# Patient Record
Sex: MALE | Race: WHITE | Employment: OTHER | URBAN - METROPOLITAN AREA
[De-identification: names, ages, dates, MRNs, and addresses within clinical notes are randomized per-mention and may not be internally consistent; named-entity substitution may affect disease eponyms.]

---

## 2017-01-20 RX ORDER — LEVOCETIRIZINE DIHYDROCHLORIDE 5 MG/1
TABLET, FILM COATED ORAL
Qty: 30 TABLET | Refills: 2 | Status: SHIPPED | OUTPATIENT
Start: 2017-01-20 | End: 2017-04-17 | Stop reason: SDUPTHER

## 2017-02-08 RX ORDER — TRAMADOL HYDROCHLORIDE 50 MG/1
50-100 TABLET ORAL EVERY 6 HOURS PRN
Qty: 60 TABLET | Refills: 0 | Status: SHIPPED | OUTPATIENT
Start: 2017-02-08 | End: 2017-04-19 | Stop reason: SDUPTHER

## 2017-02-17 ENCOUNTER — OFFICE VISIT (OUTPATIENT)
Dept: FAMILY MEDICINE CLINIC | Age: 67
End: 2017-02-17

## 2017-02-17 VITALS
SYSTOLIC BLOOD PRESSURE: 118 MMHG | HEIGHT: 73 IN | DIASTOLIC BLOOD PRESSURE: 76 MMHG | WEIGHT: 170.4 LBS | OXYGEN SATURATION: 99 % | BODY MASS INDEX: 22.58 KG/M2 | HEART RATE: 98 BPM | RESPIRATION RATE: 18 BRPM

## 2017-02-17 DIAGNOSIS — J01.40 ACUTE NON-RECURRENT PANSINUSITIS: ICD-10-CM

## 2017-02-17 DIAGNOSIS — B37.0 THRUSH: Primary | ICD-10-CM

## 2017-02-17 DIAGNOSIS — J45.901 ASTHMA EXACERBATION: ICD-10-CM

## 2017-02-17 PROCEDURE — 99214 OFFICE O/P EST MOD 30 MIN: CPT | Performed by: NURSE PRACTITIONER

## 2017-02-17 RX ORDER — PREDNISONE 10 MG/1
TABLET ORAL
Qty: 36 TABLET | Refills: 0 | Status: SHIPPED | OUTPATIENT
Start: 2017-02-17 | End: 2017-04-07 | Stop reason: ALTCHOICE

## 2017-02-17 RX ORDER — TIZANIDINE 4 MG/1
TABLET ORAL
Qty: 30 TABLET | Refills: 5 | Status: SHIPPED | OUTPATIENT
Start: 2017-02-17 | End: 2017-06-13

## 2017-02-17 RX ORDER — AMOXICILLIN AND CLAVULANATE POTASSIUM 875; 125 MG/1; MG/1
1 TABLET, FILM COATED ORAL 2 TIMES DAILY
Qty: 20 TABLET | Refills: 0 | Status: SHIPPED | OUTPATIENT
Start: 2017-02-17 | End: 2017-02-27

## 2017-02-17 RX ORDER — TOPIRAMATE 25 MG/1
TABLET ORAL
Qty: 240 TABLET | Refills: 0 | Status: SHIPPED | OUTPATIENT
Start: 2017-02-17 | End: 2017-03-15 | Stop reason: SDUPTHER

## 2017-02-17 RX ORDER — CLOTRIMAZOLE 10 MG/1
10 LOZENGE ORAL; TOPICAL
Qty: 50 TABLET | Refills: 0 | Status: SHIPPED | OUTPATIENT
Start: 2017-02-17 | End: 2017-03-10 | Stop reason: SDUPTHER

## 2017-03-10 ENCOUNTER — TELEPHONE (OUTPATIENT)
Dept: FAMILY MEDICINE CLINIC | Age: 67
End: 2017-03-10

## 2017-03-10 DIAGNOSIS — B37.0 THRUSH: ICD-10-CM

## 2017-03-10 RX ORDER — CLOTRIMAZOLE 10 MG/1
10 LOZENGE ORAL; TOPICAL
Qty: 50 TABLET | Refills: 2 | Status: SHIPPED | OUTPATIENT
Start: 2017-03-10 | End: 2018-03-13 | Stop reason: SDUPTHER

## 2017-03-16 RX ORDER — MOMETASONE FUROATE AND FORMOTEROL FUMARATE DIHYDRATE 200; 5 UG/1; UG/1
AEROSOL RESPIRATORY (INHALATION)
Qty: 13 G | Refills: 5 | Status: SHIPPED | OUTPATIENT
Start: 2017-03-16 | End: 2017-08-03 | Stop reason: SDUPTHER

## 2017-03-16 RX ORDER — TOPIRAMATE 25 MG/1
TABLET ORAL
Qty: 240 TABLET | Refills: 0 | Status: SHIPPED | OUTPATIENT
Start: 2017-03-16 | End: 2017-04-19 | Stop reason: SDUPTHER

## 2017-04-07 ENCOUNTER — OFFICE VISIT (OUTPATIENT)
Dept: FAMILY MEDICINE CLINIC | Age: 67
End: 2017-04-07

## 2017-04-07 VITALS
RESPIRATION RATE: 16 BRPM | HEART RATE: 78 BPM | BODY MASS INDEX: 23.59 KG/M2 | HEIGHT: 73 IN | SYSTOLIC BLOOD PRESSURE: 124 MMHG | DIASTOLIC BLOOD PRESSURE: 78 MMHG | OXYGEN SATURATION: 98 % | WEIGHT: 178 LBS

## 2017-04-07 DIAGNOSIS — J01.41 ACUTE RECURRENT PANSINUSITIS: Primary | ICD-10-CM

## 2017-04-07 DIAGNOSIS — B37.0 ORAL THRUSH: ICD-10-CM

## 2017-04-07 PROCEDURE — 99214 OFFICE O/P EST MOD 30 MIN: CPT | Performed by: FAMILY MEDICINE

## 2017-04-07 RX ORDER — AMOXICILLIN AND CLAVULANATE POTASSIUM 875; 125 MG/1; MG/1
1 TABLET, FILM COATED ORAL 2 TIMES DAILY
Qty: 20 TABLET | Refills: 0 | Status: SHIPPED | OUTPATIENT
Start: 2017-04-07 | End: 2017-04-17

## 2017-04-07 ASSESSMENT — PATIENT HEALTH QUESTIONNAIRE - PHQ9
2. FEELING DOWN, DEPRESSED OR HOPELESS: 0
1. LITTLE INTEREST OR PLEASURE IN DOING THINGS: 0
SUM OF ALL RESPONSES TO PHQ9 QUESTIONS 1 & 2: 0
SUM OF ALL RESPONSES TO PHQ QUESTIONS 1-9: 0

## 2017-04-07 ASSESSMENT — ENCOUNTER SYMPTOMS
HOARSE VOICE: 0
SORE THROAT: 1
COUGH: 0
SINUS PRESSURE: 1
SHORTNESS OF BREATH: 1
SWOLLEN GLANDS: 0

## 2017-04-17 RX ORDER — LEVOCETIRIZINE DIHYDROCHLORIDE 5 MG/1
TABLET, FILM COATED ORAL
Qty: 30 TABLET | Refills: 2 | Status: SHIPPED | OUTPATIENT
Start: 2017-04-17 | End: 2017-08-23 | Stop reason: SDUPTHER

## 2017-04-19 ENCOUNTER — TELEPHONE (OUTPATIENT)
Dept: FAMILY MEDICINE CLINIC | Age: 67
End: 2017-04-19

## 2017-04-19 RX ORDER — TRAMADOL HYDROCHLORIDE 50 MG/1
50-100 TABLET ORAL EVERY 6 HOURS PRN
Qty: 60 TABLET | Refills: 0 | Status: SHIPPED | OUTPATIENT
Start: 2017-04-19 | End: 2017-06-09 | Stop reason: SDUPTHER

## 2017-04-19 RX ORDER — TOPIRAMATE 25 MG/1
TABLET ORAL
Qty: 240 TABLET | Refills: 0 | Status: SHIPPED | OUTPATIENT
Start: 2017-04-19 | End: 2017-05-17 | Stop reason: SDUPTHER

## 2017-05-30 ENCOUNTER — TELEPHONE (OUTPATIENT)
Dept: FAMILY MEDICINE CLINIC | Age: 67
End: 2017-05-30

## 2017-06-09 RX ORDER — TRAMADOL HYDROCHLORIDE 50 MG/1
TABLET ORAL
Qty: 60 TABLET | Refills: 0 | Status: SHIPPED | OUTPATIENT
Start: 2017-06-09 | End: 2017-06-13 | Stop reason: SDUPTHER

## 2017-06-13 ENCOUNTER — OFFICE VISIT (OUTPATIENT)
Dept: FAMILY MEDICINE CLINIC | Age: 67
End: 2017-06-13

## 2017-06-13 VITALS
WEIGHT: 175 LBS | SYSTOLIC BLOOD PRESSURE: 134 MMHG | HEART RATE: 88 BPM | DIASTOLIC BLOOD PRESSURE: 74 MMHG | HEIGHT: 73 IN | BODY MASS INDEX: 23.19 KG/M2 | RESPIRATION RATE: 15 BRPM | OXYGEN SATURATION: 98 %

## 2017-06-13 DIAGNOSIS — R07.81 PLEURITIC PAIN: ICD-10-CM

## 2017-06-13 DIAGNOSIS — G89.29 CHRONIC NONINTRACTABLE HEADACHE, UNSPECIFIED HEADACHE TYPE: ICD-10-CM

## 2017-06-13 DIAGNOSIS — M25.471 ANKLE SWELLING, RIGHT: Primary | ICD-10-CM

## 2017-06-13 DIAGNOSIS — D69.2 PURPURA (HCC): ICD-10-CM

## 2017-06-13 DIAGNOSIS — M25.571 CHRONIC PAIN OF RIGHT ANKLE: ICD-10-CM

## 2017-06-13 DIAGNOSIS — R51.9 CHRONIC NONINTRACTABLE HEADACHE, UNSPECIFIED HEADACHE TYPE: ICD-10-CM

## 2017-06-13 DIAGNOSIS — J45.40 MODERATE PERSISTENT ASTHMA WITHOUT COMPLICATION: ICD-10-CM

## 2017-06-13 DIAGNOSIS — G89.29 CHRONIC PAIN OF RIGHT ANKLE: ICD-10-CM

## 2017-06-13 LAB
A/G RATIO: 1.9 (ref 1.1–2.2)
ALBUMIN SERPL-MCNC: 4.4 G/DL (ref 3.4–5)
ALP BLD-CCNC: 87 U/L (ref 40–129)
ALT SERPL-CCNC: 21 U/L (ref 10–40)
ANION GAP SERPL CALCULATED.3IONS-SCNC: 14 MMOL/L (ref 3–16)
AST SERPL-CCNC: 20 U/L (ref 15–37)
BASOPHILS ABSOLUTE: 0 K/UL (ref 0–0.2)
BASOPHILS RELATIVE PERCENT: 0.4 %
BILIRUB SERPL-MCNC: 0.5 MG/DL (ref 0–1)
BUN BLDV-MCNC: 10 MG/DL (ref 7–20)
CALCIUM SERPL-MCNC: 9.2 MG/DL (ref 8.3–10.6)
CHLORIDE BLD-SCNC: 106 MMOL/L (ref 99–110)
CO2: 23 MMOL/L (ref 21–32)
CREAT SERPL-MCNC: 0.7 MG/DL (ref 0.8–1.3)
D DIMER: <200 NG/ML DDU (ref 0–229)
EOSINOPHILS ABSOLUTE: 0.1 K/UL (ref 0–0.6)
EOSINOPHILS RELATIVE PERCENT: 1.2 %
GFR AFRICAN AMERICAN: >60
GFR NON-AFRICAN AMERICAN: >60
GLOBULIN: 2.3 G/DL
GLUCOSE BLD-MCNC: 90 MG/DL (ref 70–99)
HCT VFR BLD CALC: 42.2 % (ref 40.5–52.5)
HEMOGLOBIN: 14.1 G/DL (ref 13.5–17.5)
LYMPHOCYTES ABSOLUTE: 1.8 K/UL (ref 1–5.1)
LYMPHOCYTES RELATIVE PERCENT: 24.4 %
MCH RBC QN AUTO: 32.7 PG (ref 26–34)
MCHC RBC AUTO-ENTMCNC: 33.3 G/DL (ref 31–36)
MCV RBC AUTO: 98.2 FL (ref 80–100)
MONOCYTES ABSOLUTE: 0.6 K/UL (ref 0–1.3)
MONOCYTES RELATIVE PERCENT: 9 %
NEUTROPHILS ABSOLUTE: 4.7 K/UL (ref 1.7–7.7)
NEUTROPHILS RELATIVE PERCENT: 65 %
PDW BLD-RTO: 13.4 % (ref 12.4–15.4)
PLATELET # BLD: 253 K/UL (ref 135–450)
PMV BLD AUTO: 7.1 FL (ref 5–10.5)
POTASSIUM SERPL-SCNC: 4.6 MMOL/L (ref 3.5–5.1)
RBC # BLD: 4.3 M/UL (ref 4.2–5.9)
SODIUM BLD-SCNC: 143 MMOL/L (ref 136–145)
TOTAL PROTEIN: 6.7 G/DL (ref 6.4–8.2)
WBC # BLD: 7.2 K/UL (ref 4–11)

## 2017-06-13 PROCEDURE — 36415 COLL VENOUS BLD VENIPUNCTURE: CPT | Performed by: FAMILY MEDICINE

## 2017-06-13 PROCEDURE — 99214 OFFICE O/P EST MOD 30 MIN: CPT | Performed by: FAMILY MEDICINE

## 2017-06-13 RX ORDER — BACLOFEN 10 MG/1
10 TABLET ORAL 3 TIMES DAILY PRN
Qty: 90 TABLET | Refills: 2 | Status: SHIPPED | OUTPATIENT
Start: 2017-06-13 | End: 2017-10-16

## 2017-06-13 RX ORDER — TRAMADOL HYDROCHLORIDE 50 MG/1
TABLET ORAL
Qty: 60 TABLET | Refills: 1 | Status: SHIPPED | OUTPATIENT
Start: 2017-06-13 | End: 2017-09-11 | Stop reason: SDUPTHER

## 2017-06-13 RX ORDER — RIZATRIPTAN BENZOATE 10 MG/1
TABLET, ORALLY DISINTEGRATING ORAL
Qty: 9 TABLET | Refills: 5 | Status: SHIPPED | OUTPATIENT
Start: 2017-06-13 | End: 2017-07-03

## 2017-06-16 ENCOUNTER — TELEPHONE (OUTPATIENT)
Dept: FAMILY MEDICINE CLINIC | Age: 67
End: 2017-06-16

## 2017-06-16 ENCOUNTER — HOSPITAL ENCOUNTER (OUTPATIENT)
Dept: OTHER | Age: 67
Discharge: HOME | End: 2017-06-16
Attending: FAMILY MEDICINE | Admitting: FAMILY MEDICINE

## 2017-06-16 DIAGNOSIS — M25.571 CHRONIC PAIN OF RIGHT ANKLE: ICD-10-CM

## 2017-06-16 DIAGNOSIS — M25.471 ANKLE SWELLING, RIGHT: ICD-10-CM

## 2017-06-16 DIAGNOSIS — G89.29 CHRONIC PAIN OF RIGHT ANKLE: ICD-10-CM

## 2017-06-16 RX ORDER — ERGOCALCIFEROL 1.25 MG/1
CAPSULE ORAL
Qty: 4 CAPSULE | Refills: 5 | Status: SHIPPED | OUTPATIENT
Start: 2017-06-16 | End: 2017-11-25 | Stop reason: SDUPTHER

## 2017-06-19 ENCOUNTER — TELEPHONE (OUTPATIENT)
Dept: FAMILY MEDICINE CLINIC | Age: 67
End: 2017-06-19

## 2017-06-19 DIAGNOSIS — M25.571 RIGHT ANKLE PAIN, UNSPECIFIED CHRONICITY: Primary | ICD-10-CM

## 2017-06-19 RX ORDER — MELOXICAM 15 MG/1
15 TABLET ORAL DAILY
Qty: 30 TABLET | Refills: 0 | Status: SHIPPED | OUTPATIENT
Start: 2017-06-19 | End: 2017-07-28 | Stop reason: SDUPTHER

## 2017-07-03 ENCOUNTER — TELEPHONE (OUTPATIENT)
Dept: FAMILY MEDICINE CLINIC | Age: 67
End: 2017-07-03

## 2017-07-03 RX ORDER — RIZATRIPTAN BENZOATE 10 MG/1
TABLET, ORALLY DISINTEGRATING ORAL
Qty: 18 TABLET | Refills: 5 | Status: SHIPPED | OUTPATIENT
Start: 2017-07-03 | End: 2018-02-12 | Stop reason: SDUPTHER

## 2017-07-03 RX ORDER — RIZATRIPTAN BENZOATE 10 MG/1
TABLET, ORALLY DISINTEGRATING ORAL
Qty: 9 TABLET | Refills: 5 | Status: SHIPPED | OUTPATIENT
Start: 2017-07-03 | End: 2017-07-03 | Stop reason: SDUPTHER

## 2017-07-28 DIAGNOSIS — M25.571 RIGHT ANKLE PAIN, UNSPECIFIED CHRONICITY: ICD-10-CM

## 2017-07-28 RX ORDER — MELOXICAM 15 MG/1
TABLET ORAL
Qty: 30 TABLET | Refills: 0 | Status: SHIPPED | OUTPATIENT
Start: 2017-07-28 | End: 2017-08-23 | Stop reason: SDUPTHER

## 2017-07-31 RX ORDER — ONDANSETRON 4 MG/1
TABLET, ORALLY DISINTEGRATING ORAL
Qty: 10 TABLET | Refills: 4 | Status: SHIPPED | OUTPATIENT
Start: 2017-07-31 | End: 2017-10-19 | Stop reason: ALTCHOICE

## 2017-08-03 ENCOUNTER — TELEPHONE (OUTPATIENT)
Dept: FAMILY MEDICINE CLINIC | Age: 67
End: 2017-08-03

## 2017-08-23 DIAGNOSIS — M25.571 RIGHT ANKLE PAIN, UNSPECIFIED CHRONICITY: ICD-10-CM

## 2017-08-23 RX ORDER — LEVOCETIRIZINE DIHYDROCHLORIDE 5 MG/1
TABLET, FILM COATED ORAL
Qty: 30 TABLET | Refills: 2 | Status: SHIPPED | OUTPATIENT
Start: 2017-08-23 | End: 2017-11-16 | Stop reason: SDUPTHER

## 2017-08-23 RX ORDER — TOPIRAMATE 25 MG/1
TABLET ORAL
Qty: 240 TABLET | Refills: 2 | Status: SHIPPED | OUTPATIENT
Start: 2017-08-23 | End: 2017-11-16 | Stop reason: SDUPTHER

## 2017-08-23 RX ORDER — MELOXICAM 15 MG/1
TABLET ORAL
Qty: 30 TABLET | Refills: 0 | Status: SHIPPED | OUTPATIENT
Start: 2017-08-23 | End: 2017-09-12 | Stop reason: SDUPTHER

## 2017-09-12 DIAGNOSIS — M25.571 RIGHT ANKLE PAIN, UNSPECIFIED CHRONICITY: ICD-10-CM

## 2017-09-12 RX ORDER — TRAMADOL HYDROCHLORIDE 50 MG/1
TABLET ORAL
Qty: 60 TABLET | Refills: 0 | Status: SHIPPED | OUTPATIENT
Start: 2017-09-12 | End: 2017-10-17 | Stop reason: SDUPTHER

## 2017-09-13 RX ORDER — MELOXICAM 15 MG/1
TABLET ORAL
Qty: 30 TABLET | Refills: 0 | Status: SHIPPED | OUTPATIENT
Start: 2017-09-13 | End: 2017-10-16 | Stop reason: SDUPTHER

## 2017-10-16 ENCOUNTER — TELEPHONE (OUTPATIENT)
Dept: FAMILY MEDICINE CLINIC | Age: 67
End: 2017-10-16

## 2017-10-16 DIAGNOSIS — M25.571 RIGHT ANKLE PAIN, UNSPECIFIED CHRONICITY: ICD-10-CM

## 2017-10-16 NOTE — TELEPHONE ENCOUNTER
PT WANTING TO GO BACK ON FLEXERIL - STOP TAKING THE BACLOFEN - IT ISN'T WORKING THAT WELL FOR HIM      [facility]  [place], [address] [facility] -  [phone] - F [phone]      PT @  [phone]

## 2017-10-17 RX ORDER — TIZANIDINE 4 MG/1
TABLET ORAL
Qty: 30 TABLET | Refills: 2 | Status: SHIPPED | OUTPATIENT
Start: 2017-10-17 | End: 2017-10-18 | Stop reason: SDUPTHER

## 2017-10-17 RX ORDER — MELOXICAM 15 MG/1
TABLET ORAL
Qty: 30 TABLET | Refills: 5 | Status: SHIPPED | OUTPATIENT
Start: 2017-10-17 | End: 2018-04-12 | Stop reason: SDUPTHER

## 2017-10-17 RX ORDER — TRAMADOL HYDROCHLORIDE 50 MG/1
TABLET ORAL
Qty: 60 TABLET | Refills: 0 | Status: SHIPPED | OUTPATIENT
Start: 2017-10-17 | End: 2017-11-16 | Stop reason: SDUPTHER

## 2017-10-17 NOTE — TELEPHONE ENCOUNTER
SPOKE  TO PT AND ADVISED. HE WOULD LIKE TO TRY THE TIZANIDINE. MED PENDED TO [facility] FOR THE PREVIOUS DOSE HE WAS ON.

## 2017-10-18 ENCOUNTER — TELEPHONE (OUTPATIENT)
Dept: FAMILY MEDICINE CLINIC | Age: 67
End: 2017-10-18

## 2017-10-18 RX ORDER — TIZANIDINE 4 MG/1
TABLET ORAL
Qty: 30 TABLET | Refills: 2 | Status: SHIPPED | OUTPATIENT
Start: 2017-10-18 | End: 2018-01-15 | Stop reason: SDUPTHER

## 2017-10-18 NOTE — TELEPHONE ENCOUNTER
[facility] [address] - [place] [phone]      tiZANidine (ZANAFLEX) 4 MG tablet         Script was faxed yesterday -- it wasn't signed.    Please sign and fax back - thank you

## 2017-10-19 ENCOUNTER — OFFICE VISIT (OUTPATIENT)
Dept: FAMILY MEDICINE CLINIC | Age: 67
End: 2017-10-19

## 2017-10-19 VITALS
HEIGHT: 73 IN | DIASTOLIC BLOOD PRESSURE: 70 MMHG | WEIGHT: 173 LBS | BODY MASS INDEX: 22.93 KG/M2 | TEMPERATURE: 98 F | SYSTOLIC BLOOD PRESSURE: 122 MMHG | RESPIRATION RATE: 14 BRPM | HEART RATE: 74 BPM

## 2017-10-19 DIAGNOSIS — H00.015 HORDEOLUM EXTERNUM OF LEFT LOWER EYELID: Primary | ICD-10-CM

## 2017-10-19 DIAGNOSIS — B37.0 ORAL PHARYNGEAL CANDIDIASIS: ICD-10-CM

## 2017-10-19 PROCEDURE — 99213 OFFICE O/P EST LOW 20 MIN: CPT | Performed by: NURSE PRACTITIONER

## 2017-10-19 RX ORDER — FLUCONAZOLE 100 MG/1
TABLET ORAL
Qty: 15 TABLET | Refills: 0 | Status: SHIPPED | OUTPATIENT
Start: 2017-10-19 | End: 2018-09-11

## 2017-10-19 RX ORDER — SULFACETAMIDE SODIUM 100 MG/ML
2 SOLUTION/ DROPS OPHTHALMIC 4 TIMES DAILY
Qty: 15 ML | Refills: 0 | Status: SHIPPED | OUTPATIENT
Start: 2017-10-19 | End: 2018-07-10 | Stop reason: SDUPTHER

## 2017-10-19 NOTE — PROGRESS NOTES
06/13/17 175 lb (79.4 kg)   04/07/17 178 lb (80.7 kg)     BP Readings from Last 3 Encounters:   10/19/17 122/70   06/13/17 134/74   04/07/17 124/78        No results found for this visit on 10/19/17. Assessment    1. Hordeolum externum of left lower eyelid  sulfacetamide (BLEPH-10) 10 % ophthalmic solution   2. Oral pharyngeal candidiasis  fluconazole (DIFLUCAN) 100 MG tablet         Plan    Continue warm compresses 15-20 minutes 3-4 times daily to stye. Started on antibiotic eye drops. Thrush:  Ok to continue nystatin oral swish. Prescribed diflucan for additional treatment. Return if symptoms worsen or fail to improve.

## 2017-10-23 RX ORDER — INHALER, ASSIST DEVICES
SPACER (EA) MISCELLANEOUS
Qty: 1 DEVICE | Refills: 0 | Status: SHIPPED | OUTPATIENT
Start: 2017-10-23 | End: 2022-04-21 | Stop reason: SDUPTHER

## 2017-11-01 ENCOUNTER — TELEPHONE (OUTPATIENT)
Dept: FAMILY MEDICINE CLINIC | Age: 67
End: 2017-11-01

## 2017-11-01 RX ORDER — NEOMYCIN/POLYMYXIN B/HYDROCORT 3.5-10K-1
2 SUSPENSION, DROPS(FINAL DOSAGE FORM)(ML) OPHTHALMIC (EYE) 4 TIMES DAILY
Qty: 1 BOTTLE | Refills: 0 | Status: SHIPPED | OUTPATIENT
Start: 2017-11-01 | End: 2018-09-11 | Stop reason: ALTCHOICE

## 2017-11-01 NOTE — TELEPHONE ENCOUNTER
CALLED NAD SPOKE TO PATIENT. HE STATES THE WHITE PART OF THE STYE IS GONE, BUT STILL OOZING, AND BURNING AND IS REDDNESS STILL. BUT HE FEELS LIKE IT IS OVER ALL GETTING BETTER. PLEASE ADVISE?

## 2017-11-01 NOTE — TELEPHONE ENCOUNTER
I sent in a new drop with a couple of antibiotics in combination with a steroid. If this is not clearing the stye, I recommend an eye specialist to look at the lesion.

## 2017-11-01 NOTE — TELEPHONE ENCOUNTER
Patient is asking if he should get the flu shot and/ or the shingles shot    Also says his eye is still a bothersome    Not sure if he is going to need to come back in or not   said he will watch if for a couple of days

## 2017-11-17 RX ORDER — LEVOCETIRIZINE DIHYDROCHLORIDE 5 MG/1
TABLET, FILM COATED ORAL
Qty: 30 TABLET | Refills: 2 | Status: SHIPPED | OUTPATIENT
Start: 2017-11-17 | End: 2018-02-12

## 2017-11-17 RX ORDER — TOPIRAMATE 25 MG/1
TABLET ORAL
Qty: 240 TABLET | Refills: 2 | Status: SHIPPED | OUTPATIENT
Start: 2017-11-17 | End: 2018-02-12 | Stop reason: SDUPTHER

## 2017-11-27 RX ORDER — ERGOCALCIFEROL 1.25 MG/1
CAPSULE ORAL
Qty: 4 CAPSULE | Refills: 5 | Status: SHIPPED | OUTPATIENT
Start: 2017-11-27 | End: 2018-06-12 | Stop reason: SDUPTHER

## 2017-12-01 ENCOUNTER — OFFICE VISIT (OUTPATIENT)
Dept: FAMILY MEDICINE CLINIC | Age: 67
End: 2017-12-01

## 2017-12-01 VITALS
HEIGHT: 73 IN | BODY MASS INDEX: 23.17 KG/M2 | WEIGHT: 174.8 LBS | TEMPERATURE: 98.6 F | DIASTOLIC BLOOD PRESSURE: 68 MMHG | RESPIRATION RATE: 16 BRPM | HEART RATE: 84 BPM | SYSTOLIC BLOOD PRESSURE: 110 MMHG | OXYGEN SATURATION: 98 %

## 2017-12-01 DIAGNOSIS — J02.9 SORE THROAT: ICD-10-CM

## 2017-12-01 DIAGNOSIS — J06.9 VIRAL URI: Primary | ICD-10-CM

## 2017-12-01 DIAGNOSIS — R07.89 CHEST TIGHTNESS: ICD-10-CM

## 2017-12-01 LAB — S PYO AG THROAT QL: NORMAL

## 2017-12-01 PROCEDURE — 87880 STREP A ASSAY W/OPTIC: CPT | Performed by: NURSE PRACTITIONER

## 2017-12-01 PROCEDURE — 99214 OFFICE O/P EST MOD 30 MIN: CPT | Performed by: NURSE PRACTITIONER

## 2017-12-01 PROCEDURE — 94640 AIRWAY INHALATION TREATMENT: CPT | Performed by: NURSE PRACTITIONER

## 2017-12-01 RX ORDER — PREDNISONE 10 MG/1
TABLET ORAL
Qty: 20 TABLET | Refills: 0 | Status: SHIPPED | OUTPATIENT
Start: 2017-12-01 | End: 2018-02-23 | Stop reason: ALTCHOICE

## 2017-12-01 RX ORDER — ALBUTEROL SULFATE 2.5 MG/3ML
2.5 SOLUTION RESPIRATORY (INHALATION) ONCE
Status: COMPLETED | OUTPATIENT
Start: 2017-12-01 | End: 2017-12-01

## 2017-12-01 RX ORDER — AZITHROMYCIN 250 MG/1
250 TABLET, FILM COATED ORAL DAILY
Qty: 6 TABLET | Refills: 0 | Status: SHIPPED | OUTPATIENT
Start: 2017-12-01 | End: 2017-12-07

## 2017-12-01 RX ADMIN — ALBUTEROL SULFATE 2.5 MG: 2.5 SOLUTION RESPIRATORY (INHALATION) at 16:25

## 2017-12-01 ASSESSMENT — ENCOUNTER SYMPTOMS
COUGH: 1
CHEST TIGHTNESS: 1

## 2017-12-01 NOTE — PROGRESS NOTES
visit:      Sore throat  -     POCT rapid strep A- Negative  Viral URI  Chest tightness  RESP handout reviewed with pt he is encouraged to continue treating the symptoms  Breathing treatment x 1- pt states his chest does not feel as tight afterwARDS  albuterol (PROVENTIL) nebulizer solution 2.5 mg; Take 3 mLs by nebulization once  CA PRESSURIZED/NONPRESSURIZED INHALATION TREATMENT  predniSONE (DELTASONE) 10 MG tablet; 4 tabs x 2 d 3 tabs x 2 d 2 tabs x 2 d 1  tab x 2 d in am with food  avoid NSAIDs while on this medication  azithromycin (ZITHROMAX) 250 MG tablet;  Take 1 tablet by mouth daily for 6 days 2 tabs day 1 then 1 tab day 2- 5

## 2017-12-01 NOTE — PATIENT INSTRUCTIONS
Instructions for Respiratory Infections (SAVE THIS SHEET)   For the first 7-14 days of symptoms follow instructions below, even before being seen in the office or even during treatment with antibiotics, until symptom free. 1. Water: Drink 1 ounce of water for every 2 pounds of body weight for adults need 64 Ounces of water per day. This will loosen mucus in the head and chest & improve the weak feeling of dehydration, allow the body to get germ fighting resources to the infection. Half can be juice or sugar free Crystal Light. Don't count drinks with caffeine or carbonation. Infants can have Pedialyte liquid or freezer pops. Avoid salt if you have high Blood Pressure, swelling in the feet or ankles or have heart problems. 2. Humidity: Humidify the air to 35-50% ( or until the windows fog over slightly). Can use a humidifier, vaporizer, boil water on the stove or put a coffee can full of water on the heater vents. This will loosen mucus from infections and allergies. 3. Sleep: Get 8-10 hours a night and rest during the evening after work or school. If you have trouble sleeping, adults can take Melatonin 3mg up to 3 tabs at bedtime ( not for children or pregnant women). If Mono is suspected then sleep during 9PM to 9AM time span (if possible.)   4. Cough: Take cough medicines with Guaifenesin ( to loosen chest or head congestion) and Dextromethorphan ( to decrease excess cough). Robitussin D.M. Syrup every 4-6 hrs or Mucinex D.M. Pills twice a day. Use the pediatric formulations for children over 6 months making sure they are alcohol & sugar free for children, pregnant women, and diabetics. 5. Pain And Fevers: Take Acetaminophen ( Tylenol) for fevers, aches, and headaches. 2-500 mg every 8 hours for adults. Appropriate doses at bedtime for children may help them sleep better. If pregnant take 1 -500 mg. (Tylenol) every 8 hours as needed.  Ibuprofen may be used if not pregnant, but should be given with food to

## 2017-12-11 ENCOUNTER — TELEPHONE (OUTPATIENT)
Dept: FAMILY MEDICINE CLINIC | Age: 67
End: 2017-12-11

## 2017-12-11 NOTE — TELEPHONE ENCOUNTER
Pt saw Tara Wallace on 12/01/17 -- she was going to see if she could get a breathing machine for him  - he had a treatment while he was here and it really helped      Please call @  [phone]

## 2017-12-14 ENCOUNTER — TELEPHONE (OUTPATIENT)
Dept: FAMILY MEDICINE CLINIC | Age: 67
End: 2017-12-14

## 2017-12-14 DIAGNOSIS — J45.909 UNCOMPLICATED ASTHMA, UNSPECIFIED ASTHMA SEVERITY, UNSPECIFIED WHETHER PERSISTENT: Primary | ICD-10-CM

## 2017-12-18 ENCOUNTER — TELEPHONE (OUTPATIENT)
Dept: FAMILY MEDICINE CLINIC | Age: 67
End: 2017-12-18

## 2017-12-18 RX ORDER — ALBUTEROL SULFATE 2.5 MG/3ML
2.5 SOLUTION RESPIRATORY (INHALATION) EVERY 4 HOURS PRN
Qty: 25 VIAL | Refills: 2 | Status: SHIPPED | OUTPATIENT
Start: 2017-12-18 | End: 2019-08-26

## 2017-12-18 NOTE — TELEPHONE ENCOUNTER
CALLED AND SPOKE TO PATIENT. HE SAID HE THOUGHT HE WAS SUPPOSED TO GET THE NEBULIZER SOLUTION THAT COMES IN LITTLE VIALS AND JUST USE THE CONTENTS OF ONE VIAL PER TREATMENT 4 TIMES DAILY. WHEN HE WENT TO THE PHARMACY THE SCRIPT THAT WAS SENT OVER WAS FOR A CONCENTRATE AND HE HAD TO MIX THIS UP AND STUFF LIKE THAT. CAN WE CHANGE THIS SO HE CAN GET THE VIALS IN STEAD.

## 2017-12-18 NOTE — TELEPHONE ENCOUNTER
Patient called and stated that he talked to Melissa Hernández on the phone about his Abuterol rx he has questions on how to use it       Please call patient back

## 2017-12-28 ENCOUNTER — OFFICE VISIT (OUTPATIENT)
Dept: FAMILY MEDICINE CLINIC | Age: 67
End: 2017-12-28

## 2017-12-28 VITALS
HEART RATE: 80 BPM | DIASTOLIC BLOOD PRESSURE: 70 MMHG | BODY MASS INDEX: 23.7 KG/M2 | TEMPERATURE: 97.8 F | WEIGHT: 178.8 LBS | SYSTOLIC BLOOD PRESSURE: 128 MMHG | HEIGHT: 73 IN | OXYGEN SATURATION: 96 % | RESPIRATION RATE: 16 BRPM

## 2017-12-28 DIAGNOSIS — Z79.899 LONG TERM USE OF DRUG: ICD-10-CM

## 2017-12-28 DIAGNOSIS — E55.9 VITAMIN D DEFICIENCY: ICD-10-CM

## 2017-12-28 DIAGNOSIS — E53.8 B12 DEFICIENCY: ICD-10-CM

## 2017-12-28 DIAGNOSIS — M19.90 ARTHRITIS: ICD-10-CM

## 2017-12-28 DIAGNOSIS — R53.83 FATIGUE, UNSPECIFIED TYPE: ICD-10-CM

## 2017-12-28 DIAGNOSIS — Z12.5 SCREENING FOR PROSTATE CANCER: ICD-10-CM

## 2017-12-28 DIAGNOSIS — G43.719 INTRACTABLE CHRONIC MIGRAINE WITHOUT AURA AND WITHOUT STATUS MIGRAINOSUS: Primary | ICD-10-CM

## 2017-12-28 LAB
A/G RATIO: 1.9 (ref 1.1–2.2)
ALBUMIN SERPL-MCNC: 4.4 G/DL (ref 3.4–5)
ALP BLD-CCNC: 91 U/L (ref 40–129)
ALT SERPL-CCNC: 23 U/L (ref 10–40)
AMPHETAMINE SCREEN, URINE: NORMAL
ANION GAP SERPL CALCULATED.3IONS-SCNC: 13 MMOL/L (ref 3–16)
AST SERPL-CCNC: 24 U/L (ref 15–37)
BARBITURATE SCREEN, URINE: NORMAL
BENZODIAZEPINE SCREEN, URINE: NORMAL
BILIRUB SERPL-MCNC: 0.6 MG/DL (ref 0–1)
BUN BLDV-MCNC: 9 MG/DL (ref 7–20)
CALCIUM SERPL-MCNC: 9.4 MG/DL (ref 8.3–10.6)
CHLORIDE BLD-SCNC: 101 MMOL/L (ref 99–110)
CO2: 26 MMOL/L (ref 21–32)
COCAINE METABOLITE SCREEN URINE: NORMAL
CREAT SERPL-MCNC: 0.6 MG/DL (ref 0.8–1.3)
GFR AFRICAN AMERICAN: >60
GFR NON-AFRICAN AMERICAN: >60
GLOBULIN: 2.3 G/DL
GLUCOSE BLD-MCNC: 89 MG/DL (ref 70–99)
MDMA URINE: NORMAL
METHADONE SCREEN, URINE: NORMAL
METHAMPHETAMINE, URINE: NORMAL
OPIATE SCREEN URINE: NORMAL
OXYCODONE SCREEN URINE: NORMAL
PHENCYCLIDINE SCREEN URINE: NORMAL
POTASSIUM SERPL-SCNC: 3.9 MMOL/L (ref 3.5–5.1)
PROPOXYPHENE SCREEN, URINE: NORMAL
PROSTATE SPECIFIC ANTIGEN: 0.73 NG/ML (ref 0–4)
SODIUM BLD-SCNC: 140 MMOL/L (ref 136–145)
THC: NORMAL
TOTAL PROTEIN: 6.7 G/DL (ref 6.4–8.2)
TRICYCLIC ANTIDEPRESSANTS, UR: NORMAL
TSH REFLEX: 2.18 UIU/ML (ref 0.27–4.2)
VITAMIN D 25-HYDROXY: 59.3 NG/ML

## 2017-12-28 PROCEDURE — 80305 DRUG TEST PRSMV DIR OPT OBS: CPT | Performed by: NURSE PRACTITIONER

## 2017-12-28 PROCEDURE — 36415 COLL VENOUS BLD VENIPUNCTURE: CPT | Performed by: NURSE PRACTITIONER

## 2017-12-28 PROCEDURE — 99214 OFFICE O/P EST MOD 30 MIN: CPT | Performed by: NURSE PRACTITIONER

## 2017-12-28 RX ORDER — TRAMADOL HYDROCHLORIDE 50 MG/1
TABLET ORAL
Qty: 60 TABLET | Refills: 2 | Status: SHIPPED | OUTPATIENT
Start: 2017-12-28 | End: 2018-05-23 | Stop reason: SDUPTHER

## 2017-12-28 NOTE — PROGRESS NOTES
Micaela Lazo  74 y.o. male    1950      CC: Refill of tramadol. Chief Complaint   Patient presents with    Other     TRAMADOL REFILL  UDS TO BE OBTAINED. WOULD LIKE LABS HAS NOT EATEN SINCE 2 AM     HPI  Was taking zyrtecl daily - switched to xyzal at night  Allegra in the am.    Using ipratroprium nasal sprays and an OTC nose sprays - steroid spray. Allergies are pretty well managed   Has migraines in am.   Takes 6 topamax at night and protonix. Has light sensitivity with them. Time stops the headache. The Triptan (maxalt) does not stop it - improves it some. May be food related. Was trying to go to pain clinic. Has been having trouble getting a hold of them. Takes the tramadol for the back pain and leg pain. Some days the pain pain is bad - other days it is ok. Most of the pain is muscle fatigue. Takes the tramadol mostly at night unless has a bad migraine. Uses for bad migraines. If really sick with migraines, does not resolve with the maxalt. Uses a nausea med with the migraines, as well (zofran)  Gets in habit of taking, so tries to get to stop taking the medication. Arthritis has been bothering him so bad - has neck pain and right elbow pain that is significant. Has ankle pain. Has arthritis that resolves with tramadol, as well. Mobic helps some. Did help with the carpal tunnel. Never uses the whole tab of tramadol. Uses a small portion of the tramadol. Sugar bothers him. Allergies   Allergen Reactions    Codeine      Upset stomach     Hydrocodone Nausea And Vomiting       Physical  Examination    Physical Exam   Constitutional: He is oriented to person, place, and time and well-developed, well-nourished, and in no distress. No distress. HENT:   Head: Normocephalic. Right Ear: External ear normal.   Left Ear: External ear normal.   Nose: Nose normal.   Mouth/Throat: Oropharynx is clear and moist.   Eyes: Right eye exhibits no discharge.  Left eye exhibits no discharge. Cardiovascular: Normal rate and regular rhythm. Exam reveals no gallop and no friction rub. No murmur heard. Pulmonary/Chest: Effort normal. No respiratory distress. He has no wheezes. He has no rales. Musculoskeletal: He exhibits tenderness. Low back pain into right hip and into bilateral legs. Right ankle pain. Lymphadenopathy:        Head (right side): No submental, no submandibular and no tonsillar adenopathy present. Head (left side): No submental, no submandibular and no tonsillar adenopathy present. He has no cervical adenopathy. Neurological: He is alert and oriented to person, place, and time. Skin: Skin is warm and dry. He is not diaphoretic. Psychiatric: Mood and affect normal.   Nursing note and vitals reviewed. Vitals:    12/28/17 1149   BP: 128/70   Site: Left Arm   Position: Sitting   Cuff Size: Medium Adult   Pulse: 80   Resp: 16   Temp: 97.8 °F (36.6 °C)   TempSrc: Oral   SpO2: 96%   Weight: 178 lb 12.8 oz (81.1 kg)   Height: 6' 1\" (1.854 m)     Body mass index is 23.59 kg/m². Wt Readings from Last 3 Encounters:   12/28/17 178 lb 12.8 oz (81.1 kg)   12/01/17 174 lb 12.8 oz (79.3 kg)   10/19/17 173 lb (78.5 kg)     BP Readings from Last 3 Encounters:   12/28/17 128/70   12/01/17 110/68   10/19/17 122/70        Results for POC orders placed in visit on 12/28/17   POCT Rapid Drug Screen   Result Value Ref Range    Amphetamine Screen, Urine NEG     Barbiturate Screen, Urine NEG     Benzodiazepine Screen, Urine NEG     COCAINE METABOLITE SCREEN URINE NEG     THC NEG     MDMA URINE NEG     Methadone Screen, Urine NEG     Opiate Scrn, Ur NEG     Oxycodone Screen, Ur NEG     PCP Scrn, Ur NEG     Propoxyphene Screen, Urine NEG     Tricyclic Antidepressants, Ur NEG     Methamphetamine, Urine NEG        Assessment    1. Intractable chronic migraine without aura and without status migrainosus     2. Arthritis     3.  Screening for prostate cancer  Psa screening    Psa screening   4. Long term use of drug  POCT Rapid Drug Screen   5. Fatigue, unspecified type  Testosterone, free, total    Comprehensive Metabolic Panel    TSH with Reflex    Testosterone, free, total    Comprehensive Metabolic Panel    TSH with Reflex   6. Vitamin D deficiency  Vitamin D 25 Hydroxy    Vitamin D 25 Hydroxy   7. B12 deficiency  Vitamin B12    Vitamin B12         Plan    Refilled tramadol - continue use as needed - only use when needed. Labs ordered as above. Return in about 3 months (around 3/28/2018) for Pain mgmt. There are no Patient Instructions on file for this visit. Controlled Substances Monitoring:     Attestation: The Prescription Monitoring Report for this patient was reviewed today. (Falguni Morgan CNP)  Documentation: Medication contract signed today. , Random urine drug screen sent today.  (Hershal Cathy, CNP)

## 2017-12-29 ENCOUNTER — TELEPHONE (OUTPATIENT)
Dept: FAMILY MEDICINE CLINIC | Age: 67
End: 2017-12-29

## 2017-12-29 LAB — VITAMIN B-12: 495 PG/ML (ref 211–911)

## 2017-12-29 NOTE — TELEPHONE ENCOUNTER
Patient is unable to get into his mychart and he wants us to disable mychart - please give him a call with his blood test results.

## 2017-12-29 NOTE — TELEPHONE ENCOUNTER
I do not know how to disable my chart. Please check with front staff  Labs are released other than testosterone which is still pending at this point.  I will recheck on Wednesday went back in the office

## 2017-12-30 LAB
SEX HORMONE BINDING GLOBULIN: 115 NMOL/L (ref 11–80)
TESTOSTERONE FREE-NONMALE: 45.3 PG/ML (ref 47–244)
TESTOSTERONE TOTAL: 556 NG/DL (ref 220–1000)

## 2018-01-15 RX ORDER — TIZANIDINE 4 MG/1
TABLET ORAL
Qty: 30 TABLET | Refills: 2 | Status: SHIPPED | OUTPATIENT
Start: 2018-01-15 | End: 2019-01-08 | Stop reason: ALTCHOICE

## 2018-01-16 ENCOUNTER — TELEPHONE (OUTPATIENT)
Dept: FAMILY MEDICINE CLINIC | Age: 68
End: 2018-01-16

## 2018-02-01 ENCOUNTER — TELEPHONE (OUTPATIENT)
Dept: FAMILY MEDICINE CLINIC | Age: 68
End: 2018-02-01

## 2018-02-01 DIAGNOSIS — J45.909 UNCOMPLICATED ASTHMA, UNSPECIFIED ASTHMA SEVERITY, UNSPECIFIED WHETHER PERSISTENT: Primary | ICD-10-CM

## 2018-02-01 RX ORDER — SODIUM CHLORIDE FOR INHALATION 0.9 %
3 VIAL, NEBULIZER (ML) INHALATION PRN
Qty: 3 ML | Refills: 4 | Status: SHIPPED | OUTPATIENT
Start: 2018-02-01 | End: 2018-03-03

## 2018-02-12 RX ORDER — LEVOCETIRIZINE DIHYDROCHLORIDE 5 MG/1
TABLET, FILM COATED ORAL
Qty: 30 TABLET | Refills: 2 | Status: SHIPPED | OUTPATIENT
Start: 2018-02-12 | End: 2018-05-12 | Stop reason: SDUPTHER

## 2018-02-12 RX ORDER — TOPIRAMATE 25 MG/1
TABLET ORAL
Qty: 240 TABLET | Refills: 2 | Status: SHIPPED | OUTPATIENT
Start: 2018-02-12 | End: 2018-05-12 | Stop reason: SDUPTHER

## 2018-02-12 RX ORDER — TIZANIDINE 4 MG/1
TABLET ORAL
Qty: 30 TABLET | Refills: 2 | Status: SHIPPED | OUTPATIENT
Start: 2018-02-12 | End: 2018-02-23 | Stop reason: ALTCHOICE

## 2018-02-12 RX ORDER — RIZATRIPTAN BENZOATE 10 MG/1
TABLET, ORALLY DISINTEGRATING ORAL
Qty: 18 TABLET | Refills: 5 | Status: SHIPPED | OUTPATIENT
Start: 2018-02-12 | End: 2018-05-14

## 2018-02-12 RX ORDER — MOMETASONE FUROATE AND FORMOTEROL FUMARATE DIHYDRATE 200; 5 UG/1; UG/1
AEROSOL RESPIRATORY (INHALATION)
Qty: 13 G | Refills: 5 | Status: SHIPPED | OUTPATIENT
Start: 2018-02-12 | End: 2018-03-01

## 2018-02-23 ENCOUNTER — OFFICE VISIT (OUTPATIENT)
Dept: FAMILY MEDICINE CLINIC | Age: 68
End: 2018-02-23

## 2018-02-23 VITALS
DIASTOLIC BLOOD PRESSURE: 76 MMHG | WEIGHT: 175 LBS | SYSTOLIC BLOOD PRESSURE: 116 MMHG | BODY MASS INDEX: 23.19 KG/M2 | HEIGHT: 73 IN | RESPIRATION RATE: 20 BRPM | HEART RATE: 72 BPM | OXYGEN SATURATION: 98 %

## 2018-02-23 DIAGNOSIS — Z23 NEED FOR PNEUMOCOCCAL VACCINATION: ICD-10-CM

## 2018-02-23 DIAGNOSIS — J45.21 MILD INTERMITTENT ASTHMA WITH ACUTE EXACERBATION: Primary | ICD-10-CM

## 2018-02-23 PROCEDURE — 90471 IMMUNIZATION ADMIN: CPT | Performed by: NURSE PRACTITIONER

## 2018-02-23 PROCEDURE — 99214 OFFICE O/P EST MOD 30 MIN: CPT | Performed by: NURSE PRACTITIONER

## 2018-02-23 PROCEDURE — 94640 AIRWAY INHALATION TREATMENT: CPT | Performed by: NURSE PRACTITIONER

## 2018-02-23 PROCEDURE — 90670 PCV13 VACCINE IM: CPT | Performed by: NURSE PRACTITIONER

## 2018-02-23 RX ORDER — IPRATROPIUM BROMIDE 42 UG/1
1-2 SPRAY, METERED NASAL 4 TIMES DAILY PRN
Qty: 1 BOTTLE | Refills: 1 | Status: CANCELLED | OUTPATIENT
Start: 2018-02-23

## 2018-02-23 RX ORDER — FLUTICASONE FUROATE AND VILANTEROL 200; 25 UG/1; UG/1
1 POWDER RESPIRATORY (INHALATION) DAILY
Qty: 1 EACH | Refills: 3 | Status: SHIPPED | OUTPATIENT
Start: 2018-02-23 | End: 2018-03-01

## 2018-02-23 RX ORDER — ALBUTEROL SULFATE 2.5 MG/3ML
2.5 SOLUTION RESPIRATORY (INHALATION) ONCE
Status: COMPLETED | OUTPATIENT
Start: 2018-02-23 | End: 2018-02-23

## 2018-02-23 RX ADMIN — ALBUTEROL SULFATE 2.5 MG: 2.5 SOLUTION RESPIRATORY (INHALATION) at 13:49

## 2018-02-23 NOTE — PROGRESS NOTES
SUBJECTIVE:    Patient ID: Homar Stewart is a 74 y.o. y.o. male. HPI   Chief Complaint   Patient presents with   Jose Stoner does not work well for him- asthma is worse at end of the month- needs pneumonia shot     Would like to change inhaler - Sachin Rodriguez works the beginning of the month but towards the end of the month it does not work so well as he has increased wheezing and has to use albuterol more- using Dulera 3 puffs then - has not had to use combivent much at end of the month at least 6-7 - - he wakes up wheezing more  symbicort did not work well for him- Advair 250 not sure how it worked as it has been years since last used    Review of Systems     OBJECTIVE:  Physical Exam   Constitutional: Vital signs are normal. He appears well-developed. He is active. Cardiovascular: Normal rate, regular rhythm, S1 normal, S2 normal and normal heart sounds. Pulmonary/Chest: Effort normal and breath sounds normal.   Lymphadenopathy:        Head (right side): No submental, no submandibular and no tonsillar adenopathy present. Head (left side): No submental, no submandibular and no tonsillar adenopathy present. Neurological: He is alert. Psychiatric: He has a normal mood and affect. His speech is normal and behavior is normal. Judgment and thought content normal. Cognition and memory are normal.       ASSESSMENT:  1. Mild intermittent asthma with acute exacerbation  Fluticasone Furoate-Vilanterol (BREO ELLIPTA) 200-25 MCG/INH AEPB    77190 - NM INHAL RX, AIRWAY OBST/DX SPUTUM INDUCT    albuterol (PROVENTIL) nebulizer solution 2.5 mg   2. Need for pneumococcal vaccination  PREVNAR 13 IM (Pneumococcal conjugate vaccine 13-valent)       PLAN:  Zan Aguillon was seen today for asthma. Diagnoses and all orders for this visit:    Mild intermittent asthma with acute exacerbation  -     Fluticasone Furoate-Vilanterol (BREO ELLIPTA) 200-25 MCG/INH AEPB;  Inhale 1 puff into the lungs daily  Breathing treatment  -

## 2018-02-23 NOTE — PATIENT INSTRUCTIONS
quick-relief medicine with you at all times. · Talk to your doctor before using other medicines. Some medicines, such as aspirin, can cause asthma attacks in some people. · If you have a peak flow meter, use it to check how well you are breathing. This can help you predict when an asthma attack is going to occur. Then you can take medicine to prevent the asthma attack or make it less severe. · Do not smoke or allow others to smoke around you. Avoid smoky places. Smoking makes asthma worse. If you need help quitting, talk to your doctor about stop-smoking programs and medicines. These can increase your chances of quitting for good. · Learn what triggers an asthma attack for you, and avoid the triggers when you can. Common triggers include colds, smoke, air pollution, dust, pollen, mold, pets, cockroaches, stress, and cold air. · Avoid colds and the flu. Get a pneumococcal vaccine shot. If you have had one before, ask your doctor if you need a second dose. Get a flu vaccine every fall. If you must be around people with colds or the flu, wash your hands often. When should you call for help? Call 911 anytime you think you may need emergency care. For example, call if:  ? · You have severe trouble breathing. ?Call your doctor now or seek immediate medical care if:  ? · Your symptoms do not get better after you have followed your asthma action plan. ? · You have new or worse trouble breathing. ? · Your coughing and wheezing get worse. ? · You cough up dark brown or bloody mucus (sputum). ? · You have a new or higher fever. ? Watch closely for changes in your health, and be sure to contact your doctor if:  ? · You need to use quick-relief medicine on more than 2 days a week (unless it is just for exercise). ? · You cough more deeply or more often, especially if you notice more mucus or a change in the color of your mucus. ? · You are not getting better as expected. Where can you learn more?   Go to

## 2018-03-01 ENCOUNTER — TELEPHONE (OUTPATIENT)
Dept: FAMILY MEDICINE CLINIC | Age: 68
End: 2018-03-01

## 2018-03-13 DIAGNOSIS — B37.0 THRUSH: ICD-10-CM

## 2018-04-12 DIAGNOSIS — M25.571 RIGHT ANKLE PAIN, UNSPECIFIED CHRONICITY: ICD-10-CM

## 2018-04-12 RX ORDER — MELOXICAM 15 MG/1
TABLET ORAL
Qty: 30 TABLET | Refills: 5 | Status: SHIPPED | OUTPATIENT
Start: 2018-04-12 | End: 2018-10-09 | Stop reason: SDUPTHER

## 2018-05-04 ENCOUNTER — OFFICE VISIT (OUTPATIENT)
Dept: FAMILY MEDICINE CLINIC | Age: 68
End: 2018-05-04

## 2018-05-04 VITALS
WEIGHT: 179.4 LBS | BODY MASS INDEX: 23.78 KG/M2 | RESPIRATION RATE: 18 BRPM | HEIGHT: 73 IN | SYSTOLIC BLOOD PRESSURE: 118 MMHG | OXYGEN SATURATION: 97 % | HEART RATE: 73 BPM | DIASTOLIC BLOOD PRESSURE: 78 MMHG

## 2018-05-04 DIAGNOSIS — M54.42 ACUTE BILATERAL LOW BACK PAIN WITH BILATERAL SCIATICA: Primary | ICD-10-CM

## 2018-05-04 DIAGNOSIS — M54.41 ACUTE BILATERAL LOW BACK PAIN WITH BILATERAL SCIATICA: Primary | ICD-10-CM

## 2018-05-04 DIAGNOSIS — T14.8XXA BRUISING: ICD-10-CM

## 2018-05-04 LAB
APTT: 30.8 SEC (ref 24.1–34.9)
BASOPHILS ABSOLUTE: 0 K/UL (ref 0–0.2)
BASOPHILS RELATIVE PERCENT: 0.2 %
EOSINOPHILS ABSOLUTE: 0.1 K/UL (ref 0–0.6)
EOSINOPHILS RELATIVE PERCENT: 0.8 %
HCT VFR BLD CALC: 39.2 % (ref 40.5–52.5)
HEMOGLOBIN: 13.8 G/DL (ref 13.5–17.5)
INR BLD: 0.96 (ref 0.85–1.15)
LYMPHOCYTES ABSOLUTE: 1.8 K/UL (ref 1–5.1)
LYMPHOCYTES RELATIVE PERCENT: 22.9 %
MCH RBC QN AUTO: 33.8 PG (ref 26–34)
MCHC RBC AUTO-ENTMCNC: 35.2 G/DL (ref 31–36)
MCV RBC AUTO: 96.2 FL (ref 80–100)
MONOCYTES ABSOLUTE: 0.7 K/UL (ref 0–1.3)
MONOCYTES RELATIVE PERCENT: 9.1 %
NEUTROPHILS ABSOLUTE: 5.2 K/UL (ref 1.7–7.7)
NEUTROPHILS RELATIVE PERCENT: 67 %
PDW BLD-RTO: 13.5 % (ref 12.4–15.4)
PLATELET # BLD: 226 K/UL (ref 135–450)
PMV BLD AUTO: 6.8 FL (ref 5–10.5)
PROTHROMBIN TIME: 10.8 SEC (ref 9.6–13)
RBC # BLD: 4.08 M/UL (ref 4.2–5.9)
WBC # BLD: 7.8 K/UL (ref 4–11)

## 2018-05-04 PROCEDURE — 99213 OFFICE O/P EST LOW 20 MIN: CPT | Performed by: NURSE PRACTITIONER

## 2018-05-04 RX ORDER — PREDNISONE 10 MG/1
TABLET ORAL
Qty: 20 TABLET | Refills: 0 | Status: SHIPPED | OUTPATIENT
Start: 2018-05-04 | End: 2018-05-23 | Stop reason: ALTCHOICE

## 2018-05-04 RX ORDER — PREDNISONE 10 MG/1
TABLET ORAL
Qty: 20 TABLET | Refills: 0 | Status: SHIPPED | OUTPATIENT
Start: 2018-05-04 | End: 2018-05-04 | Stop reason: CLARIF

## 2018-05-04 ASSESSMENT — ENCOUNTER SYMPTOMS: BACK PAIN: 1

## 2018-05-07 ENCOUNTER — TELEPHONE (OUTPATIENT)
Dept: FAMILY MEDICINE CLINIC | Age: 68
End: 2018-05-07

## 2018-05-09 DIAGNOSIS — M54.41 ACUTE BILATERAL LOW BACK PAIN WITH BILATERAL SCIATICA: Primary | ICD-10-CM

## 2018-05-09 DIAGNOSIS — M54.42 ACUTE BILATERAL LOW BACK PAIN WITH BILATERAL SCIATICA: Primary | ICD-10-CM

## 2018-05-09 RX ORDER — TRAMADOL HYDROCHLORIDE 50 MG/1
50 TABLET ORAL EVERY 6 HOURS PRN
Qty: 12 TABLET | Refills: 0 | Status: SHIPPED | OUTPATIENT
Start: 2018-05-09 | End: 2018-05-12

## 2018-05-09 RX ORDER — CYCLOBENZAPRINE HCL 10 MG
10 TABLET ORAL 3 TIMES DAILY PRN
Qty: 30 TABLET | Refills: 0 | Status: SHIPPED | OUTPATIENT
Start: 2018-05-09 | End: 2018-05-19

## 2018-05-14 RX ORDER — RIZATRIPTAN BENZOATE 10 MG/1
TABLET, ORALLY DISINTEGRATING ORAL
Qty: 18 TABLET | Refills: 5 | Status: SHIPPED | OUTPATIENT
Start: 2018-05-14 | End: 2018-11-10 | Stop reason: SDUPTHER

## 2018-05-15 RX ORDER — LEVOCETIRIZINE DIHYDROCHLORIDE 5 MG/1
TABLET, FILM COATED ORAL
Qty: 30 TABLET | Refills: 5 | Status: SHIPPED | OUTPATIENT
Start: 2018-05-15 | End: 2018-11-10 | Stop reason: SDUPTHER

## 2018-05-15 RX ORDER — TIZANIDINE 4 MG/1
TABLET ORAL
Qty: 30 TABLET | Refills: 2 | Status: SHIPPED | OUTPATIENT
Start: 2018-05-15 | End: 2018-05-23 | Stop reason: ALTCHOICE

## 2018-05-15 RX ORDER — TOPIRAMATE 25 MG/1
TABLET ORAL
Qty: 240 TABLET | Refills: 2 | Status: SHIPPED | OUTPATIENT
Start: 2018-05-15 | End: 2018-08-09 | Stop reason: SDUPTHER

## 2018-05-22 ENCOUNTER — TELEPHONE (OUTPATIENT)
Dept: FAMILY MEDICINE CLINIC | Age: 68
End: 2018-05-22

## 2018-05-23 ENCOUNTER — OFFICE VISIT (OUTPATIENT)
Dept: FAMILY MEDICINE CLINIC | Age: 68
End: 2018-05-23

## 2018-05-23 VITALS
HEIGHT: 73 IN | DIASTOLIC BLOOD PRESSURE: 72 MMHG | BODY MASS INDEX: 23.59 KG/M2 | WEIGHT: 178 LBS | RESPIRATION RATE: 14 BRPM | SYSTOLIC BLOOD PRESSURE: 134 MMHG | HEART RATE: 74 BPM

## 2018-05-23 DIAGNOSIS — T14.8XXA BRUISING: ICD-10-CM

## 2018-05-23 DIAGNOSIS — R35.0 URINE FREQUENCY: ICD-10-CM

## 2018-05-23 DIAGNOSIS — H10.13 ALLERGIC CONJUNCTIVITIS OF BOTH EYES: ICD-10-CM

## 2018-05-23 DIAGNOSIS — K21.9 GASTROESOPHAGEAL REFLUX DISEASE, ESOPHAGITIS PRESENCE NOT SPECIFIED: ICD-10-CM

## 2018-05-23 DIAGNOSIS — M79.671 RIGHT FOOT PAIN: ICD-10-CM

## 2018-05-23 DIAGNOSIS — R10.9 LEFT FLANK PAIN: Primary | ICD-10-CM

## 2018-05-23 DIAGNOSIS — G89.29 CHRONIC PAIN OF RIGHT ANKLE: ICD-10-CM

## 2018-05-23 DIAGNOSIS — M25.571 CHRONIC PAIN OF RIGHT ANKLE: ICD-10-CM

## 2018-05-23 LAB
BILIRUBIN, POC: NORMAL
BLOOD URINE, POC: NORMAL
CLARITY, POC: CLEAR
COLOR, POC: YELLOW
GLUCOSE URINE, POC: NORMAL
KETONES, POC: NORMAL
LEUKOCYTE EST, POC: NORMAL
NITRITE, POC: NORMAL
PH, POC: 7
PROTEIN, POC: NORMAL
SPECIFIC GRAVITY, POC: 1.01
UROBILINOGEN, POC: 0.2

## 2018-05-23 PROCEDURE — 81002 URINALYSIS NONAUTO W/O SCOPE: CPT | Performed by: FAMILY MEDICINE

## 2018-05-23 PROCEDURE — 99214 OFFICE O/P EST MOD 30 MIN: CPT | Performed by: FAMILY MEDICINE

## 2018-05-23 RX ORDER — TRAMADOL HYDROCHLORIDE 50 MG/1
TABLET ORAL
Qty: 60 TABLET | Refills: 0 | Status: SHIPPED | OUTPATIENT
Start: 2018-05-23 | End: 2022-08-23 | Stop reason: SDUPTHER

## 2018-05-24 ENCOUNTER — HOSPITAL ENCOUNTER (OUTPATIENT)
Dept: ULTRASOUND IMAGING | Age: 68
Discharge: HOME | End: 2018-05-24
Attending: FAMILY MEDICINE | Admitting: FAMILY MEDICINE

## 2018-05-24 DIAGNOSIS — R10.9 LEFT FLANK PAIN: ICD-10-CM

## 2018-05-24 DIAGNOSIS — R10.13 ABDOMINAL PAIN, EPIGASTRIC: ICD-10-CM

## 2018-05-24 DIAGNOSIS — R35.0 URINE FREQUENCY: ICD-10-CM

## 2018-06-12 RX ORDER — ERGOCALCIFEROL 1.25 MG/1
CAPSULE ORAL
Qty: 4 CAPSULE | Refills: 5 | Status: SHIPPED | OUTPATIENT
Start: 2018-06-12 | End: 2018-12-03 | Stop reason: SDUPTHER

## 2018-07-10 ENCOUNTER — TELEPHONE (OUTPATIENT)
Dept: FAMILY MEDICINE CLINIC | Age: 68
End: 2018-07-10

## 2018-07-10 DIAGNOSIS — H00.015 HORDEOLUM EXTERNUM OF LEFT LOWER EYELID: ICD-10-CM

## 2018-07-10 RX ORDER — SULFACETAMIDE SODIUM 100 MG/ML
2 SOLUTION/ DROPS OPHTHALMIC 4 TIMES DAILY
Qty: 5 ML | Refills: 0 | Status: SHIPPED | OUTPATIENT
Start: 2018-07-10 | End: 2019-08-26 | Stop reason: SDUPTHER

## 2018-07-10 NOTE — TELEPHONE ENCOUNTER
PT CALLING HE HAS A STYE ON HIS LEFT EYE AND WAS WONDERING IF YOU COULD CALL SOMETHING IN FOR HIM - LAST TIME KENDALL Kemp 85 HIM    SULFACETAMIDE  - [address] [facility] [address] [facility], [address] [place] [address] [phone] - F [phone]    PT @  [phone]    L/S 05/23/18

## 2018-08-07 ENCOUNTER — HOSPITAL ENCOUNTER (OUTPATIENT)
Dept: NEUROLOGY | Age: 68
Discharge: HOME | End: 2018-08-07
Attending: PODIATRIST | Admitting: PODIATRIST

## 2018-08-07 DIAGNOSIS — M79.672 PAIN OF LEFT FOOT: ICD-10-CM

## 2018-08-07 NOTE — PROCEDURES
(n>40) 42 (n>40)   Above Knee         (n>40) (n>40)   Tibial Abs     5.5 (n<6.2) 5.0(n<6.2) 4.3  5.0  41 (n>40) 43 (n>40)   H-Reflex                 Summary of EMG and Nerve Conduction Findings: The above EMG needle exam was within normal limits in anterior myotomes, posteriorly mild muscle membrane irritability in lower lumbar paraspinals. Nerve conduction studies demonstrate absent sensory evoked responses. Right peroneal motor amplitude in decreased. 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 The changes in the lumbar paraspinals suggest underlying compromise of posterior primary rami as in degenerative facet disease, spinal stenosis. Small amplitude peroneal motor evoked response can be seen with atrophy of extensor dig brevis. No evidence of an acute radiculopathy or entrapment neuropathy. Thank you. Electronically signed by:  Gallito Moore DO,8/7/2018,9:15 AM

## 2018-08-10 RX ORDER — TOPIRAMATE 25 MG/1
TABLET ORAL
Qty: 240 TABLET | Refills: 5 | Status: SHIPPED | OUTPATIENT
Start: 2018-08-10 | End: 2019-02-04 | Stop reason: SDUPTHER

## 2018-08-10 RX ORDER — TIZANIDINE 4 MG/1
TABLET ORAL
Qty: 30 TABLET | Refills: 5 | Status: SHIPPED | OUTPATIENT
Start: 2018-08-10 | End: 2018-09-11 | Stop reason: ALTCHOICE

## 2018-08-10 RX ORDER — PANTOPRAZOLE SODIUM 40 MG/1
TABLET, DELAYED RELEASE ORAL
Qty: 60 TABLET | Refills: 2 | Status: SHIPPED | OUTPATIENT
Start: 2018-08-10 | End: 2018-11-10 | Stop reason: SDUPTHER

## 2018-08-27 ENCOUNTER — TELEPHONE (OUTPATIENT)
Dept: FAMILY MEDICINE CLINIC | Age: 68
End: 2018-08-27

## 2018-08-27 DIAGNOSIS — G95.9 LUMBAR MYELOPATHY (HCC): Primary | ICD-10-CM

## 2018-08-27 DIAGNOSIS — M54.40 ACUTE BILATERAL LOW BACK PAIN WITH SCIATICA, SCIATICA LATERALITY UNSPECIFIED: ICD-10-CM

## 2018-09-05 ENCOUNTER — HOSPITAL ENCOUNTER (OUTPATIENT)
Dept: MRI IMAGING | Age: 68
Discharge: HOME | End: 2018-09-05
Attending: FAMILY MEDICINE | Admitting: FAMILY MEDICINE

## 2018-09-05 DIAGNOSIS — G95.9 DISEASE OF SPINAL CORD (HCC): ICD-10-CM

## 2018-09-05 DIAGNOSIS — M54.40 ACUTE BILATERAL LOW BACK PAIN WITH SCIATICA, SCIATICA LATERALITY UNSPECIFIED: ICD-10-CM

## 2018-09-05 DIAGNOSIS — G95.9 LUMBAR MYELOPATHY (HCC): ICD-10-CM

## 2018-09-06 ENCOUNTER — TELEPHONE (OUTPATIENT)
Dept: FAMILY MEDICINE CLINIC | Age: 68
End: 2018-09-06

## 2018-09-11 ENCOUNTER — OFFICE VISIT (OUTPATIENT)
Dept: FAMILY MEDICINE CLINIC | Age: 68
End: 2018-09-11

## 2018-09-11 VITALS
DIASTOLIC BLOOD PRESSURE: 68 MMHG | SYSTOLIC BLOOD PRESSURE: 126 MMHG | WEIGHT: 172 LBS | BODY MASS INDEX: 22.8 KG/M2 | HEIGHT: 73 IN

## 2018-09-11 DIAGNOSIS — G62.9 PERIPHERAL POLYNEUROPATHY: ICD-10-CM

## 2018-09-11 DIAGNOSIS — B35.1 ONYCHOMYCOSIS: ICD-10-CM

## 2018-09-11 DIAGNOSIS — M70.61 TROCHANTERIC BURSITIS OF RIGHT HIP: Primary | ICD-10-CM

## 2018-09-11 DIAGNOSIS — M51.36 DDD (DEGENERATIVE DISC DISEASE), LUMBAR: ICD-10-CM

## 2018-09-11 PROCEDURE — 90662 IIV NO PRSV INCREASED AG IM: CPT | Performed by: FAMILY MEDICINE

## 2018-09-11 PROCEDURE — 90471 IMMUNIZATION ADMIN: CPT | Performed by: FAMILY MEDICINE

## 2018-09-11 PROCEDURE — 99214 OFFICE O/P EST MOD 30 MIN: CPT | Performed by: FAMILY MEDICINE

## 2018-09-11 RX ORDER — GABAPENTIN 300 MG/1
CAPSULE ORAL
Qty: 120 CAPSULE | Refills: 2 | Status: SHIPPED | OUTPATIENT
Start: 2018-09-11 | End: 2019-02-04 | Stop reason: SDUPTHER

## 2018-09-11 RX ORDER — TERBINAFINE HYDROCHLORIDE 250 MG/1
250 TABLET ORAL DAILY
Qty: 30 TABLET | Refills: 1 | Status: SHIPPED | OUTPATIENT
Start: 2018-09-11 | End: 2018-11-10 | Stop reason: SDUPTHER

## 2018-09-11 ASSESSMENT — PATIENT HEALTH QUESTIONNAIRE - PHQ9
SUM OF ALL RESPONSES TO PHQ9 QUESTIONS 1 & 2: 0
SUM OF ALL RESPONSES TO PHQ QUESTIONS 1-9: 0
SUM OF ALL RESPONSES TO PHQ QUESTIONS 1-9: 0
1. LITTLE INTEREST OR PLEASURE IN DOING THINGS: 0
2. FEELING DOWN, DEPRESSED OR HOPELESS: 0

## 2018-10-03 ENCOUNTER — OFFICE VISIT (OUTPATIENT)
Dept: FAMILY MEDICINE CLINIC | Age: 68
End: 2018-10-03
Payer: COMMERCIAL

## 2018-10-03 VITALS
HEART RATE: 71 BPM | SYSTOLIC BLOOD PRESSURE: 122 MMHG | OXYGEN SATURATION: 98 % | TEMPERATURE: 98.7 F | BODY MASS INDEX: 23.27 KG/M2 | RESPIRATION RATE: 16 BRPM | HEIGHT: 73 IN | DIASTOLIC BLOOD PRESSURE: 82 MMHG | WEIGHT: 175.6 LBS

## 2018-10-03 DIAGNOSIS — J06.9 PROTRACTED URI: Primary | ICD-10-CM

## 2018-10-03 PROCEDURE — 99213 OFFICE O/P EST LOW 20 MIN: CPT | Performed by: NURSE PRACTITIONER

## 2018-10-03 RX ORDER — AZITHROMYCIN 250 MG/1
TABLET, FILM COATED ORAL
Qty: 6 TABLET | Refills: 0 | Status: SHIPPED | OUTPATIENT
Start: 2018-10-03 | End: 2019-01-08 | Stop reason: ALTCHOICE

## 2018-10-03 ASSESSMENT — ENCOUNTER SYMPTOMS
COUGH: 1
SORE THROAT: 1
SINUS PRESSURE: 1

## 2018-10-05 ENCOUNTER — HOSPITAL ENCOUNTER (OUTPATIENT)
Dept: PHYSICAL THERAPY | Age: 68
Setting detail: THERAPIES SERIES
Discharge: HOME | End: 2018-10-05
Payer: COMMERCIAL

## 2018-10-05 PROCEDURE — G8978 MOBILITY CURRENT STATUS: HCPCS

## 2018-10-05 PROCEDURE — G8979 MOBILITY GOAL STATUS: HCPCS

## 2018-10-05 PROCEDURE — 97161 PT EVAL LOW COMPLEX 20 MIN: CPT

## 2018-10-05 PROCEDURE — 97110 THERAPEUTIC EXERCISES: CPT

## 2018-10-05 PROCEDURE — 97530 THERAPEUTIC ACTIVITIES: CPT

## 2018-10-05 ASSESSMENT — PAIN SCALES - GENERAL: PAINLEVEL_OUTOF10: 9

## 2018-10-05 ASSESSMENT — PAIN DESCRIPTION - DESCRIPTORS: DESCRIPTORS: CONSTANT;SHOOTING

## 2018-10-05 ASSESSMENT — PAIN DESCRIPTION - FREQUENCY: FREQUENCY: INTERMITTENT

## 2018-10-05 ASSESSMENT — PAIN DESCRIPTION - PAIN TYPE: TYPE: CHRONIC PAIN

## 2018-10-05 ASSESSMENT — PAIN DESCRIPTION - ORIENTATION: ORIENTATION: RIGHT;LEFT

## 2018-10-05 NOTE — PLAN OF CARE
Outpatient Physical Therapy     Phone: [phone] Fax: [phone]     To: Referring Practitioner: Kath Pizarro       Patient: Aria Patel   : 1950   MRN: [id number]  Evaluation Date: 10/5/2018      Diagnosis Information:  · Diagnosis: Right Hip Bursitis, DDD, Peripheral Polyneuropathy    · Treatment Diagnosis: Decreased LE flexibility, BLE radiculopathy, extension bias, decreased LE strength      Physical Therapy Certification Form  Dear Dr. Marie Boswell,   The following patient has been evaluated for physical therapy services. Please review the attached evaluation and/or summary of the patient's plan of care, and verify that you agree therapy should continue by signing the attached document and sending it back to our office. Plan of Care/Treatment to date:  [x] Therapeutic Exercise      [x] Modalities:  [x] Therapeutic Activity        [] Ultrasound    [x] Gait Training        [] Cervical Traction   [x] Neuromuscular Re-education      [] Cold/hotpack    [x] Instruction in HEP        [] Lumbar Traction  [x] Manual Therapy        [] Electrical Stimulation            [x] Aquatic Therapy        [] Iontophoresis        ? [] Lymphedema management  [] Women's Health     Other:  [] Vestibular Rehab        []    []  Needed     Frequency/Duration:  # Days per week: [] 1 day # Weeks: [] 1 week [] 5 weeks     [x] 2 days? [] 2 weeks [x] 6 weeks     [] 3 days   [] 3 weeks [] 7 weeks     [] 4 days   [] 4 weeks [] 8 weeks    Rehab Potential: [] Excellent [x] Good [] Fair  [] Poor     Electronically signed by:  Tierra Blackwell PT    If you have any questions or concerns, please don't hesitate to call.   Thank you for your referral.      Physician Signature:________________________________Date:__________________  By signing above, therapists plan is approved by physician

## 2018-10-05 NOTE — PROGRESS NOTES
Physical Therapy  Initial Assessment  Date: 10/5/2018  Patient Name: Lai Mims  MRN: [id number]  : 1950     Treatment Diagnosis: Decreased LE flexibility, BLE radiculopathy, extension bias, decreased LE strength     Restrictions    Outpatient fall risk assessment completed asking screening question if patient has fallen in the past 30 days:  [x] Yes  [] No    Based on screen for falls, patient demonstrates fall risk:  [] Yes  [x] No    Interventions based on fall risk status:  Updated Problem List within Medical History  [] Yes   [x] N/A    Asked family to assist with increased observation of the patient  [] Yes   [x] N/A    Patient kept in visible area when not closely supervised by therapist  [] Yes   [x] N/A    Repeatedly reinforce activity limits and safety needs with patient/family  [] Yes   [x] N/A    Increase frequency of rounding/monitoring patient  [] Yes   [x] N/A          Subjective  General  Chart Reviewed: Yes  Patient assessed for rehabilitation services?: Yes  Additional Pertinent Hx:  Left flank pain, Chronic pain of right ankle, right hip trochanteric bursitis, Lx DDD, Peripheral Polyneuropathy. Allergic Rhinitis, Arthritis, B12 deficiency, Cerebral concussion, Asthma,   Family / Caregiver Present: Yes  Referring Practitioner: Aaron Solis   Referral Date : 18  Diagnosis: Right Hip Bursitis, DDD, Peripheral Polyneuropathy   PT Visit Information  PT Insurance Information: BC/BS 60 visits/year   Subjective  Subjective: CLOF:  Referred from PCP, Dr Queen Olsen for right hip trochanteric bursitis, Lx DDD, acute bilateral LE pain with sciatica. Pt states this started when his ankles starting swelling up. Pt had 6 weeks of physical therapy at [facility] for ankle. Pt has had onset of these symptoms approximately 1.5 years ago. MD states he has nerve problems going down to the ankle  in both legs.   Pt says he does an exercise bike and therabands and it goes away, because he says he's had back pain for years and years. Pt says that without Rx medication, he has trouble sitting because of the nerve problems going down his legs. Pt states once he has shooting pains, he is unable to then lie down and get comfortable. Pt states that he can stand all day without any problems, but has problems with all other activities such as sitting for a grandson's basketball game, can't sit > 30 mins. PLOF:  Pt's hobbies include wood working, carving, yard work. IND PLOF. Home: Lives with wife in a 1 story home, garage to enter house, problems with steps. Pain: 9/10 at rest, prolonged sitting, 6/10 with activity, shooting, deep, constant Goals:  relief of pain   Pain Screening  Patient Currently in Pain: Yes  Pain Assessment  Pain Assessment: 0-10  Pain Level: 9  Pain Type: Chronic pain  Pain Location: Back;Leg; Ankle; Hip  Pain Orientation: Right;Left  Pain Descriptors: Constant; Shooting  Pain Frequency: Intermittent  Pain Intervention(s): Medication (see eMar); Heat applied  Vital Signs  Patient Currently in Pain: Yes      Social/Functional History  Social/Functional History  Lives With: Spouse  Type of Home: House  Home Layout: One level  Home Access: Stairs to enter with rails  Entrance Stairs - Number of Steps: from garage, pt unable to clarify   Active : Yes  Occupation: Retired (2* chronic pain issues )  Leisure & Hobbies: woodworking, carving, yard work   Objective    Observation/Palpation  Palpation: hypomobility SI joints   Observation: Decreased swing-through and trunk rotation with gait     Spine  Lumbar: Rotation L: 25%, R: 50%, Ext: 75% with pain, Flex:  75% with pain     Strength RLE  Comment: Hip Flex: 4+/5, Hip Abd:  Hip Ext:  3+/5  Ankle DF 5/5, KNee 5/5   Strength LLE  Comment: Hip Flex: 4/5, Hip Abd:  Hip Ext: 3+/5 Ankle DF 5/5, Knee Ext: 4/5, Knee flex 5/5     Additional Measures  Flexibility: 90/90 hamstring: LLE: lacking 25*, RLE:  lacking 30*  Special Tests: RFIS (+)  MADDI (-), (-) Slump, (-) SLR  Other: Deficits in hip rotation/hip flexor flexibility. Pt with difficulty assuming position to formally test     Assessment  Conditions Requiring Skilled Therapeutic Intervention  Body structures, Functions, Activity limitations: Decreased functional mobility ; Decreased ROM; Decreased strength  Assessment: Pt presents to skilled PT with chronic history of low back pain with new more acute onset with BLE radiculopathy, difficulties with prolonged sitting and lifting. Pt would benefit from skilled PT services in order to optimize return to PLOF  Treatment Diagnosis: Decreased LE flexibility, BLE radiculopathy, extension bias, decreased LE strength   Prognosis: Good  Decision Making: Low Complexity  REQUIRES PT FOLLOW UP: Yes        Plan  Plan  Times per week: 2  Times per day: Daily  Plan weeks: 6  Current Treatment Recommendations: Strengthening, ROM, Gait Training, Manual Therapy - Soft Tissue Mobilization, Neuromuscular Re-education, Manual Therapy - Joint Manipulation, Patient/Caregiver Education & Training, Safety Education & Training, Home Exercise Program, Aquatics    G-Code  PT G-Codes  Functional Assessment Tool Used: PT Assessment   Score: 40-60  Functional Limitation: Mobility: Walking and moving around  Mobility: Walking and Moving Around Current Status (): At least 40 percent but less than 60 percent impaired, limited or restricted  Mobility: Walking and Moving Around Goal Status ():  At least 1 percent but less than 20 percent impaired, limited or restricted    OutComes Score  40-60  Goals  Long term goals  Time Frame for Long term goals : upon discharge  Long term goal 1: Pt will be IND with progressive LE/back debility HEP protocol with reference to written instructions, to achieve maximal level of functional mobility and independence  Long term goal 2: Pt will tolerate sitting for 2 hours without difficulty in order to restore PLOF and be able to tolerate sitting for grandkid's

## 2018-10-08 ENCOUNTER — HOSPITAL ENCOUNTER (OUTPATIENT)
Dept: PHYSICAL THERAPY | Age: 68
Setting detail: THERAPIES SERIES
Discharge: HOME | End: 2018-10-08
Payer: COMMERCIAL

## 2018-10-08 PROCEDURE — 97110 THERAPEUTIC EXERCISES: CPT

## 2018-10-09 DIAGNOSIS — M25.571 RIGHT ANKLE PAIN, UNSPECIFIED CHRONICITY: ICD-10-CM

## 2018-10-10 ENCOUNTER — TELEPHONE (OUTPATIENT)
Dept: FAMILY MEDICINE CLINIC | Age: 68
End: 2018-10-10

## 2018-10-10 RX ORDER — GABAPENTIN 100 MG/1
100 CAPSULE ORAL 3 TIMES DAILY
Qty: 90 CAPSULE | Refills: 3 | Status: SHIPPED | OUTPATIENT
Start: 2018-10-10 | End: 2019-02-04 | Stop reason: SDUPTHER

## 2018-10-10 RX ORDER — MELOXICAM 15 MG/1
TABLET ORAL
Qty: 30 TABLET | Refills: 5 | Status: SHIPPED | OUTPATIENT
Start: 2018-10-10 | End: 2019-05-02 | Stop reason: ALTCHOICE

## 2018-10-10 NOTE — TELEPHONE ENCOUNTER
Azithromycin 5 day course should stay in your system for 10 days. Use mucinex and antihistamine to reduce drainage and if symptoms worsen or persist, can rx another antibiotic.   Low dose gabapentin sent to day's pharmacy to use during day w/ higher dose gabapentin at night time

## 2018-10-11 ENCOUNTER — HOSPITAL ENCOUNTER (OUTPATIENT)
Dept: PHYSICAL THERAPY | Age: 68
Setting detail: THERAPIES SERIES
Discharge: HOME | End: 2018-10-11
Payer: COMMERCIAL

## 2018-10-11 PROCEDURE — 97110 THERAPEUTIC EXERCISES: CPT

## 2018-10-11 PROCEDURE — 97140 MANUAL THERAPY 1/> REGIONS: CPT

## 2018-10-16 ENCOUNTER — APPOINTMENT (OUTPATIENT)
Dept: PHYSICAL THERAPY | Age: 68
End: 2018-10-16
Payer: COMMERCIAL

## 2018-10-18 ENCOUNTER — HOSPITAL ENCOUNTER (OUTPATIENT)
Dept: PHYSICAL THERAPY | Age: 68
Setting detail: THERAPIES SERIES
Discharge: HOME | End: 2018-10-18
Payer: COMMERCIAL

## 2018-10-18 PROCEDURE — 97110 THERAPEUTIC EXERCISES: CPT

## 2018-10-23 ENCOUNTER — HOSPITAL ENCOUNTER (OUTPATIENT)
Dept: PHYSICAL THERAPY | Age: 68
Setting detail: THERAPIES SERIES
Discharge: HOME | End: 2018-10-23
Payer: COMMERCIAL

## 2018-10-23 PROCEDURE — 97110 THERAPEUTIC EXERCISES: CPT

## 2018-10-26 ENCOUNTER — APPOINTMENT (OUTPATIENT)
Dept: PHYSICAL THERAPY | Age: 68
End: 2018-10-26
Payer: COMMERCIAL

## 2018-10-30 ENCOUNTER — HOSPITAL ENCOUNTER (OUTPATIENT)
Dept: PHYSICAL THERAPY | Age: 68
Setting detail: THERAPIES SERIES
Discharge: HOME | End: 2018-10-30
Payer: COMMERCIAL

## 2018-10-30 PROCEDURE — 97110 THERAPEUTIC EXERCISES: CPT

## 2018-11-02 ENCOUNTER — APPOINTMENT (OUTPATIENT)
Dept: PHYSICAL THERAPY | Age: 68
End: 2018-11-02
Payer: COMMERCIAL

## 2018-11-06 ENCOUNTER — HOSPITAL ENCOUNTER (OUTPATIENT)
Dept: PHYSICAL THERAPY | Age: 68
Setting detail: THERAPIES SERIES
Discharge: HOME | End: 2018-11-06
Payer: COMMERCIAL

## 2018-11-06 PROCEDURE — 97110 THERAPEUTIC EXERCISES: CPT

## 2018-11-08 ENCOUNTER — HOSPITAL ENCOUNTER (OUTPATIENT)
Dept: PHYSICAL THERAPY | Age: 68
Setting detail: THERAPIES SERIES
Discharge: HOME | End: 2018-11-08
Payer: COMMERCIAL

## 2018-11-08 PROCEDURE — 97110 THERAPEUTIC EXERCISES: CPT

## 2018-11-08 NOTE — FLOWSHEET NOTE
Physical Therapy Daily Treatment Note  Date:  2018    Patient Name:  Shilpa Mcfarlane    :  1950  MRN: [id number]     Restrictions/Precautions:  Extension bias, but currently poor tolerance to prone;  MRI: L2-3, L3-4, L4-5, L5-S1 disc bulge, L1 fracture     Medical/Treatment Diagnosis Information:   · Diagnosis: Right Hip Bursitis, DDD, Peripheral Polyneuropathy   · Treatment Diagnosis: Decreased LE flexibility, BLE radiculopathy, extension bias, decreased LE strength     Tracking Information:  Physician Information Referring Practitioner: [id number] of Care Sent Date:  10/5/18  Signed Received: Y   Visit Count / Total Visits      Insurance Approved Visits  /  Approved Dates:     Insurance Information PT Insurance Information: BC/BS 60 visits/year      Progress Note/G-codes   []  Yes  [x]  No Next Due: #10     Pain level: 3-4/10 bilateral legs/hip/back       Subjective:    Pt reports states he is not feeling to bad today says he feels a little stiff today. States his pain bothers him the most at night but he has been sleeping better than before. Says he feels like he is moving better than before. Objective:   Observation:    Test measurements:         Exercises:  Exercise/Equipment Resistance/Repetitions Other comments   Bike/Nu Step #1 x 4 mins Level 3    IB/HR/TR 2 x 30sec    Stair, HS/Hip Flexor    Mat    Fire Hydrants x 10 B in quadruped  Quadruped alt leg lifts x 10B    Supine SB Crunches - 10x    SB Bridges - 10x (knees bent)        LTR w/ LE on SB x10        DKTC 1 x 10'' B  Deep Lumbar EOT flexion - 3 x 5'' : Pt reports crunches w/ SB caused neck pain and bridges w/ SB (straight legs) caused ankle pain last visit and did not want to perform these exercises at this date. Hip Hike     Cable Column Multifidi, 3-way hip            // Bars     SwissBall        10/23: Attempted alt.  arm marches but was a bit difficult  10/30: attempted lateral bend over swissball but pt had more pain   [employer]                                      Other Therapeutic Activities: 11/8. Pt continues to bring up foot issues, and wants to get a script to work on his foot as well as his back. Pt is inconsistent with his symptoms, and PT has difficulty getting patient to focus on his specific needs to help improve overall functioning. Pt states he does the ankle exercises every day at home, so isn't sure what else can be done for that anyway. Pt was educated on PT POC, Diagnosis, Prognosis, pathomechanics, as well as treatment goals and options, and frequency/duration of scheduling future physical therapy appointments. Time was also taken on this day to answer all patient questions involving their participation in PT    Home Exercise Program:    11/6 - The following exercises were performed and added to the patient's home exercise program. (EOT deep lumbar flexion). Additionally, the patient was educated on appropriate frequency, intensity and duration. Pt was educated on  HEP including distribution of handout describing exercises, sets, repetitions, frequency and intensity. Exercises/activities include: wall lean stretch, prone on elbows, piriformis/hamstring/hip flexor stretch     Manual Treatments:   10/11. Ball/belt traction x 10 mins     Modalities:     Consider mechanical traction next visit  10/30: Pt declined heat at this date  10/18: Pt declines heat and mechanical traction  10/11, MHP in reclined position x 15 mins    Consider EGS, and mechanical traction     Timed Code Treatment Minutes:    30    Total Treatment Minutes:   30    Treatment/Activity Tolerance:  [x] Patient tolerated treatment well [] Patient limited by fatigue  [] Patient limited by pain  [] Patient limited by other medical complications  [] Other: Patient was able to perform gentle core strengthening and deep EOT lumbar flexion without increased symptoms.     Prognosis: [x] Good [] Fair  [] Poor    Patient Requires

## 2018-11-12 RX ORDER — TERBINAFINE HYDROCHLORIDE 250 MG/1
250 TABLET ORAL DAILY
Qty: 30 TABLET | Refills: 1 | Status: SHIPPED | OUTPATIENT
Start: 2018-11-12 | End: 2018-12-12

## 2018-11-12 RX ORDER — RIZATRIPTAN BENZOATE 10 MG/1
TABLET, ORALLY DISINTEGRATING ORAL
Qty: 18 TABLET | Refills: 5 | Status: SHIPPED | OUTPATIENT
Start: 2018-11-12 | End: 2019-05-08 | Stop reason: SDUPTHER

## 2018-11-12 RX ORDER — MOMETASONE FUROATE AND FORMOTEROL FUMARATE DIHYDRATE 200; 5 UG/1; UG/1
2 AEROSOL RESPIRATORY (INHALATION) 2 TIMES DAILY
Qty: 13 G | Refills: 2 | Status: SHIPPED | OUTPATIENT
Start: 2018-11-12 | End: 2019-02-04 | Stop reason: SDUPTHER

## 2018-11-12 RX ORDER — PANTOPRAZOLE SODIUM 40 MG/1
TABLET, DELAYED RELEASE ORAL
Qty: 60 TABLET | Refills: 2 | Status: SHIPPED | OUTPATIENT
Start: 2018-11-12 | End: 2019-02-04 | Stop reason: SDUPTHER

## 2018-11-12 RX ORDER — LEVOCETIRIZINE DIHYDROCHLORIDE 5 MG/1
TABLET, FILM COATED ORAL
Qty: 30 TABLET | Refills: 5 | Status: SHIPPED | OUTPATIENT
Start: 2018-11-12 | End: 2019-05-04 | Stop reason: SDUPTHER

## 2018-11-13 ENCOUNTER — HOSPITAL ENCOUNTER (OUTPATIENT)
Dept: PHYSICAL THERAPY | Age: 68
Setting detail: THERAPIES SERIES
Discharge: HOME | End: 2018-11-13
Payer: COMMERCIAL

## 2018-11-13 PROCEDURE — 97012 MECHANICAL TRACTION THERAPY: CPT

## 2018-11-13 PROCEDURE — 97110 THERAPEUTIC EXERCISES: CPT

## 2018-11-13 NOTE — FLOWSHEET NOTE
// [employer]        10/23: Attempted alt. arm marches but was a bit difficult  10/30: attempted lateral bend over swissball but pt had more pain   [employer]                                      Other Therapeutic Activities: 11/8. Pt continues to bring up foot issues, and wants to get a script to work on his foot as well as his back. Pt is inconsistent with his symptoms, and PT has difficulty getting patient to focus on his specific needs to help improve overall functioning. Pt states he does the ankle exercises every day at home, so isn't sure what else can be done for that anyway. Pt was educated on PT POC, Diagnosis, Prognosis, pathomechanics, as well as treatment goals and options, and frequency/duration of scheduling future physical therapy appointments. Time was also taken on this day to answer all patient questions involving their participation in PT    Home Exercise Program:    11/13: Added quadruped fire hydrants, quadruped alt leg lifts, and SB TR  11/6 - The following exercises were performed and added to the patient's home exercise program. (EOT deep lumbar flexion). Additionally, the patient was educated on appropriate frequency, intensity and duration. Pt was educated on  HEP including distribution of handout describing exercises, sets, repetitions, frequency and intensity. Exercises/activities include: wall lean stretch, prone on elbows, piriformis/hamstring/hip flexor stretch     Manual Treatments:   10/11. Ball/belt traction x 10 mins     Modalities:     11/13: Pt was set up on lumbar traction in supine with bolsters under B knees with parameters of 65/40 lbs with on/off time of 30/10 x 10 mins. Pt was given panic button as well as instructed on how to use it if experiencing pain.   Discussed using call bell as needed for assistance     10/30: Pt declined heat at this date  10/18: Pt declines heat and mechanical traction  10/11, MHP in reclined position x 15 mins    Consider

## 2018-11-15 ENCOUNTER — APPOINTMENT (OUTPATIENT)
Dept: PHYSICAL THERAPY | Age: 68
End: 2018-11-15
Payer: COMMERCIAL

## 2018-12-05 RX ORDER — ERGOCALCIFEROL 1.25 MG/1
CAPSULE ORAL
Qty: 4 CAPSULE | Refills: 2 | Status: SHIPPED | OUTPATIENT
Start: 2018-12-05 | End: 2019-04-03 | Stop reason: ALTCHOICE

## 2019-01-08 ENCOUNTER — OFFICE VISIT (OUTPATIENT)
Dept: FAMILY MEDICINE CLINIC | Age: 69
End: 2019-01-08
Payer: COMMERCIAL

## 2019-01-08 VITALS
WEIGHT: 177 LBS | DIASTOLIC BLOOD PRESSURE: 72 MMHG | HEART RATE: 79 BPM | BODY MASS INDEX: 23.46 KG/M2 | TEMPERATURE: 97.9 F | SYSTOLIC BLOOD PRESSURE: 108 MMHG | HEIGHT: 73 IN

## 2019-01-08 DIAGNOSIS — J01.90 ACUTE SINUSITIS, RECURRENCE NOT SPECIFIED, UNSPECIFIED LOCATION: Primary | ICD-10-CM

## 2019-01-08 DIAGNOSIS — R07.9 RIGHT-SIDED CHEST PAIN: ICD-10-CM

## 2019-01-08 PROCEDURE — 99213 OFFICE O/P EST LOW 20 MIN: CPT | Performed by: FAMILY MEDICINE

## 2019-01-08 RX ORDER — AMOXICILLIN 875 MG/1
875 TABLET, COATED ORAL 2 TIMES DAILY
Qty: 14 TABLET | Refills: 0 | Status: SHIPPED | OUTPATIENT
Start: 2019-01-08 | End: 2019-04-03 | Stop reason: SDUPTHER

## 2019-01-08 RX ORDER — BENZONATATE 200 MG/1
200 CAPSULE ORAL 3 TIMES DAILY PRN
Qty: 20 CAPSULE | Refills: 0 | Status: SHIPPED | OUTPATIENT
Start: 2019-01-08 | End: 2019-01-15

## 2019-01-11 ENCOUNTER — TELEPHONE (OUTPATIENT)
Dept: FAMILY MEDICINE CLINIC | Age: 69
End: 2019-01-11

## 2019-01-11 RX ORDER — DEXTROMETHORPHAN HYDROBROMIDE AND PROMETHAZINE HYDROCHLORIDE 15; 6.25 MG/5ML; MG/5ML
5 SYRUP ORAL 4 TIMES DAILY PRN
Qty: 120 ML | Refills: 0 | Status: SHIPPED | OUTPATIENT
Start: 2019-01-11 | End: 2019-01-11 | Stop reason: SDUPTHER

## 2019-01-11 RX ORDER — DEXTROMETHORPHAN HYDROBROMIDE AND PROMETHAZINE HYDROCHLORIDE 15; 6.25 MG/5ML; MG/5ML
5 SYRUP ORAL 4 TIMES DAILY PRN
Qty: 120 ML | Refills: 0 | Status: SHIPPED | OUTPATIENT
Start: 2019-01-11 | End: 2019-01-17

## 2019-01-11 NOTE — TELEPHONE ENCOUNTER
Promethazine dm sent to [facility] [address] [facility] - can use this w/ tessalon perles and cough drops.   Could cause sedation - can also use honey -1 tsp every 1-2 hours for cough

## 2019-02-04 DIAGNOSIS — G62.9 PERIPHERAL POLYNEUROPATHY: ICD-10-CM

## 2019-02-05 RX ORDER — GABAPENTIN 100 MG/1
100 CAPSULE ORAL 3 TIMES DAILY
Qty: 90 CAPSULE | Refills: 3 | Status: SHIPPED | OUTPATIENT
Start: 2019-02-05 | End: 2019-07-08 | Stop reason: SDUPTHER

## 2019-02-05 RX ORDER — PANTOPRAZOLE SODIUM 40 MG/1
TABLET, DELAYED RELEASE ORAL
Qty: 60 TABLET | Refills: 2 | Status: SHIPPED | OUTPATIENT
Start: 2019-02-05 | End: 2019-05-04 | Stop reason: SDUPTHER

## 2019-02-05 RX ORDER — TOPIRAMATE 25 MG/1
TABLET ORAL
Qty: 240 TABLET | Refills: 5 | Status: SHIPPED | OUTPATIENT
Start: 2019-02-05 | End: 2019-07-08 | Stop reason: SDUPTHER

## 2019-02-05 RX ORDER — GABAPENTIN 300 MG/1
CAPSULE ORAL
Qty: 120 CAPSULE | Refills: 2 | Status: SHIPPED | OUTPATIENT
Start: 2019-02-05 | End: 2019-07-08 | Stop reason: SDUPTHER

## 2019-02-05 RX ORDER — MOMETASONE FUROATE AND FORMOTEROL FUMARATE DIHYDRATE 200; 5 UG/1; UG/1
2 AEROSOL RESPIRATORY (INHALATION) 2 TIMES DAILY
Qty: 13 G | Refills: 2 | Status: SHIPPED | OUTPATIENT
Start: 2019-02-05 | End: 2019-05-04 | Stop reason: SDUPTHER

## 2019-03-07 RX ORDER — ERGOCALCIFEROL 1.25 MG/1
CAPSULE ORAL
Qty: 4 CAPSULE | OUTPATIENT
Start: 2019-03-07

## 2019-03-11 ENCOUNTER — TELEPHONE (OUTPATIENT)
Dept: FAMILY MEDICINE CLINIC | Age: 69
End: 2019-03-11

## 2019-04-03 ENCOUNTER — OFFICE VISIT (OUTPATIENT)
Dept: FAMILY MEDICINE CLINIC | Age: 69
End: 2019-04-03
Payer: COMMERCIAL

## 2019-04-03 VITALS
HEART RATE: 64 BPM | RESPIRATION RATE: 18 BRPM | DIASTOLIC BLOOD PRESSURE: 78 MMHG | BODY MASS INDEX: 23.01 KG/M2 | SYSTOLIC BLOOD PRESSURE: 122 MMHG | HEIGHT: 73 IN | WEIGHT: 173.6 LBS

## 2019-04-03 DIAGNOSIS — R20.2 PARESTHESIA OF LEFT ARM: ICD-10-CM

## 2019-04-03 DIAGNOSIS — L08.9 WOUND INFECTION: Primary | ICD-10-CM

## 2019-04-03 DIAGNOSIS — T14.8XXA WOUND INFECTION: Primary | ICD-10-CM

## 2019-04-03 PROCEDURE — 99213 OFFICE O/P EST LOW 20 MIN: CPT | Performed by: FAMILY MEDICINE

## 2019-04-03 RX ORDER — AMOXICILLIN 875 MG/1
875 TABLET, COATED ORAL 2 TIMES DAILY
Qty: 20 TABLET | Refills: 0 | Status: SHIPPED | OUTPATIENT
Start: 2019-04-03 | End: 2019-04-08 | Stop reason: ALTCHOICE

## 2019-04-03 ASSESSMENT — PATIENT HEALTH QUESTIONNAIRE - PHQ9
1. LITTLE INTEREST OR PLEASURE IN DOING THINGS: 0
SUM OF ALL RESPONSES TO PHQ QUESTIONS 1-9: 0
SUM OF ALL RESPONSES TO PHQ QUESTIONS 1-9: 0
SUM OF ALL RESPONSES TO PHQ9 QUESTIONS 1 & 2: 0
2. FEELING DOWN, DEPRESSED OR HOPELESS: 0

## 2019-04-03 ASSESSMENT — ENCOUNTER SYMPTOMS: COLOR CHANGE: 1

## 2019-04-03 NOTE — PROGRESS NOTES
[facility]  Clinic Note    Date: 4/3/2019                                               Subjective:     Chief Complaint   Patient presents with    Lesion(s)     WOUND BETWEEN FINGERS ONGOING 4 WEEKS, NOT HEALING LEFT HAND IN BETWEEN RIGHT AN MIDDLE FINGER, RIGHT THUMB WAS INFECTED AND TREATED BY KIKO. PINKY FINGER ON RIGHT HAND BY WOUND IS NUMBD AS WELL, THE NUMBNESS GOES UP TO ELBOW     HPI  As above, started off as small sore on dorsum of left hand, ring finger. There has been yellow d/c but \"closed over\" recently. No fever but increasing redness of skin surrounding it, hurts to make a fist.   Now has had episodes of numbness starting at the are and going up to elbow. He says has previous hx of having MRSA but that last time he had this, Amoxicillin made all of his sores and \"blisters\" go away completely. Not diabetic, not on any blood thinners, no known trauma or injury. 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 13 g 2    pantoprazole (PROTONIX) 40 MG tablet TAKE 1 TABLET BY MOUTH 2 TIMES DAILY. 60 tablet 2    topiramate (TOPAMAX) 25 MG tablet TAKE 3 TABLETS BY MOUTH EVERY MORNING AND 5 TABLETS BY MOUTH EVERY EVENING 240 tablet 5    gabapentin (NEURONTIN) 100 MG capsule Take 1 capsule by mouth 3 times daily for 30 days. . 90 capsule 3    gabapentin (NEURONTIN) 300 MG capsule 1 CAP BY MOUTH AT BEDTIME FOR 2-3 NIGHTS THEN 2 NIGHTLY FOR 4-7 DAYS THEN 1 AM, 2 AT BEDTIME FOR 4-7 DAYS THEN 1AM, 1 PM AND 2 AT BEDTIME  capsule 2    PROAIR  (90 Base) MCG/ACT inhaler INHALE 2 PUFFS INTO THE LUNGS EVERY 4 HOURS AS NEEDED FOR WHEEZING OR SHORTNESS OF BREATH. 8.5 g 5    rizatriptan (MAXALT-MLT) 10 MG disintegrating tablet DISSOLVE 1 TABLET BY MOUTH ONCE AS NEEDED FOR MIGRAINE. MAY REPEAT IN 2 HOURS IF NEEDED. MAX 2 TABLETS PER 24 HOURS 18 tablet 5    levocetirizine (XYZAL) 5 MG tablet TAKE 1 TABLET BY MOUTH EVERY EVENING. 30 tablet 5    meloxicam (MOBIC) 15 MG tablet TAKE 1 TABLET BY MOUTH DAILY 30 tablet 5    albuterol (PROVENTIL) (2.5 MG/3ML) 0.083% nebulizer solution Take 3 mLs by nebulization every 4 hours as needed for Wheezing 25 vial 2    Spacer/Aero-Holding Chambers (COMPACT SPACE CHAMBER/SM MASK) AZAEL AS DIRECTED 1 Device 0    albuterol-ipratropium (COMBIVENT)  MCG/ACT inhaler INHALE 2 PUFFS INTO THE LUNGS EVERY 4 (FOUR) HOURS AS NEEDED. 2 Inhaler 2    ipratropium (ATROVENT) 0.06 % nasal spray 1-2 sprays by Nasal route 4 times daily as needed for Rhinitis 1 Bottle 1    ondansetron (ZOFRAN-ODT) 4 MG disintegrating tablet TAKE 1 TABLET BY MOUTH EVERY 8 HOURS AS NEEDED FOR NAUSEA OR VOMITING. 10 tablet 0     No current facility-administered medications for this visit. Allergies   Allergen Reactions    Codeine      Upset stomach     Hydrocodone Nausea And Vomiting       Review of Systems   Constitutional: Negative for activity change, chills and fever. Musculoskeletal: Positive for arthralgias. Negative for gait problem. Skin: Positive for color change, rash and wound. Neurological: Positive for tremors. Hematological: Bruises/bleeds easily.        Objective:  /78 (Site: Right Upper Arm, Position: Sitting, Cuff Size: Medium Adult)   Pulse 64   Resp 18   Ht 6' 1\" (1.854 m)   Wt 173 lb 9.6 oz (78.7 kg)   BMI 22.90 kg/m²     Physical

## 2019-04-06 LAB
GRAM STAIN RESULT: ABNORMAL
ORGANISM: ABNORMAL
WOUND/ABSCESS: ABNORMAL
WOUND/ABSCESS: ABNORMAL

## 2019-04-08 ENCOUNTER — TELEPHONE (OUTPATIENT)
Dept: FAMILY MEDICINE CLINIC | Age: 69
End: 2019-04-08

## 2019-04-08 RX ORDER — SULFAMETHOXAZOLE AND TRIMETHOPRIM 800; 160 MG/1; MG/1
1 TABLET ORAL 2 TIMES DAILY
Qty: 20 TABLET | Refills: 0 | Status: SHIPPED | OUTPATIENT
Start: 2019-04-08 | End: 2019-04-18

## 2019-04-08 NOTE — TELEPHONE ENCOUNTER
Patient is calling because he wants to know if he should be taking AMOXICILLIN AND BACTRIM OR SHOULD HE STOP THE AMOXICILLIN. Left arm is still hurting a little, and what can he put on the cut on his left hand. Please give him a call back.

## 2019-04-09 ENCOUNTER — TELEPHONE (OUTPATIENT)
Dept: FAMILY MEDICINE CLINIC | Age: 69
End: 2019-04-09

## 2019-04-09 NOTE — TELEPHONE ENCOUNTER
Patient wants to know why Dr. Samm Jacobs put him on Bactrim and does he need to stay out of the sun with this medication. Please give him a call back.

## 2019-04-09 NOTE — TELEPHONE ENCOUNTER
Changed it based on the wound culture, and yes, would be best to either avoid sun exposure or be sure to wear sunscreen to minimize the chance of sunburn

## 2019-04-23 ENCOUNTER — OFFICE VISIT (OUTPATIENT)
Dept: FAMILY MEDICINE CLINIC | Age: 69
End: 2019-04-23
Payer: COMMERCIAL

## 2019-04-23 DIAGNOSIS — D69.2 PURPURA (HCC): Primary | ICD-10-CM

## 2019-04-23 LAB
A/G RATIO: 2.1 (ref 1.1–2.2)
ALBUMIN SERPL-MCNC: 4.2 G/DL (ref 3.4–5)
ALP BLD-CCNC: 113 U/L (ref 40–129)
ALT SERPL-CCNC: 22 U/L (ref 10–40)
ANION GAP SERPL CALCULATED.3IONS-SCNC: 13 MMOL/L (ref 3–16)
APTT: 32.3 SEC (ref 26–36)
AST SERPL-CCNC: 24 U/L (ref 15–37)
BASOPHILS ABSOLUTE: 0 K/UL (ref 0–0.2)
BASOPHILS RELATIVE PERCENT: 0.2 %
BILIRUB SERPL-MCNC: 0.4 MG/DL (ref 0–1)
BUN BLDV-MCNC: 9 MG/DL (ref 7–20)
CALCIUM SERPL-MCNC: 8.9 MG/DL (ref 8.3–10.6)
CHLORIDE BLD-SCNC: 106 MMOL/L (ref 99–110)
CO2: 24 MMOL/L (ref 21–32)
CREAT SERPL-MCNC: 0.5 MG/DL (ref 0.8–1.3)
EOSINOPHILS ABSOLUTE: 0 K/UL (ref 0–0.6)
EOSINOPHILS RELATIVE PERCENT: 0.4 %
GFR AFRICAN AMERICAN: >60
GFR NON-AFRICAN AMERICAN: >60
GLOBULIN: 2 G/DL
GLUCOSE BLD-MCNC: 100 MG/DL (ref 70–99)
HCT VFR BLD CALC: 40.2 % (ref 40.5–52.5)
HEMOGLOBIN: 13.9 G/DL (ref 13.5–17.5)
LYMPHOCYTES ABSOLUTE: 1.6 K/UL (ref 1–5.1)
LYMPHOCYTES RELATIVE PERCENT: 22.6 %
MCH RBC QN AUTO: 33 PG (ref 26–34)
MCHC RBC AUTO-ENTMCNC: 34.5 G/DL (ref 31–36)
MCV RBC AUTO: 95.5 FL (ref 80–100)
MONOCYTES ABSOLUTE: 0.7 K/UL (ref 0–1.3)
MONOCYTES RELATIVE PERCENT: 10.2 %
NEUTROPHILS ABSOLUTE: 4.9 K/UL (ref 1.7–7.7)
NEUTROPHILS RELATIVE PERCENT: 66.6 %
PDW BLD-RTO: 13.4 % (ref 12.4–15.4)
PLATELET # BLD: 239 K/UL (ref 135–450)
PMV BLD AUTO: 6.8 FL (ref 5–10.5)
POTASSIUM SERPL-SCNC: 3.9 MMOL/L (ref 3.5–5.1)
RBC # BLD: 4.21 M/UL (ref 4.2–5.9)
SEDIMENTATION RATE, ERYTHROCYTE: 8 MM/HR (ref 0–20)
SODIUM BLD-SCNC: 143 MMOL/L (ref 136–145)
TOTAL PROTEIN: 6.2 G/DL (ref 6.4–8.2)
WBC # BLD: 7.3 K/UL (ref 4–11)

## 2019-04-23 PROCEDURE — 11104 PUNCH BX SKIN SINGLE LESION: CPT | Performed by: FAMILY MEDICINE

## 2019-04-23 PROCEDURE — 36415 COLL VENOUS BLD VENIPUNCTURE: CPT | Performed by: FAMILY MEDICINE

## 2019-04-23 NOTE — PROGRESS NOTES
Subjective:      Patient ID: Mike Luna is a 79 y.o. male. HPI  No chief complaint on file. BP Readings from Last 3 Encounters:   04/03/19 122/78   01/08/19 108/72   10/03/18 122/82     Pulse Readings from Last 3 Encounters:   04/03/19 64   01/08/19 79   10/03/18 71     Wt Readings from Last 3 Encounters:   04/03/19 173 lb 9.6 oz (78.7 kg)   01/08/19 177 lb (80.3 kg)   10/03/18 175 lb 9.6 oz (79.7 kg)     Current Outpatient Medications   Medication Sig Dispense Refill    DULERA 200-5 MCG/ACT inhaler INHALE 2 PUFFS INTO THE LUNGS 2 TIMES DAILY RINSE MOUTH AFTER USING. 13 g 2    pantoprazole (PROTONIX) 40 MG tablet TAKE 1 TABLET BY MOUTH 2 TIMES DAILY. 60 tablet 2    topiramate (TOPAMAX) 25 MG tablet TAKE 3 TABLETS BY MOUTH EVERY MORNING AND 5 TABLETS BY MOUTH EVERY EVENING 240 tablet 5    gabapentin (NEURONTIN) 100 MG capsule Take 1 capsule by mouth 3 times daily for 30 days. . 90 capsule 3    gabapentin (NEURONTIN) 300 MG capsule 1 CAP BY MOUTH AT BEDTIME FOR 2-3 NIGHTS THEN 2 NIGHTLY FOR 4-7 DAYS THEN 1 AM, 2 AT BEDTIME FOR 4-7 DAYS THEN 1AM, 1 PM AND 2 AT BEDTIME  capsule 2    PROAIR  (90 Base) MCG/ACT inhaler INHALE 2 PUFFS INTO THE LUNGS EVERY 4 HOURS AS NEEDED FOR WHEEZING OR SHORTNESS OF BREATH. 8.5 g 5    rizatriptan (MAXALT-MLT) 10 MG disintegrating tablet DISSOLVE 1 TABLET BY MOUTH ONCE AS NEEDED FOR MIGRAINE. MAY REPEAT IN 2 HOURS IF NEEDED. MAX 2 TABLETS PER 24 HOURS 18 tablet 5    levocetirizine (XYZAL) 5 MG tablet TAKE 1 TABLET BY MOUTH EVERY EVENING.  30 tablet 5    meloxicam (MOBIC) 15 MG tablet TAKE 1 TABLET BY MOUTH DAILY 30 tablet 5    albuterol (PROVENTIL) (2.5 MG/3ML) 0.083% nebulizer solution Take 3 mLs by nebulization every 4 hours as needed for Wheezing 25 vial 2    Spacer/Aero-Holding Chambers (COMPACT SPACE CHAMBER/SM MASK) AZAEL AS DIRECTED 1 Device 0    albuterol-ipratropium (COMBIVENT)  MCG/ACT inhaler INHALE 2 PUFFS INTO THE LUNGS EVERY 4 (FOUR) HOURS AS NEEDED. 2 Inhaler 2    ipratropium (ATROVENT) 0.06 % nasal spray 1-2 sprays by Nasal route 4 times daily as needed for Rhinitis 1 Bottle 1    ondansetron (ZOFRAN-ODT) 4 MG disintegrating tablet TAKE 1 TABLET BY MOUTH EVERY 8 HOURS AS NEEDED FOR NAUSEA OR VOMITING. 10 tablet 0     No current facility-administered medications for this visit.    seen by dr. Joey Giordano 4/3 with wound between fingers for 1 month  Bruised spots -raised - skin peels and gets infected - happening on arms. Getting bad headaches. Dx w/ mrsa in past - given amoxil on 4/3  No additional asa/ ibuprofen  Hurts a lot when first gets lesions but don't stay that way. Don't usually go proximal to elbows. Review of Systems    Objective:   Physical Exam   Constitutional: He is oriented to person, place, and time. He appears well-developed and well-nourished. No distress. Eyes: No scleral icterus. Pulmonary/Chest: Effort normal.   Musculoskeletal: He exhibits no edema. Neurological: He is alert and oriented to person, place, and time. Skin: Skin is warm and dry. Scattered purpura lesions on forearms/ hands bilat  One w/ superficial abrasion right hand   Psychiatric: He has a normal mood and affect. Assessment:       Diagnosis Orders   1.  Purpura (HCC)  CBC Auto Differential    Comprehensive Metabolic Panel    Sedimentation Rate    ANTI-NEUTROPHILIC CYTOPLASMIC ANTIBODY    APTT    78367 - WI PUNCH BIOPSY SKIN SINGLE LESION           Plan:      Stop meloxicam    Check labs above  Punch bx 4mm done after 1 cc 2% lido w/ epi local/ betadine  Pso/ bandaid - wound care d/w pt  F/u pending results  Hold on abx for now    Shaunna Skiff, MD

## 2019-04-25 LAB — ANCA IFA: NORMAL

## 2019-04-26 ENCOUNTER — TELEPHONE (OUTPATIENT)
Dept: FAMILY MEDICINE CLINIC | Age: 69
End: 2019-04-26

## 2019-04-26 DIAGNOSIS — R51.9 WORSENING HEADACHES: Primary | ICD-10-CM

## 2019-04-26 RX ORDER — TIZANIDINE 4 MG/1
4 TABLET ORAL NIGHTLY
Qty: 30 TABLET | Refills: 0 | Status: SHIPPED | OUTPATIENT
Start: 2019-04-26 | End: 2021-01-07 | Stop reason: ALTCHOICE

## 2019-04-26 NOTE — TELEPHONE ENCOUNTER
Biopsy shows hemanth's purpura - lab work essentially unremarkable. Hemanth's purpura is not inflammatory but represents breakdown in skin/ subcutaneous tissue that makes blood vessels more susceptible to injury. Need to eat good balanced diet,take a good multivitamin daily and protect skin on hands/ arms from trauma/ injury. Need to try to get ct of head, noncontrast asap if headaches are worsening - please schedule  Sent in zanaflex 4mg to take before bed [facility] to see if this helps w/ headache in addition to what he's taking now.

## 2019-05-02 ENCOUNTER — OFFICE VISIT (OUTPATIENT)
Dept: FAMILY MEDICINE CLINIC | Age: 69
End: 2019-05-02
Payer: COMMERCIAL

## 2019-05-02 VITALS
DIASTOLIC BLOOD PRESSURE: 70 MMHG | HEART RATE: 76 BPM | BODY MASS INDEX: 23.19 KG/M2 | HEIGHT: 73 IN | SYSTOLIC BLOOD PRESSURE: 128 MMHG | WEIGHT: 175 LBS | RESPIRATION RATE: 14 BRPM

## 2019-05-02 DIAGNOSIS — D69.2 SENILE PURPURA (HCC): ICD-10-CM

## 2019-05-02 DIAGNOSIS — G89.29 CHRONIC NONINTRACTABLE HEADACHE, UNSPECIFIED HEADACHE TYPE: ICD-10-CM

## 2019-05-02 DIAGNOSIS — R51.9 CHRONIC NONINTRACTABLE HEADACHE, UNSPECIFIED HEADACHE TYPE: ICD-10-CM

## 2019-05-02 DIAGNOSIS — L08.9 SKIN INFECTION: ICD-10-CM

## 2019-05-02 DIAGNOSIS — J31.0 CHRONIC RHINITIS: Primary | ICD-10-CM

## 2019-05-02 PROCEDURE — 99214 OFFICE O/P EST MOD 30 MIN: CPT | Performed by: FAMILY MEDICINE

## 2019-05-02 RX ORDER — CLINDAMYCIN HYDROCHLORIDE 300 MG/1
300 CAPSULE ORAL 3 TIMES DAILY
Qty: 30 CAPSULE | Refills: 0 | Status: SHIPPED | OUTPATIENT
Start: 2019-05-02 | End: 2019-05-12

## 2019-05-02 RX ORDER — IPRATROPIUM BROMIDE 42 UG/1
1-2 SPRAY, METERED NASAL 4 TIMES DAILY PRN
Qty: 1 BOTTLE | Refills: 5 | Status: SHIPPED | OUTPATIENT
Start: 2019-05-02 | End: 2020-06-01

## 2019-05-02 NOTE — PROGRESS NOTES
[facility]  Clinic Note    Date: 5/2/2019                                               Subjective:     Chief Complaint   Patient presents with    Migraine     MIGRAINE ROUTINE FOLLOW UP DISCUSS IF HE REALLY NEEDS TO GET CT SCAN HE HAS HAD SO MANY CT SCANS NOT REALLY WANTING TO REPEAT     Results     REVIEW BLOOD WORK AND DISCUSS RED SPOTS ON HIS ARMS HE HAS MORE NOW      HPI  Stays active - always doing things w/ arms/ hands  Doesn't get anything but a lot of new ones since yesterday  Skin breaks open but don't heal well - feels off - doesn't feel healthy - ? Chronic skin infection  Still nail issues -no response to lamisil for 3 month - had pus under nail at one point. 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 Recommended that he continue current therapy, as will likely drop with oral supplement.      Diverticulitis     Gallbladder problem     GERD (gastroesophageal reflux disease)     Hiatal hernia     Migraine     Nausea & vomiting         Past Surgical History:   Procedure Laterality Date    CHOLECYSTECTOMY      COLONOSCOPY  9/11    FOOT SURGERY      nerve removed from foot    SHOULDER SURGERY Right 2005    rotator cuff repair    UPPER GASTROINTESTINAL ENDOSCOPY  9/11     Office Visit on 04/23/2019   Component Date Value Ref Range Status    aPTT 04/23/2019 32.3  26.0 - 36.0 sec Final    Comment: Therapeutic range: 54.0 - 90.0 sec    Effective 5-31-18 9:00am EST  Please note reference ranges have  changed for PTT Testing.  Sed Rate 04/23/2019 8  0 - 20 mm/Hr Final    Sodium 04/23/2019 143  136 - 145 mmol/L Final    Potassium 04/23/2019 3.9  3.5 - 5.1 mmol/L Final    Chloride 04/23/2019 106  99 - 110 mmol/L Final    CO2 04/23/2019 24  21 - 32 mmol/L Final    Anion Gap 04/23/2019 13  3 - 16 Final    Glucose 04/23/2019 100* 70 - 99 mg/dL Final    BUN 04/23/2019 9  7 - 20 mg/dL Final    CREATININE 04/23/2019 0.5* 0.8 - 1.3 mg/dL Final    GFR Non- 04/23/2019 >60  >60 Final    Comment: >60 mL/min/1.73m2 EGFR, calc. for ages 25 and older using the  MDRD formula (not corrected for weight), is valid for stable  renal function.  GFR  04/23/2019 >60  >60 Final    Comment: Chronic Kidney Disease: less than 60 ml/min/1.73 sq.m. Kidney Failure: less than 15 ml/min/1.73 sq.m. Results valid for patients 18 years and older.       Calcium 04/23/2019 8.9  8.3 - 10.6 mg/dL Final    Total Protein 04/23/2019 6.2* 6.4 - 8.2 g/dL Final    Alb 04/23/2019 4.2  3.4 - 5.0 g/dL Final    Albumin/Globulin Ratio 04/23/2019 2.1  1.1 - 2.2 Final    Total Bilirubin 04/23/2019 0.4  0.0 - 1.0 mg/dL Final    Alkaline Phosphatase 04/23/2019 113  40 - 129 U/L Final    ALT 04/23/2019 22  10 - 40 U/L Final    AST 04/23/2019 24  15 - 37 U/L Final    Globulin 04/23/2019 2.0  g/dL Final    WBC 04/23/2019 7.3  4.0 - 11.0 K/uL Final    RBC 04/23/2019 4.21  4.20 - 5.90 M/uL Final    Hemoglobin 04/23/2019 13.9  13.5 - 17.5 g/dL Final    Hematocrit 04/23/2019 40.2* 40.5 - 52.5 % Final    MCV 04/23/2019 95.5  80.0 - 100.0 fL Final    MCH 04/23/2019 33.0  26.0 - 34.0 pg Final    MCHC 04/23/2019 34.5  31.0 - 36.0 g/dL Final    RDW 04/23/2019 13.4  12.4 - 15.4 % Final    Platelets 80/93/4543 239  135 - 450 K/uL Final    MPV 04/23/2019 6.8  5.0 - 10.5 fL Final    Neutrophils % 04/23/2019 66.6  % Final    Lymphocytes % 04/23/2019 22.6  % Final    Monocytes % 04/23/2019 10.2  % Final    Eosinophils % 04/23/2019 0.4  % Final    Basophils % 04/23/2019 0.2  % Final    Neutrophils # 04/23/2019 4.9  1.7 - 7.7 K/uL Final    Lymphocytes # 04/23/2019 1.6  1.0 - 5.1 K/uL Final    Monocytes # 04/23/2019 0.7  0.0 - 1.3 K/uL Final    Eosinophils # 04/23/2019 0.0  0.0 - 0.6 K/uL Final    Basophils # 04/23/2019 0.0  0.0 - 0.2 K/uL Final    ANCA IFA 04/23/2019 <1:20  <1:20 Final    Comment: The ANCA IFA is <1:20; therefore, no further testing will be performed. INTERPRETIVE INFORMATION: Anti-Neutrophil Cyto Ab, IgG  Neutrophil Cytoplasmic Antibodies (C-ANCA = granular cytoplasmic  staining,  P-ANCA = perinuclear staining) are found in the serum of over 90  percent of  patients with certain necrotizing systemic vasculitides, and usually in  less  than 5 percent of patients with collagen vascular disease or arthritis. Performed by [facility] , [id number] [facility] [phone]  www. Gauri Powell MD - Lab. Director       Family History   Problem Relation Age of Onset    Hypertension Mother     High Cholesterol Mother     Hypertension Father     High Cholesterol Father     Diabetes Sister         B 17 ? METFORMIN? Current Outpatient Medications   Medication Sig Dispense Refill    tiZANidine (ZANAFLEX) 4 MG tablet Take 1 tablet by mouth nightly 30 tablet 0    DULERA 200-5 MCG/ACT inhaler INHALE 2 PUFFS INTO THE LUNGS 2 TIMES DAILY RINSE MOUTH AFTER USING. 13 g 2    pantoprazole (PROTONIX) 40 MG tablet TAKE 1 TABLET BY MOUTH 2 TIMES DAILY. 60 tablet 2    topiramate (TOPAMAX) 25 MG tablet TAKE 3 TABLETS BY MOUTH EVERY MORNING AND 5 TABLETS BY MOUTH EVERY EVENING 240 tablet 5    gabapentin (NEURONTIN) 100 MG capsule Take 1 capsule by mouth 3 times daily for 30 days. . 90 capsule 3    gabapentin (NEURONTIN) 300 MG capsule 1 CAP BY MOUTH AT BEDTIME FOR 2-3 NIGHTS THEN 2 NIGHTLY FOR 4-7 DAYS THEN 1 AM, 2 AT BEDTIME FOR 4-7 DAYS THEN 1AM, 1 PM AND 2 AT BEDTIME  capsule 2    PROAIR  (90 Base) MCG/ACT inhaler INHALE 2 PUFFS INTO THE LUNGS EVERY 4 HOURS AS NEEDED FOR WHEEZING OR SHORTNESS OF BREATH. 8.5 g 5    rizatriptan (MAXALT-MLT) 10 MG disintegrating tablet DISSOLVE 1 TABLET BY MOUTH ONCE AS NEEDED FOR MIGRAINE. MAY REPEAT IN 2 HOURS IF NEEDED. MAX 2 TABLETS PER 24 HOURS 18 tablet 5    levocetirizine (XYZAL) 5 MG tablet TAKE 1 TABLET BY MOUTH EVERY EVENING. 30 tablet 5    albuterol (PROVENTIL) (2.5 MG/3ML) 0.083% nebulizer solution Take 3 mLs by nebulization every 4 hours as needed for Wheezing 25 vial 2    Spacer/Aero-Holding Chambers (COMPACT SPACE CHAMBER/SM MASK) AZAEL AS DIRECTED 1 Device 0    albuterol-ipratropium (COMBIVENT)  MCG/ACT inhaler INHALE 2 PUFFS INTO THE LUNGS EVERY 4 (FOUR) HOURS AS NEEDED. 2 Inhaler 2    ipratropium (ATROVENT) 0.06 % nasal spray 1-2 sprays by Nasal route 4 times daily as needed for Rhinitis 1 Bottle 1    ondansetron (ZOFRAN-ODT) 4 MG disintegrating tablet TAKE 1 TABLET BY MOUTH EVERY 8 HOURS AS NEEDED FOR NAUSEA OR VOMITING. 10 tablet 0     No current facility-administered medications for this visit. Allergies   Allergen Reactions    Codeine      Upset stomach     Hydrocodone Nausea And Vomiting       Review of Systems    Objective:  /70 (Site: Left Upper Arm, Position: Sitting, Cuff Size: Medium Adult)   Pulse 76   Resp 14   Ht 6' 1\" (1.854 m)   Wt 175 lb (79.4 kg)   BMI 23.09 kg/m²     BP Readings from Last 3 Encounters:   05/02/19 128/70   04/03/19 122/78   01/08/19 108/72       Pulse Readings from Last 3 Encounters:   05/02/19 76   04/03/19 64   01/08/19 79       Wt Readings from Last 3 Encounters:   05/02/19 175 lb (79.4 kg)   04/03/19 173 lb 9.6 oz (78.7 kg)   01/08/19 177 lb (80.3 kg)   still getting headaches/ ? Migraines - maxalt helps  On mvi/ probiotics daily.   Light is big trigger for migraines at times -bright lights  Allergies have been problematic   Allegra doesn't work in summer  Zyrtec 10 bid worked well in past.  Needs atrovent ns refill for drainage  Can't tolerate smelly lotions - trigger migraine  Sees foot doctor for pain - off mobic now  Stomach okay on ppi  Physical Exam   Constitutional: He is oriented to person, place, and time. He appears well-developed and well-nourished. No distress. Eyes: No scleral icterus. Pulmonary/Chest: Effort normal.   Musculoskeletal: He exhibits no edema. Neurological: He is alert and oriented to person, place, and time. No focal lesions   Skin: Skin is warm and dry. Scattered bruising dorsal hands/ forearms   Psychiatric: He has a normal mood and affect. Assessment/Plan:   There are no diagnoses linked to this encounter. Diagnosis Orders   1. Chronic rhinitis     2. Senile purpura ([facility] [address]     3. Skin infection     4. Chronic nonintractable headache, unspecified headache type       Consider emgality as topamax not working - maxalt prn  Hold on ct scan for now  Clindamycin 300 tid for 10 days w/ probiotics  Senile purpura d/w pt - consider derm referral but o/w mvi and protect arms  Refill atrovent ns prn - can use zyrtec 10 bid through summer prn  ? Role of allergies in headache  Soap/ water - consider hibiclens wash 2x/ week w/ hx of mrsa  Using topical compounded diclofenac/ gabapentin/ lidocaine to foot per podiatry  Off oral nsaids  Topical creams d/w pt  No follow-ups on file.     Pauline Dodge DO  5/2/2019  11:25 AM

## 2019-05-06 RX ORDER — LEVOCETIRIZINE DIHYDROCHLORIDE 5 MG/1
TABLET, FILM COATED ORAL
Qty: 30 TABLET | Refills: 5 | Status: SHIPPED | OUTPATIENT
Start: 2019-05-06 | End: 2019-11-07 | Stop reason: SDUPTHER

## 2019-05-06 RX ORDER — MOMETASONE FUROATE AND FORMOTEROL FUMARATE DIHYDRATE 200; 5 UG/1; UG/1
2 AEROSOL RESPIRATORY (INHALATION) 2 TIMES DAILY
Qty: 13 G | Refills: 5 | Status: SHIPPED | OUTPATIENT
Start: 2019-05-06 | End: 2019-11-07 | Stop reason: SDUPTHER

## 2019-05-06 RX ORDER — PANTOPRAZOLE SODIUM 40 MG/1
TABLET, DELAYED RELEASE ORAL
Qty: 60 TABLET | Refills: 5 | Status: SHIPPED | OUTPATIENT
Start: 2019-05-06 | End: 2019-11-07 | Stop reason: SDUPTHER

## 2019-05-09 RX ORDER — RIZATRIPTAN BENZOATE 10 MG/1
TABLET, ORALLY DISINTEGRATING ORAL
Qty: 18 TABLET | Refills: 5 | Status: SHIPPED | OUTPATIENT
Start: 2019-05-09 | End: 2019-12-05 | Stop reason: SDUPTHER

## 2019-05-22 ENCOUNTER — TELEPHONE (OUTPATIENT)
Dept: FAMILY MEDICINE CLINIC | Age: 69
End: 2019-05-22

## 2019-05-22 NOTE — TELEPHONE ENCOUNTER
Pt had MRSA on his hand. His finger is bothering him--nothing to see. Do you want to see him? It is his finger  The inside. It is swollen. Is this why you are sending him to Dermatologist?    We did a punch biopsy on pt --there is still a hole in it that is white. Left arm. The redness away.   Does this just take a while to heal.  Please call

## 2019-05-22 NOTE — TELEPHONE ENCOUNTER
It does take awhile for the punch ulcer to heal from inside out - usually 1-2 months. I suggested seeing a dermatologist due to the extent of his bruising to make sure there is nothing else going and for general skin check.   can use ice/ anti-inflammatory topical preparations to finger and if continues to persist/ worsen, can be seen for acute appointment

## 2019-06-03 ENCOUNTER — TELEPHONE (OUTPATIENT)
Dept: FAMILY MEDICINE CLINIC | Age: 69
End: 2019-06-03

## 2019-06-03 NOTE — TELEPHONE ENCOUNTER
Patient is calling because he only wants to see Dr. Shharzad Vargas tomorrow for diarrhea now for 1 week. Please give him a call back, Emma Grewal told him she could get him in.

## 2019-06-05 ENCOUNTER — OFFICE VISIT (OUTPATIENT)
Dept: FAMILY MEDICINE CLINIC | Age: 69
End: 2019-06-05
Payer: COMMERCIAL

## 2019-06-05 VITALS
RESPIRATION RATE: 14 BRPM | WEIGHT: 174 LBS | HEIGHT: 73 IN | HEART RATE: 74 BPM | SYSTOLIC BLOOD PRESSURE: 118 MMHG | DIASTOLIC BLOOD PRESSURE: 70 MMHG | BODY MASS INDEX: 23.06 KG/M2

## 2019-06-05 DIAGNOSIS — K20.90 ESOPHAGITIS DETERMINED BY ENDOSCOPY: Primary | ICD-10-CM

## 2019-06-05 DIAGNOSIS — R19.7 DIARRHEA, UNSPECIFIED TYPE: ICD-10-CM

## 2019-06-05 PROCEDURE — 99213 OFFICE O/P EST LOW 20 MIN: CPT | Performed by: FAMILY MEDICINE

## 2019-06-05 RX ORDER — DEXLANSOPRAZOLE 60 MG/1
60 CAPSULE, DELAYED RELEASE ORAL DAILY
Qty: 30 CAPSULE | Refills: 2 | Status: SHIPPED | OUTPATIENT
Start: 2019-06-05 | End: 2019-08-26

## 2019-06-05 RX ORDER — DICYCLOMINE HCL 20 MG
20 TABLET ORAL 3 TIMES DAILY PRN
Qty: 30 TABLET | Refills: 0 | Status: SHIPPED | OUTPATIENT
Start: 2019-06-05 | End: 2019-08-26

## 2019-06-05 NOTE — PROGRESS NOTES
Subjective:      Patient ID: Cammy Robert is a 79 y.o. male. HPI  Chief Complaint   Patient presents with    Diarrhea     DIARRHEA FOR OVER A WEEK NOT SURE WHAT CAUSED IT WORSE IN THE MORNING WAS ON ANTIBIOTICS FOR MRSA NOT SURE IF ITS RELATED OR NOT HE HAS NEVER HAD THIS ISSUE BEFORE HE IS BLEEDING NOW VERY BAD CRAMPING      Having firm stool initially waking him up then turns to thick liquid consistency  A lot of abd pain/ cramping  BP Readings from Last 3 Encounters:   06/05/19 118/70   05/02/19 128/70   04/03/19 122/78     Pulse Readings from Last 3 Encounters:   06/05/19 74   05/02/19 76   04/03/19 64     Wt Readings from Last 3 Encounters:   06/05/19 174 lb (78.9 kg)   05/02/19 175 lb (79.4 kg)   04/03/19 173 lb 9.6 oz (78.7 kg)     Taking imodium/ pepto bismol - pepto bismol made feel dizzy  Imodium tolerated and seems to slow it down  Wife usually cooks meals - generally fairly healthy  Colonoscopy 7/15 and egd done - mild esophagitis - occ gets things stuck in throat  Coughs a lot to get foot up -on right side  Usually slows down by noon - but getting intermittent episodes  Noted blood yesterday - sleeping a lot in last week. Has divertic/ hiatal hernia - pain more on left side - frequent bathroom trips  Some hoarseness off and on over last week. No heartburn but indigestion a lot - limits spicy food  Takes protonix twice daily. Current Outpatient Medications   Medication Sig Dispense Refill    rizatriptan (MAXALT-MLT) 10 MG disintegrating tablet DISSOLVE 1 TABLET BY MOUTH ONCE AS NEEDED FOR MIGRAINE. MAY REPEAT IN 2 HOURS IF NEEDED. MAX 2 TABLETS PER 24 HOURS 18 tablet 5    levocetirizine (XYZAL) 5 MG tablet TAKE 1 TABLET BY MOUTH EVERY EVENING. 30 tablet 5    DULERA 200-5 MCG/ACT inhaler INHALE 2 PUFFS INTO THE LUNGS 2 TIMES DAILY RINSE MOUTH AFTER USING. 13 g 5    pantoprazole (PROTONIX) 40 MG tablet TAKE 1 TABLET BY MOUTH 2 TIMES DAILY.  60 tablet 5    ipratropium (ATROVENT) 0.06 % nasal spray 1-2 sprays by Nasal route 4 times daily as needed for Rhinitis 1 Bottle 5    tiZANidine (ZANAFLEX) 4 MG tablet Take 1 tablet by mouth nightly 30 tablet 0    topiramate (TOPAMAX) 25 MG tablet TAKE 3 TABLETS BY MOUTH EVERY MORNING AND 5 TABLETS BY MOUTH EVERY EVENING 240 tablet 5    PROAIR  (90 Base) MCG/ACT inhaler INHALE 2 PUFFS INTO THE LUNGS EVERY 4 HOURS AS NEEDED FOR WHEEZING OR SHORTNESS OF BREATH. 8.5 g 5    albuterol (PROVENTIL) (2.5 MG/3ML) 0.083% nebulizer solution Take 3 mLs by nebulization every 4 hours as needed for Wheezing 25 vial 2    Spacer/Aero-Holding Chambers (COMPACT SPACE CHAMBER/SM MASK) AZAEL AS DIRECTED 1 Device 0    albuterol-ipratropium (COMBIVENT)  MCG/ACT inhaler INHALE 2 PUFFS INTO THE LUNGS EVERY 4 (FOUR) HOURS AS NEEDED. 2 Inhaler 2    ondansetron (ZOFRAN-ODT) 4 MG disintegrating tablet TAKE 1 TABLET BY MOUTH EVERY 8 HOURS AS NEEDED FOR NAUSEA OR VOMITING. 10 tablet 0    gabapentin (NEURONTIN) 100 MG capsule Take 1 capsule by mouth 3 times daily for 30 days. . 90 capsule 3    gabapentin (NEURONTIN) 300 MG capsule 1 CAP BY MOUTH AT BEDTIME FOR 2-3 NIGHTS THEN 2 NIGHTLY FOR 4-7 DAYS THEN 1 AM, 2 AT BEDTIME FOR 4-7 DAYS THEN 1AM, 1 PM AND 2 AT BEDTIME  capsule 2     No current facility-administered medications for this visit. Review of Systems    Objective:   Physical Exam   Constitutional: He appears well-developed. No distress. HENT:   Mouth/Throat: Oropharynx is clear and moist.   Eyes: Conjunctivae are normal. No scleral icterus. Cardiovascular: Normal rate, regular rhythm and normal heart sounds. Exam reveals no gallop. No murmur heard. Pulmonary/Chest: Effort normal and breath sounds normal. No respiratory distress. He has no wheezes. He has no rhonchi. He has no rales. Abdominal: Soft. Bowel sounds are normal. He exhibits no distension. There is no tenderness. Musculoskeletal: He exhibits no edema. Neurological: He is alert. Skin: Skin is intact. No rash noted. No erythema. Psychiatric: He has a normal mood and affect. Assessment:       Diagnosis Orders   1. Esophagitis determined by endoscopy  dexlansoprazole (DEXILANT) 60 MG CPDR delayed release capsule   2.  Diarrhea, unspecified type  Clostridium Difficile Toxin/Antigen    GI Bacterial Pathogens By PCR           Plan:      Probiotic/ fiber encouraged  Check stool pathogen/ c.diff  Bentyl prn pain  Imodium prn frequent stool  dexilant in lieu of protonix 40 bid to see if helps ongoing indigestion  Reviewed colonoscopy/ egd  Consider gi if continued sx        Kenji Boucher MD

## 2019-06-07 ENCOUNTER — TELEPHONE (OUTPATIENT)
Dept: FAMILY MEDICINE CLINIC | Age: 69
End: 2019-06-07

## 2019-06-07 RX ORDER — VANCOMYCIN HYDROCHLORIDE 125 MG/1
125 CAPSULE ORAL 4 TIMES DAILY
Qty: 40 CAPSULE | Refills: 0 | Status: SHIPPED | OUTPATIENT
Start: 2019-06-07 | End: 2019-06-17

## 2019-06-25 ENCOUNTER — TELEPHONE (OUTPATIENT)
Dept: FAMILY MEDICINE CLINIC | Age: 69
End: 2019-06-25

## 2019-06-25 NOTE — TELEPHONE ENCOUNTER
CAN HE DO ANOTHER STOOL CX? I DO NOT HAVE ANY WHERE I CAN WORK HIM IN TODAY WITH [facility] OR [facility] [address] IS COMPLETELY BOOKED. Corinne Arena

## 2019-06-25 NOTE — TELEPHONE ENCOUNTER
Pt calling he was seen 6.5 for diarrhea. Pt said this has not stopped and wants to see Dr. Gabriela Jade. Dr. Gabriela Jade is booked has no openings pt did not want to see a NP. Pt wants to know what we can do.        Please call pt back

## 2019-06-25 NOTE — TELEPHONE ENCOUNTER
CALLED PT PER [facility] DOES NOT HAVE TO BE SEEN, POSS DO ANOTHER STOOL CX. AND HE CAN SEND OVER ANOTHER ANTIBIOTIC. I CALLED PT TO ADVISE HE THEN TELLS ME HE IS CONSTIPATED SOMETIMES AND HAS DIARRHEA AT TIMES.  HE IS GOING TO TRY TO DO STOOL CX AND  KIT TOMORROW.KW

## 2019-07-08 DIAGNOSIS — G62.9 PERIPHERAL POLYNEUROPATHY: ICD-10-CM

## 2019-07-08 RX ORDER — GABAPENTIN 100 MG/1
100 CAPSULE ORAL 3 TIMES DAILY
Qty: 90 CAPSULE | Refills: 3 | Status: SHIPPED | OUTPATIENT
Start: 2019-07-08 | End: 2019-10-25 | Stop reason: ALTCHOICE

## 2019-07-08 RX ORDER — GABAPENTIN 300 MG/1
CAPSULE ORAL
Qty: 120 CAPSULE | Refills: 2 | Status: SHIPPED | OUTPATIENT
Start: 2019-07-08 | End: 2019-10-25 | Stop reason: ALTCHOICE

## 2019-07-08 RX ORDER — TOPIRAMATE 25 MG/1
TABLET ORAL
Qty: 240 TABLET | Refills: 5 | Status: SHIPPED | OUTPATIENT
Start: 2019-07-08 | End: 2020-01-09

## 2019-07-19 ENCOUNTER — TELEPHONE (OUTPATIENT)
Dept: FAMILY MEDICINE CLINIC | Age: 69
End: 2019-07-19

## 2019-07-19 RX ORDER — DOXYCYCLINE HYCLATE 100 MG
100 TABLET ORAL 2 TIMES DAILY
Qty: 14 TABLET | Refills: 0 | Status: SHIPPED | OUTPATIENT
Start: 2019-07-19 | End: 2019-08-26 | Stop reason: SDUPTHER

## 2019-07-19 NOTE — TELEPHONE ENCOUNTER
PT HAS A CUT ON HIS  LEFT HAND THAT IS NOT HEALING. PT hurt himself 2 weeks ago and the spot is not real big but just not healing. His finger is swollen,  Pt does not want to get MRSA again. Do you need to see pt again? Could you sent in an antibiotic? Same hand that he had MRSA on.

## 2019-08-15 ENCOUNTER — HOSPITAL ENCOUNTER (OUTPATIENT)
Dept: CT IMAGING | Age: 69
Discharge: HOME | End: 2019-08-15
Payer: COMMERCIAL

## 2019-08-15 PROCEDURE — 70450 CT HEAD/BRAIN W/O DYE: CPT

## 2019-08-26 ENCOUNTER — OFFICE VISIT (OUTPATIENT)
Dept: FAMILY MEDICINE CLINIC | Age: 69
End: 2019-08-26
Payer: COMMERCIAL

## 2019-08-26 VITALS
TEMPERATURE: 98.6 F | HEART RATE: 82 BPM | RESPIRATION RATE: 12 BRPM | DIASTOLIC BLOOD PRESSURE: 82 MMHG | HEIGHT: 73 IN | SYSTOLIC BLOOD PRESSURE: 128 MMHG | BODY MASS INDEX: 22.82 KG/M2 | WEIGHT: 172.2 LBS

## 2019-08-26 DIAGNOSIS — B96.89 ACUTE BACTERIAL SINUSITIS: Primary | ICD-10-CM

## 2019-08-26 DIAGNOSIS — H00.014 HORDEOLUM EXTERNUM LEFT UPPER EYELID: ICD-10-CM

## 2019-08-26 DIAGNOSIS — J01.90 ACUTE BACTERIAL SINUSITIS: Primary | ICD-10-CM

## 2019-08-26 PROCEDURE — 99213 OFFICE O/P EST LOW 20 MIN: CPT | Performed by: FAMILY MEDICINE

## 2019-08-26 RX ORDER — DOXYCYCLINE HYCLATE 100 MG
100 TABLET ORAL 2 TIMES DAILY
Qty: 20 TABLET | Refills: 0 | Status: SHIPPED | OUTPATIENT
Start: 2019-08-26 | End: 2020-02-12 | Stop reason: ALTCHOICE

## 2019-08-26 RX ORDER — ONDANSETRON 4 MG/1
TABLET, ORALLY DISINTEGRATING ORAL
Qty: 20 TABLET | Refills: 0 | Status: SHIPPED | OUTPATIENT
Start: 2019-08-26 | End: 2021-08-24

## 2019-08-26 RX ORDER — SULFACETAMIDE SODIUM 100 MG/ML
2 SOLUTION/ DROPS OPHTHALMIC 4 TIMES DAILY
Qty: 1 BOTTLE | Refills: 0 | Status: SHIPPED | OUTPATIENT
Start: 2019-08-26 | End: 2019-09-02

## 2019-08-26 ASSESSMENT — ENCOUNTER SYMPTOMS
EYE DISCHARGE: 1
EYE REDNESS: 1
WHEEZING: 0
SINUS PRESSURE: 1
SHORTNESS OF BREATH: 0
ABDOMINAL PAIN: 0
RHINORRHEA: 1
SINUS PAIN: 1
COUGH: 1
FACIAL SWELLING: 1
PHOTOPHOBIA: 0

## 2019-09-13 ENCOUNTER — TELEPHONE (OUTPATIENT)
Dept: FAMILY MEDICINE CLINIC | Age: 69
End: 2019-09-13

## 2019-09-13 RX ORDER — CLINDAMYCIN HYDROCHLORIDE 300 MG/1
300 CAPSULE ORAL 3 TIMES DAILY
Qty: 30 CAPSULE | Refills: 0 | Status: SHIPPED | OUTPATIENT
Start: 2019-09-13 | End: 2019-09-23

## 2019-09-13 RX ORDER — NEOMYCIN/POLYMYXIN B/HYDROCORT 3.5-10K-1
1 SUSPENSION, DROPS(FINAL DOSAGE FORM)(ML) OPHTHALMIC (EYE) 4 TIMES DAILY
Qty: 1 BOTTLE | Refills: 0 | Status: SHIPPED | OUTPATIENT
Start: 2019-09-13 | End: 2021-01-07 | Stop reason: ALTCHOICE

## 2019-09-13 NOTE — TELEPHONE ENCOUNTER
Sent in scripts for Cortisporin and clindamycin  Not improving he needs to see an eye specialist as may be a chalazion that needs to be cut out

## 2019-10-25 ENCOUNTER — OFFICE VISIT (OUTPATIENT)
Dept: FAMILY MEDICINE CLINIC | Age: 69
End: 2019-10-25
Payer: COMMERCIAL

## 2019-10-25 VITALS
HEART RATE: 78 BPM | DIASTOLIC BLOOD PRESSURE: 70 MMHG | SYSTOLIC BLOOD PRESSURE: 120 MMHG | WEIGHT: 170 LBS | BODY MASS INDEX: 22.53 KG/M2 | HEIGHT: 73 IN | RESPIRATION RATE: 14 BRPM

## 2019-10-25 DIAGNOSIS — S50.812A INFECTED ABRASION OF LEFT FOREARM, INITIAL ENCOUNTER: Primary | ICD-10-CM

## 2019-10-25 DIAGNOSIS — B37.81 ESOPHAGEAL CANDIDIASIS (HCC): ICD-10-CM

## 2019-10-25 DIAGNOSIS — L08.9 INFECTED ABRASION OF LEFT FOREARM, INITIAL ENCOUNTER: Primary | ICD-10-CM

## 2019-10-25 PROCEDURE — 99213 OFFICE O/P EST LOW 20 MIN: CPT | Performed by: FAMILY MEDICINE

## 2019-10-25 RX ORDER — FLUCONAZOLE 200 MG/1
TABLET ORAL
Refills: 0 | COMMUNITY
Start: 2019-10-18 | End: 2019-11-06 | Stop reason: SDUPTHER

## 2019-10-25 RX ORDER — DOXYCYCLINE HYCLATE 100 MG
100 TABLET ORAL 2 TIMES DAILY
Qty: 14 TABLET | Refills: 0 | Status: SHIPPED | OUTPATIENT
Start: 2019-10-25 | End: 2019-11-01

## 2019-10-28 ENCOUNTER — TELEPHONE (OUTPATIENT)
Dept: FAMILY MEDICINE CLINIC | Age: 69
End: 2019-10-28

## 2019-10-29 ENCOUNTER — TELEPHONE (OUTPATIENT)
Dept: FAMILY MEDICINE CLINIC | Age: 69
End: 2019-10-29

## 2019-10-29 ENCOUNTER — OFFICE VISIT (OUTPATIENT)
Dept: FAMILY MEDICINE CLINIC | Age: 69
End: 2019-10-29
Payer: COMMERCIAL

## 2019-10-29 VITALS — BODY MASS INDEX: 22.43 KG/M2 | HEIGHT: 73 IN

## 2019-10-29 DIAGNOSIS — T14.8XXA SKIN ABRASION: ICD-10-CM

## 2019-10-29 DIAGNOSIS — J45.40 MODERATE PERSISTENT ASTHMA WITHOUT COMPLICATION: ICD-10-CM

## 2019-10-29 DIAGNOSIS — L03.114 CELLULITIS OF LEFT ARM: Primary | ICD-10-CM

## 2019-10-29 PROCEDURE — S8100 SPACER WITHOUT MASK: HCPCS | Performed by: FAMILY MEDICINE

## 2019-10-29 PROCEDURE — 99213 OFFICE O/P EST LOW 20 MIN: CPT | Performed by: FAMILY MEDICINE

## 2019-10-29 RX ORDER — CLINDAMYCIN HYDROCHLORIDE 300 MG/1
300 CAPSULE ORAL 3 TIMES DAILY
Qty: 21 CAPSULE | Refills: 0 | Status: SHIPPED | OUTPATIENT
Start: 2019-10-29 | End: 2019-11-05

## 2019-10-31 ENCOUNTER — TELEPHONE (OUTPATIENT)
Dept: FAMILY MEDICINE CLINIC | Age: 69
End: 2019-10-31

## 2019-11-06 ENCOUNTER — TELEPHONE (OUTPATIENT)
Dept: FAMILY MEDICINE CLINIC | Age: 69
End: 2019-11-06

## 2019-11-06 RX ORDER — FLUCONAZOLE 100 MG/1
TABLET ORAL
Qty: 15 TABLET | Refills: 0 | Status: SHIPPED | OUTPATIENT
Start: 2019-11-20 | End: 2019-12-19 | Stop reason: SDUPTHER

## 2019-11-07 RX ORDER — MOMETASONE FUROATE AND FORMOTEROL FUMARATE DIHYDRATE 200; 5 UG/1; UG/1
2 AEROSOL RESPIRATORY (INHALATION) 2 TIMES DAILY
Qty: 13 G | Refills: 5 | Status: SHIPPED | OUTPATIENT
Start: 2019-11-07 | End: 2020-05-01

## 2019-11-07 RX ORDER — PANTOPRAZOLE SODIUM 40 MG/1
TABLET, DELAYED RELEASE ORAL
Qty: 60 TABLET | Refills: 5 | Status: SHIPPED | OUTPATIENT
Start: 2019-11-07 | End: 2020-05-01

## 2019-11-07 RX ORDER — LEVOCETIRIZINE DIHYDROCHLORIDE 5 MG/1
TABLET, FILM COATED ORAL
Qty: 30 TABLET | Refills: 5 | Status: SHIPPED | OUTPATIENT
Start: 2019-11-07 | End: 2020-06-01

## 2019-11-11 RX ORDER — GABAPENTIN 100 MG/1
100 CAPSULE ORAL 3 TIMES DAILY
Qty: 90 CAPSULE | Refills: 2 | Status: SHIPPED | OUTPATIENT
Start: 2019-11-11 | End: 2020-02-06 | Stop reason: SDUPTHER

## 2019-12-18 ENCOUNTER — TELEPHONE (OUTPATIENT)
Dept: FAMILY MEDICINE CLINIC | Age: 69
End: 2019-12-18

## 2019-12-19 ENCOUNTER — NURSE ONLY (OUTPATIENT)
Dept: FAMILY MEDICINE CLINIC | Age: 69
End: 2019-12-19
Payer: COMMERCIAL

## 2019-12-19 ENCOUNTER — TELEPHONE (OUTPATIENT)
Dept: FAMILY MEDICINE CLINIC | Age: 69
End: 2019-12-19

## 2019-12-19 DIAGNOSIS — Z23 NEED FOR 23-POLYVALENT PNEUMOCOCCAL POLYSACCHARIDE VACCINE: Primary | ICD-10-CM

## 2019-12-19 DIAGNOSIS — B37.81 ESOPHAGEAL CANDIDIASIS (HCC): ICD-10-CM

## 2019-12-19 PROCEDURE — 36415 COLL VENOUS BLD VENIPUNCTURE: CPT | Performed by: FAMILY MEDICINE

## 2019-12-19 PROCEDURE — 90471 IMMUNIZATION ADMIN: CPT | Performed by: FAMILY MEDICINE

## 2019-12-19 PROCEDURE — 90732 PPSV23 VACC 2 YRS+ SUBQ/IM: CPT | Performed by: FAMILY MEDICINE

## 2019-12-19 RX ORDER — FLUCONAZOLE 100 MG/1
TABLET ORAL
Qty: 7 TABLET | Refills: 0 | Status: SHIPPED | OUTPATIENT
Start: 2019-12-19 | End: 2020-02-12 | Stop reason: ALTCHOICE

## 2019-12-20 LAB
ALBUMIN SERPL-MCNC: 3.4 G/DL (ref 3.1–4.9)
ALPHA-1-GLOBULIN: 0.2 G/DL (ref 0.2–0.4)
ALPHA-2-GLOBULIN: 0.7 G/DL (ref 0.4–1.1)
BETA GLOBULIN: 1.1 G/DL (ref 0.9–1.6)
GAMMA GLOBULIN: 1 G/DL (ref 0.6–1.8)
HIV AG/AB: NORMAL
HIV ANTIGEN: NORMAL
HIV-1 ANTIBODY: NORMAL
HIV-2 AB: NORMAL
SPE/IFE INTERPRETATION: NORMAL
TOTAL PROTEIN: 6.3 G/DL (ref 6.4–8.2)

## 2020-01-09 RX ORDER — TOPIRAMATE 25 MG/1
TABLET ORAL
Qty: 240 TABLET | Refills: 5 | Status: SHIPPED | OUTPATIENT
Start: 2020-01-09 | End: 2020-07-31

## 2020-01-10 ENCOUNTER — TELEPHONE (OUTPATIENT)
Dept: INTERNAL MEDICINE CLINIC | Age: 70
End: 2020-01-10

## 2020-01-10 NOTE — TELEPHONE ENCOUNTER
Any inhaler that has steroid in it can cause thrush. Can use combivent which can be used as needed and doesn't have a steroid -up to every 4 hours, if would like to try this. Usually if rinsing mouth w/ mouthwash after each steroid inhalation, risk of thrush is low.

## 2020-01-10 NOTE — TELEPHONE ENCOUNTER
SPOKE TO PT AND ADVISED- HE WOULD LIKE TO KNOW IF THERE IS ANOTHER INHALER WE CAN SEND OTHER THAN HIS Dellis Everett. HE DOES NOT NEED IT A LOT THIS TIME HAYLEY YEAR BUT IT DOES CAUSE THRUSH SO HE WOULD LIKE TO KNOW IF THERE IS ANOTHER OPTION FOR HIM.

## 2020-01-10 NOTE — TELEPHONE ENCOUNTER
Submitted PA for Rizatriptan Benzoate 10MG dispersible tablets    Via [facility]  Key: [id number]   STATUS:APPROVED, will scan once received.

## 2020-02-05 RX ORDER — GABAPENTIN 300 MG/1
CAPSULE ORAL
Qty: 120 CAPSULE | Refills: 2 | Status: SHIPPED | OUTPATIENT
Start: 2020-02-05 | End: 2020-06-02

## 2020-02-05 RX ORDER — RIZATRIPTAN BENZOATE 10 MG/1
TABLET, ORALLY DISINTEGRATING ORAL
Qty: 18 TABLET | Refills: 1 | Status: SHIPPED | OUTPATIENT
Start: 2020-02-05 | End: 2020-04-02

## 2020-02-06 RX ORDER — GABAPENTIN 100 MG/1
100 CAPSULE ORAL 3 TIMES DAILY
Qty: 90 CAPSULE | Refills: 2 | Status: SHIPPED | OUTPATIENT
Start: 2020-02-06 | End: 2020-03-06 | Stop reason: SDUPTHER

## 2020-02-12 ENCOUNTER — OFFICE VISIT (OUTPATIENT)
Dept: FAMILY MEDICINE CLINIC | Age: 70
End: 2020-02-12
Payer: COMMERCIAL

## 2020-02-12 VITALS
RESPIRATION RATE: 14 BRPM | DIASTOLIC BLOOD PRESSURE: 78 MMHG | WEIGHT: 178 LBS | BODY MASS INDEX: 23.59 KG/M2 | SYSTOLIC BLOOD PRESSURE: 128 MMHG | HEIGHT: 73 IN | OXYGEN SATURATION: 99 % | HEART RATE: 74 BPM

## 2020-02-12 LAB
A/G RATIO: 2 (ref 1.1–2.2)
ALBUMIN SERPL-MCNC: 4.4 G/DL (ref 3.4–5)
ALP BLD-CCNC: 138 U/L (ref 40–129)
ALT SERPL-CCNC: 20 U/L (ref 10–40)
ANION GAP SERPL CALCULATED.3IONS-SCNC: 10 MMOL/L (ref 3–16)
AST SERPL-CCNC: 23 U/L (ref 15–37)
BILIRUB SERPL-MCNC: 0.3 MG/DL (ref 0–1)
BILIRUBIN, POC: 1.02
BLOOD URINE, POC: NORMAL
BUN BLDV-MCNC: 9 MG/DL (ref 7–20)
CALCIUM SERPL-MCNC: 9.1 MG/DL (ref 8.3–10.6)
CHLORIDE BLD-SCNC: 101 MMOL/L (ref 99–110)
CLARITY, POC: NORMAL
CO2: 25 MMOL/L (ref 21–32)
COLOR, POC: NORMAL
CREAT SERPL-MCNC: 0.6 MG/DL (ref 0.8–1.3)
D DIMER: <200 NG/ML DDU (ref 0–229)
GFR AFRICAN AMERICAN: >60
GFR NON-AFRICAN AMERICAN: >60
GLOBULIN: 2.2 G/DL
GLUCOSE BLD-MCNC: 102 MG/DL (ref 70–99)
GLUCOSE URINE, POC: NORMAL
KETONES, POC: NORMAL
LEUKOCYTE EST, POC: NORMAL
NITRITE, POC: NORMAL
PH, POC: 7
POTASSIUM SERPL-SCNC: 4 MMOL/L (ref 3.5–5.1)
PRO-BNP: 49 PG/ML (ref 0–124)
PROTEIN, POC: NORMAL
SEDIMENTATION RATE, ERYTHROCYTE: 8 MM/HR (ref 0–20)
SODIUM BLD-SCNC: 136 MMOL/L (ref 136–145)
SPECIFIC GRAVITY, POC: 1.02
TOTAL PROTEIN: 6.6 G/DL (ref 6.4–8.2)
UROBILINOGEN, POC: 0.2

## 2020-02-12 PROCEDURE — 99213 OFFICE O/P EST LOW 20 MIN: CPT | Performed by: FAMILY MEDICINE

## 2020-02-12 PROCEDURE — 94010 BREATHING CAPACITY TEST: CPT | Performed by: FAMILY MEDICINE

## 2020-02-12 PROCEDURE — 81002 URINALYSIS NONAUTO W/O SCOPE: CPT | Performed by: FAMILY MEDICINE

## 2020-02-12 ASSESSMENT — PATIENT HEALTH QUESTIONNAIRE - PHQ9
2. FEELING DOWN, DEPRESSED OR HOPELESS: 0
1. LITTLE INTEREST OR PLEASURE IN DOING THINGS: 0
SUM OF ALL RESPONSES TO PHQ QUESTIONS 1-9: 0
SUM OF ALL RESPONSES TO PHQ QUESTIONS 1-9: 0
SUM OF ALL RESPONSES TO PHQ9 QUESTIONS 1 & 2: 0

## 2020-02-12 NOTE — PROGRESS NOTES
Refill    gabapentin (NEURONTIN) 100 MG capsule Take 1 capsule by mouth 3 times daily for 90 days. 90 capsule 2    rizatriptan (MAXALT-MLT) 10 MG disintegrating tablet DISSOLVE 1 TABLET BY MOUTH ONCE AS NEEDED FOR MIGRAINE. MAY REPEAT IN 2 HOURS IF NEEDED. MAX 2 TABLETS PER 24 HOURS 18 tablet 1    gabapentin (NEURONTIN) 300 MG capsule TAKE ONE CAPSULE IN THE MORNING, TAKE ONE CAPSULE IN THE AFTERNOON, TAKE TWO CAPSULES BEFORE BED, AS TOLERATED 120 capsule 2    albuterol-ipratropium (COMBIVENT RESPIMAT)  MCG/ACT AERS inhaler Inhale 2 puffs into the lungs daily 1 Inhaler 2    topiramate (TOPAMAX) 25 MG tablet TAKE 3 TABLETS BY MOUTH EVERY MORNING AND 5 TABLETS BY MOUTH EVERY EVENING 240 tablet 5    levocetirizine (XYZAL) 5 MG tablet TAKE 1 TABLET BY MOUTH EVERY EVENING. 30 tablet 5    DULERA 200-5 MCG/ACT inhaler INHALE 2 PUFFS INTO THE LUNGS 2 TIMES DAILY RINSE MOUTH AFTER USING. 13 g 5    pantoprazole (PROTONIX) 40 MG tablet TAKE 1 TABLET BY MOUTH 2 TIMES DAILY. 60 tablet 5    neomycin-polymyxin-hydrocortisone (CORTISPORIN) 3.5-15884-6 ophthalmic suspension Place 1 drop into the left eye 4 times daily 1 Bottle 0    ondansetron (ZOFRAN-ODT) 4 MG disintegrating tablet TAKE 1 TABLET BY MOUTH EVERY 8 HOURS AS NEEDED FOR NAUSEA OR VOMITING. 20 tablet 0    ipratropium (ATROVENT) 0.06 % nasal spray 1-2 sprays by Nasal route 4 times daily as needed for Rhinitis 1 Bottle 5    tiZANidine (ZANAFLEX) 4 MG tablet Take 1 tablet by mouth nightly 30 tablet 0    PROAIR  (90 Base) MCG/ACT inhaler INHALE 2 PUFFS INTO THE LUNGS EVERY 4 HOURS AS NEEDED FOR WHEEZING OR SHORTNESS OF BREATH. 8.5 g 5    Spacer/Aero-Holding Chambers (COMPACT SPACE CHAMBER/SM MASK) AZAEL AS DIRECTED 1 Device 0     No current facility-administered medications for this visit. Review of Systems:   All others are negative, except as noted in the HPI.     Patient History:  Past Medical History:   Diagnosis Date    Arthritis    

## 2020-02-13 ENCOUNTER — TELEPHONE (OUTPATIENT)
Dept: FAMILY MEDICINE CLINIC | Age: 70
End: 2020-02-13

## 2020-02-13 NOTE — TELEPHONE ENCOUNTER
Urinalysis is normal other than being a little concentrated. It is okay to increase water consumption as long as salt /sodium intake remains low. No evidence of blood clot, heart failure or kidney/ liver problem on lab test  This appears to be venous insufficiency - try the custom fit stockings and see how this works.   If symptoms worsen in meantime, let me know

## 2020-02-13 NOTE — TELEPHONE ENCOUNTER
Dose listed now for gabapentin is 100mg 3x/day. Dose max on this medicine is 3600mg/day or over 10x what he's taking now, so plenty of room to increase dose. To determine if there is neuropathy or radiculopathy from low back, emg is diagnostic test for this. Would have to call [address] [place] [address] to schedule - ordered.

## 2020-02-13 NOTE — TELEPHONE ENCOUNTER
SPOKE TO PT HE STATED HE IS WEARING THE STOCKINGS NOW BUT HIS FEET ARE BURNING, HE IS WONDERING ABOUT BACK INJURY HE HAD IN THE PAST IF THIS COULD BE CAUSING ANY ISSUES. HE STATED THE GABAPENTIN HELPS BUT WANTS TO KNOW MAX DOSE OF THIS?  HE IS ALSO NOT SURE THAT HE WANTS TO CONTINUE THIS MED? Joe Flores

## 2020-03-03 ENCOUNTER — TELEPHONE (OUTPATIENT)
Dept: FAMILY MEDICINE CLINIC | Age: 70
End: 2020-03-03

## 2020-03-03 ENCOUNTER — HOSPITAL ENCOUNTER (OUTPATIENT)
Dept: NEUROLOGY | Age: 70
Discharge: HOME | End: 2020-03-03
Payer: COMMERCIAL

## 2020-03-03 PROCEDURE — 95886 MUSC TEST DONE W/N TEST COMP: CPT

## 2020-03-03 PROCEDURE — 95909 NRV CNDJ TST 5-6 STUDIES: CPT

## 2020-03-03 RX ORDER — ALBUTEROL SULFATE 90 UG/1
2 AEROSOL, METERED RESPIRATORY (INHALATION) 4 TIMES DAILY PRN
Qty: 1 INHALER | Refills: 2 | Status: SHIPPED | OUTPATIENT
Start: 2020-03-03 | End: 2021-01-07 | Stop reason: SDUPTHER

## 2020-03-03 NOTE — PROCEDURES
Left Sural Sensory   Calf-Lat Mall NR  < 4.0 NR  > 5 Calf-Lat Mall 14 NR  > 35    Right Sural Sensory   Calf-Lat Mall NR  < 4.0 NR  > 5 Calf-Lat Mall 14 NR  > 35        Electromyography     Side Muscle Nerve Root Ins Act Fibs Psw Amp Dur Poly Recrt Int Noa Cancholaery Comment   Right Gluteus Med Sup Gluteal L5-S1 Nml Nml Nml Nml Nml 0 Nml Nml    Right Vastus Med Femoral L2-L4 Nml Nml Nml Nml Nml 0 Nml Nml    Right Add Longus Obturator L2-L4 Nml Nml Nml Nml Nml 0 Nml Nml    Right Tib Anterior Deep Fibular,  Fibula. .. L4-L5 Nml Nml Nml Nml Nml 0 Nml Nml    Right Fib longus  L5-S1 Nml Nml Nml Nml Nml 0 Nml Nml    Right Gastroc MH Tibial S1-S2 Nml Nml Nml Nml Nml 0 Nml Nml    Right Ext [place]ura Long Deep Fibular,  Fibula. .. L5-S1 Nml Nml Nml Nml Nml 0 Nml Nml    Right EDB Deep Fibular,  Fibula. .. L5-S1 Nml Nml Nml Nml Nml 0 Nml Nml    Right AHB Medial Plantar,  Tibi. .. S1-S2 Nml Nml Nml Nml Nml 0 Nml Nml    Right Lumbo Paraspinal (Upper) Rami L1-L2 Nml Nml Nml         Right Lumbo Paraspinal (Mid) Rami L3-L4 Nml Nml Nml         Right Lumbo Paraspinal (Lower) Rami L5-S1 Nml Nml Nml         Left Gluteus Med Sup Gluteal L5-S1 Nml Nml Nml Nml Nml 0 Nml Nml    Left Vastus Med Femoral L2-L4 Nml Nml Nml Nml Nml 0 Nml Nml    Left Add Longus Obturator L2-L4 Nml Nml Nml Nml Nml 0 Nml Nml    Left Tib Anterior Deep Fibular,  Fibula. .. L4-L5 Nml Nml Nml Nml Nml 0 Nml Nml    Left Fib longus  L5-S1 Nml Nml Nml Nml Nml 0 Nml Nml    Left Gastroc MH Tibial S1-S2 Nml Nml Nml Nml Nml 0 Nml Nml    Left Ext Ventura Long Deep Fibular,  Fibula. .. L5-S1 Nml Nml Nml Nml Nml 0 Nml Nml    Left EDB Deep Fibular,  Fibula. .. L5-S1 Nml Nml Nml Nml Nml 0 Nml Nml    Left AHB Medial Plantar,  Tibi. ..  S1-S2 Nml Nml Nml Nml Nml 0 Nml Nml    Left Lumbo Paraspinal (Upper) Rami L1-L2 Nml Nml Nml         Left Lumbo Paraspinal (Mid) Rami L3-L4 Nml Nml Nml         Left Lumbo Paraspinal (Lower) Rami L5-S1 Nml Nml Nml               Electronically signed by Dmitry Yeung DO on

## 2020-03-03 NOTE — TELEPHONE ENCOUNTER
Pharmacy is calling because the patient has tried the inhaler Combivent and he does not want this inhaler he wants the Proair. Please give them a call back.        [phone] Kaitlin Christy

## 2020-03-05 ENCOUNTER — TELEPHONE (OUTPATIENT)
Dept: FAMILY MEDICINE CLINIC | Age: 70
End: 2020-03-05

## 2020-03-05 NOTE — TELEPHONE ENCOUNTER
Swelling is separate from neuropathy. Did he get new custom fit compression stockings? Is he restricting sodium in diet significantly? No evidence of dvt based on labs done in last month.   If compression stockings are not helping, will consider referral to vascular specialist

## 2020-03-05 NOTE — TELEPHONE ENCOUNTER
CALLED AND SPOKE WITH PT AND ADVISED. HE IS GOING TO TALK WITH HIS SISTER AND SEE WHAT THEY THINK HE SHOULD DO. AND HE WILL GIVE US A CALL BACK.  HS

## 2020-03-06 ENCOUNTER — TELEPHONE (OUTPATIENT)
Dept: FAMILY MEDICINE CLINIC | Age: 70
End: 2020-03-06

## 2020-03-06 RX ORDER — GABAPENTIN 100 MG/1
100-200 CAPSULE ORAL 3 TIMES DAILY
Qty: 180 CAPSULE | Refills: 5 | Status: SHIPPED | OUTPATIENT
Start: 2020-03-06 | End: 2020-12-03 | Stop reason: SDUPTHER

## 2020-03-16 NOTE — TELEPHONE ENCOUNTER
Patients current nebulizer solution is . Needs to have a new prescription. Currently has the solution that he has to mix with saline. He would prefer to have the ready to use. Does not need to be a large quantity he doesn't use this all of the time.

## 2020-03-19 ENCOUNTER — TELEPHONE (OUTPATIENT)
Dept: FAMILY MEDICINE CLINIC | Age: 70
End: 2020-03-19

## 2020-03-19 RX ORDER — ALBUTEROL SULFATE 2.5 MG/3ML
2.5 SOLUTION RESPIRATORY (INHALATION) EVERY 6 HOURS PRN
Qty: 120 EACH | Refills: 3 | Status: SHIPPED | OUTPATIENT
Start: 2020-03-19 | End: 2021-05-05

## 2020-03-19 NOTE — TELEPHONE ENCOUNTER
[facility] [place] [place] - [address] - P [phone] - F [phone]      HIS SCRIPT FOR ALBUTEROL IS THE ONE YOU HAVE TO MIX - HE PREFERS THE PRE-MIX -  PLEASE CALL THIS ONE IN - THANK YOU

## 2020-04-02 RX ORDER — RIZATRIPTAN BENZOATE 10 MG/1
TABLET, ORALLY DISINTEGRATING ORAL
Qty: 18 TABLET | Refills: 1 | OUTPATIENT
Start: 2020-04-02

## 2020-04-02 RX ORDER — RIZATRIPTAN BENZOATE 10 MG/1
TABLET, ORALLY DISINTEGRATING ORAL
Qty: 18 TABLET | Refills: 5 | Status: SHIPPED | OUTPATIENT
Start: 2020-04-02 | End: 2020-04-06

## 2020-04-03 ENCOUNTER — TELEPHONE (OUTPATIENT)
Dept: ADMINISTRATIVE | Age: 70
End: 2020-04-03

## 2020-04-06 RX ORDER — RIZATRIPTAN BENZOATE 10 MG/1
TABLET, ORALLY DISINTEGRATING ORAL
Qty: 18 TABLET | Refills: 1 | Status: SHIPPED | OUTPATIENT
Start: 2020-04-06 | End: 2020-04-30 | Stop reason: SDUPTHER

## 2020-04-30 ENCOUNTER — TELEPHONE (OUTPATIENT)
Dept: FAMILY MEDICINE CLINIC | Age: 70
End: 2020-04-30

## 2020-04-30 RX ORDER — RIZATRIPTAN BENZOATE 10 MG/1
TABLET, ORALLY DISINTEGRATING ORAL
Qty: 18 TABLET | Refills: 1 | Status: SHIPPED | OUTPATIENT
Start: 2020-04-30 | End: 2020-06-26 | Stop reason: SDUPTHER

## 2020-04-30 NOTE — TELEPHONE ENCOUNTER
Refill sent though to [facility] on 4/6 for 3 month supply so should have unless didn't get filled then on maxalt.   Resent rx today

## 2020-04-30 NOTE — TELEPHONE ENCOUNTER
Patient needs a refill on his medication RIZATRIPTAN 10 MG TABLET - PATIENT TAKES AS NEEDED. Please give him a call back.      [phone] Kaitlin Christy

## 2020-05-01 ENCOUNTER — TELEPHONE (OUTPATIENT)
Dept: FAMILY MEDICINE CLINIC | Age: 70
End: 2020-05-01

## 2020-05-01 RX ORDER — MOMETASONE FUROATE AND FORMOTEROL FUMARATE DIHYDRATE 200; 5 UG/1; UG/1
2 AEROSOL RESPIRATORY (INHALATION) 2 TIMES DAILY
Qty: 13 G | Refills: 5 | Status: SHIPPED | OUTPATIENT
Start: 2020-05-01 | End: 2020-10-27

## 2020-05-01 RX ORDER — PANTOPRAZOLE SODIUM 40 MG/1
TABLET, DELAYED RELEASE ORAL
Qty: 60 TABLET | Refills: 5 | Status: SHIPPED | OUTPATIENT
Start: 2020-05-01 | End: 2020-10-27

## 2020-05-01 RX ORDER — GABAPENTIN 300 MG/1
600 CAPSULE ORAL NIGHTLY
Qty: 60 CAPSULE | Refills: 0
Start: 2020-05-01 | End: 2020-05-31

## 2020-06-26 ENCOUNTER — VIRTUAL VISIT (OUTPATIENT)
Dept: FAMILY MEDICINE CLINIC | Age: 70
End: 2020-06-26
Payer: COMMERCIAL

## 2020-06-26 PROCEDURE — 99214 OFFICE O/P EST MOD 30 MIN: CPT | Performed by: FAMILY MEDICINE

## 2020-06-26 RX ORDER — RIZATRIPTAN BENZOATE 10 MG/1
TABLET, ORALLY DISINTEGRATING ORAL
Qty: 18 TABLET | Refills: 5 | Status: SHIPPED | OUTPATIENT
Start: 2020-06-26 | End: 2021-01-07 | Stop reason: SDUPTHER

## 2020-06-26 RX ORDER — AMITRIPTYLINE HYDROCHLORIDE 25 MG/1
25 TABLET, FILM COATED ORAL NIGHTLY
Qty: 30 TABLET | Refills: 5 | Status: SHIPPED | OUTPATIENT
Start: 2020-06-26 | End: 2021-08-24

## 2020-06-26 RX ORDER — GABAPENTIN 300 MG/1
CAPSULE ORAL
Qty: 120 CAPSULE | Refills: 5 | Status: SHIPPED | OUTPATIENT
Start: 2020-06-26 | End: 2021-01-07 | Stop reason: SDUPTHER

## 2020-06-26 NOTE — PROGRESS NOTES
NASAL ROUTE 4 TIMES DAILY AS NEEDED FOR RHINITIS Yes Valentina Schlatter, MD   pantoprazole (PROTONIX) 40 MG tablet TAKE 1 TABLET BY MOUTH 2 TIMES DAILY. Yes Valentina Schlatter, MD   DULERA 200-5 MCG/ACT inhaler INHALE 2 PUFFS INTO THE LUNGS 2 TIMES DAILY RINSE MOUTH AFTER USING. Yes Valentina Schlatter, MD   rizatriptan (MAXALT-MLT) 10 MG disintegrating tablet DISSOLVE 1 TABLET BY MOUTH ONCE AS NEEDED FOR MIGRAINE. MAY REPEAT IN 2 HOURS IF NEEDED. MAX 2 TABLETS PER 24 HOURS Yes Valentina Schlatter, MD   albuterol (PROVENTIL) (2.5 MG/3ML) 0.083% nebulizer solution Take 3 mLs by nebulization every 6 hours as needed for Wheezing Yes Valentina Schlatter, MD   albuterol (PROVENTIL) (5 MG/ML) 0.5% nebulizer solution Take 1 mL by nebulization 4 times daily as needed for Wheezing Yes Valentina Schlatter, MD   gabapentin (NEURONTIN) 100 MG capsule Take 1-2 capsules by mouth 3 times daily for 90 days. Yes Valentina Schlatter, MD   albuterol sulfate  (90 Base) MCG/ACT inhaler Inhale 2 puffs into the lungs 4 times daily as needed for Wheezing Yes Valentina Schlatter, MD   topiramate (TOPAMAX) 25 MG tablet TAKE 3 TABLETS BY MOUTH EVERY MORNING AND 5 TABLETS BY MOUTH EVERY EVENING Yes Valentina Schlatter, MD   neomycin-polymyxin-hydrocortisone (CORTISPORIN) 3.5-79472-0 ophthalmic suspension Place 1 drop into the left eye 4 times daily Yes CELESTINO Brandt CNP   ondansetron (ZOFRAN-ODT) 4 MG disintegrating tablet TAKE 1 TABLET BY MOUTH EVERY 8 HOURS AS NEEDED FOR NAUSEA OR VOMITING. Yes Isabelle Castillo DO   tiZANidine (ZANAFLEX) 4 MG tablet Take 1 tablet by mouth nightly Yes Valentina Schlatter, MD   PROAIR  (17 Base) MCG/ACT inhaler INHALE 2 PUFFS INTO THE LUNGS EVERY 4 HOURS AS NEEDED FOR WHEEZING OR SHORTNESS OF BREATH.  Yes CELESTINO Lazaro CNP   Spacer/Aero-Holding Liana Kevin (COMPACT SPACE CHAMBER/SM MASK) AZAEL AS DIRECTED Yes CELESTINO Lazaro - CNP       Social History     Tobacco Use    Smoking status: Never Smoker    Smokeless tobacco: Never Used

## 2020-06-29 RX ORDER — IPRATROPIUM BROMIDE 42 UG/1
SPRAY, METERED NASAL
Qty: 15 ML | Refills: 2 | Status: SHIPPED | OUTPATIENT
Start: 2020-06-29 | End: 2021-01-07 | Stop reason: SDUPTHER

## 2020-06-29 RX ORDER — LEVOCETIRIZINE DIHYDROCHLORIDE 5 MG/1
TABLET, FILM COATED ORAL
Qty: 30 TABLET | Refills: 2 | Status: SHIPPED | OUTPATIENT
Start: 2020-06-29 | End: 2020-09-30

## 2020-07-31 RX ORDER — TOPIRAMATE 25 MG/1
TABLET ORAL
Qty: 240 TABLET | Refills: 5 | Status: SHIPPED | OUTPATIENT
Start: 2020-07-31 | End: 2021-01-07 | Stop reason: SDUPTHER

## 2020-09-30 RX ORDER — LEVOCETIRIZINE DIHYDROCHLORIDE 5 MG/1
TABLET, FILM COATED ORAL
Qty: 30 TABLET | Refills: 2 | Status: SHIPPED | OUTPATIENT
Start: 2020-09-30 | End: 2021-01-07 | Stop reason: SDUPTHER

## 2020-11-20 ENCOUNTER — NURSE ONLY (OUTPATIENT)
Dept: FAMILY MEDICINE CLINIC | Age: 70
End: 2020-11-20
Payer: COMMERCIAL

## 2020-11-20 PROCEDURE — 90472 IMMUNIZATION ADMIN EACH ADD: CPT | Performed by: FAMILY MEDICINE

## 2020-11-20 PROCEDURE — 90715 TDAP VACCINE 7 YRS/> IM: CPT | Performed by: FAMILY MEDICINE

## 2020-11-20 PROCEDURE — 90471 IMMUNIZATION ADMIN: CPT | Performed by: FAMILY MEDICINE

## 2020-11-20 PROCEDURE — 90750 HZV VACC RECOMBINANT IM: CPT | Performed by: FAMILY MEDICINE

## 2020-11-20 NOTE — PROGRESS NOTES
Immunizations Administered     Name Date Dose Route    Tdap (Boostrix, Adacel) 11/20/2020 0.5 mL Intramuscular    Site: Deltoid- Left    Lot: [id number]    ND: 90303-616-15    Zoster Recombinant (Shingrix) 11/20/2020 0.5 mL Intramuscular    Site: Deltoid- Right    Lot: [id number]    NDC: 80094-911-47

## 2020-12-03 RX ORDER — GABAPENTIN 100 MG/1
100-200 CAPSULE ORAL 3 TIMES DAILY
Qty: 180 CAPSULE | Refills: 0 | Status: SHIPPED | OUTPATIENT
Start: 2020-12-03 | End: 2020-12-23

## 2020-12-21 ENCOUNTER — TELEPHONE (OUTPATIENT)
Dept: FAMILY MEDICINE CLINIC | Age: 70
End: 2020-12-21

## 2020-12-23 RX ORDER — GABAPENTIN 100 MG/1
CAPSULE ORAL
Qty: 60 CAPSULE | Refills: 0 | Status: SHIPPED | OUTPATIENT
Start: 2020-12-23 | End: 2021-01-07 | Stop reason: SDUPTHER

## 2020-12-23 RX ORDER — PANTOPRAZOLE SODIUM 40 MG/1
TABLET, DELAYED RELEASE ORAL
Qty: 60 TABLET | Refills: 0 | Status: SHIPPED | OUTPATIENT
Start: 2020-12-23 | End: 2021-01-07 | Stop reason: SDUPTHER

## 2021-01-07 ENCOUNTER — TELEPHONE (OUTPATIENT)
Dept: FAMILY MEDICINE CLINIC | Age: 71
End: 2021-01-07

## 2021-01-07 ENCOUNTER — VIRTUAL VISIT (OUTPATIENT)
Dept: FAMILY MEDICINE CLINIC | Age: 71
End: 2021-01-07
Payer: COMMERCIAL

## 2021-01-07 DIAGNOSIS — J31.0 CHRONIC RHINITIS: ICD-10-CM

## 2021-01-07 DIAGNOSIS — J45.40 MODERATE PERSISTENT ASTHMA WITHOUT COMPLICATION: ICD-10-CM

## 2021-01-07 DIAGNOSIS — K21.9 GASTROESOPHAGEAL REFLUX DISEASE, UNSPECIFIED WHETHER ESOPHAGITIS PRESENT: ICD-10-CM

## 2021-01-07 DIAGNOSIS — H00.025 HORDEOLUM INTERNUM OF LEFT LOWER EYELID: Primary | ICD-10-CM

## 2021-01-07 DIAGNOSIS — G43.909 MIGRAINE WITHOUT STATUS MIGRAINOSUS, NOT INTRACTABLE, UNSPECIFIED MIGRAINE TYPE: ICD-10-CM

## 2021-01-07 DIAGNOSIS — G62.9 PERIPHERAL POLYNEUROPATHY: ICD-10-CM

## 2021-01-07 DIAGNOSIS — E55.9 VITAMIN D DEFICIENCY: ICD-10-CM

## 2021-01-07 PROCEDURE — 99214 OFFICE O/P EST MOD 30 MIN: CPT | Performed by: FAMILY MEDICINE

## 2021-01-07 RX ORDER — TOPIRAMATE 25 MG/1
TABLET ORAL
Qty: 240 TABLET | Refills: 5 | Status: SHIPPED | OUTPATIENT
Start: 2021-01-07 | End: 2021-01-07 | Stop reason: SDUPTHER

## 2021-01-07 RX ORDER — POLYMYXIN B SULFATE AND TRIMETHOPRIM 1; 10000 MG/ML; [USP'U]/ML
1 SOLUTION OPHTHALMIC EVERY 4 HOURS
Qty: 5 ML | Refills: 0 | Status: SHIPPED | OUTPATIENT
Start: 2021-01-07 | End: 2021-01-17

## 2021-01-07 RX ORDER — RIZATRIPTAN BENZOATE 10 MG/1
TABLET, ORALLY DISINTEGRATING ORAL
Qty: 18 TABLET | Refills: 5 | Status: SHIPPED | OUTPATIENT
Start: 2021-01-07 | End: 2021-10-04

## 2021-01-07 RX ORDER — ERGOCALCIFEROL (VITAMIN D2) 1250 MCG
1 CAPSULE ORAL WEEKLY
COMMUNITY
End: 2021-01-07 | Stop reason: SDUPTHER

## 2021-01-07 RX ORDER — GABAPENTIN 300 MG/1
CAPSULE ORAL
Qty: 90 CAPSULE | Refills: 5 | Status: SHIPPED | OUTPATIENT
Start: 2021-01-07 | End: 2021-01-07 | Stop reason: SDUPTHER

## 2021-01-07 RX ORDER — GABAPENTIN 100 MG/1
CAPSULE ORAL
Qty: 180 CAPSULE | Refills: 5 | Status: SHIPPED | OUTPATIENT
Start: 2021-01-07 | End: 2021-07-26

## 2021-01-07 RX ORDER — ERGOCALCIFEROL (VITAMIN D2) 1250 MCG
1 CAPSULE ORAL WEEKLY
Qty: 4 CAPSULE | Refills: 5 | Status: SHIPPED | OUTPATIENT
Start: 2021-01-07 | End: 2021-01-11

## 2021-01-07 RX ORDER — PANTOPRAZOLE SODIUM 40 MG/1
TABLET, DELAYED RELEASE ORAL
Qty: 60 TABLET | Refills: 5 | Status: SHIPPED | OUTPATIENT
Start: 2021-01-07 | End: 2021-01-07 | Stop reason: SDUPTHER

## 2021-01-07 RX ORDER — LEVOCETIRIZINE DIHYDROCHLORIDE 5 MG/1
TABLET, FILM COATED ORAL
Qty: 30 TABLET | Refills: 5 | Status: SHIPPED | OUTPATIENT
Start: 2021-01-07 | End: 2021-07-26

## 2021-01-07 RX ORDER — IPRATROPIUM BROMIDE 42 UG/1
SPRAY, METERED NASAL
Qty: 15 ML | Refills: 5 | Status: SHIPPED | OUTPATIENT
Start: 2021-01-07 | End: 2021-01-07 | Stop reason: SDUPTHER

## 2021-01-07 RX ORDER — LEVOCETIRIZINE DIHYDROCHLORIDE 5 MG/1
TABLET, FILM COATED ORAL
Qty: 30 TABLET | Refills: 5 | Status: SHIPPED | OUTPATIENT
Start: 2021-01-07 | End: 2021-01-07 | Stop reason: SDUPTHER

## 2021-01-07 RX ORDER — IPRATROPIUM BROMIDE 42 UG/1
SPRAY, METERED NASAL
Qty: 15 ML | Refills: 5 | Status: SHIPPED | OUTPATIENT
Start: 2021-01-07 | End: 2022-01-25

## 2021-01-07 RX ORDER — GABAPENTIN 100 MG/1
CAPSULE ORAL
Qty: 180 CAPSULE | Refills: 5 | Status: SHIPPED | OUTPATIENT
Start: 2021-01-07 | End: 2021-01-07 | Stop reason: SDUPTHER

## 2021-01-07 RX ORDER — TOPIRAMATE 25 MG/1
TABLET ORAL
Qty: 240 TABLET | Refills: 5 | Status: SHIPPED | OUTPATIENT
Start: 2021-01-07 | End: 2021-08-30

## 2021-01-07 RX ORDER — GABAPENTIN 300 MG/1
CAPSULE ORAL
Qty: 90 CAPSULE | Refills: 5 | Status: SHIPPED | OUTPATIENT
Start: 2021-01-07 | End: 2021-09-09 | Stop reason: SDUPTHER

## 2021-01-07 RX ORDER — RIZATRIPTAN BENZOATE 10 MG/1
TABLET, ORALLY DISINTEGRATING ORAL
Qty: 18 TABLET | Refills: 5 | Status: SHIPPED | OUTPATIENT
Start: 2021-01-07 | End: 2021-01-07 | Stop reason: SDUPTHER

## 2021-01-07 RX ORDER — ALBUTEROL SULFATE 90 UG/1
2 AEROSOL, METERED RESPIRATORY (INHALATION) 4 TIMES DAILY PRN
Qty: 1 INHALER | Refills: 2 | Status: SHIPPED | OUTPATIENT
Start: 2021-01-07 | End: 2021-01-07 | Stop reason: SDUPTHER

## 2021-01-07 RX ORDER — ALBUTEROL SULFATE 90 UG/1
2 AEROSOL, METERED RESPIRATORY (INHALATION) 4 TIMES DAILY PRN
Qty: 1 INHALER | Refills: 2 | Status: SHIPPED | OUTPATIENT
Start: 2021-01-07 | End: 2021-12-21 | Stop reason: SDUPTHER

## 2021-01-07 RX ORDER — PANTOPRAZOLE SODIUM 40 MG/1
TABLET, DELAYED RELEASE ORAL
Qty: 60 TABLET | Refills: 5 | Status: SHIPPED | OUTPATIENT
Start: 2021-01-07 | End: 2021-07-26

## 2021-01-07 SDOH — HEALTH STABILITY: MENTAL HEALTH: HOW OFTEN DO YOU HAVE A DRINK CONTAINING ALCOHOL?: NEVER

## 2021-01-07 SDOH — ECONOMIC STABILITY: FOOD INSECURITY: WITHIN THE PAST 12 MONTHS, THE FOOD YOU BOUGHT JUST DIDN'T LAST AND YOU DIDN'T HAVE MONEY TO GET MORE.: NEVER TRUE

## 2021-01-07 SDOH — ECONOMIC STABILITY: FOOD INSECURITY: WITHIN THE PAST 12 MONTHS, YOU WORRIED THAT YOUR FOOD WOULD RUN OUT BEFORE YOU GOT MONEY TO BUY MORE.: NEVER TRUE

## 2021-01-07 SDOH — ECONOMIC STABILITY: INCOME INSECURITY: HOW HARD IS IT FOR YOU TO PAY FOR THE VERY BASICS LIKE FOOD, HOUSING, MEDICAL CARE, AND HEATING?: NOT HARD AT ALL

## 2021-01-07 SDOH — ECONOMIC STABILITY: TRANSPORTATION INSECURITY
IN THE PAST 12 MONTHS, HAS THE LACK OF TRANSPORTATION KEPT YOU FROM MEDICAL APPOINTMENTS OR FROM GETTING MEDICATIONS?: NO

## 2021-01-07 ASSESSMENT — PATIENT HEALTH QUESTIONNAIRE - PHQ9
SUM OF ALL RESPONSES TO PHQ QUESTIONS 1-9: 0
1. LITTLE INTEREST OR PLEASURE IN DOING THINGS: 0

## 2021-01-07 NOTE — PROGRESS NOTES
2021    TELEHEALTH EVALUATION -- Audio/Visual (During MTTZO-42 public health emergency)    HPI:    Lucía Saavedra (:  1950) has requested an audio/video evaluation for the following concern(s):    Chief Complaint   Patient presents with    Pain     NEUROPATHY ROUTINE FOLLOW UP    Eye Problem     PT C/O LEFT EYE REDNESS, OOZING, PAINFUL X1 WEEK. Going on for 10 days - wakes up w/ drainage in eye and late in evening - has spot under lid - may be getting a little better - on left side - using lid scrubs which help - vision effected when drainage increases - o/w okay  Not clearing  Working on different diet - still using protonix bid  Can't tolerate milk or almond milk  ? Tried dexilant in past - protonix works fairly well  Gabapentin working well and tolerates well - careful w/ driving w/ it - can't tolerate higher dose. Can tolerate gabapentin 200 tid w/ 900 hs  topamax helps cut down on migraines - 75 am, 125 pm  A little dizzy at times w/ gabapentin  Uses maxalt - prn - frequency varies but reduced and not near as intense as in past  More head congestion - breathing through nose okay. Ran down on dulera - some more wheezing - as long as stays on Branda Qualia does fairly well  Review of Systems    Prior to Visit Medications    Medication Sig Taking? Authorizing Provider   pantoprazole (PROTONIX) 40 MG tablet TAKE 1 TABLET BY MOUTH 2 TIMES DAILY. Arielle Nguyen MD   gabapentin (NEURONTIN) 100 MG capsule TAKE 1-2 CAPSULES BY MOUTH 3 TIMES DAILY. Arielle Nguyen MD   DULERA 200-5 MCG/ACT inhaler INHALE 2 PUFFS INTO THE LUNGS 2 TIMES DAILY RINSE MOUTH AFTER USING. Arielle Nguyen MD   levocetirizine (XYZAL) 5 MG tablet TAKE 1 TABLET BY MOUTH EVERY EVENING.   CELESTINO Handy - CNP   topiramate (TOPAMAX) 25 MG tablet TAKE 3 TABLETS BY MOUTH EVERY MORNING AND 5 TABLETS BY MOUTH EVERY EVENING  CELESTINO Gutierrez - CNP ipratropium (ATROVENT) 0.06 % nasal spray TAKE 1-2 SPRAYS BY NASAL ROUTE 4 TIMES DAILY AS NEEDED FOR RHINITIS  Shayan De Jesus MD   gabapentin (NEURONTIN) 300 MG capsule 1 cap am, 1 cap pm and 2 po hs  Shayan De Jesus MD   amitriptyline (ELAVIL) 25 MG tablet Take 1 tablet by mouth nightly  Shayan De Jesus MD   rizatriptan (MAXALT-MLT) 10 MG disintegrating tablet DISSOLVE 1 TABLET BY MOUTH ONCE AS NEEDED FOR MIGRAINE. MAY REPEAT IN 2 HOURS IF NEEDED. MAX 2 TABLETS PER 24 HOURS  Shayan De Jesus MD   albuterol (PROVENTIL) (2.5 MG/3ML) 0.083% nebulizer solution Take 3 mLs by nebulization every 6 hours as needed for Wheezing  Shayan De Jesus MD   albuterol (PROVENTIL) (5 MG/ML) 0.5% nebulizer solution Take 1 mL by nebulization 4 times daily as needed for Wheezing  Shayan De Jesus MD   albuterol sulfate  (90 Base) MCG/ACT inhaler Inhale 2 puffs into the lungs 4 times daily as needed for Wheezing  Shayan De Jesus MD   neomycin-polymyxin-hydrocortisone (CORTISPORIN) 3.5-96520-9 ophthalmic suspension Place 1 drop into the left eye 4 times daily  CELESTINO Arora CNP   ondansetron (ZOFRAN-ODT) 4 MG disintegrating tablet TAKE 1 TABLET BY MOUTH EVERY 8 HOURS AS NEEDED FOR NAUSEA OR VOMITING. Adri Husbands DO Jonathan   tiZANidine (ZANAFLEX) 4 MG tablet Take 1 tablet by mouth nightly  Shayan De Jesus MD   PROAIR  (50 Base) MCG/ACT inhaler INHALE 2 PUFFS INTO THE LUNGS EVERY 4 HOURS AS NEEDED FOR WHEEZING OR SHORTNESS OF BREATH. CELESTINO Hernandez CNP   Spacer/Aero-Holding Cheli Contreras (COMPACT SPACE CHAMBER/SM MASK) AZAEL  [facility], APRN - CNP       Social History     Tobacco Use    Smoking status: Never Smoker    Smokeless tobacco: Never Used   Substance Use Topics    Alcohol use:  Yes     Alcohol/week: 1.0 - 2.0 standard drinks     Types: 1 - 2 Standard drinks or equivalent per week     Comment: glass of wine at night    Drug use: No        PHYSICAL EXAMINATION: [ INSTRUCTIONS:  \"[x]\" Indicates a positive item  \"[]\" Indicates a negative item  -- DELETE ALL ITEMS NOT EXAMINED]  Vital Signs: (As obtained by patient/caregiver or practitioner observation)    Blood pressure-  Heart rate-    Respiratory rate-    Temperature-  Pulse oximetry-     Constitutional: [x] Appears well-developed and well-nourished [] No apparent distress      [] Abnormal-   Mental status  [x] Alert and awake  [] Oriented to person/place/time []Able to follow commands      Eyes:  EOM    []  Normal  [] Abnormal-  Sclera  []  Normal  [] Abnormal -         Discharge []  None visible  [] Abnormal -    HENT:   [x] Normocephalic, atraumatic. [] Abnormal   [] Mouth/Throat: Mucous membranes are moist.     External Ears [] Normal  [] Abnormal-     Neck: [] No visualized mass     Pulmonary/Chest: [x] Respiratory effort normal.  [] No visualized signs of difficulty breathing or respiratory distress        [] Abnormal-      Musculoskeletal:   [] Normal gait with no signs of ataxia         [] Normal range of motion of neck        [] Abnormal-       Neurological:        [x] No Facial Asymmetry (Cranial nerve 7 motor function) (limited exam to video visit)          [] No gaze palsy        [] Abnormal-         Skin:        [x] No significant exanthematous lesions or discoloration noted on facial skin         [] Abnormal-            Psychiatric:       [x] Normal Affect [] No Hallucinations        [] Abnormal-     Other pertinent observable physical exam findings-     ASSESSMENT/PLAN:  There are no diagnoses linked to this encounter. Diagnosis Orders   1. Hordeolum internum of left lower eyelid     2. Peripheral polyneuropathy  gabapentin (NEURONTIN) 300 MG capsule   3. Gastroesophageal reflux disease, unspecified whether esophagitis present     4. Migraine without status migrainosus, not intractable, unspecified migraine type     5. Chronic rhinitis     6.  Vitamin D deficiency 7. Moderate persistent asthma without complication       Refills done  Cont dulera w/ albuterol prn  Cont topamax w/ maxalt - working well  Gabapentin stable for neuropathy  polytrim gtts w/ warm compresses for eye  tx options for congestion dw pt at length - consider allergist eval if worsening sxs  Needs shingrix #2 - consider covid vaccine  Due for colonoscopy 2020 2/2 fam hx colon ca - last scope 2015 no polyps - just divertic  Consider lipid,cmp, psa testing on f/u visit 6 months/ prn  No follow-ups on file. Anna Potts is a 78 y.o. male being evaluated by a Virtual Visit (video visit) encounter to address concerns as mentioned above. A caregiver was present when appropriate. Due to this being a TeleHealth encounter (During [facility]- public health emergency), evaluation of the following organ systems was limited: Vitals/Constitutional/EENT/Resp/CV/GI//MS/Neuro/Skin/Heme-Lymph-Imm. Pursuant to the emergency declaration under the [address] [place] [address] authority and the [employer] and [employer] Act, this Virtual Visit was conducted with patient's (and/or legal guardian's) consent, to reduce the patient's risk of exposure to COVID-19 and provide necessary medical care. The patient (and/or legal guardian) has also been advised to contact this office for worsening conditions or problems, and seek emergency medical treatment and/or call 911 if deemed necessary. Patient identification was verified at the start of the visit: Yes    Total time spent on this encounter: 21-30 minutes    Services were provided through a video synchronous discussion virtually to substitute for in-person clinic visit. Patient and provider were located at their individual homes. --Luciana Batista MD on 1/7/2021 at 2:14 PM    An electronic signature was used to authenticate this note.

## 2021-01-07 NOTE — TELEPHONE ENCOUNTER
All the scripts Dr. Marvel Reardon sent today - [facility] go to [facility]    [facility], [address] [facility] -  [phone]    Except the eye drops and Vitamin D

## 2021-01-11 RX ORDER — ERGOCALCIFEROL 1.25 MG/1
CAPSULE ORAL
Qty: 4 CAPSULE | Refills: 10 | Status: SHIPPED | OUTPATIENT
Start: 2021-01-11 | End: 2021-12-14

## 2021-03-04 ENCOUNTER — TELEPHONE (OUTPATIENT)
Dept: ADMINISTRATIVE | Age: 71
End: 2021-03-04

## 2021-03-04 NOTE — TELEPHONE ENCOUNTER
Submitted PA for Rizatriptan Benzoate 10MG OR TABS Key: [id number]    Via CMM STATUS:  Available without authorization. .Please notify patient, thank you.

## 2021-05-03 ENCOUNTER — TELEPHONE (OUTPATIENT)
Dept: ADMINISTRATIVE | Age: 71
End: 2021-05-03

## 2021-05-03 NOTE — TELEPHONE ENCOUNTER
PA COVER MY MEDS  Medication:Levocetirizine Dihydrochloride 5MG tablets  Key:[id number]  Status:PENDING

## 2021-05-05 RX ORDER — ALBUTEROL SULFATE 2.5 MG/3ML
2.5 SOLUTION RESPIRATORY (INHALATION) EVERY 6 HOURS PRN
Qty: 75 ML | Refills: 2 | Status: SHIPPED | OUTPATIENT
Start: 2021-05-05 | End: 2021-12-06

## 2021-05-06 NOTE — TELEPHONE ENCOUNTER
Denial for Levocetirizine Dihydrochloride 5MG tablets    Letter attached    . Please notify patient, thank you.

## 2021-05-06 NOTE — TELEPHONE ENCOUNTER
Inform xyzal is not covered by insurance [facility]

## 2021-07-26 RX ORDER — GABAPENTIN 100 MG/1
CAPSULE ORAL
Qty: 180 CAPSULE | Refills: 1 | Status: SHIPPED | OUTPATIENT
Start: 2021-07-26 | End: 2021-10-26

## 2021-07-26 RX ORDER — LEVOCETIRIZINE DIHYDROCHLORIDE 5 MG/1
TABLET, FILM COATED ORAL
Qty: 30 TABLET | Refills: 5 | Status: SHIPPED | OUTPATIENT
Start: 2021-07-26 | End: 2022-01-28 | Stop reason: SDUPTHER

## 2021-07-26 RX ORDER — PANTOPRAZOLE SODIUM 40 MG/1
TABLET, DELAYED RELEASE ORAL
Qty: 60 TABLET | Refills: 5 | Status: SHIPPED | OUTPATIENT
Start: 2021-07-26 | End: 2022-01-25

## 2021-08-23 ENCOUNTER — NURSE TRIAGE (OUTPATIENT)
Dept: OTHER | Facility: CLINIC | Age: 71
End: 2021-08-23

## 2021-08-23 ENCOUNTER — TELEPHONE (OUTPATIENT)
Dept: FAMILY MEDICINE CLINIC | Age: 71
End: 2021-08-23

## 2021-08-23 NOTE — TELEPHONE ENCOUNTER
Received call from [address] [place] [address] at [facility] with Red Flag Complaint. Brief description of triage: Pt states he has had sinus pressure and nasal congestion/drainage for the last 2-3 weeks with no relief. See assessment below. Triage indicates for patient to be seen today or tomorrow. Care advice provided, patient verbalizes understanding; denies any other questions or concerns; instructed to call back for any new or worsening symptoms. Writer provided warm transfer to Adarsh Machado at [facility] for appointment scheduling. Attention Provider: Thank you for allowing me to participate in the care of your patient. The patient was connected to triage in response to information provided to the [facility]. Please do not respond through this encounter as the response is not directed to a shared pool. Reason for Disposition   Sinus congestion (pressure, fullness) present > 10 days    Answer Assessment - Initial Assessment Questions  1. LOCATION: \"Where does it hurt? \"      Nasal discomfort, eye pressure and ears    2. ONSET: \"When did the sinus pain start? \"  (e.g., hours, days)       2 -3 weeks    3. SEVERITY: \"How bad is the pain? \"   (Scale 1-10; mild, moderate or severe)    - MILD (1-3): doesn't interfere with normal activities     - MODERATE (4-7): interferes with normal activities (e.g., work or school) or awakens from sleep    - SEVERE (8-10): excruciating pain and patient unable to do any normal activities         6/10    4. RECURRENT SYMPTOM: \"Have you ever had sinus problems before? \" If so, ask: \"When was the last time? \" and \"What happened that time? \"       Pt states he has in the past about once a year. Given ATB    5. NASAL CONGESTION: \"Is the nose blocked? \" If so, ask, \"Can you open it or must you breathe through the mouth? \"      Pt states nasal congestion has to mouth breathe    6. NASAL DISCHARGE: \"Do you have discharge from your nose? \" If so ask, \"What color? \"      Yellow color    7. FEVER: \"Do you have a fever? \" If so, ask: \"What is it, how was it measured, and when did it start? \"       Denies    8. OTHER SYMPTOMS: \"Do you have any other symptoms? \" (e.g., sore throat, cough, earache, difficulty breathing)      Ear pressure and slight drainage, cough and SOB at times    9. PREGNANCY: \"Is there any chance you are pregnant? \" \"When was your last menstrual period? \"      na    Protocols used: SINUS PAIN OR CONGESTION-ADULT-OH

## 2021-08-23 NOTE — TELEPHONE ENCOUNTER
----- Message from Mehrdadon Roberto sent at 8/23/2021 11:23 AM EDT -----  Subject: Appointment Request    Reason for Call: Urgent Return from RN Triage    QUESTIONS  Type of Appointment? Established Patient  Reason for appointment request? No appointments available during search  Additional Information for Provider? Pt has been having sinus pain for the   last several day's. Would like to know if he be seen within the next two   day's. Pt also mentioned that if a Z-pack can be called to his pharmacy   that would be helpful also  ---------------------------------------------------------------------------  --------------  CALL BACK INFO  What is the best way for the office to contact you? OK to leave message on   voicemail  Preferred Call Back Phone Number? [phone]  ---------------------------------------------------------------------------  --------------  SCRIPT ANSWERS  Patient needs to be seen today? No  Patient needs to be seen today or tomorrow? Yes   Patient needs to be seen within 5 days? No  Patient can be seen for a routine visit? No  Nurse Name? Jl Maier  Have you been diagnosed with, awaiting test results for, or told that you   are suspected of having COVID-19 (Coronavirus)? (If patient has tested   negative or was tested as a requirement for work, school, or travel and   not based on symptoms, answer no)? No  Do you currently have flu-like symptoms including fever or chills, cough,   shortness of breath, difficulty breathing, or new loss of taste or smell? No  Have you had close contact with someone with COVID-19 in the last 14 days? No  (Service Expert  click yes below to proceed with [employer] As Usual   Scheduling)?  Yes

## 2021-08-24 ENCOUNTER — VIRTUAL VISIT (OUTPATIENT)
Dept: FAMILY MEDICINE CLINIC | Age: 71
End: 2021-08-24
Payer: COMMERCIAL

## 2021-08-24 DIAGNOSIS — R09.89 CHEST CONGESTION: ICD-10-CM

## 2021-08-24 DIAGNOSIS — J01.40 ACUTE NON-RECURRENT PANSINUSITIS: Primary | ICD-10-CM

## 2021-08-24 PROCEDURE — 99213 OFFICE O/P EST LOW 20 MIN: CPT | Performed by: NURSE PRACTITIONER

## 2021-08-24 RX ORDER — AZITHROMYCIN 250 MG/1
TABLET, FILM COATED ORAL
Qty: 6 TABLET | Refills: 0 | Status: SHIPPED | OUTPATIENT
Start: 2021-08-24 | End: 2021-09-09 | Stop reason: ALTCHOICE

## 2021-08-24 ASSESSMENT — ENCOUNTER SYMPTOMS
BACK PAIN: 0
SINUS PAIN: 1
SINUS PRESSURE: 1
COUGH: 1
EYE DISCHARGE: 0
DIARRHEA: 0
COLOR CHANGE: 0
FACIAL SWELLING: 0
CHEST TIGHTNESS: 1
SORE THROAT: 1
RHINORRHEA: 1
CONSTIPATION: 0
TROUBLE SWALLOWING: 0
NAUSEA: 0
VOICE CHANGE: 1
SHORTNESS OF BREATH: 0
ABDOMINAL DISTENTION: 0
ABDOMINAL PAIN: 0

## 2021-08-24 NOTE — PROGRESS NOTES
Nanette Goldberg (:  1950) is a 78 y.o. male,Established patient, here for evaluation of the following chief complaint(s): Sinusitis (pt is having chest congestion and issues with breathing nasal congestion, pt took mucinex adn xyza,, ha and sinus pressure, cough, yellow mucus , x 2- 2 1/2 weeks )      Patient identification was verified at the start of the visit: Yes- phone call    Patient located for today's visit in [place]: Yes    ASSESSMENT/PLAN:  1. Acute non-recurrent pansinusitis  -     azithromycin (ZITHROMAX) 250 MG tablet; 2 tabs day 1 then 1 tab day 2- 5, Disp-6 tablet, R-0Normal  2. Chest congestion  -     azithromycin (ZITHROMAX) 250 MG tablet; 2 tabs day 1 then 1 tab day 2- 5, Disp-6 tablet, R-0Normal   Push fluids and rest   Continue mucinex BID   Try switching xyzal to claritin or allegra   Work on coughing and deep breathing   Continue PRN albuterol nebulizers up to 4 times daily, already has refills available     Pt reports his eye is red/purple upper eyelid and swollen, requests allergy eye drops. Need a picture to treat, pt requesting allergy eye drops. States polytrim from Dr General Weller in 2021 did not help and pt had to seek eye treatment from eye doctor. Cannot treat eye without image, pt understanding, will send [employer] message or call his eye doctor. Return in about 3 weeks (around 2021) for Gabapentin Check Up. SUBJECTIVE/OBJECTIVE:  HPI    Sinus Pain  Patient complains of congestion, facial pain, frequent clearing of the throat, headaches, nasal congestion, post nasal drip and sinus pressure. Onset of symptoms was 2 weeks ago. Symptoms have been gradually worsening since that time. He is drinking plenty of fluids. Past history is significant for asthma and allergic rhinitis. Patient is non-smoker. No sick contacts. Wife was sick a few weeks ago. Pt has been trying nebulizer at home as well. COVID vaccinated Feb and Mar 2021.      Review of Systems   Constitutional: Negative for activity change, appetite change, fatigue, fever and unexpected weight change. HENT: Positive for congestion, postnasal drip, rhinorrhea, sinus pressure, sinus pain, sore throat and voice change. Negative for dental problem, drooling, ear discharge, ear pain, facial swelling, hearing loss, mouth sores, nosebleeds, sneezing, tinnitus and trouble swallowing. Eyes: Negative for discharge and visual disturbance. Respiratory: Positive for cough and chest tightness. Negative for shortness of breath. Cardiovascular: Negative for chest pain, palpitations and leg swelling. Gastrointestinal: Negative for abdominal distention, abdominal pain, constipation, diarrhea and nausea. Endocrine: Negative for cold intolerance, heat intolerance, polydipsia, polyphagia and polyuria. Genitourinary: Negative for decreased urine volume, difficulty urinating, dysuria, flank pain, frequency and urgency. Musculoskeletal: Negative for arthralgias, back pain, gait problem, joint swelling, myalgias and neck pain. Skin: Negative for color change, rash and wound. Allergic/Immunologic: Negative for food allergies and immunocompromised state. Neurological: Positive for headaches. Negative for dizziness, tremors, speech difficulty, weakness, light-headedness and numbness. Hematological: Negative for adenopathy. Does not bruise/bleed easily. Psychiatric/Behavioral: Negative for confusion, decreased concentration, self-injury, sleep disturbance and suicidal ideas. The patient is not nervous/anxious.         Patient-Reported Vitals 8/24/2021   Patient-Reported Weight 165 lbs   Patient-Reported Height 6 1        Physical Exam    [INSTRUCTIONS:  \"[x]\" Indicates a positive item  \"[]\" Indicates a negative item  -- DELETE ALL ITEMS NOT EXAMINED]    Constitutional: [x] Appears well-developed and well-nourished [x] No apparent distress      [] Abnormal -     Mental status: [x] Alert and awake  [x] Oriented to person/place/time [x] Able to follow commands    [] Abnormal -     Eyes:   EOM    [x]  Normal    [] Abnormal -   Sclera  [x]  Normal    [] Abnormal -          Discharge [x]  None visible   [] Abnormal -     HENT: [x] Normocephalic, atraumatic  [x] Abnormal -  Nasal voice  [x] Mouth/Throat: Mucous membranes are moist    External Ears [x] Normal  [] Abnormal -    Neck: [x] No visualized mass [x] Abnormal -  Jaw glands feel swollen and tender per pt    Pulmonary/Chest: [x] Respiratory effort normal   [x] No visualized signs of difficulty breathing or respiratory distress        [x] Abnormal -  Congested cough observed during visit     Musculoskeletal:   [x] Normal gait with no signs of ataxia         [x] Normal range of motion of neck        [] Abnormal -     Neurological:        [x] No Facial Asymmetry (Cranial nerve 7 motor function) (limited exam due to video visit)          [x] No gaze palsy        [] Abnormal -          Skin:        [x] No significant exanthematous lesions or discoloration noted on facial skin         [] Abnormal -            Psychiatric:       [x] Normal Affect [] Abnormal -        [x] No Hallucinations    Other pertinent observable physical exam findings:-          On this date 8/24/2021 I have spent 20 minutes reviewing previous notes, test results and face to face (virtual) with the patient discussing the diagnosis and importance of compliance with the treatment plan as well as documenting on the day of the visit. Care Gaps Addressed  Hep C screen recommended with next blood draw   Call insurance company to discuss coverage for shingles vaccine (Shingrix) 2 dose series   Due for lipids    I have reviewed patient's pertinent medical history, relevant laboratory and imaging studies, and past/future health maintenance. Discussed with the patient the importance of adhering to their current medication regimen as directed.  Advised the patient that they should continue to work on eating a healthy balanced diet and staying active by exercising within their personal limits. Orders as listed above. Patient was advised to keep future appointments with their respective specialty care team(s). Patient had the opportunity to ask questions, all of which were answered to the best of my ability and with patient satisfaction. Patient understands and is agreeable with the care plan following today's visit. Patient is to schedule an appointment for any new or worsening symptoms. Go to ER for significant shortness of breath, chest pain, or uncontrolled pain or fever. I discussed with patient the risk and benefits of any medications that were prescribed today. I verified that the patient understands their medications, labs, and/or procedures. The patient is doing well with current medication regimen and does not have any barriers to adherence. The patient's self-management abilities are good. Follow Up in 1 Months for Gabapentin/needs fasting labs but that is afternoon appt    Esteban Rojo is a 78 y.o. male being evaluated by a Virtual Visit (video visit) encounter to address concerns as mentioned above. A caregiver was present when appropriate. Due to this being a TeleHealth encounter (During [facility] public [facility] emergency), evaluation of the following organ systems was limited: Vitals/Constitutional/EENT/Resp/CV/GI//MS/Neuro/Skin/Heme-Lymph-Imm. Pursuant to the emergency declaration under the [facility], [address] [place] [address] authority and the [employer] and [employer] Act, this Virtual Visit was conducted with patient's (and/or legal guardian's) consent, to reduce the patient's risk of exposure to COVID-19 and provide necessary medical care.   The patient (and/or legal guardian) has also been advised to contact this office for worsening conditions or problems, and seek emergency medical treatment and/or call 911 if deemed necessary. Services were provided through a video synchronous discussion virtually to substitute for in-person clinic visit. Patient was located at home and provider was located in office or at home. An electronic signature was used to authenticate this note.     --CELESTINO Gaspar - CNP

## 2021-08-24 NOTE — PATIENT INSTRUCTIONS
Patient Education        Saline Nasal Washes: Care Instructions  Your Care Instructions     Saline nasal washes help keep the nasal passages open by washing out thick or dried mucus. This simple remedy can help relieve symptoms of allergies, sinusitis, and colds. It also can make the nose feel more comfortable by keeping the mucous membranes moist. You may notice a little burning sensation in your nose the first few times you use the solution, but this usually gets better in a few days. Follow-up care is a key part of your treatment and safety. Be sure to make and go to all appointments, and call your doctor if you are having problems. It's also a good idea to know your test results and keep a list of the medicines you take. How can you care for yourself at home? · You can buy premixed saline solution in a squeeze bottle or other sinus rinse products at a drugstore. Read and follow the instructions on the label. · You also can make your own saline solution by adding 1 teaspoon of salt and 1 teaspoon of baking soda to 2 cups of distilled water. · If you use a homemade solution, pour a small amount into a clean bowl. Using a rubber bulb syringe, squeeze the syringe and place the tip in the salt water. Pull a small amount of the salt water into the syringe by relaxing your hand. · Sit down with your head tilted slightly back. Do not lie down. Put the tip of the bulb syringe or the squeeze bottle a little way into one of your nostrils. Gently drip or squirt a few drops into the nostril. Repeat with the other nostril. Some sneezing and gagging are normal at first.  · Gently blow your nose. · Wipe the syringe or bottle tip clean after each use. · Repeat this 2 or 3 times a day. · Use nasal washes gently if you have nosebleeds often. When should you call for help?   Watch closely for changes in your health, and be sure to contact your doctor if:    · You often get nosebleeds.     · You have problems doing the nasal washes. Where can you learn more? Go to [email or web address] and sign in to your viaForensicst account. Enter [id number] [id number] [phone] in the [facility] box to learn more about \"Saline Nasal Washes: Care Instructions. \"     If you do not have an account, please click on the \"Sign Up Now\" link. Current as of: December 2, 2020               Content Version: 12.9  © 2006-2021 [employer]. Care instructions adapted under license by [facility] ([facility]). If you have questions about a medical condition or this instruction, always ask your healthcare professional. [place] [address] any warranty or liability for your use of this information. Patient Education        Sinusitis: Care Instructions  Your Care Instructions     Sinusitis is an infection of the lining of the sinus cavities in your head. Sinusitis often follows a cold. It causes pain and pressure in your head and face. In most cases, sinusitis gets better on its own in 1 to 2 weeks. But some mild symptoms may last for several weeks. Sometimes antibiotics are needed. Follow-up care is a key part of your treatment and safety. Be sure to make and go to all appointments, and call your doctor if you are having problems. It's also a good idea to know your test results and keep a list of the medicines you take. How can you care for yourself at home? · Take an over-the-counter pain medicine, such as acetaminophen (Tylenol), ibuprofen (Advil, Motrin), or naproxen (Aleve). Read and follow all instructions on the label. · If the doctor prescribed antibiotics, take them as directed. Do not stop taking them just because you feel better. You need to take the full course of antibiotics. · Be careful when taking over-the-counter cold or flu medicines and Tylenol at the same time. Many of these medicines have acetaminophen, which is Tylenol. Read the labels to make sure that you are not taking more than the recommended dose. Too much acetaminophen (Tylenol) can be harmful. · Breathe warm, moist air from a steamy shower, a hot bath, or a sink filled with hot water. Avoid cold, dry air. Using a humidifier in your home may help. Follow the directions for cleaning the machine. · Use saline (saltwater) nasal washes. This can help keep your nasal passages open and wash out mucus and bacteria. You can buy saline nose drops at a grocery store or drugstore. Or you can make your own at home by adding 1 teaspoon of salt and 1 teaspoon of baking soda to 2 cups of distilled water. If you make your own, fill a bulb syringe with the solution, insert the tip into your nostril, and squeeze gently. Rozann Lolling your nose. · Put a hot, wet towel or a warm gel pack on your face 3 or 4 times a day for 5 to 10 minutes each time. · Try a decongestant nasal spray like oxymetazoline (Afrin). Do not use it for more than 3 days in a row. Using it for more than 3 days can make your congestion worse. When should you call for help? Call your doctor now or seek immediate medical care if:    · You have new or worse swelling or redness in your face or around your eyes.     · You have a new or higher fever. Watch closely for changes in your health, and be sure to contact your doctor if:    · You have new or worse facial pain.     · The mucus from your nose becomes thicker (like pus) or has new blood in it.     · You are not getting better as expected. Where can you learn more? Go to https://Pro [email or web address].[email or web address] and sign in to your [facility] account. Enter S445 in the [facility] box to learn more about \"Sinusitis: Care Instructions. \"     If you do not have an account, please click on the \"Sign Up Now\" link. Current as of: December 2, 2020               Content Version: 12.9  © 6293-9675 Healthwise, Incorporated. Care instructions adapted under license by [facility] ([facility]).  If you have questions about a medical condition or this instruction, always ask your healthcare professional. [place] [address] any warranty or liability for your use of this information. Patient Education        Bronchitis: Care Instructions  Your Care Instructions     Bronchitis is inflammation of the bronchial tubes, which carry air to the lungs. The tubes swell and produce mucus, or phlegm. The mucus and inflamed bronchial tubes make you cough. You may have trouble breathing. Most cases of bronchitis are caused by viruses like those that cause colds. Antibiotics usually do not help and they may be harmful. Bronchitis usually develops rapidly and lasts about 2 to 3 weeks in otherwise healthy people. Follow-up care is a key part of your treatment and safety. Be sure to make and go to all appointments, and call your doctor if you are having problems. It's also a good idea to know your test results and keep a list of the medicines you take. How can you care for yourself at home? · Take all medicines exactly as prescribed. Call your doctor if you think you are having a problem with your medicine. · Get some extra rest.  · Take an over-the-counter pain medicine, such as acetaminophen (Tylenol), ibuprofen (Advil, Motrin), or naproxen (Aleve) to reduce fever and relieve body aches. Read and follow all instructions on the label. · Do not take two or more pain medicines at the same time unless the doctor told you to. Many pain medicines have acetaminophen, which is Tylenol. Too much acetaminophen (Tylenol) can be harmful. · Take an over-the-counter cough medicine that contains dextromethorphan to help quiet a dry, hacking cough so that you can sleep. Avoid cough medicines that have more than one active ingredient. Read and follow all instructions on the label. · Breathe moist air from a humidifier, hot shower, or sink filled with hot water. The heat and moisture will thin mucus so you can cough it out. · Do not smoke. Smoking can make bronchitis worse.  If you need help quitting, talk to your doctor about stop-smoking programs and medicines. These can increase your chances of quitting for good. When should you call for help? Call 911 anytime you think you may need emergency care. For example, call if:    · You have severe trouble breathing. Call your doctor now or seek immediate medical care if:    · You have new or worse trouble breathing.     · You cough up dark brown or bloody mucus (sputum).     · You have a new or higher fever.     · You have a new rash. Watch closely for changes in your health, and be sure to contact your doctor if:    · You cough more deeply or more often, especially if you notice more mucus or a change in the color of your mucus.     · You are not getting better as expected. Where can you learn more? Go to [email or web address]://Wordeo.Yuppics. org and sign in to your [employer] account. Enter H333 in the [facility] box to learn more about \"Bronchitis: Care Instructions. \"     If you do not have an account, please click on the \"Sign Up Now\" link. Current as of: October 26, 2020               Content Version: 12.9  © 1960-1822 Healthwise, Incorporated. Care instructions adapted under license by [facility] ([facility]). If you have questions about a medical condition or this instruction, always ask your healthcare professional. [address] any warranty or liability for your use of this information.

## 2021-09-09 ENCOUNTER — VIRTUAL VISIT (OUTPATIENT)
Dept: FAMILY MEDICINE CLINIC | Age: 71
End: 2021-09-09
Payer: COMMERCIAL

## 2021-09-09 DIAGNOSIS — E78.00 HYPERCHOLESTEREMIA: ICD-10-CM

## 2021-09-09 DIAGNOSIS — M19.90 ARTHRITIS: ICD-10-CM

## 2021-09-09 DIAGNOSIS — E55.9 VITAMIN D DEFICIENCY: ICD-10-CM

## 2021-09-09 DIAGNOSIS — G43.719 INTRACTABLE CHRONIC MIGRAINE WITHOUT AURA AND WITHOUT STATUS MIGRAINOSUS: Primary | ICD-10-CM

## 2021-09-09 DIAGNOSIS — J30.9 ALLERGIC RHINITIS, UNSPECIFIED SEASONALITY, UNSPECIFIED TRIGGER: ICD-10-CM

## 2021-09-09 DIAGNOSIS — K58.9 IRRITABLE BOWEL SYNDROME, UNSPECIFIED TYPE: ICD-10-CM

## 2021-09-09 DIAGNOSIS — J45.40 MODERATE PERSISTENT ASTHMA WITHOUT COMPLICATION: ICD-10-CM

## 2021-09-09 DIAGNOSIS — T78.40XD MULTIPLE CHEMICAL SENSITIVITY SYNDROME, SUBSEQUENT ENCOUNTER: ICD-10-CM

## 2021-09-09 DIAGNOSIS — K21.9 GASTROESOPHAGEAL REFLUX DISEASE, UNSPECIFIED WHETHER ESOPHAGITIS PRESENT: ICD-10-CM

## 2021-09-09 PROCEDURE — 99214 OFFICE O/P EST MOD 30 MIN: CPT | Performed by: FAMILY MEDICINE

## 2021-09-09 RX ORDER — GABAPENTIN 300 MG/1
600 CAPSULE ORAL NIGHTLY
COMMUNITY
End: 2021-11-08 | Stop reason: SDUPTHER

## 2021-09-09 NOTE — PROGRESS NOTES
2021    TELEHEALTH EVALUATION -- Audio/Visual (During [id number] public health emergency)    HPI:    Vikas Romero (:  1950) has requested an audio/video evaluation for the following concern(s):    Chief Complaint   Patient presents with    Pain     CHRONIC PAIN ROUTINE FOLLOW UP   seen for sinusitis recently  Got zpak - doing better   Still dealing w/ allergies  Using xyzal and mucinex regularly 1/2-1 bid of each. Allegra not helpful  atrovent helps fairly well. Headaches not too bad - about the same  Uses rizatriptan handful/ month typically. Mornings usually the roughest  Breathing not real good lately. Uses dulera daily but w/ albuterol prn - some increased use of albuterol overall   Still able to function and do things he needs to do  Using albuterol tid - Nanci Cinnamon doesn't seem to help a lot. Doesn't always remember dulera dose  Gabapentin seems to be working well - stretching out through day - gets tired if pushes dose up through day. Back and feet are most irritated by neuropathy  Some swelling but related to salt content  ppi daily still needed  Some diarrhea - aggravated by abx  Better now - on probiotic daily  BP Readings from Last 3 Encounters:   20 128/78   10/25/19 120/70   19 128/82       Review of Systems    Prior to Visit Medications    Medication Sig Taking? Authorizing Provider   gabapentin (NEURONTIN) 300 MG capsule Take 600 mg by mouth nightly. Yes Historical Provider, MD   topiramate (TOPAMAX) 25 MG tablet TAKE 3 TABLETS BY MOUTH EVERY MORNING AND 5 TABLETS BY MOUTH EVERY EVENING Yes Pravin Cisneros MD   gabapentin (NEURONTIN) 100 MG capsule TAKE 1-2 CAPSULES BY MOUTH 3 TIMES DAILY. Yes CELESTINO Perez CNP   pantoprazole (PROTONIX) 40 MG tablet TAKE 1 TABLET BY MOUTH 2 TIMES DAILY. Yes CELESTINO Perez CNP   levocetirizine (XYZAL) 5 MG tablet TAKE 1 TABLET BY MOUTH EVERY EVENING.  Yes CELESTINO Perez - CNP   albuterol (PROVENTIL) (2.5 MG/3ML) 0.083% nebulizer solution TAKE 3 MLS BY NEBULIZATION EVERY 6 HOURS AS NEEDED FOR WHEEZING Yes Farhana Lugo MD   vitamin D (ERGOCALCIFEROL) 1.25 MG (05819 UT) CAPS capsule TAKE 1 CAPSULE BY MOUTH ONCE A WEEK Yes Farhana Lugo MD   ipratropium (ATROVENT) 0.06 % nasal spray TAKE 1-2 SPRAYS BY NASAL ROUTE 4 TIMES DAILY AS NEEDED FOR RHINITIS Yes Farhana Lugo MD   rizatriptan (MAXALT-MLT) 10 MG disintegrating tablet DISSOLVE 1 TABLET BY MOUTH ONCE AS NEEDED FOR MIGRAINE. MAY REPEAT IN 2 HOURS IF NEEDED. MAX 2 TABLETS PER 24 HOURS Yes Farhana Lugo MD   albuterol sulfate  (90 Base) MCG/ACT inhaler Inhale 2 puffs into the lungs 4 times daily as needed for Wheezing PROAIR BRAND!! Yes Farhana Lugo MD   mometasone-formoterol [facility]) 200-5 MCG/ACT inhaler Inhale 2 puffs into the lungs 2 times daily Rinse mouth after using. Yes Farhana Lugo MD   Spacer/Aero-Holding Chambers (COMPACT SPACE CHAMBER/SM MASK) AZAEL AS DIRECTED Yes CELESTINO Cr - CNP       Social History     Tobacco Use    Smoking status: Never Smoker    Smokeless tobacco: Never Used   Vaping Use    Vaping Use: Never used   Substance Use Topics    Alcohol use:  Yes     Alcohol/week: 1.0 - 2.0 standard drinks     Types: 1 - 2 Standard drinks or equivalent per week     Comment: glass of wine at night    Drug use: No        PHYSICAL EXAMINATION:  [ INSTRUCTIONS:  \"[x]\" Indicates a positive item  \"[]\" Indicates a negative item  -- DELETE ALL ITEMS NOT EXAMINED]  Vital Signs: (As obtained by patient/caregiver or practitioner observation)    Blood pressure-  Heart rate-    Respiratory rate-    Temperature-  Pulse oximetry-     Constitutional: [x] Appears well-developed and well-nourished [x] No apparent distress      [] Abnormal-   Mental status  [x] Alert and awake  [] Oriented to person/place/time []Able to follow commands      Eyes:  EOM    []  Normal  [] Abnormal-  Sclera  []  Normal  [] Abnormal -         Discharge []  None visible  [] Abnormal -    HENT:   [x] Normocephalic, atraumatic. [] Abnormal   [] Mouth/Throat: Mucous membranes are moist.     External Ears [] Normal  [] Abnormal-     Neck: [] No visualized mass     Pulmonary/Chest: [x] Respiratory effort normal.  [] No visualized signs of difficulty breathing or respiratory distress        [] Abnormal-      Musculoskeletal:   [] Normal gait with no signs of ataxia         [] Normal range of motion of neck        [] Abnormal-       Neurological:        [x] No Facial Asymmetry (Cranial nerve 7 motor function) (limited exam to video visit)          [] No gaze palsy        [] Abnormal-         Skin:        [x] No significant exanthematous lesions or discoloration noted on facial skin         [] Abnormal-            Psychiatric:       [x] Normal Affect [] No Hallucinations        [] Abnormal-     Other pertinent observable physical exam findings-     ASSESSMENT/PLAN:   Diagnosis Orders   1. Intractable chronic migraine without aura and without status migrainosus     2. Irritable bowel syndrome, unspecified type     3. Multiple chemical sensitivity syndrome, subsequent encounter     4. Vitamin D deficiency     5. Arthritis     6. Moderate persistent asthma without complication     7. Allergic rhinitis, unspecified seasonality, unspecified trigger     8. Gastroesophageal reflux disease, unspecified whether esophagitis present       Encourage dulera routinely bid w/ albuterol prn  Consider pulm / pft when able w/ covid  Xyzal/ mucinex/ atrovent ns for allergies for now  Cont topamax w/ maxalt for migraines  Gabapentin for neuropathy pain working well at present dose  Reviewed coronary calcium score - 55% risk for similar individuals - lifestyle focus on reducing risk dw/ pt including bp/ lipid control, healthy diet, regular activity and weight mgmt. Labs in near future o/w  6 months  No follow-ups on file. Caitie Dillon, was evaluated through a synchronous (real-time) audio-video encounter.  The patient (or guardian if applicable) is aware that this is a billable service. Verbal consent to proceed has been obtained within the past 12 months. The visit was conducted pursuant to the emergency declaration under the [address] [place] [address] and the [employer] and [employer] General Act. Patient identification was verified, and a caregiver was present when appropriate. The patient was located in a state where the provider was credentialed to provide care. Total time spent on this encounter: 21 or more minutes were spent on the digital evaluation and management of this patient. --Marybel Cox MD on 9/9/2021 at 2:12 PM    An electronic signature was used to authenticate this note.

## 2021-09-14 ENCOUNTER — NURSE ONLY (OUTPATIENT)
Dept: FAMILY MEDICINE CLINIC | Age: 71
End: 2021-09-14
Payer: COMMERCIAL

## 2021-09-14 DIAGNOSIS — E78.00 HYPERCHOLESTEREMIA: ICD-10-CM

## 2021-09-14 DIAGNOSIS — E55.9 VITAMIN D DEFICIENCY: ICD-10-CM

## 2021-09-14 DIAGNOSIS — K58.9 IRRITABLE BOWEL SYNDROME, UNSPECIFIED TYPE: ICD-10-CM

## 2021-09-14 LAB
A/G RATIO: 2.1 (ref 1.1–2.2)
ALBUMIN SERPL-MCNC: 4.1 G/DL (ref 3.4–5)
ALP BLD-CCNC: 102 U/L (ref 40–129)
ALT SERPL-CCNC: 19 U/L (ref 10–40)
ANION GAP SERPL CALCULATED.3IONS-SCNC: 11 MMOL/L (ref 3–16)
AST SERPL-CCNC: 21 U/L (ref 15–37)
BILIRUB SERPL-MCNC: 0.5 MG/DL (ref 0–1)
BUN BLDV-MCNC: 7 MG/DL (ref 7–20)
CALCIUM SERPL-MCNC: 9.2 MG/DL (ref 8.3–10.6)
CHLORIDE BLD-SCNC: 104 MMOL/L (ref 99–110)
CHOLESTEROL, TOTAL: 137 MG/DL (ref 0–199)
CO2: 25 MMOL/L (ref 21–32)
CREAT SERPL-MCNC: 0.7 MG/DL (ref 0.8–1.3)
GFR AFRICAN AMERICAN: >60
GFR NON-AFRICAN AMERICAN: >60
GLOBULIN: 2 G/DL
GLUCOSE BLD-MCNC: 93 MG/DL (ref 70–99)
HDLC SERPL-MCNC: 56 MG/DL (ref 40–60)
LDL CHOLESTEROL CALCULATED: NORMAL MG/DL
LDL CHOLESTEROL DIRECT: 82 MG/DL
POTASSIUM SERPL-SCNC: 3.7 MMOL/L (ref 3.5–5.1)
SODIUM BLD-SCNC: 140 MMOL/L (ref 136–145)
TOTAL PROTEIN: 6.1 G/DL (ref 6.4–8.2)
TRIGL SERPL-MCNC: 29 MG/DL (ref 0–150)
VITAMIN D 25-HYDROXY: 59.5 NG/ML
VLDLC SERPL CALC-MCNC: NORMAL MG/DL

## 2021-09-14 PROCEDURE — 36415 COLL VENOUS BLD VENIPUNCTURE: CPT | Performed by: FAMILY MEDICINE

## 2021-10-19 ENCOUNTER — IMMUNIZATION (OUTPATIENT)
Dept: FAMILY MEDICINE CLINIC | Age: 71
End: 2021-10-19
Payer: COMMERCIAL

## 2021-10-19 DIAGNOSIS — Z23 NEED FOR IMMUNIZATION AGAINST INFLUENZA: Primary | ICD-10-CM

## 2021-10-19 PROCEDURE — 90694 VACC AIIV4 NO PRSRV 0.5ML IM: CPT | Performed by: FAMILY MEDICINE

## 2021-10-19 PROCEDURE — 90471 IMMUNIZATION ADMIN: CPT | Performed by: FAMILY MEDICINE

## 2021-10-19 NOTE — PROGRESS NOTES
Vaccine Information Sheet, \"Influenza - Inactivated\"  given to Agapito Gunderson, or parent/legal guardian of  Agapito Gunderson and verbalized understanding. Patient responses:    Have you ever had a reaction to a flu vaccine? No  Do you have any current illness? No  Have you ever had Guillian Hampton Syndrome? No  Do you have a serious allergy to any of the follow: Neomycin, Polymyxin, Thimerosal, eggs or egg products? No    Flu vaccine given per order. Please see immunization tab. Risks and benefits explained. Current VIS given.       Immunizations Administered     Name Date Dose Route    Influenza, Quadv, adjuvanted, 65 yrs +, IM, PF (Fluad) 10/19/2021 0.5 mL Intramuscular    Site: Deltoid- Left    Lot: [id number]    [address]: 82517-631-49

## 2021-10-22 RX ORDER — MOMETASONE FUROATE AND FORMOTEROL FUMARATE DIHYDRATE 200; 5 UG/1; UG/1
AEROSOL RESPIRATORY (INHALATION)
Qty: 13 G | Refills: 2 | Status: SHIPPED | OUTPATIENT
Start: 2021-10-22 | End: 2022-01-13

## 2021-10-27 RX ORDER — GABAPENTIN 300 MG/1
CAPSULE ORAL
Qty: 90 CAPSULE | Refills: 5 | OUTPATIENT
Start: 2021-10-27

## 2021-11-01 ENCOUNTER — TELEPHONE (OUTPATIENT)
Dept: FAMILY MEDICINE CLINIC | Age: 71
End: 2021-11-01

## 2021-11-01 NOTE — TELEPHONE ENCOUNTER
PATIENT AWARE DRANAYA NOT IN OFFICE. CALLED PATIENT HE STATED THE PHARMACY WAS JUST NOW ABLE TO GET INHALER IN. SO HE IS OKAY FOR THIS MONTH BUT WILL NEED SOMETHING NEXT MONTH. INSURANCE SAYS THEY WILL COVER  [address].  PATIENT STATES HE ALSO HASN'T HAD THRUSH IN A VERY LONG TIME WHILE USING THE Khai Marinas SO SLIGHTLY CONCERNED ABOUT STARTING A NEW INHALER. GUZMANW

## 2021-11-02 NOTE — TELEPHONE ENCOUNTER
Trelegy is not the same medicine as dulera but still has steroid in it. Risks of thrush are generally kept low by rinsing mouth and gargling with mouthwash after use.

## 2021-11-08 ENCOUNTER — TELEPHONE (OUTPATIENT)
Dept: FAMILY MEDICINE CLINIC | Age: 71
End: 2021-11-08

## 2021-11-08 DIAGNOSIS — G62.9 PERIPHERAL POLYNEUROPATHY: ICD-10-CM

## 2021-11-08 RX ORDER — GABAPENTIN 100 MG/1
100 CAPSULE ORAL 4 TIMES DAILY
Qty: 120 CAPSULE | Refills: 2 | Status: SHIPPED | OUTPATIENT
Start: 2021-11-08 | End: 2022-01-10

## 2021-11-08 RX ORDER — GABAPENTIN 300 MG/1
CAPSULE ORAL
Qty: 90 CAPSULE | Refills: 2 | Status: SHIPPED | OUTPATIENT
Start: 2021-11-08 | End: 2022-01-28 | Stop reason: SDUPTHER

## 2021-11-08 NOTE — TELEPHONE ENCOUNTER
He appears to be taking 2-3 300mg gabapentin capsules at night and 100mg up to 4x/day through day.  Refill sent

## 2021-11-08 NOTE — TELEPHONE ENCOUNTER
She's wrong. Last refills were for 900 mg nightly on 1/7 along with the 100 mg TID rx. Please fill what I sent.

## 2021-11-08 NOTE — TELEPHONE ENCOUNTER
CALLED AND SPOKE TO KIM THE PHARMACIST AT [address] KIM MENTIONED TO ME THAT THE PATIENT HAPPENS TO BE IS UNCLE AND HAS EXPRESSED TO HIM THAT HE CAN ONLY TOLERATE 700MG OF GABAPENTIN AT NIGHT. WHEN I ASKED KIM IF THE PATIENT HAS MENTIONED TO DR. QUINONEZ ABOUT THIS, HE STATES THAT THE PATIENT TOLD HIM THAT HE HAD SPOKEN TO DR. QUINONEZ AND THERE WAS AN UNDERSTANDING THAT THIS WAS THE MAX DOSE. PER HALEY HE LOOKED AT THE NOTE FROM DR. QUINONEZ IN January AND IT STATES THAT PATIENT COULD NOT TOLERATE MORE THAN 00MG AT NIGHT. I PLACED PHARMACY ON HOLD AND SPOKE TO HALEY. HALEY STATES HE IS UNCOMFORTABLE PRESCRIBING THIS MEDICATION TODAY AS THERE IS TOO MUCH CONFUSION WITH THE SCRIPT AND THIS WILL NEED TO BE TAKEN CARE OF BY DR. Taniya Torres. KIM UNDERSTOOD AND AGREED FOR THIS TO WAIT.  SC

## 2021-11-08 NOTE — TELEPHONE ENCOUNTER
ERMA FROM [facility] CALLED AND STATED THE SCRIPT FOR THE GABAPENTIN 300MG WAS WRITTEN INCORRECTLY. SHE IS ASKING US TO JUST WRITE GABAPENTIN 600MG NIGHTY. THIS WILL BE MUCH EASIER FOR THE PATIENT AND THEM TO FILL. SC    PHARMACIST CALLED BACK AND SAID THE WAY THE 100MG SCRIPT IS WRITTEN IS ONLY FOR A 12 DAY SUPPLY. SCRIPT NEEDS TO SAY QUANTITY 180 CAPSULES TO GIVE HIM 30 DAY SUPPLY.  SC

## 2021-11-08 NOTE — TELEPHONE ENCOUNTER
Looks like it accidentally got cancelled due to duplicate but it is not. Sent the refill for the 300 mg.

## 2021-11-10 ENCOUNTER — NURSE ONLY (OUTPATIENT)
Dept: FAMILY MEDICINE CLINIC | Age: 71
End: 2021-11-10
Payer: COMMERCIAL

## 2021-11-10 DIAGNOSIS — Z23 NEED FOR SHINGLES VACCINE: Primary | ICD-10-CM

## 2021-11-10 PROCEDURE — 90750 HZV VACC RECOMBINANT IM: CPT | Performed by: FAMILY MEDICINE

## 2021-11-10 PROCEDURE — 90471 IMMUNIZATION ADMIN: CPT | Performed by: FAMILY MEDICINE

## 2021-11-10 NOTE — PROGRESS NOTES
Immunizations Administered     Name Date Dose Route    Zoster Recombinant (Shingrix) 11/10/2021 0.5 mL Intramuscular    Site: Deltoid- Right    Lot: [id number]    NDC: 40398-087-73

## 2021-11-16 RX ORDER — TOPIRAMATE 25 MG/1
TABLET ORAL
Qty: 150 TABLET | OUTPATIENT
Start: 2021-11-16

## 2021-12-06 RX ORDER — ALBUTEROL SULFATE 2.5 MG/3ML
2.5 SOLUTION RESPIRATORY (INHALATION) EVERY 6 HOURS PRN
Qty: 75 ML | Refills: 2 | Status: SHIPPED | OUTPATIENT
Start: 2021-12-06 | End: 2022-01-18 | Stop reason: SDUPTHER

## 2021-12-14 RX ORDER — ERGOCALCIFEROL 1.25 MG/1
CAPSULE ORAL
Qty: 4 CAPSULE | Refills: 2 | Status: SHIPPED | OUTPATIENT
Start: 2021-12-14 | End: 2022-03-11

## 2021-12-14 NOTE — TELEPHONE ENCOUNTER
Ref Range & Units 9/14/21 0930    Vit D, 25-Hydroxy >=30 ng/mL 59.5    Comment: <=20 ng/mL. ........... Lawernce Roughen Deficient   21-29 ng/mL. ......... Lawernce Roughen Insufficient   >=30 ng/mL. ........ Lawernce Roughen Sufficient    Resulting Agency  [facility]

## 2021-12-21 RX ORDER — ALBUTEROL SULFATE 90 UG/1
2 AEROSOL, METERED RESPIRATORY (INHALATION) 4 TIMES DAILY PRN
Qty: 18 G | Refills: 2 | Status: SHIPPED | OUTPATIENT
Start: 2021-12-21 | End: 2022-06-20

## 2021-12-21 RX ORDER — ALBUTEROL SULFATE 90 UG/1
2 AEROSOL, METERED RESPIRATORY (INHALATION) 4 TIMES DAILY PRN
Qty: 18 G | Refills: 2 | OUTPATIENT
Start: 2021-12-21

## 2022-01-13 RX ORDER — MOMETASONE FUROATE AND FORMOTEROL FUMARATE DIHYDRATE 200; 5 UG/1; UG/1
AEROSOL RESPIRATORY (INHALATION)
Qty: 13 G | Refills: 2 | Status: SHIPPED | OUTPATIENT
Start: 2022-01-13 | End: 2022-01-14 | Stop reason: SDUPTHER

## 2022-01-14 ENCOUNTER — VIRTUAL VISIT (OUTPATIENT)
Dept: FAMILY MEDICINE CLINIC | Age: 72
End: 2022-01-14
Payer: COMMERCIAL

## 2022-01-14 DIAGNOSIS — J45.40 MODERATE PERSISTENT ASTHMA WITHOUT COMPLICATION: ICD-10-CM

## 2022-01-14 DIAGNOSIS — J01.10 ACUTE FRONTAL SINUSITIS, RECURRENCE NOT SPECIFIED: ICD-10-CM

## 2022-01-14 DIAGNOSIS — U07.1 COVID-19 VIRUS INFECTION: Primary | ICD-10-CM

## 2022-01-14 PROCEDURE — 99442 PR PHYS/QHP TELEPHONE EVALUATION 11-20 MIN: CPT | Performed by: FAMILY MEDICINE

## 2022-01-14 RX ORDER — AMOXICILLIN AND CLAVULANATE POTASSIUM 875; 125 MG/1; MG/1
1 TABLET, FILM COATED ORAL 2 TIMES DAILY
Qty: 14 TABLET | Refills: 0 | Status: SHIPPED | OUTPATIENT
Start: 2022-01-14 | End: 2022-01-21

## 2022-01-14 SDOH — ECONOMIC STABILITY: FOOD INSECURITY: WITHIN THE PAST 12 MONTHS, YOU WORRIED THAT YOUR FOOD WOULD RUN OUT BEFORE YOU GOT MONEY TO BUY MORE.: NEVER TRUE

## 2022-01-14 SDOH — ECONOMIC STABILITY: FOOD INSECURITY: WITHIN THE PAST 12 MONTHS, THE FOOD YOU BOUGHT JUST DIDN'T LAST AND YOU DIDN'T HAVE MONEY TO GET MORE.: NEVER TRUE

## 2022-01-14 SDOH — ECONOMIC STABILITY: TRANSPORTATION INSECURITY
IN THE PAST 12 MONTHS, HAS LACK OF TRANSPORTATION KEPT YOU FROM MEETINGS, WORK, OR FROM GETTING THINGS NEEDED FOR DAILY LIVING?: NO

## 2022-01-14 ASSESSMENT — PATIENT HEALTH QUESTIONNAIRE - PHQ9
SUM OF ALL RESPONSES TO PHQ9 QUESTIONS 1 & 2: 0
SUM OF ALL RESPONSES TO PHQ QUESTIONS 1-9: 0
2. FEELING DOWN, DEPRESSED OR HOPELESS: 0
SUM OF ALL RESPONSES TO PHQ QUESTIONS 1-9: 0
1. LITTLE INTEREST OR PLEASURE IN DOING THINGS: 0

## 2022-01-14 ASSESSMENT — SOCIAL DETERMINANTS OF HEALTH (SDOH): HOW HARD IS IT FOR YOU TO PAY FOR THE VERY BASICS LIKE FOOD, HOUSING, MEDICAL CARE, AND HEATING?: NOT HARD AT ALL

## 2022-01-14 NOTE — PROGRESS NOTES
Symone Fernando is a 73 y.o. male evaluated via telephone on 1/14/2022. Consent:  He and/or health care decision maker is aware that that he may receive a bill for this telephone service, depending on his insurance coverage, and has provided verbal consent to proceed: Yes      Documentation:  I communicated with the patient and/or health care decision maker about see note. Details of this discussion including any medical advice provided: see note  Chief Complaint   Patient presents with    Congestion     COUGH CONGESTION YELLOW MUCUS, LOW GRADE FEVER, SX STARTED 2 WEEKS AGO WIFE HAD SAME SX  2 WEEKS AGO. HE FAINTED 2 NIGHTS AGO IN BATHROOM. TESTED POS FOR COVID    2 different things - sinusitis for 2 weeks   Had positive covid test - home test this am  Started w/ runny nose/ cold sxs - now coughing up yellow phlegm -no fever - doesn't get normally  Started w/ bad headaches - which is unusual  Not really sob/ tight in chest.  Using mucinex part of a pill - using zyrtec. On dulera - may be interested in changing at some point       Diagnosis Orders   1. COVID-19 virus infection     2. Moderate persistent asthma without complication     3. Acute frontal sinusitis, recurrence not specified       augmentin 875 bid for 7 days. Please eat yogurt daily or take probiotic supplement while on this antibiotic and for additional week after finishing the antibiotic to protect your GI tract. covid tx dw pt - not likely to benefit from oral or monoclonal ab tx based on sxs, though felt a lot worse in last 2 days  Sx'atic tx d/w pt - hydrate well  Pulse oximetery to check and ER if stays below 90% at rest.  Cdc.gov recommended isolation  pft in near future  Cont dulera bid w/ albuterol prn for asthma        I affirm this is a Patient Initiated Episode with a Patient who has not had a related appointment within my department in the past 7 days or scheduled within the next 24 hours.     Patient identification was verified at the start of the visit: Yes    Total Time: minutes: 11-20 minutes    The visit was conducted pursuant to the emergency declaration under the [address] [place] [address] and the [employer] and [employer] General Act. Patient identification was verified, and a caregiver was present when appropriate. The patient was located in a state where the provider was credentialed to provide care.     Note: not billable if this call serves to triage the patient into an appointment for the relevant concern      Lachelle Barkley MD

## 2022-01-18 ENCOUNTER — TELEPHONE (OUTPATIENT)
Dept: FAMILY MEDICINE CLINIC | Age: 72
End: 2022-01-18

## 2022-01-18 RX ORDER — METHYLPREDNISOLONE 4 MG/1
TABLET ORAL
Qty: 1 KIT | Refills: 0 | Status: SHIPPED | OUTPATIENT
Start: 2022-01-18 | End: 2022-01-18 | Stop reason: SDUPTHER

## 2022-01-18 RX ORDER — GABAPENTIN 100 MG/1
CAPSULE ORAL
Qty: 130 CAPSULE | Refills: 2 | Status: SHIPPED | OUTPATIENT
Start: 2022-01-18 | End: 2022-04-21 | Stop reason: SDUPTHER

## 2022-01-18 RX ORDER — METHYLPREDNISOLONE 4 MG/1
TABLET ORAL
Qty: 1 KIT | Refills: 0 | Status: SHIPPED | OUTPATIENT
Start: 2022-01-18 | End: 2022-01-24

## 2022-01-18 RX ORDER — ALBUTEROL SULFATE 2.5 MG/3ML
2.5 SOLUTION RESPIRATORY (INHALATION) EVERY 6 HOURS PRN
Qty: 75 ML | Refills: 2 | Status: SHIPPED | OUTPATIENT
Start: 2022-01-18

## 2022-01-18 NOTE — TELEPHONE ENCOUNTER
Sent medrol dose pack to days pharmacy -can start in am w/ food as directed. This may help a little, but it is not unusual w/ covid infection for symptoms such as fatigue and getting winded more quickly to persist for days to weeks or even longer.   Activity as tolerated  Get pulse oximeter and if oxygen staying below 90% at rest -needs to go to ER

## 2022-01-18 NOTE — TELEPHONE ENCOUNTER
CALLED AND SPOKE TO PATIENT AND ADVISED ON MEDROL DOSE PACK BEING SENT IN. PATIENT IS ASKING FOR NEBULIZER SOLUTION TO BE SENT TO THE PHARMACY. HE NEEDS THE PRE MIXED PACKED SOLUTION. HE NEEDS BOTH THE MEDROL DOSE PACK AND SOLUTION TO GO TO [facility]. HE ALSO TOLD ME HIS GABAPENTIN 100MG SCRIPT AT [facility] KEEPS GETTING MESSED UP. HE IS ASKING FOR A SCRIPT SO THAT HE CAN GET #130 CAPSULES A MONTH FOR THIS. HE TOLD ME HE DOES NOT NEED THIS NOW BUT WANTED DR. QUINONEZ TO BE AWARE. SC    SCRIPTS PENDED TO THE CORRECT PHARMACY.  SC

## 2022-01-18 NOTE — TELEPHONE ENCOUNTER
He has covid  (since the 14th) and still has the SOB and wanted to know what he could do?       Pt @  [phone]

## 2022-01-24 RX ORDER — TOPIRAMATE 25 MG/1
TABLET ORAL
Qty: 240 TABLET | Refills: 0 | Status: SHIPPED | OUTPATIENT
Start: 2022-01-24 | End: 2022-02-21

## 2022-01-25 RX ORDER — PANTOPRAZOLE SODIUM 40 MG/1
TABLET, DELAYED RELEASE ORAL
Qty: 60 TABLET | Refills: 5 | Status: SHIPPED | OUTPATIENT
Start: 2022-01-25 | End: 2022-07-18

## 2022-01-25 RX ORDER — IPRATROPIUM BROMIDE 42 UG/1
SPRAY, METERED NASAL
Qty: 15 ML | Refills: 5 | Status: SHIPPED | OUTPATIENT
Start: 2022-01-25

## 2022-01-28 DIAGNOSIS — G62.9 PERIPHERAL POLYNEUROPATHY: ICD-10-CM

## 2022-01-28 RX ORDER — GABAPENTIN 300 MG/1
CAPSULE ORAL
Qty: 90 CAPSULE | Refills: 5 | Status: SHIPPED | OUTPATIENT
Start: 2022-01-28 | End: 2022-09-23

## 2022-01-28 RX ORDER — LEVOCETIRIZINE DIHYDROCHLORIDE 5 MG/1
TABLET, FILM COATED ORAL
Qty: 30 TABLET | Refills: 5 | Status: SHIPPED | OUTPATIENT
Start: 2022-01-28 | End: 2022-07-18

## 2022-01-28 NOTE — TELEPHONE ENCOUNTER
[facility] PHARMACY REQUESTING REFILL FOR GABAPENTIN 300 MG  AND  LEVOCETIRIZINE

## 2022-02-02 ENCOUNTER — TELEPHONE (OUTPATIENT)
Dept: FAMILY MEDICINE CLINIC | Age: 72
End: 2022-02-02

## 2022-02-02 DIAGNOSIS — R19.7 DIARRHEA, UNSPECIFIED TYPE: Primary | ICD-10-CM

## 2022-02-02 NOTE — TELEPHONE ENCOUNTER
CALLED PT HE FINISHED ANTIBIOTICS END OF December, STOMACH STARTED FEELING BLOATED 4 OR 5 DAYS AGO. NO DIARRHEA AND WHEN HE HAS SOFT STOOL ITS NOT EVERYDAY HAS BEEN THIS WAY SINCE HE FINISHED ANTIBIOTICS, HE WAS ON AMOXICILLIN 2 TIMES IN December ONCE FROM QUINONEZ ONCE FROM THE DENTISTS ONE FOR 10 DAYS AND ONE FOR 7 DAYS. NO NAUSEA NOT THROWING UP. HE HAS ABDOMINAL PAIN ON THE LEFT SIDE.  Jose Godwin

## 2022-02-02 NOTE — TELEPHONE ENCOUNTER
Needs c.diff toxin assay asap but has to be loose stool - can't test formed stool  In meantime - get on a good quality probiotic - florastor otc is a good one on regular basis.

## 2022-02-02 NOTE — TELEPHONE ENCOUNTER
----- Message from Luis Kennethks sent at 2/2/2022  4:30 PM EST -----  Subject: Message to Provider    QUESTIONS  Information for Provider? PT has C diff and is finishing up antibiotics. He is experiencing bloating and loose stools but no fever or diarrhea. Please call PT.  ---------------------------------------------------------------------------  --------------  CALL BACK INFO  What is the best way for the office to contact you? OK to leave message on   voicemail  Preferred Call Back Phone Number? [phone]  ---------------------------------------------------------------------------  --------------  SCRIPT ANSWERS  Relationship to Patient?  Self

## 2022-02-11 ENCOUNTER — APPOINTMENT (OUTPATIENT)
Dept: GENERAL RADIOLOGY | Age: 72
End: 2022-02-11
Payer: COMMERCIAL

## 2022-02-11 ENCOUNTER — HOSPITAL ENCOUNTER (EMERGENCY)
Age: 72
Discharge: HOME | End: 2022-02-11
Payer: COMMERCIAL

## 2022-02-11 ENCOUNTER — APPOINTMENT (OUTPATIENT)
Dept: CT IMAGING | Age: 72
End: 2022-02-11
Payer: COMMERCIAL

## 2022-02-11 VITALS
TEMPERATURE: 98.7 F | BODY MASS INDEX: 21.97 KG/M2 | WEIGHT: 165.79 LBS | OXYGEN SATURATION: 100 % | DIASTOLIC BLOOD PRESSURE: 74 MMHG | HEIGHT: 73 IN | HEART RATE: 76 BPM | RESPIRATION RATE: 16 BRPM | SYSTOLIC BLOOD PRESSURE: 124 MMHG

## 2022-02-11 DIAGNOSIS — S52.501A CLOSED FRACTURE OF DISTAL END OF RIGHT RADIUS, UNSPECIFIED FRACTURE MORPHOLOGY, INITIAL ENCOUNTER: Primary | ICD-10-CM

## 2022-02-11 DIAGNOSIS — S62.324A CLOSED DISPLACED FRACTURE OF SHAFT OF FOURTH METACARPAL BONE OF RIGHT HAND, INITIAL ENCOUNTER: ICD-10-CM

## 2022-02-11 DIAGNOSIS — S62.326A CLOSED DISPLACED FRACTURE OF SHAFT OF FIFTH METACARPAL BONE OF RIGHT HAND, INITIAL ENCOUNTER: ICD-10-CM

## 2022-02-11 PROCEDURE — 72125 CT NECK SPINE W/O DYE: CPT

## 2022-02-11 PROCEDURE — 73110 X-RAY EXAM OF WRIST: CPT

## 2022-02-11 PROCEDURE — 71101 X-RAY EXAM UNILAT RIBS/CHEST: CPT

## 2022-02-11 PROCEDURE — 70450 CT HEAD/BRAIN W/O DYE: CPT

## 2022-02-11 PROCEDURE — 6370000000 HC RX 637 (ALT 250 FOR IP): Performed by: PHYSICIAN ASSISTANT

## 2022-02-11 PROCEDURE — 99283 EMERGENCY DEPT VISIT LOW MDM: CPT

## 2022-02-11 PROCEDURE — 29125 APPL SHORT ARM SPLINT STATIC: CPT

## 2022-02-11 PROCEDURE — 73030 X-RAY EXAM OF SHOULDER: CPT

## 2022-02-11 PROCEDURE — 73130 X-RAY EXAM OF HAND: CPT

## 2022-02-11 RX ORDER — OXYCODONE HYDROCHLORIDE AND ACETAMINOPHEN 5; 325 MG/1; MG/1
1 TABLET ORAL ONCE
Status: COMPLETED | OUTPATIENT
Start: 2022-02-11 | End: 2022-02-11

## 2022-02-11 RX ORDER — HYDROCODONE BITARTRATE AND ACETAMINOPHEN 5; 325 MG/1; MG/1
1 TABLET ORAL EVERY 6 HOURS PRN
Qty: 10 TABLET | Refills: 0 | Status: SHIPPED | OUTPATIENT
Start: 2022-02-11 | End: 2022-02-14

## 2022-02-11 RX ORDER — IBUPROFEN 600 MG/1
600 TABLET ORAL EVERY 8 HOURS PRN
Qty: 15 TABLET | Refills: 0 | Status: SHIPPED | OUTPATIENT
Start: 2022-02-11 | End: 2022-08-08 | Stop reason: ALTCHOICE

## 2022-02-11 RX ORDER — ONDANSETRON 4 MG/1
4 TABLET, ORALLY DISINTEGRATING ORAL ONCE
Status: COMPLETED | OUTPATIENT
Start: 2022-02-11 | End: 2022-02-11

## 2022-02-11 RX ADMIN — ONDANSETRON 4 MG: 4 TABLET, ORALLY DISINTEGRATING ORAL at 14:09

## 2022-02-11 RX ADMIN — OXYCODONE HYDROCHLORIDE AND ACETAMINOPHEN 1 TABLET: 5; 325 TABLET ORAL at 14:09

## 2022-02-11 ASSESSMENT — PAIN SCALES - GENERAL
PAINLEVEL_OUTOF10: 8
PAINLEVEL_OUTOF10: 8

## 2022-02-11 ASSESSMENT — PAIN DESCRIPTION - LOCATION: LOCATION: HEAD

## 2022-02-11 ASSESSMENT — PAIN DESCRIPTION - ORIENTATION: ORIENTATION: RIGHT

## 2022-02-11 ASSESSMENT — PAIN DESCRIPTION - PAIN TYPE: TYPE: ACUTE PAIN

## 2022-02-11 NOTE — ED NOTES
Ulnar gutter and posterior splint applied cap refill checked after and pulses present patient education given on splint care     Gurvinder Acosta RN  02/11/22 3018

## 2022-02-11 NOTE — ED PROVIDER NOTES
[facility]        Pt Name: Benito Pizarro  MRN: [id number]  [place] 1950  Date of evaluation: 2/11/2022  Provider: STACI Catalan      ADVANCED PRACTICE PROVIDER, I HAVE EVALUATED THIS PATIENT    CHIEF COMPLAINT     fall      HISTORY OF PRESENT ILLNESS  (Location/Symptom, Timing/Onset, Context/Setting, Quality, Duration,Modifying Factors, Severity.)   Benito Pizarro is a 73 y.o. male who presents to the emergencydepartment after fall. Patient slipped and fell landing on his right side. He injured his right ribs, right hand and wrist, right shoulder. He did hit the right side of his head but the fall. He denies loss of consciousness. Denies aspirin or anticoagulant use. Denies lightheadedness, dizziness, syncope prior to the fall. He has shortness of breath due to the rib pain. Denies chest pain, nausea, vomiting, abdominal pain       Nursing Notes were reviewed and I agree. REVIEW OF SYSTEMS    (2-9 systems for level 4, 10 or more for level 5)   Review of Systems     Pertinent positives and negatives as per HPI. PAST MEDICAL HISTORY         Diagnosis Date    Arthritis     Asthma     B12 deficiency 7/10/2013    Takes injection every other week and B12 level 500. Recommended that he continue current therapy, as will likely drop with oral supplement.      Diverticulitis     Gallbladder problem     GERD (gastroesophageal reflux disease)     Hiatal hernia     Migraine     Nausea & vomiting        SURGICAL HISTORY         Procedure Laterality Date    CHOLECYSTECTOMY      COLONOSCOPY  9/11    FOOT SURGERY      nerve removed from foot    SHOULDER SURGERY Right 2005    rotator cuff repair    UPPER GASTROINTESTINAL ENDOSCOPY  9/11       CURRENT MEDICATIONS       Discharge Medication List as of 2/11/2022  5:48 PM      CONTINUE these medications which have NOT CHANGED    Details   levocetirizine (XYZAL) 5 MG tablet TAKE 1 TABLET BY MOUTH EVERY EVENING., Disp-30 tablet, R-5Normal      !! gabapentin (NEURONTIN) 300 MG capsule 3 po nightly, Disp-90 capsule, R-5Normal      pantoprazole (PROTONIX) 40 MG tablet TAKE 1 TABLET BY MOUTH 2 TIMES DAILY. , Disp-60 tablet, R-5Normal      ipratropium (ATROVENT) 0.06 % nasal spray TAKE 1-2 SPRAYS BY NASAL ROUTE 4 TIMES DAILY AS NEEDED FOR RHINITIS, Disp-15 mL, R-5Normal      topiramate (TOPAMAX) 25 MG tablet TAKE 3 TABLETS BY MOUTH EVERY MORNING AND 5 TABLETS BY MOUTH EVERY EVENING, Disp-240 tablet, R-0Normal      albuterol (PROVENTIL) (2.5 MG/3ML) 0.083% nebulizer solution Take 3 mLs by nebulization every 6 hours as needed for Wheezing, Disp-75 mL, R-2This prescription was filled on 12/6/2021. Any refills authorized will be placed on file. Normal      !! gabapentin (NEURONTIN) 100 MG capsule Take 1 cap 4-5x/ day as directed -Fill initial prescription 1/26/22 or later, Disp-130 capsule, R-2Normal      mometasone-formoterol (DULERA) 200-5 MCG/ACT inhaler INHALE 2 PUFFS INTO THE LUNGS 2 TIMES DAILY RINSE MOUTH AFTER USING., Disp-13 g, R-5This prescription was filled on 12/24/2021. Any refills authorized will be placed on file. Normal      albuterol sulfate  (90 Base) MCG/ACT inhaler Inhale 2 puffs into the lungs 4 times daily as needed for Wheezing PROAIR BRAND!!, Disp-18 g, R-2Normal      vitamin D (ERGOCALCIFEROL) 1.25 MG (56959 UT) CAPS capsule TAKE 1 CAPSULE BY MOUTH ONCE A WEEK, Disp-4 capsule, R-2This prescription was filled on 11/26/2021. Any refills authorized will be placed on file. Normal      rizatriptan (MAXALT-MLT) 10 MG disintegrating tablet DISSOLVE 1 TABLET BY MOUTH ONCE AS NEEDED FOR MIGRAINE. MAY REPEAT IN 2 HOURS IF NEEDED. MAX 2 TABLETS PER 24 HOURS, Disp-18 tablet, R-5Normal      Spacer/Aero-Holding Chambers (COMPACT SPACE CHAMBER/SM MASK) AZALE AS DIRECTED, Disp-1 Device, R-0Normal       !! - Potential duplicate medications found. Please discuss with provider. ALLERGIES     Codeine and Hydrocodone    FAMILY HISTORY           Problem Relation Age of Onset    Hypertension Mother     High Cholesterol Mother     Hypertension Father     High Cholesterol Father     Diabetes Sister         B 17 ? METFORMIN? Family Status   Relation Name Status    Mother  Alive    Father  Alive    Sister 2 Alive        SOCIAL HISTORY      reports that he has never smoked. He has never used smokeless tobacco. He reports current alcohol use of about 1.0 - 2.0 standard drink of alcohol per week. He reports that he does not use drugs. PHYSICAL EXAM    (up to 7 for level 4, 8 or more for level 5)     ED Triage Vitals [02/11/22 1253]   BP Temp Temp Source Pulse Resp SpO2 Height Weight   123/79 98.7 °F (37.1 °C) Oral 78 16 100 % 6' 1\" (1.854 m) 165 lb 12.6 oz (75.2 kg)       Physical Exam  Constitutional:       General: He is not in acute distress. Appearance: Normal appearance. He is well-developed. He is not ill-appearing, toxic-appearing or diaphoretic. HENT:      Head: Normocephalic and atraumatic. Ears:      Comments: No racoon eyes or hernandez signs bilaterally  No hematoma on the right side of the scalp at site of impact  Eyes:      Conjunctiva/sclera: Conjunctivae normal.      Pupils: Pupils are equal, round, and reactive to light. Cardiovascular:      Rate and Rhythm: Normal rate and regular rhythm. Heart sounds: Normal heart sounds. Pulmonary:      Effort: Pulmonary effort is normal. No respiratory distress. Breath sounds: Normal breath sounds. Musculoskeletal:      Cervical back: Normal range of motion and neck supple. Comments: No TTP entire midline spine    Mild diffuse TTP of the right shoulder with no erythema edema ecchymosis and decreased range of motion due to pain. The upper arm and elbow are nontender. The forearm is nontender. The wrist and dorsal hand have significant amount of ecchymosis and edema.   Mild tenderness to palpation diffusely in the wrist and hand. Significant tenderness palpation over the ulnar aspect of the hand over the fourth and fifth metacarpal.  Does have decreased range of motion of the wrist and fingers due to pain. Does have some rotatory malalignment of the pinky finger and ring finger. Radial pulse 2+. Compartments of the arm are soft. Skin:     General: Skin is warm. Neurological:      Mental Status: He is alert. Psychiatric:         Mood and Affect: Mood normal.         Behavior: Behavior normal.         Thought Content: Thought content normal.         Judgment: Judgment normal.         DIFFERENTIAL DIAGNOSIS   Fracture, dislocation, soft tissue injury  Subdural hematoma, Epidural Hematoma, Subarachnoid Hemorrhage, Traumatic Brain Injury, Cervical Spine fracture      DIAGNOSTICRESULTS         RADIOLOGY:   Non-plain film images such as CT, Ultrasound and MRI are read by the radiologist. Plain radiographic images are visualized and preliminarily interpreted by STACI Zabala with the below findings:      Interpretation per the Radiologist below, if available at the time of this note:    XR RIBS RIGHT INCLUDE CHEST (MIN 3 VIEWS)   Final Result   CHEST/RIBS:      No acute radiographic abnormality of the right ribs. No acute   cardiopulmonary disease. RIGHT SHOULDER:      No acute abnormality of the right shoulder. RIGHT WRIST:      Suspected acute nondisplaced fracture of the distal right radius. RIGHT HAND:      Acute fractures of the 4th and 5th metacarpals. Nonspecific   age-indeterminate 3 mm metallic foreign body within the soft tissues of the   right thumb, adjacent to the 1st proximal phalanx, consistent with an   age-indeterminate retained foreign body. XR SHOULDER RIGHT (MIN 2 VIEWS)   Final Result   CHEST/RIBS:      No acute radiographic abnormality of the right ribs. No acute   cardiopulmonary disease.       RIGHT SHOULDER:      No acute abnormality of the right shoulder. RIGHT WRIST:      Suspected acute nondisplaced fracture of the distal right radius. RIGHT HAND:      Acute fractures of the 4th and 5th metacarpals. Nonspecific   age-indeterminate 3 mm metallic foreign body within the soft tissues of the   right thumb, adjacent to the 1st proximal phalanx, consistent with an   age-indeterminate retained foreign body. XR WRIST RIGHT (MIN 3 VIEWS)   Final Result   CHEST/RIBS:      No acute radiographic abnormality of the right ribs. No acute   cardiopulmonary disease. RIGHT SHOULDER:      No acute abnormality of the right shoulder. RIGHT WRIST:      Suspected acute nondisplaced fracture of the distal right radius. RIGHT HAND:      Acute fractures of the 4th and 5th metacarpals. Nonspecific   age-indeterminate 3 mm metallic foreign body within the soft tissues of the   right thumb, adjacent to the 1st proximal phalanx, consistent with an   age-indeterminate retained foreign body. XR HAND RIGHT (MIN 3 VIEWS)   Final Result   CHEST/RIBS:      No acute radiographic abnormality of the right ribs. No acute   cardiopulmonary disease. RIGHT SHOULDER:      No acute abnormality of the right shoulder. RIGHT WRIST:      Suspected acute nondisplaced fracture of the distal right radius. RIGHT HAND:      Acute fractures of the 4th and 5th metacarpals. Nonspecific   age-indeterminate 3 mm metallic foreign body within the soft tissues of the   right thumb, adjacent to the 1st proximal phalanx, consistent with an   age-indeterminate retained foreign body. CT HEAD WO CONTRAST   Final Result   No acute intracranial abnormality. RECOMMENDATIONS:   Unavailable         CT CERVICAL SPINE WO CONTRAST   Final Result   No acute abnormality of the cervical spine. LABS:  Labs Reviewed - No data to display    All other labs were within normal range or not returned as of this dictation.     EMERGENCY DEPARTMENT COURSE and DIFFERENTIALDIAGNOSIS/MDM:   Vitals:    Vitals:    02/11/22 1253 02/11/22 1745   BP: 123/79 124/74   Pulse: 78 76   Resp: 16 16   Temp: 98.7 °F (37.1 °C)    TempSrc: Oral    SpO2: 100% 100%   Weight: 165 lb 12.6 oz (75.2 kg)    Height: 6' 1\" (1.854 m)        Patient wasnontoxic, well appearing, afebrile with normal vital signs. Saturating well on room air. Fall was mechanical in nature. No prodromal symptoms. Had head injury. CT head and cervical spine are negative. xrays show distal radius fracture along with 4th and 5th metacarpal fracture. Was placed in splint. Instructed to FU With ortho in next few days for reeval and return for worsening. He agreed and understood. Xray did show age-indeterminate 3 mm metallic foreign body within the soft tissues of the right thumb. There are no wounds in the thumb. Suspect this is old. PROCEDURES:  None    FINAL IMPRESSION      1. Closed fracture of distal end of right radius, unspecified fracture morphology, initial encounter    2. Closed displaced fracture of shaft of fourth metacarpal bone of right hand, initial encounter    3. Closed displaced fracture of shaft of fifth metacarpal bone of right hand, initial encounter        DISPOSITION/PLAN   DISPOSITION Decision To Discharge 02/11/2022 05:48:05 PM      PATIENT REFERRED TO:  Kimberley Montgomery MD  [address] [place] [address]  [phone]    Schedule an appointment as soon as possible for a visit in 3 days  for reevaluation    [facility] Emergency Department  [address] [facility]  [phone]    As needed, If symptoms worsen      DISCHARGE MEDICATIONS:  Discharge Medication List as of 2/11/2022  5:48 PM      START taking these medications    Details   HYDROcodone-acetaminophen (NORCO) 5-325 MG per tablet Take 1 tablet by mouth every 6 hours as needed for Pain for up to 3 days. , Disp-10 tablet, R-0Normal      ibuprofen (ADVIL;MOTRIN) 600 MG tablet Take 1 tablet by mouth every 8 hours as needed for Pain, Disp-15 tablet, R-0Normal             (Please note that portions ofthis note were completed with a voice recognition program.  Efforts were made to edit the dictations but occasionally words are mis-transcribed.)    Esau Ivory, [address] [place] [address]  02/13/22 5946

## 2022-02-11 NOTE — ED PROVIDER NOTES
views of the right shoulder were performed. There is no acute fracture or dislocation. There is mild osteoarthritis at the right [facility], operated by [facility] and glenohumeral joints. No destructive osseous lesion is seen. No soft tissue abnormality is evident. RIGHT WRIST: Three views of the right wrist were performed. There is subtle lucency through the distal right radius with a subtle cortical step-off along the dorsal aspect of the distal right radius on the lateral view. These findings are suggestive of acute nondisplaced fracture. No additional fracture is identified. The joint spaces are preserved. There is mild diffuse soft tissue swelling RIGHT HAND: Three views of the right hand were performed. There is an acute mildly displaced spiral type fracture of the shaft of the 4th metacarpal.  There is a mildly displaced comminuted fracture through the shaft of the 5th metacarpal.  No additional acute fracture is identified. The remaining osseous structures are preserved. Joint spaces are preserved. There is an age-indeterminate and nonspecific thin 3 mm metallic density overlying the soft tissues of the thumb, adjacent to the 1st proximal phalanx, consistent with an age-indeterminate retained foreign body. No additional soft tissue abnormality is evident. CHEST/RIBS: No acute radiographic abnormality of the right ribs. No acute cardiopulmonary disease. RIGHT SHOULDER: No acute abnormality of the right shoulder. RIGHT WRIST: Suspected acute nondisplaced fracture of the distal right radius. RIGHT HAND: Acute fractures of the 4th and 5th metacarpals. Nonspecific age-indeterminate 3 mm metallic foreign body within the soft tissues of the right thumb, adjacent to the 1st proximal phalanx, consistent with an age-indeterminate retained foreign body.      XR SHOULDER RIGHT (MIN 2 VIEWS)    Result Date: 2/11/2022  EXAMINATION: 3 X-RAY VIEWS OF THE RIGHT RIBS WITH FRONTAL X-RAY VIEW OF THE CHEST; 3 X-RAY VIEWS OF THE RIGHT WRIST; THREE X-RAY VIEWS OF THE RIGHT SHOULDER; THREE X-RAY VIEWS OF THE RIGHT HAND 2/11/2022 2:40 pm; 2/11/2022 2:39 pm COMPARISON: 08/23/2016, 11/02/2015, 10/17/2008 HISTORY: ORDERING SYSTEM PROVIDED HISTORY: Injury TECHNOLOGIST PROVIDED HISTORY: Reason for Exam:  Injury Reason for Exam:  Fall ORDERING SYSTEM PROVIDED HISTORY: Trauma, R/O fx TECHNOLOGIST PROVIDED HISTORY: Reason for Exam:  Trauma, R/O fx Reason for Exam:  Fall FINDINGS: CHEST/RIGHT RIBS: Multiple views of the right-sided ribs demonstrate no radiographic evidence of a rib fracture or destructive lesion. There is no acute skeletal abnormality. There is mild degenerative change throughout the thoracic spine. The heart size and mediastinal contours are within normal limits. Chronic right apical pleural-parenchymal scarring. The lungs are otherwise clear. There is no evidence of a pneumothorax. RIGHT SHOULDER: Three views of the right shoulder were performed. There is no acute fracture or dislocation. There is mild osteoarthritis at the right [facility] and glenohumeral joints. No destructive osseous lesion is seen. No soft tissue abnormality is evident. RIGHT WRIST: Three views of the right wrist were performed. There is subtle lucency through the distal right radius with a subtle cortical step-off along the dorsal aspect of the distal right radius on the lateral view. These findings are suggestive of acute nondisplaced fracture. No additional fracture is identified. The joint spaces are preserved. There is mild diffuse soft tissue swelling RIGHT HAND: Three views of the right hand were performed. There is an acute mildly displaced spiral type fracture of the shaft of the 4th metacarpal.  There is a mildly displaced comminuted fracture through the shaft of the 5th metacarpal.  No additional acute fracture is identified. The remaining osseous structures are preserved. Joint spaces are preserved.   There is an age-indeterminate and nonspecific thin 3 destructive osseous lesion is seen. No soft tissue abnormality is evident. RIGHT WRIST: Three views of the right wrist were performed. There is subtle lucency through the distal right radius with a subtle cortical step-off along the dorsal aspect of the distal right radius on the lateral view. These findings are suggestive of acute nondisplaced fracture. No additional fracture is identified. The joint spaces are preserved. There is mild diffuse soft tissue swelling RIGHT HAND: Three views of the right hand were performed. There is an acute mildly displaced spiral type fracture of the shaft of the 4th metacarpal.  There is a mildly displaced comminuted fracture through the shaft of the 5th metacarpal.  No additional acute fracture is identified. The remaining osseous structures are preserved. Joint spaces are preserved. There is an age-indeterminate and nonspecific thin 3 mm metallic density overlying the soft tissues of the thumb, adjacent to the 1st proximal phalanx, consistent with an age-indeterminate retained foreign body. No additional soft tissue abnormality is evident. CHEST/RIBS: No acute radiographic abnormality of the right ribs. No acute cardiopulmonary disease. RIGHT SHOULDER: No acute abnormality of the right shoulder. RIGHT WRIST: Suspected acute nondisplaced fracture of the distal right radius. RIGHT HAND: Acute fractures of the 4th and 5th metacarpals. Nonspecific age-indeterminate 3 mm metallic foreign body within the soft tissues of the right thumb, adjacent to the 1st proximal phalanx, consistent with an age-indeterminate retained foreign body.      XR HAND RIGHT (MIN 3 VIEWS)    Result Date: 2/11/2022  EXAMINATION: 3 X-RAY VIEWS OF THE RIGHT RIBS WITH FRONTAL X-RAY VIEW OF THE CHEST; 3 X-RAY VIEWS OF THE RIGHT WRIST; THREE X-RAY VIEWS OF THE RIGHT SHOULDER; THREE X-RAY VIEWS OF THE RIGHT HAND 2/11/2022 2:40 pm; 2/11/2022 2:39 pm COMPARISON: 08/23/2016, 11/02/2015, 10/17/2008 HISTORY: ORDERING SYSTEM PROVIDED HISTORY: Injury TECHNOLOGIST PROVIDED HISTORY: Reason for Exam:  Injury Reason for Exam:  Fall ORDERING SYSTEM PROVIDED HISTORY: Trauma, R/O fx TECHNOLOGIST PROVIDED HISTORY: Reason for Exam:  Trauma, R/O fx Reason for Exam:  Fall FINDINGS: CHEST/RIGHT RIBS: Multiple views of the right-sided ribs demonstrate no radiographic evidence of a rib fracture or destructive lesion. There is no acute skeletal abnormality. There is mild degenerative change throughout the thoracic spine. The heart size and mediastinal contours are within normal limits. Chronic right apical pleural-parenchymal scarring. The lungs are otherwise clear. There is no evidence of a pneumothorax. RIGHT SHOULDER: Three views of the right shoulder were performed. There is no acute fracture or dislocation. There is mild osteoarthritis at the right [facility] and glenohumeral joints. No destructive osseous lesion is seen. No soft tissue abnormality is evident. RIGHT WRIST: Three views of the right wrist were performed. There is subtle lucency through the distal right radius with a subtle cortical step-off along the dorsal aspect of the distal right radius on the lateral view. These findings are suggestive of acute nondisplaced fracture. No additional fracture is identified. The joint spaces are preserved. There is mild diffuse soft tissue swelling RIGHT HAND: Three views of the right hand were performed. There is an acute mildly displaced spiral type fracture of the shaft of the 4th metacarpal.  There is a mildly displaced comminuted fracture through the shaft of the 5th metacarpal.  No additional acute fracture is identified. The remaining osseous structures are preserved. Joint spaces are preserved. There is an age-indeterminate and nonspecific thin 3 mm metallic density overlying the soft tissues of the thumb, adjacent to the 1st proximal phalanx, consistent with an age-indeterminate retained foreign body.   No additional soft tissue abnormality is evident. CHEST/RIBS: No acute radiographic abnormality of the right ribs. No acute cardiopulmonary disease. RIGHT SHOULDER: No acute abnormality of the right shoulder. RIGHT WRIST: Suspected acute nondisplaced fracture of the distal right radius. RIGHT HAND: Acute fractures of the 4th and 5th metacarpals. Nonspecific age-indeterminate 3 mm metallic foreign body within the soft tissues of the right thumb, adjacent to the 1st proximal phalanx, consistent with an age-indeterminate retained foreign body. CT HEAD WO CONTRAST    Result Date: 2/11/2022  EXAMINATION: CT OF THE HEAD WITHOUT CONTRAST  2/11/2022 1:53 pm TECHNIQUE: CT of the head was performed without the administration of intravenous contrast. Dose modulation, iterative reconstruction, and/or weight based adjustment of the mA/kV was utilized to reduce the radiation dose to as low as reasonably achievable. COMPARISON: 08/15/2019 HISTORY: ORDERING SYSTEM PROVIDED HISTORY: fall with head inj TECHNOLOGIST PROVIDED HISTORY: Has a \"code stroke\" or \"stroke alert\" been called? ->No Reason for exam:->fall with head inj Decision Support Exception - unselect if not a suspected or confirmed emergency medical condition->Emergency Medical Condition (MA) Reason for Exam: fall with head inj FINDINGS: BRAIN/VENTRICLES: There is no acute intracranial hemorrhage, mass effect or midline shift. No abnormal extra-axial fluid collection. The gray-white differentiation is maintained without evidence of an acute infarct. There is no evidence of hydrocephalus. There are mild atrophic changes similar to the prior exam ORBITS: The visualized portion of the orbits demonstrate no acute abnormality. SINUSES: The visualized paranasal sinuses and mastoid air cells demonstrate no acute abnormality. SOFT TISSUES/SKULL:  No acute abnormality of the visualized skull or soft tissues. No acute intracranial abnormality.  RECOMMENDATIONS: Unavailable     CT CERVICAL SPINE WO CONTRAST    Result Date: 2/11/2022  EXAMINATION: CT OF THE CERVICAL SPINE WITHOUT CONTRAST 2/11/2022 1:53 pm TECHNIQUE: CT of the cervical spine was performed without the administration of intravenous contrast. Multiplanar reformatted images are provided for review. Dose modulation, iterative reconstruction, and/or weight based adjustment of the mA/kV was utilized to reduce the radiation dose to as low as reasonably achievable. COMPARISON: None. HISTORY: ORDERING SYSTEM PROVIDED HISTORY: fall with head inj TECHNOLOGIST PROVIDED HISTORY: Reason for exam:->fall with head inj Decision Support Exception - unselect if not a suspected or confirmed emergency medical condition->Emergency Medical Condition (MA) Reason for Exam: fall with head inj FINDINGS: BONES/ALIGNMENT: There is no acute fracture or traumatic malalignment. DEGENERATIVE CHANGES: No significant degenerative changes. SOFT TISSUES: There is no prevertebral soft tissue swelling. No acute abnormality of the cervical spine. Final ED Course and MDM: In brief, Jeovanny Rodas is a 73 y.o. male whose care was signed out to me by the outgoing provider. In brief, splint, ulnar gutter with a volar was checked. Patient is neurologically neurovascularly intact. Capillary refill less than 3 seconds. See below splint placement note. SPLINT PLACEMENT  A right ulnar gutter with a volar OCL splint was applied to the affected limb by the emergency department technician. I evaluated the splint after it was applied. The splint was in good position. The patient's extremity was neurovascularly intact after the splint placement.       ED Medication Orders (From admission, onward)    Start Ordered     Status Ordering Provider    02/11/22 1400 02/11/22 1345  oxyCODONE-acetaminophen (PERCOCET) 5-325 MG per tablet 1 tablet  ONCE         Last MAR action: Given - by Rebeca Verdin on 02/11/22 at [address] [facility], [address] [facility]

## 2022-02-14 ENCOUNTER — TELEPHONE (OUTPATIENT)
Dept: FAMILY MEDICINE CLINIC | Age: 72
End: 2022-02-14

## 2022-02-14 NOTE — TELEPHONE ENCOUNTER
----- Message from Endy Roman sent at 2/14/2022  1:47 PM EST -----  Subject: Message to Provider    QUESTIONS  Information for Provider? Pt fell and hurt his wrist on friday. He seen a   pcp and was sent to the e.r. He broke his wrist and hand. Pt was given a   referral [facility] with [facility], but that person was not a hand specialist.   Pt found another specialist that is a member of [facility] and pt would   like to make his pcp aware of this  ---------------------------------------------------------------------------  --------------  [address]  What is the best way for the office to contact you? OK to leave message on   voicemail  Preferred Call Back Phone Number? [phone]  ---------------------------------------------------------------------------  --------------  SCRIPT ANSWERS  Relationship to Patient? Other  Representative Name? Lory weaver   Is the Representative on the appropriate HIPAA document in Epic?  Yes

## 2022-02-15 ENCOUNTER — CARE COORDINATION (OUTPATIENT)
Dept: CARE COORDINATION | Age: 72
End: 2022-02-15

## 2022-02-15 NOTE — CARE COORDINATION
Educated patient about risk for severe COVID-19 due to risk factors according to CDC guidelines. ACM reviewed discharge instructions, medical action plan and red flag symptoms with the patient's wife. who verbalized understanding. Discussed COVID vaccination status: Yes. Education provided on COVID-19 vaccination as appropriate. Discussed exposure protocols and quarantine with CDC Guidelines. patient's wife. was given an opportunity to verbalize any questions and concerns and agrees to contact ACM or health care provider for questions related to their healthcare. The patient was positive for COVID 19 on 1/14/2022. COVID related symptoms have resolved. Patient slipped on the ice and fell, injuring his right hand and wrist. The patient's wife, Leonid Quiñones, who is on the patient's current HIPAA form to discuss the patient's PHI, reported that the patient has an appointment for today to see an orthopedic hand specialist. This ACM will follow up to 2-3 days. Cayden Gutierrez

## 2022-02-21 RX ORDER — TOPIRAMATE 25 MG/1
TABLET ORAL
Qty: 240 TABLET | Refills: 0 | Status: SHIPPED | OUTPATIENT
Start: 2022-02-21 | End: 2022-03-16

## 2022-03-11 RX ORDER — ERGOCALCIFEROL 1.25 MG/1
CAPSULE ORAL
Qty: 4 CAPSULE | Refills: 2 | Status: SHIPPED | OUTPATIENT
Start: 2022-03-11 | End: 2022-06-17

## 2022-03-11 NOTE — TELEPHONE ENCOUNTER
LV 1/14/22 WITH DR QUINONEZ NV NONE    Component Ref Range & Units 9/14/21 0930 12/28/17 1309 1/29/16 0900 6/2/15 1408 10/24/14 1529 5/23/13 1420   Vit D, 25-Hydroxy >=30 ng/mL 59.5  59.3 CM  51.5 CM  45.3 CM  44 R, CM  34 R    Comment: <=20 ng/mL. ........... [place] Pyo Deficient   21-29 ng/mL. ......... [place] Pyo Insufficient   >=30 ng/mL. ........ [place] Pyo Sufficient

## 2022-03-16 RX ORDER — TOPIRAMATE 25 MG/1
TABLET ORAL
Qty: 240 TABLET | Refills: 0 | Status: SHIPPED | OUTPATIENT
Start: 2022-03-16 | End: 2022-04-21

## 2022-04-18 RX ORDER — GABAPENTIN 100 MG/1
CAPSULE ORAL
Qty: 130 CAPSULE | Refills: 2 | OUTPATIENT
Start: 2022-04-18

## 2022-04-21 ENCOUNTER — OFFICE VISIT (OUTPATIENT)
Dept: FAMILY MEDICINE CLINIC | Age: 72
End: 2022-04-21
Payer: COMMERCIAL

## 2022-04-21 VITALS
SYSTOLIC BLOOD PRESSURE: 120 MMHG | BODY MASS INDEX: 22.66 KG/M2 | WEIGHT: 171 LBS | HEART RATE: 81 BPM | OXYGEN SATURATION: 97 % | HEIGHT: 73 IN | DIASTOLIC BLOOD PRESSURE: 80 MMHG | RESPIRATION RATE: 20 BRPM

## 2022-04-21 DIAGNOSIS — J45.40 MODERATE PERSISTENT ASTHMA WITHOUT COMPLICATION: Primary | ICD-10-CM

## 2022-04-21 DIAGNOSIS — J30.9 ALLERGIC RHINITIS, UNSPECIFIED SEASONALITY, UNSPECIFIED TRIGGER: ICD-10-CM

## 2022-04-21 DIAGNOSIS — G62.9 PERIPHERAL POLYNEUROPATHY: ICD-10-CM

## 2022-04-21 DIAGNOSIS — S62.91XD CLOSED FRACTURE OF RIGHT HAND WITH ROUTINE HEALING, SUBSEQUENT ENCOUNTER: ICD-10-CM

## 2022-04-21 DIAGNOSIS — G89.29 CHRONIC PAIN OF RIGHT ANKLE: ICD-10-CM

## 2022-04-21 DIAGNOSIS — G43.719 INTRACTABLE CHRONIC MIGRAINE WITHOUT AURA AND WITHOUT STATUS MIGRAINOSUS: ICD-10-CM

## 2022-04-21 DIAGNOSIS — M25.571 CHRONIC PAIN OF RIGHT ANKLE: ICD-10-CM

## 2022-04-21 PROCEDURE — 99214 OFFICE O/P EST MOD 30 MIN: CPT | Performed by: FAMILY MEDICINE

## 2022-04-21 RX ORDER — INHALER, ASSIST DEVICES
SPACER (EA) MISCELLANEOUS
Qty: 1 EACH | Refills: 0 | Status: SHIPPED | OUTPATIENT
Start: 2022-04-21

## 2022-04-21 RX ORDER — GABAPENTIN 100 MG/1
CAPSULE ORAL
Qty: 130 CAPSULE | Refills: 5 | Status: SHIPPED | OUTPATIENT
Start: 2022-04-21 | End: 2022-09-23

## 2022-04-21 RX ORDER — TOPIRAMATE 25 MG/1
TABLET ORAL
Qty: 240 TABLET | Refills: 2 | Status: SHIPPED | OUTPATIENT
Start: 2022-04-21 | End: 2022-10-13

## 2022-04-21 NOTE — PROGRESS NOTES
[facility]  Clinic Note    Date: 4/21/2022                                               Subjective:     Chief Complaint   Patient presents with    Medication Refill     PT HERE FOR MEDICATION REFILLS FOR GABAPENTIN AND SPACER FOR INHALER      HPI    Labs reviewed and good from 9/14/21  ALLERGIES WORSE THIS TIME OF YEAR  GENERALLY PAR w/ seasonal   Needs refills on inhalers  Had fracture right 4th mc bone - had removable cast 4 weeks then doing PT to help w/ cramping. [place] on ice 2/10  Still some pain right hand  Using nebulizer more   Needs spacer  Takes gabapentin when gets up in am  Gets crampy pain in feet/ low legs - has periph neuropathy  Some tremor at times. 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 Moods seem to swing a lot per wife - doesn't eat regularly - not sure if bs all over the place  Nearly daily doesn't feel good - some of this is migraines  Forgets to eat. Sister w/ dm1  Right ankle pain ? Since fall - pain though for awhile - wears compression stocking - worse in last couple months - not seen by podiatry.   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 Recommended that he continue current therapy, as will likely drop with oral supplement.  Diverticulitis     Gallbladder problem     GERD (gastroesophageal reflux disease)     Hiatal hernia     Migraine     Nausea & vomiting      Past Surgical History:   Procedure Laterality Date    CHOLECYSTECTOMY      COLONOSCOPY  9/11    FOOT SURGERY      nerve removed from foot    SHOULDER SURGERY Right 2005    rotator cuff repair    UPPER GASTROINTESTINAL ENDOSCOPY  9/11     Nurse Only on 09/14/2021   Component Date Value Ref Range Status    Sodium 09/14/2021 140  136 - 145 mmol/L Final    Potassium 09/14/2021 3.7  3.5 - 5.1 mmol/L Final    Chloride 09/14/2021 104  99 - 110 mmol/L Final    CO2 09/14/2021 25  21 - 32 mmol/L Final    Anion Gap 09/14/2021 11  3 - 16 Final    Glucose 09/14/2021 93  70 - 99 mg/dL Final    BUN 09/14/2021 7  7 - 20 mg/dL Final    CREATININE 09/14/2021 0.7* 0.8 - 1.3 mg/dL Final    GFR Non- 09/14/2021 >60  >60 Final    Comment: >60 mL/min/1.73m2 EGFR, calc. for ages 25 and older using the  MDRD formula (not corrected for weight), is valid for stable  renal function.  GFR  09/14/2021 >60  >60 Final    Comment: Chronic Kidney Disease: less than 60 ml/min/1.73 sq.m. Kidney Failure: less than 15 ml/min/1.73 sq.m. Results valid for patients 18 years and older.       Calcium 09/14/2021 9.2  8.3 - 10.6 mg/dL Final    Total Protein 09/14/2021 6.1* 6.4 - 8.2 g/dL Final    Albumin 09/14/2021 4.1  3.4 - 5.0 g/dL Final    Albumin/Globulin Ratio 09/14/2021 2.1  1.1 - 2.2 Final    Total Bilirubin 09/14/2021 0.5  0.0 - 1.0 mg/dL Final    Alkaline Phosphatase 09/14/2021 102  40 - 129 U/L Final    ALT 09/14/2021 19  10 - 40 U/L Final    AST 09/14/2021 21  15 - 37 U/L Final    Globulin 09/14/2021 2.0  g/dL Final    Cholesterol, Total 09/14/2021 137  0 - 199 mg/dL Final    Triglycerides 09/14/2021 29  0 - 150 mg/dL Final    HDL 09/14/2021 56  40 - 60 mg/dL Final    LDL Calculated 09/14/2021 see below  <100 mg/dL Final    Comment: When the triglyceride is <30 mg/dL or >300 mg/dL the  calculated LDL and  VLDL are not valid and a measured  LDL is performed.  VLDL Cholesterol Calculated 09/14/2021 see below  Not Established mg/dL Final    Comment: When the triglyceride is <30 mg/dL or >300 mg/dL the  calculated LDL and  VLDL are not valid and a measured  LDL is performed.  Vit D, 25-Hydroxy 09/14/2021 59.5  >=30 ng/mL Final    Comment: <=20 ng/mL. ........... [place] Stands Deficient  21-29 ng/mL. ......... [place] Stands Insufficient  >=30 ng/mL. ........ [place] Stands Sufficient      LDL Direct 09/14/2021 82  <100 mg/dL Final     Family History   Problem Relation Age of Onset    Hypertension Mother     High Cholesterol Mother     Hypertension Father     High Cholesterol Father     Diabetes Sister         B 15 ? METFORMIN? Current Outpatient Medications   Medication Sig Dispense Refill    topiramate (TOPAMAX) 25 MG tablet TAKE 3 TABLETS BY MOUTH EVERY MORNING AND 5 TABLETS BY MOUTH EVERY EVENING 240 tablet 0    vitamin D (ERGOCALCIFEROL) 1.25 MG (93007 UT) CAPS capsule TAKE 1 CAPSULE BY MOUTH ONCE A WEEK 4 capsule 2    ibuprofen (ADVIL;MOTRIN) 600 MG tablet Take 1 tablet by mouth every 8 hours as needed for Pain 15 tablet 0    levocetirizine (XYZAL) 5 MG tablet TAKE 1 TABLET BY MOUTH EVERY EVENING. 30 tablet 5    gabapentin (NEURONTIN) 300 MG capsule 3 po nightly 90 capsule 5    pantoprazole (PROTONIX) 40 MG tablet TAKE 1 TABLET BY MOUTH 2 TIMES DAILY.  60 tablet 5    ipratropium (ATROVENT) 0.06 % nasal spray TAKE 1-2 SPRAYS BY NASAL ROUTE 4 TIMES DAILY AS NEEDED FOR RHINITIS 15 mL 5    albuterol (PROVENTIL) (2.5 MG/3ML) 0.083% nebulizer solution Take 3 mLs by nebulization every 6 hours as needed for Wheezing 75 mL 2    gabapentin (NEURONTIN) 100 MG capsule Take 1 cap 4-5x/ day as directed -Fill initial prescription 1/26/22 or later 130 capsule 2    mometasone-formoterol (DULERA) 200-5 MCG/ACT inhaler INHALE 2 PUFFS INTO THE LUNGS 2 TIMES DAILY RINSE MOUTH AFTER USING. 13 g 5    albuterol sulfate  (90 Base) MCG/ACT inhaler Inhale 2 puffs into the lungs 4 times daily as needed for Wheezing PROAIR BRAND!! 18 g 2    rizatriptan (MAXALT-MLT) 10 MG disintegrating tablet DISSOLVE 1 TABLET BY MOUTH ONCE AS NEEDED FOR MIGRAINE. MAY REPEAT IN 2 HOURS IF NEEDED. MAX 2 TABLETS PER 24 HOURS 18 tablet 5    Spacer/Aero-Holding Chambers (COMPACT SPACE CHAMBER/SM MASK) AZAEL AS DIRECTED 1 Device 0     No current facility-administered medications for this visit. Allergies   Allergen Reactions    Codeine      Upset stomach     Hydrocodone Nausea And Vomiting       Review of Systems    Objective:  /80 (Site: Left Upper Arm, Position: Sitting, Cuff Size: Medium Adult)   Pulse 81   Resp 20   Ht 6' 1\" (1.854 m)   Wt 171 lb (77.6 kg) Comment: SHOES ON  SpO2 97%   BMI 22.56 kg/m²     BP Readings from Last 3 Encounters:   04/21/22 120/80   02/11/22 124/74   02/12/20 128/78       Pulse Readings from Last 3 Encounters:   04/21/22 81   02/11/22 76   02/12/20 74       Wt Readings from Last 3 Encounters:   04/21/22 171 lb (77.6 kg)   02/11/22 165 lb 12.6 oz (75.2 kg)   02/12/20 178 lb (80.7 kg)       Physical Exam  Vitals and nursing note reviewed. Constitutional:       Appearance: He is well-developed. HENT:      Head: Normocephalic and atraumatic. Eyes:      General: No scleral icterus. Conjunctiva/sclera: Conjunctivae normal.   Pulmonary:      Effort: Pulmonary effort is normal.   Skin:     General: Skin is warm and dry. Neurological:      Mental Status: He is alert and oriented to person, place, and time. Assessment/Plan:      Diagnosis Orders   1. Moderate persistent asthma without complication     2.  Allergic rhinitis, unspecified seasonality, unspecified trigger     3. Intractable chronic migraine without aura and without status migrainosus     4. Closed fracture of right hand with routine healing, subsequent encounter     5. Peripheral polyneuropathy     6.  Chronic pain of right ankle       Compression stockings/   F/u ortho and podiatry  topamax for migraines  Cont nasal steroid/ xyzal/ atrovent for allergies  RICE encouraged  F/u PT   Asthma stable on albuterol/ dulera w/ nebs prn  Gabapentin for periph neuropathy working well at present dose - refill done  Diet precautions dw/ pt    Viky Tillman MD, MD  4/21/2022  2:41 PM

## 2022-06-01 ENCOUNTER — TELEPHONE (OUTPATIENT)
Dept: ADMINISTRATIVE | Age: 72
End: 2022-06-01

## 2022-06-01 NOTE — TELEPHONE ENCOUNTER
Submitted PA for Rizatriptan Benzoate 10MG dispersible tablets  Via CMM KEY: [id number] STATUS: APPROVED. If this requires a response please respond to the pool. [address]). Please advise patient thank you.

## 2022-06-13 ENCOUNTER — TELEPHONE (OUTPATIENT)
Dept: FAMILY MEDICINE CLINIC | Age: 72
End: 2022-06-13

## 2022-06-13 NOTE — TELEPHONE ENCOUNTER
----- Message from Nikunj Alfred sent at 6/13/2022 12:56 PM EDT -----  Subject: Message to Provider    QUESTIONS  Information for Provider? Patient states auth sent to [employer] for 18 tabs of rizatriptan 10 mg. needs auth resent to   Brianne Conklin for 18 tabs  ---------------------------------------------------------------------------  --------------  CALL BACK INFO  What is the best way for the office to contact you? OK to leave message on   voicemail  Preferred Call Back Phone Number? [phone]  ---------------------------------------------------------------------------  --------------  SCRIPT ANSWERS  Relationship to Patient?  Self

## 2022-06-13 NOTE — TELEPHONE ENCOUNTER
----- Message from Jose Palacios sent at 11/1/2021  4:03 PM EDT -----  Subject: Message to Provider    QUESTIONS  Information for Provider? Pt wants to speak to Dr. Jasmeet Hughes about his refill   of Pebbles Reyes. Pt is running low and was told by his pharmacy that they are   having a hard time filling it because the company is being bought. He   wants to see about getting a new inhaler before he runs out.   ---------------------------------------------------------------------------  --------------  CALL BACK INFO  What is the best way for the office to contact you? OK to leave message on   voicemail  Preferred Call Back Phone Number? [phone]  ---------------------------------------------------------------------------  --------------  SCRIPT ANSWERS  Relationship to Patient?  Self Type 2 diabetes mellitus Hypothyroidism

## 2022-06-13 NOTE — TELEPHONE ENCOUNTER
CALLED PATIENT AND THE PHARM [address] [facility] [address] FILL SCRIPT. CALLED ADAN. PA WAS SENT THRU [facility] AND IS APPROVED, ITS JUST TOO EARLY TO FILL. CANNOT FILL TIL 06/18/2022. CALLED PATIENT AND ADVISED.  MICHELL

## 2022-06-17 RX ORDER — ERGOCALCIFEROL 1.25 MG/1
CAPSULE ORAL
Qty: 4 CAPSULE | Refills: 2 | Status: SHIPPED | OUTPATIENT
Start: 2022-06-17 | End: 2022-11-03

## 2022-06-17 NOTE — TELEPHONE ENCOUNTER
Last OV 4/21/2022 [address]  Next OV   7/7/2022 Jeremy Nazario, [address] [facility] [facility] Appt Reason: Routine (Patient Request) No Script   Fingernail issue/ fungus - green   Booking Code: [id number]   10/21/2022 Jeremy Nazario, [id number] [place] [address] Drive UP ASTHMA AND neuropathy PLUS BLOOD WORK

## 2022-06-20 RX ORDER — ALBUTEROL SULFATE 90 UG/1
AEROSOL, METERED RESPIRATORY (INHALATION)
Qty: 8.5 G | Refills: 2 | Status: SHIPPED | OUTPATIENT
Start: 2022-06-20

## 2022-07-07 ENCOUNTER — OFFICE VISIT (OUTPATIENT)
Dept: FAMILY MEDICINE CLINIC | Age: 72
End: 2022-07-07
Payer: COMMERCIAL

## 2022-07-07 VITALS
SYSTOLIC BLOOD PRESSURE: 120 MMHG | OXYGEN SATURATION: 96 % | BODY MASS INDEX: 22.93 KG/M2 | DIASTOLIC BLOOD PRESSURE: 72 MMHG | HEART RATE: 84 BPM | WEIGHT: 173 LBS | HEIGHT: 73 IN

## 2022-07-07 DIAGNOSIS — G62.9 PERIPHERAL POLYNEUROPATHY: Primary | ICD-10-CM

## 2022-07-07 DIAGNOSIS — D69.2 SENILE PURPURA (HCC): ICD-10-CM

## 2022-07-07 DIAGNOSIS — G43.719 INTRACTABLE CHRONIC MIGRAINE WITHOUT AURA AND WITHOUT STATUS MIGRAINOSUS: ICD-10-CM

## 2022-07-07 DIAGNOSIS — J45.40 MODERATE PERSISTENT ASTHMA WITHOUT COMPLICATION: ICD-10-CM

## 2022-07-07 DIAGNOSIS — L60.3 DYSTROPHIC NAIL: ICD-10-CM

## 2022-07-07 DIAGNOSIS — M19.90 ARTHRITIS: ICD-10-CM

## 2022-07-07 DIAGNOSIS — K58.9 IRRITABLE BOWEL SYNDROME, UNSPECIFIED TYPE: ICD-10-CM

## 2022-07-07 DIAGNOSIS — J30.9 ALLERGIC RHINITIS, UNSPECIFIED SEASONALITY, UNSPECIFIED TRIGGER: ICD-10-CM

## 2022-07-07 DIAGNOSIS — T78.40XD MULTIPLE CHEMICAL SENSITIVITY SYNDROME, SUBSEQUENT ENCOUNTER: ICD-10-CM

## 2022-07-07 DIAGNOSIS — E55.9 VITAMIN D DEFICIENCY: ICD-10-CM

## 2022-07-07 PROCEDURE — 1123F ACP DISCUSS/DSCN MKR DOCD: CPT | Performed by: FAMILY MEDICINE

## 2022-07-07 PROCEDURE — 99214 OFFICE O/P EST MOD 30 MIN: CPT | Performed by: FAMILY MEDICINE

## 2022-07-07 RX ORDER — TERBINAFINE HYDROCHLORIDE 250 MG/1
250 TABLET ORAL DAILY
Qty: 42 TABLET | Refills: 0 | Status: SHIPPED | OUTPATIENT
Start: 2022-07-07 | End: 2022-08-18

## 2022-07-07 NOTE — PROGRESS NOTES
[facility] Medicine  Clinic Note    Date: 7/7/2022                                               Subjective:     Chief Complaint   Patient presents with    Other     FUNGUS ON FINGERNAIL     Asthma     WANTS TO TALK ABOUT POSS CHANGING INHALER      HPI  Using albuterol via hfa/ neb prn  W/ dulera 200 2 puffs bid  HAS TROUBLE W/ MASK  Not sure if dulera as bothersome as it was in past - has some problems w/ effectiveness when gets to end of canister - some problems w/ pharmacy filling on regular basis  Weather/ pollen worse 1-2 months ago and some more problems - using machine more often but reduced recently  Using dulera 2 puffs bid - rinses mouth after - no problems w/ voice/ yeast/ throat irritation  Glands sore right now - some right ear problems  Nose not running/ no coughing up mucus. Off and on fullness right ear - ? 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 Very sensitive to different things on skin -can't tolerate sunscreens  Wears protective clothing -seen by derm in past  Hard to breathe at [employer] at times  Getting migraine every morning for most part -uses rizaptriptan prn - not regularly - only for worse migraine  More bruising on arms noted  Using hand strengthener but right hand lenora remains weaker - right handed -frustrating. Had fx of hand - was in therapy for this.   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 Recommended that he continue current therapy, as will likely drop with oral supplement.  Diverticulitis     Gallbladder problem     GERD (gastroesophageal reflux disease)     Hiatal hernia     Migraine     Nausea & vomiting      Past Surgical History:   Procedure Laterality Date    CHOLECYSTECTOMY      COLONOSCOPY  9/11    FOOT SURGERY      nerve removed from foot    SHOULDER SURGERY Right 2005    rotator cuff repair    UPPER GASTROINTESTINAL ENDOSCOPY  9/11     Nurse Only on 09/14/2021   Component Date Value Ref Range Status    Sodium 09/14/2021 140  136 - 145 mmol/L Final    Potassium 09/14/2021 3.7  3.5 - 5.1 mmol/L Final    Chloride 09/14/2021 104  99 - 110 mmol/L Final    CO2 09/14/2021 25  21 - 32 mmol/L Final    Anion Gap 09/14/2021 11  3 - 16 Final    Glucose 09/14/2021 93  70 - 99 mg/dL Final    BUN 09/14/2021 7  7 - 20 mg/dL Final    CREATININE 09/14/2021 0.7* 0.8 - 1.3 mg/dL Final    GFR Non- 09/14/2021 >60  >60 Final    Comment: >60 mL/min/1.73m2 EGFR, calc. for ages 25 and older using the  MDRD formula (not corrected for weight), is valid for stable  renal function.       GFR  09/14/2021 >60  >60 Final    Comment: Chronic Kidney Disease: less than 60 ml/min/1.73 sq.m. Kidney Failure: less than 15 ml/min/1.73 sq.m. Results valid for patients 18 years and older.  Calcium 09/14/2021 9.2  8.3 - 10.6 mg/dL Final    Total Protein 09/14/2021 6.1* 6.4 - 8.2 g/dL Final    Albumin 09/14/2021 4.1  3.4 - 5.0 g/dL Final    Albumin/Globulin Ratio 09/14/2021 2.1  1.1 - 2.2 Final    Total Bilirubin 09/14/2021 0.5  0.0 - 1.0 mg/dL Final    Alkaline Phosphatase 09/14/2021 102  40 - 129 U/L Final    ALT 09/14/2021 19  10 - 40 U/L Final    AST 09/14/2021 21  15 - 37 U/L Final    Globulin 09/14/2021 2.0  g/dL Final    Cholesterol, Total 09/14/2021 137  0 - 199 mg/dL Final    Triglycerides 09/14/2021 29  0 - 150 mg/dL Final    HDL 09/14/2021 56  40 - 60 mg/dL Final    LDL Calculated 09/14/2021 see below  <100 mg/dL Final    Comment: When the triglyceride is <30 mg/dL or >300 mg/dL the  calculated LDL and  VLDL are not valid and a measured  LDL is performed.  VLDL Cholesterol Calculated 09/14/2021 see below  Not Established mg/dL Final    Comment: When the triglyceride is <30 mg/dL or >300 mg/dL the  calculated LDL and  VLDL are not valid and a measured  LDL is performed.  Vit D, 25-Hydroxy 09/14/2021 59.5  >=30 ng/mL Final    Comment: <=20 ng/mL. ........... Cloteal Capps Deficient  21-29 ng/mL. ......... Cloteal Capps Insufficient  >=30 ng/mL. ........ Cloteal Capps Sufficient      LDL Direct 09/14/2021 82  <100 mg/dL Final     Family History   Problem Relation Age of Onset    Hypertension Mother     High Cholesterol Mother     Hypertension Father     High Cholesterol Father     Diabetes Sister         B 15 ? METFORMIN?      Current Outpatient Medications   Medication Sig Dispense Refill    albuterol sulfate HFA (PROVENTIL;VENTOLIN;PROAIR) 108 (90 Base) MCG/ACT inhaler INHALE 2 PUFFS INTO THE LUNGS FOUR TIMES DAILY AS NEEDED FOR WHEEZING 8.5 g 2    vitamin D (ERGOCALCIFEROL) 1.25 MG (83699 UT) CAPS capsule TAKE 1 CAPSULE BY MOUTH WEEKLY 4 capsule 2    gabapentin (NEURONTIN) 100 MG capsule Take 1 cap 4-5x/ day as directed 130 capsule 5    Spacer/Aero-Holding Chambers (COMPACT SPACE CHAMBER/SM MASK) AZAEL AS DIRECTED w/ inhalers 1 each 0    topiramate (TOPAMAX) 25 MG tablet TAKE 3 TABLETS BY MOUTH EVERY MORNING AND 5 TABLETS BY MOUTH EVERY EVENING 240 tablet 2    ibuprofen (ADVIL;MOTRIN) 600 MG tablet Take 1 tablet by mouth every 8 hours as needed for Pain 15 tablet 0    levocetirizine (XYZAL) 5 MG tablet TAKE 1 TABLET BY MOUTH EVERY EVENING. 30 tablet 5    gabapentin (NEURONTIN) 300 MG capsule 3 po nightly 90 capsule 5    pantoprazole (PROTONIX) 40 MG tablet TAKE 1 TABLET BY MOUTH 2 TIMES DAILY. 60 tablet 5    ipratropium (ATROVENT) 0.06 % nasal spray TAKE 1-2 SPRAYS BY NASAL ROUTE 4 TIMES DAILY AS NEEDED FOR RHINITIS 15 mL 5    mometasone-formoterol (DULERA) 200-5 MCG/ACT inhaler INHALE 2 PUFFS INTO THE LUNGS 2 TIMES DAILY RINSE MOUTH AFTER USING. 13 g 5    rizatriptan (MAXALT-MLT) 10 MG disintegrating tablet DISSOLVE 1 TABLET BY MOUTH ONCE AS NEEDED FOR MIGRAINE. MAY REPEAT IN 2 HOURS IF NEEDED. MAX 2 TABLETS PER 24 HOURS 18 tablet 5    albuterol (PROVENTIL) (2.5 MG/3ML) 0.083% nebulizer solution Take 3 mLs by nebulization every 6 hours as needed for Wheezing (Patient not taking: Reported on 7/7/2022) 75 mL 2     No current facility-administered medications for this visit.      Allergies   Allergen Reactions    Codeine      Upset stomach     Hydrocodone Nausea And Vomiting       Review of Systems    Objective:  /72 (Site: Left Upper Arm, Position: Sitting, Cuff Size: Medium Adult)   Pulse 84   Ht 6' 1\" (1.854 m)   Wt 173 lb (78.5 kg)   SpO2 96%   BMI 22.82 kg/m²     BP Readings from Last 3 Encounters:   07/07/22 120/72   04/21/22 120/80   02/11/22 124/74       Pulse Readings from Last 3 Encounters:   07/07/22 84   04/21/22 81   02/11/22 76       Wt Readings from Last 3 Encounters:   07/07/22 173 lb (78.5 kg)   04/21/22 171 lb (77.6 kg) 02/11/22 165 lb 12.6 oz (75.2 kg)       Physical Exam  Constitutional:       General: He is not in acute distress. Appearance: He is well-developed. HENT:      Right Ear: Tympanic membrane and ear canal normal.      Left Ear: Tympanic membrane and ear canal normal.   Eyes:      General: No scleral icterus. Conjunctiva/sclera: Conjunctivae normal.   Cardiovascular:      Rate and Rhythm: Normal rate and regular rhythm. Heart sounds: Normal heart sounds. No murmur heard. No gallop. Pulmonary:      Effort: Pulmonary effort is normal. No respiratory distress. Breath sounds: Normal breath sounds. No wheezing, rhonchi or rales. Abdominal:      General: Bowel sounds are normal. There is no distension. Palpations: Abdomen is soft. Tenderness: There is no abdominal tenderness. Musculoskeletal:         General: No swelling. Skin:     Findings: No erythema or rash. Comments: Scattered bruises forearms  Firm red nodule left buttocks   Neurological:      Mental Status: He is alert. Assessment/Plan:      Diagnosis Orders   1. Peripheral polyneuropathy     2. Allergic rhinitis, unspecified seasonality, unspecified trigger     3. Multiple chemical sensitivity syndrome, subsequent encounter     4. Arthritis     5. Vitamin D deficiency     6. Intractable chronic migraine without aura and without status migrainosus     7. Irritable bowel syndrome, unspecified type     8. Moderate persistent asthma without complication     9. Dystrophic nail     10. Senile purpura (HCC)     likely dystrophic nail from trauma but may have secondary fungal infection - tx w/ lamisil 250/d for 6 weeks - se d/w pt  Liver okay in last year  D/w pt option of adding LAMA to ics/ laba inhaler as overall need for proair seems to be increasing even w/o allergy season worsening.  trelegy appears to be covered  Will try using dulera 1 puff am, 1 puff midday and 2 puffs at night  For now first as some benefit spreading out inhaler  No asa/ limited ibuprofen - for bruising.   Cbc okay 3 years ago -   F/u 3 months w/ general exam w/ lipid,cmp, cbc  Had covid infection 1/22 - encourage covid booster in near future  Monitor and reduce pressure on cyst/ improved abscess left buttocks  Chidi Frazier MD, MD  7/7/2022  4:11 PM

## 2022-07-18 ENCOUNTER — TELEPHONE (OUTPATIENT)
Dept: FAMILY MEDICINE CLINIC | Age: 72
End: 2022-07-18

## 2022-07-18 RX ORDER — LEVOCETIRIZINE DIHYDROCHLORIDE 5 MG/1
TABLET, FILM COATED ORAL
Qty: 30 TABLET | Refills: 5 | Status: SHIPPED | OUTPATIENT
Start: 2022-07-18 | End: 2022-08-08 | Stop reason: SDUPTHER

## 2022-07-18 RX ORDER — PANTOPRAZOLE SODIUM 40 MG/1
TABLET, DELAYED RELEASE ORAL
Qty: 60 TABLET | Refills: 5 | Status: SHIPPED | OUTPATIENT
Start: 2022-07-18

## 2022-07-19 RX ORDER — LEVOCETIRIZINE DIHYDROCHLORIDE 5 MG/1
TABLET, FILM COATED ORAL
Qty: 30 TABLET | Refills: 5 | Status: SHIPPED | OUTPATIENT
Start: 2022-07-19

## 2022-08-05 ENCOUNTER — TELEPHONE (OUTPATIENT)
Dept: FAMILY MEDICINE CLINIC | Age: 72
End: 2022-08-05

## 2022-08-05 NOTE — TELEPHONE ENCOUNTER
CALLED PATIENT. HE ISN'T ON A MUSCLE RELAXER. PATIENT IS HAVING SUCH WEAKNESS HE FELL AND WAS ON FLOOR AND COULDN'T GET UP OFF FLOOR. ADVISED PATIENT THAT HE NEEDED TO GO TO [facility], HE REFUSED. PATIENT WAS REALLY OUT OF IT AND WANTED TO  [facility] ON Monday. KIKO NOT IN OFFICE. USED A SAME DAY SLOT WITH  ON Monday FOR URGENT APPT.  MICHELL

## 2022-08-05 NOTE — TELEPHONE ENCOUNTER
Not sure about bandaid - is this for finger nail - okay to use for short periods of time. Can reduce muscle relaxant dose -to 1/2 tab if believes this is effecting his weakness  Will send in inhaler to add to dulera.

## 2022-08-05 NOTE — TELEPHONE ENCOUNTER
THE MEDICATION - TERBINAFINE FOR HIS FUNGUS ON HIS FINGERNAIL - SEEMS TO BE GETTING BETTER     HE IS HAVING SOME MUSCLE WEAKNESS - HARD TO GET OUT OF BED AND UP OUT OF A CHAIR - WALKING SOMEWHAT OF A PROBLEM     HE CAN HE START TAKING 1/2 OF A PILL?  CAN HE PUT A BAND AID ON IT?    PT @  [phone]    HE ALSO WANTS ANOTHER INHALER - YOU TOLD HIM IT WOULD BE ADDED ON TO THE [facility] [address], [address] [address] - F [phone]

## 2022-08-08 ENCOUNTER — OFFICE VISIT (OUTPATIENT)
Dept: FAMILY MEDICINE CLINIC | Age: 72
End: 2022-08-08
Payer: COMMERCIAL

## 2022-08-08 VITALS
SYSTOLIC BLOOD PRESSURE: 130 MMHG | DIASTOLIC BLOOD PRESSURE: 80 MMHG | WEIGHT: 170 LBS | HEART RATE: 86 BPM | HEIGHT: 73 IN | BODY MASS INDEX: 22.53 KG/M2 | RESPIRATION RATE: 12 BRPM | OXYGEN SATURATION: 98 %

## 2022-08-08 DIAGNOSIS — M47.816 SPONDYLOSIS OF LUMBAR SPINE: ICD-10-CM

## 2022-08-08 DIAGNOSIS — S32.010S CLOSED WEDGE COMPRESSION FRACTURE OF L1 VERTEBRA, SEQUELA: ICD-10-CM

## 2022-08-08 DIAGNOSIS — G62.9 PERIPHERAL POLYNEUROPATHY: ICD-10-CM

## 2022-08-08 DIAGNOSIS — M51.36 DDD (DEGENERATIVE DISC DISEASE), LUMBAR: ICD-10-CM

## 2022-08-08 DIAGNOSIS — M54.31 RIGHT SIDED SCIATICA: Primary | ICD-10-CM

## 2022-08-08 DIAGNOSIS — E53.8 B12 DEFICIENCY: ICD-10-CM

## 2022-08-08 LAB — HOMOCYSTEINE: 8 UMOL/L (ref 0–10)

## 2022-08-08 PROCEDURE — 1123F ACP DISCUSS/DSCN MKR DOCD: CPT | Performed by: FAMILY MEDICINE

## 2022-08-08 PROCEDURE — 99215 OFFICE O/P EST HI 40 MIN: CPT | Performed by: FAMILY MEDICINE

## 2022-08-08 RX ORDER — PREDNISONE 20 MG/1
60 TABLET ORAL DAILY
Qty: 15 TABLET | Refills: 0 | Status: SHIPPED | OUTPATIENT
Start: 2022-08-08 | End: 2022-08-13

## 2022-08-08 NOTE — PROGRESS NOTES
Wei Lazo (:  1950) is a 73 y.o. male,Established patient, here for evaluation of the following chief complaint(s):  Back Pain (Pt c/o follow up on back pain ) and Other (Follow up on fungal issue )       ASSESSMENT/PLAN:  1254    Patient Instructions   Aubree Gould was seen today for back pain and other. Diagnoses and all orders for this visit:    Right sided sciatica  -     predniSONE (DELTASONE) 20 MG tablet; Take 3 tablets by mouth in the morning for 5 days. Range of motion and knee to chest and wall sit do every 1-2 hours. Moist heat  Sports cream (Aspercreme doesn't smell). Diclofenac (brand name Voltaren) gel 1% is available over-the-counter. You can place 4 g on the knee NEXT TO the kneecap but NOT ON TOP OF the kneecap. If placed on top of the kneecap it will not penetrate into the joint. You can also put some of the 4 g dose on the joint line on each side of the knee going back towards behind the knee. You can put this on up to 4 times a day. Even if you do both joints 4 times a day there is not enough absorbed into the bloodstream to upset the stomach or significantly affect the kidneys. Tylenol 500 mg up to 2 tabs 3x/day as needed. Take 1-2 Aleve twice daily with a full meal for 1-2 weeks (assuming no blood thinners besides a baby aspirin or recent treatment for ulcers). If heartburn develops take omeprazole 20 mg 30 minutes before breakfast routinely every day. This may work better than the Voltaren gel but can cause stomach problems as well as affect the kidneys. It can also cause swelling in the ankles in some people because of its effects on the kidneys. If diagnosed with arthritis in the past:  Glucosamine 1500 mg/ chondroitin 1200 mg once daily or any combination equaling that dose i.e. 750/600 mg twice daily or 500/400 mg three time daily. This is a daily joint/arthritis supplement without significant side. B12 deficiency  -     Methylmalonic Acid Urine;  Future  Start Super B complex once daily. Peripheral neuropathy  Cerefolin NAC (mild cognitive impairment), Metanx, NIVA-FOL 2.5-25-2 MG TABS (peripheral neuropathy), Folbic, Folbee, FABB (homocysteinemia), VIRT-MARIO 2.2, 25, 1 mg, Folinic Acid-Vit B6-Vit B12 (FOLINIC-PLUS) 4-50-2 MG, Methyl- Pro 15 mg,  OR [place] 15 mg, Homocystine Supreme (on line availability) -  these are all combinations of folic acid (or L-methyl-Folate), B12 and other B vitamins. Cerefolin NAC (once daily) and Metanx can be purchased for $60 a month from [employer] ([phone] OR www.[employer]). Trimethyl glycine (TMG) and Betaine are helpful also per an article in the [facility] website. MTHFR mutation testing may be indicated if you need these medications. 2100 mcg 5-MTHFolate, B2 20 mg, B6 10 mg, B12 1000 mcg, Trimethylglycine 1000 mg is a newer available medicine. Confirm diagnosis on imaging with chart review  DDD (degenerative disc disease), lumbar    Spondylosis of lumbar spine    Closed wedge compression fracture of L1 vertebra, sequela    Return if symptoms worsen or fail to improve AND, for scheduled/requested routine visit with PCP. Subjective   SUBJECTIVE/OBJECTIVE:  Chief Complaint   Patient presents with    Back Pain     Pt c/o follow up on back pain     Other     Follow up on fungal issue    1147    Back pain going into his leg  Had EMG in 2018 suggesting spinal Rami affected suggesting a spinal problem. EMG in 2020 suggested peripheral neuropathy. He is having pain in his back right now. He has pain getting up out of a chair. Has to roll out of bed. Broke his hand in Feb but slowed down. Was kneeling down cleaning out a septic tank and got much worse after that. Had recommended hip replacement but not at this time. Pain starts in center of back and goes down both legs. Right leg has pain going all around and goes into foot but stops before the toes. Left leg ~ but more in back of calf.  Gets ant thigh pain on exercise bike and stair master. Review of Systems  Past Medical History:   Diagnosis Date    Arthritis     Asthma     B12 deficiency 07/10/2013    Tooks injection every other week and B12 level 500. Recommended that he continue current therapy, as will likely drop with oral supplement. Diverticulitis     Gallbladder problem     S/P lap martha    GERD (gastroesophageal reflux disease)     Hiatal hernia     Migraine     Nausea & vomiting      Past Surgical History:   Procedure Laterality Date    CHOLECYSTECTOMY      COLONOSCOPY  9/11    FOOT SURGERY      nerve removed from foot    SHOULDER SURGERY Right 2005    rotator cuff repair    UPPER GASTROINTESTINAL ENDOSCOPY  9/11     Social History     Socioeconomic History    Marital status:      Spouse name: Paulette Gonzalez    Number of children: 5    Years of education: 12    Highest education level: Not on file   Occupational History    Occupation: CONSTRUCTION - retired 2017   Tobacco Use    Smoking status: Never    Smokeless tobacco: Never   Vaping Use    Vaping Use: Never used   Substance and Sexual Activity    Alcohol use: Not Currently     Comment: glass of wine at night    Drug use: No    Sexual activity: Not on file   Other Topics Concern    Not on file   Social History Narrative    2 hours 1x/wk exercise. Exercise 25 min 5x/wk. 8/8/22     Social Determinants of Health     Financial Resource Strain: Low Risk     Difficulty of Paying Living Expenses: Not hard at all   Food Insecurity: No Food Insecurity    Worried About Running Out of Food in the Last Year: Never true    Ran Out of Food in the Last Year: Never true   Transportation Needs: No Transportation Needs    Lack of Transportation (Medical): No    Lack of Transportation (Non-Medical):  No   Physical Activity: Not on file   Stress: Not on file   Social Connections: Not on file   Intimate Partner Violence: Not on file   Housing Stability: Not on file       Family History   Problem Relation Age of AS NEEDED FOR MIGRAINE. MAY REPEAT IN 2 HOURS IF NEEDED. MAX 2 TABLETS PER 24 HOURS 18 tablet 5    tiotropium (SPIRIVA RESPIMAT) 2.5 MCG/ACT AERS inhaler Inhale 2 puffs into the lungs in the morning. (Patient not taking: Reported on 8/8/2022) 1 each 5     No current facility-administered medications for this visit. Objective   Vitals:    08/08/22 1133   BP: 130/80   Site: Left Upper Arm   Position: Sitting   Cuff Size: Small Adult   Pulse: 86   Resp: 12   SpO2: 98%   Weight: 170 lb (77.1 kg)   Height: 6' 1\" (1.854 m)     BP Readings from Last 3 Encounters:   08/08/22 130/80   07/07/22 120/72   04/21/22 120/80     Pulse Readings from Last 3 Encounters:   08/08/22 86   07/07/22 84   04/21/22 81     Wt Readings from Last 3 Encounters:   08/08/22 170 lb (77.1 kg)   07/07/22 173 lb (78.5 kg)   04/21/22 171 lb (77.6 kg)     Body mass index is 22.43 kg/m². Physical Exam  Vitals and nursing note reviewed. Constitutional:       General: He is in acute distress (Moderate pain just transferring from exam room chair to exam room table). Appearance: He is well-developed, well-groomed and normal weight. Eyes:      General: No scleral icterus. Right eye: No discharge. Left eye: No discharge. Neck:      Trachea: Phonation normal.   Musculoskeletal:      Cervical back: Neck supple. Lumbar back: Spasms present. No swelling, deformity or signs of trauma. Decreased range of motion. Positive right straight leg raise test. Negative left straight leg raise test.      Comments: L spine pain with EXT/flexion same. Can fully flex while holding onto chair for support. Left SB causes right lateral muscle pain. Left rotation causes mid spine pain in lumbar region. Neurological:      Mental Status: He is alert. Psychiatric:         Behavior: Behavior is cooperative. 3 VIEWS OF THE LUMBAR SPINE  11/2/2015 1:59 pm  COMPARISON:  None.   HISTORY:  Midline low back pain without sciatica  Patrica Speak on Friday. Trauma. Initial study. Acute. FINDINGS:  The alignment of the lumbar spine is normal.  Anterolateral spurring is noted  at multiple levels. There is no fracture subluxation. Cholecystectomy clips  are noted. IMPRESSION:  No acute osseous injury. Mild to moderate multilevel spondylosis. MRI OF THE LUMBAR SPINE WITHOUT CONTRAST, 9/5/2018 2:07 pm  COMPARISON:  None. HISTORY:  ORDERING PHYSICIAN PROVIDED HISTORY: Lumbar myelopathy ([facility] [address]  TECHNOLOGIST PROVIDED HISTORY:  Technologist Provided Reason for Exam:  Lumbar myelopathy ([address] G95.9  (ICD-10-CM); Acute bilateral low back pain with sciatica, sciatica laterality  unspecified  Acuity: Chronic  Type of Encounter: Subsequent/Follow-up  Additional signs and symptoms: Study is consistent with a predominantly  sensory peripheral neuropathy. The changes in the lumbar paraspinals suggest  underlying compromise of posterior primary rami as in degenerative facet  disease, spinal stenosis. Small amplitude peroneal motor evoked response can  be seen with atrophy of extensor dig brevis. No evidence of an acute  radiculopathy or entrapment neuropathy. Thank you. FINDINGS:  BONES/ALIGNMENT: There is a normal lumbar lordosis. Assuming that the last  well-formed disc represents L5-S1, the conus medullaris ends at T12 and is  grossly within normal limits. There is a chronic appearing superior endplate  fracture of L1 resulting in 50% loss of vertebral body height. No acute  fracture or traumatic subluxation is identified. There is mild intramuscular edema within the posterior paraspinal space at  L4-5 bilaterally. SPINAL CORD: The conus terminates normally. SOFT TISSUES: See above. L1-L2: There is no significant disc herniation, spinal canal stenosis or  neural foraminal narrowing. L2-L3: Disc bulge resulting in mild narrowing of the right neural foramen and  the thecal sac.      L3-L4: Disc bulge resulting in mild narrowing of thecal sac and both neural  foramen. L4-L5: Disc bulge resulting in mild narrowing of the right neural foramen. L5-S1: There is no significant disc herniation, spinal canal stenosis or  neural foraminal narrowing. IMPRESSION:  Mild degenerative changes as detailed above. Chronic superior endplate fracture of L1. Mild intramuscular edema within the posterior paraspinal space of the lower  back, specifically at L4-5, possibly reflecting myositis or denervation edema. On this date 8/8/2022 I have spent 67 minutes reviewing previous notes, test results and face to face with the patient discussing the diagnosis and importance of compliance with the treatment plan as well as documenting on the day of the visit. An electronic signature was used to authenticate this note.     --Cris Flanagan, DO

## 2022-08-08 NOTE — PATIENT INSTRUCTIONS
Domenic Barnes was seen today for back pain and other. Diagnoses and all orders for this visit:    Right sided sciatica  -     predniSONE (DELTASONE) 20 MG tablet; Take 3 tablets by mouth in the morning for 5 days. Range of motion and knee to chest and wall sit do every 1-2 hours. Moist heat  Sports cream (Aspercreme doesn't smell). Diclofenac (brand name Voltaren) gel 1% is available over-the-counter. You can place 4 g on the knee NEXT TO the kneecap but NOT ON TOP OF the kneecap. If placed on top of the kneecap it will not penetrate into the joint. You can also put some of the 4 g dose on the joint line on each side of the knee going back towards behind the knee. You can put this on up to 4 times a day. Even if you do both joints 4 times a day there is not enough absorbed into the bloodstream to upset the stomach or significantly affect the kidneys. Tylenol 500 mg up to 2 tabs 3x/day as needed. Take 1-2 Aleve twice daily with a full meal for 1-2 weeks (assuming no blood thinners besides a baby aspirin or recent treatment for ulcers). If heartburn develops take omeprazole 20 mg 30 minutes before breakfast routinely every day. This may work better than the Voltaren gel but can cause stomach problems as well as affect the kidneys. It can also cause swelling in the ankles in some people because of its effects on the kidneys. If diagnosed with arthritis in the past:  Glucosamine 1500 mg/ chondroitin 1200 mg once daily or any combination equaling that dose i.e. 750/600 mg twice daily or 500/400 mg three time daily. This is a daily joint/arthritis supplement without significant side. B12 deficiency  -     Methylmalonic Acid Urine; Future  Start Super B complex once daily.      Peripheral neuropathy  Cerefolin NAC (mild cognitive impairment), Metanx, NIVA-FOL 2.5-25-2 MG TABS (peripheral neuropathy), Folbic, Folbee, FABB (homocysteinemia), VIRT-MARIO 2.2, 25, 1 mg, Folinic Acid-Vit B6-Vit B12 (FOLINIC-PLUS) 4-50-2 MG, Methyl- Pro 15 mg,  OR [place] 15 mg, Homocystine Supreme (on line availability) -  these are all combinations of folic acid (or L-methyl-Folate), B12 and other B vitamins. Cerefolin NAC (once daily) and Metanx can be purchased for $60 a month from [employer] ([phone] OR www.[employer] ([employer])). Trimethyl glycine (TMG) and Betaine are helpful also per an article in the [facility] website. MTHFR mutation testing may be indicated if you need these medications. 2100 mcg 5-MTHFolate, B2 20 mg, B6 10 mg, B12 1000 mcg, Trimethylglycine 1000 mg is a newer available medicine.

## 2022-08-12 ENCOUNTER — TELEPHONE (OUTPATIENT)
Dept: FAMILY MEDICINE CLINIC | Age: 72
End: 2022-08-12

## 2022-08-12 LAB
CREATININE 24 HOUR URINE: NORMAL MG/D (ref 800–2100)
CREATININE URINE: 82 MG/DL
HOURS COLLECTED: NORMAL
METHYLMALONIC ACID /MOL OF CREAT: 0.93 MMOL/MOL CRT (ref 0–3.6)
METHYLMALONIC ACID URINE INTERPRETATION: NORMAL
METHYLMALONIC ACID URINE: 6.73 UMOL/L
URINE TOTAL VOLUME: NORMAL

## 2022-08-12 NOTE — TELEPHONE ENCOUNTER
Homocystine is normal.  No need for additional folic acid supplementation besides what is in the current diet and supplements. Urine test for B12 deficiency is still pending. Continue B complex vitamin.

## 2022-08-12 NOTE — TELEPHONE ENCOUNTER
Patient would like the results from his blood work done on 8/8/22. Please give him a call back. Dr. Sharon Jaquez told him to take Metanx - they looked this up on the Internet and it said they need a Script. [address] [facility].  - phone no. [phone]

## 2022-08-15 ENCOUNTER — TELEPHONE (OUTPATIENT)
Dept: FAMILY MEDICINE CLINIC | Age: 72
End: 2022-08-15

## 2022-08-15 DIAGNOSIS — M54.31 RIGHT SIDED SCIATICA: Primary | ICD-10-CM

## 2022-08-15 DIAGNOSIS — M47.816 SPONDYLOSIS OF LUMBAR SPINE: ICD-10-CM

## 2022-08-15 DIAGNOSIS — S32.010S CLOSED WEDGE COMPRESSION FRACTURE OF L1 VERTEBRA, SEQUELA: ICD-10-CM

## 2022-08-15 DIAGNOSIS — M51.36 DDD (DEGENERATIVE DISC DISEASE), LUMBAR: ICD-10-CM

## 2022-08-15 PROBLEM — M51.369 DDD (DEGENERATIVE DISC DISEASE), LUMBAR: Status: ACTIVE | Noted: 2018-09-05

## 2022-08-15 PROBLEM — S32.010A CLOSED WEDGE COMPRESSION FRACTURE OF L1 VERTEBRA (HCC): Status: ACTIVE | Noted: 2018-09-05

## 2022-08-15 NOTE — TELEPHONE ENCOUNTER
Peripheral neuropathy  Cerefolin NAC (mild cognitive impairment), Metanx, NIVA-FOL 2.5-25-2 MG TABS (peripheral neuropathy), Folbic, Folbee, FABB (homocysteinemia), VIRT-MARIO 2.2, 25, 1 mg, Folinic Acid-Vit B6-Vit B12 (FOLINIC-PLUS) 4-50-2 MG, Methyl- Pro 15 mg,  OR [place] 15 mg, Homocystine Supreme (on line availability) -  these are all combinations of folic acid (or L-methyl-Folate), B12 and other B vitamins. Cerefolin NAC (once daily) and Metanx can be purchased for $60 a month from [employer] ([phone] OR www.[employer]). Trimethyl glycine (TMG) and Betaine are helpful also per an article in the [facility] website. MTHFR mutation testing may be indicated if you need these medications. 2100 mcg 5-MTHFolate, B2 20 mg, B6 10 mg, B12 1000 mcg, Trimethylglycine 1000 mg is a newer available medicine.        Patient called and stated he was supposed to get some supplements called into the pharmacy from dr. Wilian Isaacs but he said nothing is there and he is confused on what it is supposed to be         He also said he never got  the test results from the blood work

## 2022-08-16 NOTE — TELEPHONE ENCOUNTER
Spoke with patient. Explained that the medication Metanx because roughly $180 every 3 months through [facility]. It can be as much $350 a month through local pharmacies. Patient reported he will look into the other agents that we recommended to him. Patient reports after his oral steroids he is not much improved. We will refer him to a back doctor. Right sided sciatica  -     CHRISTINA King MD, Pain Management, [place] [address] Florentino Liz MD   [address] [place] [address]   ADALBERTO Flannery [address] [phone]    Fax referral with note from 8/20/22 as well as patient summary.

## 2022-08-17 ENCOUNTER — TELEPHONE (OUTPATIENT)
Dept: FAMILY MEDICINE CLINIC | Age: 72
End: 2022-08-17

## 2022-08-22 ENCOUNTER — TELEPHONE (OUTPATIENT)
Dept: FAMILY MEDICINE CLINIC | Age: 72
End: 2022-08-22

## 2022-08-22 NOTE — TELEPHONE ENCOUNTER
PER KIKO STERN TO [facility] TOMORROW SAME DAY AT END OF DAY. CALLED PATIENT AND SCHEDULED.  MICHELL

## 2022-08-22 NOTE — TELEPHONE ENCOUNTER
----- Message from Michel Arcos sent at 8/22/2022 11:31 AM EDT -----  Subject: Message to Provider    QUESTIONS  Information for Provider? Patient is still having back pain and is having   trouble sleeping. He saw Dr. Smooth Gonzales 2 weeks ago for these symptoms but   would like to get in to see Dr. Marge Guzman as soon as possible. Please advise   patient. Thank you.  ---------------------------------------------------------------------------  --------------  Laura HAWTHORNE  [phone]; OK to leave message on voicemail  ---------------------------------------------------------------------------  --------------  SCRIPT ANSWERS  Relationship to Patient? Self  Did you have an injury or trauma within the past 3 days? No  Are you having new problems with your bowel or bladder control? No  Are you having new onset numbness, tingling, or weakness in your arms   and/or legs with this pain? No  Did your pain begin within the past 14 days? No  Are you having severe pain? No  Are you having fevers (100.4), chills, or sweats? No  (Is the patient requesting to be seen urgently for their symptoms?)? No  Have you recently (14 days) seen a provider for this issue?  Yes

## 2022-08-23 ENCOUNTER — OFFICE VISIT (OUTPATIENT)
Dept: FAMILY MEDICINE CLINIC | Age: 72
End: 2022-08-23
Payer: COMMERCIAL

## 2022-08-23 VITALS
WEIGHT: 166 LBS | DIASTOLIC BLOOD PRESSURE: 86 MMHG | BODY MASS INDEX: 22 KG/M2 | HEART RATE: 83 BPM | HEIGHT: 73 IN | OXYGEN SATURATION: 99 % | SYSTOLIC BLOOD PRESSURE: 128 MMHG

## 2022-08-23 DIAGNOSIS — M54.42 ACUTE LEFT-SIDED LOW BACK PAIN WITH LEFT-SIDED SCIATICA: ICD-10-CM

## 2022-08-23 DIAGNOSIS — L60.3 DYSTROPHIC NAIL: ICD-10-CM

## 2022-08-23 DIAGNOSIS — R20.3 HYPERESTHESIA: Primary | ICD-10-CM

## 2022-08-23 DIAGNOSIS — R10.9 LEFT FLANK PAIN: ICD-10-CM

## 2022-08-23 DIAGNOSIS — R29.898 LEFT LEG WEAKNESS: ICD-10-CM

## 2022-08-23 PROCEDURE — 1123F ACP DISCUSS/DSCN MKR DOCD: CPT | Performed by: FAMILY MEDICINE

## 2022-08-23 PROCEDURE — 99214 OFFICE O/P EST MOD 30 MIN: CPT | Performed by: FAMILY MEDICINE

## 2022-08-23 RX ORDER — TRAMADOL HYDROCHLORIDE 50 MG/1
50 TABLET ORAL EVERY 6 HOURS PRN
Qty: 28 TABLET | Refills: 0 | Status: SHIPPED | OUTPATIENT
Start: 2022-08-23 | End: 2022-09-15 | Stop reason: SDUPTHER

## 2022-08-23 RX ORDER — RIZATRIPTAN BENZOATE 10 MG/1
TABLET, ORALLY DISINTEGRATING ORAL
Qty: 18 TABLET | Refills: 5 | Status: ON HOLD | OUTPATIENT
Start: 2022-08-23 | End: 2022-09-29 | Stop reason: SDUPTHER

## 2022-08-23 RX ORDER — TIZANIDINE 4 MG/1
4 TABLET ORAL NIGHTLY
Qty: 30 TABLET | Refills: 0 | Status: ON HOLD | OUTPATIENT
Start: 2022-08-23 | End: 2022-09-29 | Stop reason: HOSPADM

## 2022-08-23 NOTE — PROGRESS NOTES
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 Recommended that he continue current therapy, as will likely drop with oral supplement. Closed wedge compression fracture of L1 vertebra ([address] 09/05/2018    Superior endplate fracture of L1 resulting 80% loss of vertebral body height    DDD (degenerative disc disease), lumbar 09/05/2018    L2-L3, L3-L4, L4-L5.   Disc bulge with narrowing of the neural foramen at these levels    Diverticulitis     Gallbladder problem     S/P lap martha    GERD (gastroesophageal reflux disease)     Hiatal hernia     Migraine     MRSA infection 04/03/2019    Left hand    Nausea & vomiting     Spondylosis of lumbar spine 11/02/2015    On x-ray multilevel     Past Surgical History:   Procedure Laterality Date    CHOLECYSTECTOMY      COLONOSCOPY  9/11    FOOT SURGERY      nerve removed from foot    SHOULDER SURGERY Right 2005    rotator cuff repair    UPPER GASTROINTESTINAL ENDOSCOPY  9/11     Office Visit on 08/08/2022   Component Date Value Ref Range Status    Homocysteine 08/08/2022 8  0 - 10 umol/L Final    Comment: <11 = Desirable  11-14 = Intermediate  15-29 = High  >29 = Very High      MMA, Urine 08/08/2022 6.73  umol/L Final    Methylmalon Acid/Mol Creat 08/08/2022 0.93  0.00 - 3.60 mmol/mol CRT Final    Methymalonic Acid Urine Interpreta* 08/08/2022 See Note   Final    Comment: INTERPRETIVE INFORMATION: Methylmalonic Acid, Urine  Urinary methylmalonic acid, when increased, is an early and sensitive  indicator of vitamin B12 (cobalamin) deficiency. This test can also be  used  to monitor patients with methylmalonic aciduria. Diagnosis of  methylmalonic  aciduria requires an organic acid panel and appropriate clinical  history. This test was developed and its performance characteristics determined  by  [facility]. It has not been cleared or approved by the [employer]  and  Drug Administration. This test was performed in a CLIA certified  laboratory  and is intended for clinical purposes. Creatinine, Ur 08/08/2022 82  mg/dL Final    Creatinine, 24H Ur 19/39/0616 Not Applicable  209 - 3680 mg/d Final    Comment: Performed by [facility] , [address] [place] [phone]  www. Jennifer Gonsalez MD, PHD - Lab. Director      Urine Total Volume 08/08/2022 RANDOM   Final    Hours Collected 08/08/2022 RANDOM   Final     Family History   Problem Relation Age of Onset    Hypertension Mother     High Cholesterol Mother     Hypertension Father     High Cholesterol Father     Diabetes Type 1  Sister         B 12    Diabetes Type 1  Sister         early peripheral neuropathy    Stroke Brother 79    Hypertension Brother     High Cholesterol Brother     Hypertension Brother     High Cholesterol Brother     Hypertension Brother     High Cholesterol Brother      Current Outpatient Medications   Medication Sig Dispense Refill    tiotropium (SPIRIVA RESPIMAT) 2.5 MCG/ACT AERS inhaler Inhale 2 puffs into the lungs in the morning. 1 each 5    levocetirizine (XYZAL) 5 MG tablet TAKE 1 TABLET BY MOUTH EVERY EVENING.  30 tablet 5    pantoprazole (PROTONIX) 40 MG tablet TAKE 1 TABLET BY MOUTH TWICE DAILY 60 tablet 5    mometasone-formoterol (DULERA) 200-5 MCG/ACT inhaler INHALE 2 PUFFS INTO THE LUNGS 2 TIMES DAILY RINSE MOUTH AFTER USING. 13 g 5    albuterol sulfate HFA (PROVENTIL;VENTOLIN;PROAIR) 108 (90 Base) MCG/ACT inhaler INHALE 2 PUFFS INTO THE LUNGS FOUR TIMES DAILY AS NEEDED FOR WHEEZING 8.5 g 2    vitamin D (ERGOCALCIFEROL) 1.25 MG (61873 UT) CAPS capsule TAKE 1 CAPSULE BY MOUTH WEEKLY 4 capsule 2    Spacer/Aero-Holding Chambers (COMPACT SPACE CHAMBER/SM MASK) AZAEL AS DIRECTED w/ inhalers 1 each 0    topiramate (TOPAMAX) 25 MG tablet TAKE 3 TABLETS BY MOUTH EVERY MORNING AND 5 TABLETS BY MOUTH EVERY EVENING 240 tablet 2    gabapentin (NEURONTIN) 300 MG capsule 3 po nightly 90 capsule 5    ipratropium (ATROVENT) 0.06 % nasal spray TAKE 1-2 SPRAYS BY NASAL ROUTE 4 TIMES DAILY AS NEEDED FOR RHINITIS 15 mL 5    albuterol (PROVENTIL) (2.5 MG/3ML) 0.083% nebulizer solution Take 3 mLs by nebulization every 6 hours as needed for Wheezing 75 mL 2    rizatriptan (MAXALT-MLT) 10 MG disintegrating tablet DISSOLVE 1 TABLET BY MOUTH ONCE AS NEEDED FOR MIGRAINE. MAY REPEAT IN 2 HOURS IF NEEDED. MAX 2 TABLETS PER 24 HOURS 18 tablet 5    gabapentin (NEURONTIN) 100 MG capsule Take 1 cap 4-5x/ day as directed 130 capsule 5     No current facility-administered medications for this visit.      Allergies   Allergen Reactions    Codeine      Upset stomach     Hydrocodone Nausea And Vomiting       Review of Systems    Objective:  /86 (Site: Left Upper Arm, Position: Sitting, Cuff Size: Medium Adult)   Pulse 83   Ht 6' 1\" (1.854 m)   Wt 166 lb (75.3 kg)   SpO2 99%   BMI 21.90 kg/m²     BP Readings from Last 3 Encounters:   08/23/22 128/86   08/08/22 130/80   07/07/22 120/72       Pulse Readings from Last 3 Encounters:   08/23/22 83   08/08/22 86   07/07/22 84       Wt Readings from Last 3 Encounters:   08/23/22 166 lb (75.3 kg)   08/08/22 170 lb (77.1 kg)   07/07/22 173 lb (78.5 kg)       Physical Exam  Vitals and nursing note reviewed. Constitutional:       Appearance: He is well-developed. HENT:      Head: Normocephalic and atraumatic. Eyes:      General: No scleral icterus. Conjunctiva/sclera: Conjunctivae normal.   Pulmonary:      Effort: Pulmonary effort is normal.   Musculoskeletal:      Comments: Limited flexion/ ext and rotation lumbar  Ttp left si joint/ left para lumbar w/o palp spasm  Slow gait  Slow standing from chair     Skin:     General: Skin is warm and dry. Comments: Ess clear presently on arms   Neurological:      Mental Status: He is alert and oriented to person, place, and time. Comments: Dtr's reduced overall but may be more reduced on right than left knee and ankle  Sensation grossly intact bilat legs - weakness prox and distal left leg       Assessment/Plan:      Diagnosis Orders   1. Hyperesthesia        2. Acute left-sided low back pain with left-sided sciatica  MRI LUMBAR SPINE WO CONTRAST      3. Left leg weakness  MRI LUMBAR SPINE WO CONTRAST      4. Left flank pain  traMADol (ULTRAM) 50 MG tablet      5.  Dystrophic nail            Reviewed recent 8/8 note - not much response to prednisone  Appt w/ pain clinic end of month  Tramadol w/ zanaflex / gabapentin for now  On methyl tetrafolate  Ultram prn pain - #28 - se d/w pt  Zanaflex at night prn spasm/ tightness  F/u pending MRI lumbar  Consider spine specialist vs. Neuro pending results  May need to see derm w/ hyperesthesia/ allodynia issues and nail issues  Finish lamisil and nail care d/ wpt - hold on further tx presently  Shady Gold MD, MD  8/23/2022  5:01 PM

## 2022-08-24 NOTE — TELEPHONE ENCOUNTER
Gave printed note back to [place] to call, pt ended up calling back later in the day and was given results by JOSEPH./mv

## 2022-08-31 ENCOUNTER — TELEPHONE (OUTPATIENT)
Dept: FAMILY MEDICINE CLINIC | Age: 72
End: 2022-08-31

## 2022-08-31 NOTE — TELEPHONE ENCOUNTER
Pt calling he has an MRI tomorrow and is concerned about his medications     How should he take these and should he take this?  gabapentin, tramadol, tizanidine? Or should he take more of the tramadol?   He is worried about laying on his back, for the MRI     Pt @  [phone]

## 2022-08-31 NOTE — TELEPHONE ENCOUNTER
You can take 1-2 tramadol tabs w/ gabapentin and tizanidine as long as you have someone to drive you to assist getting through MRI

## 2022-09-01 ENCOUNTER — HOSPITAL ENCOUNTER (OUTPATIENT)
Dept: MRI IMAGING | Age: 72
Discharge: HOME | End: 2022-09-01
Payer: COMMERCIAL

## 2022-09-01 DIAGNOSIS — M54.42 ACUTE LEFT-SIDED LOW BACK PAIN WITH LEFT-SIDED SCIATICA: ICD-10-CM

## 2022-09-01 DIAGNOSIS — R29.898 LEFT LEG WEAKNESS: ICD-10-CM

## 2022-09-01 PROCEDURE — 72148 MRI LUMBAR SPINE W/O DYE: CPT

## 2022-09-05 DIAGNOSIS — S32.000A COMPRESSION FRACTURE OF LUMBAR VERTEBRA, UNSPECIFIED LUMBAR VERTEBRAL LEVEL, INITIAL ENCOUNTER (HCC): Primary | ICD-10-CM

## 2022-09-05 RX ORDER — CALCITONIN SALMON 200 [IU]/.09ML
SPRAY, METERED NASAL
Qty: 1 EACH | Refills: 0 | Status: SHIPPED | OUTPATIENT
Start: 2022-09-05

## 2022-09-06 ENCOUNTER — TELEPHONE (OUTPATIENT)
Dept: FAMILY MEDICINE CLINIC | Age: 72
End: 2022-09-06

## 2022-09-06 DIAGNOSIS — M79.673 PAIN OF FOOT, UNSPECIFIED LATERALITY: Primary | ICD-10-CM

## 2022-09-06 RX ORDER — TOLTERODINE 4 MG/1
4 CAPSULE, EXTENDED RELEASE ORAL DAILY
Qty: 30 CAPSULE | Refills: 2 | Status: SHIPPED | OUTPATIENT
Start: 2022-09-06 | End: 2022-11-01 | Stop reason: ALTCHOICE

## 2022-09-06 NOTE — TELEPHONE ENCOUNTER
CALLED AND ADVISED OF MRI RESULTS. PATIENT [facility] AND ADVISED TO CHECK WITH THEM FOR GABAPENTIN DOSAGE. AND DISCUSS NERVE PAIN.      PATIENT WOULD LIKE [facility] TO CALL IN MEDICATION FOR OVERACTIVE BLADDER, GETTING UP CLOSE TO 5 TIMES NIGHTLY TO VOID. Jacqueline Huston

## 2022-09-06 NOTE — TELEPHONE ENCOUNTER
Patient is returning [facility] phone call, concerning his MRI Lumbar. Please give him a call back.

## 2022-09-07 NOTE — TELEPHONE ENCOUNTER
PATIENT CALLED BACK AND ADVISED THE MEDICATION SEEMED TO HELP. BUT NOW THE PATIENT WOULD LIKE A FOOT DR RECOMMENDATION OR REFERRAL. RIGHT FOOT STILL BOTHERING HIM, NOW SWOLLEN MORE, TOES SWOLLEN AS WELL, WAKING HIM UP AT NIGHT. SAID KIKO LOOKED AT THIS FOOT AT LAST VISIT AND DIDN'T SEEM CONCERNED AT THAT POINT.  BUT NOW REALLY BOTHERING HIM. MICHELL

## 2022-09-08 ENCOUNTER — TELEPHONE (OUTPATIENT)
Dept: FAMILY MEDICINE CLINIC | Age: 72
End: 2022-09-08

## 2022-09-08 NOTE — TELEPHONE ENCOUNTER
Patient is calling because the medication TOLTERODINE 4 MG - HE HAS TAKEN 3 DOSAGE AND IT IS NOT WORKING. WHAT CAN WE DO FOR HIM? PLEASE GIVE HIM A CALL BACK.      [address] [employer] [place] - PHONE NO. [phone]

## 2022-09-15 ENCOUNTER — OFFICE VISIT (OUTPATIENT)
Dept: FAMILY MEDICINE CLINIC | Age: 72
End: 2022-09-15
Payer: COMMERCIAL

## 2022-09-15 VITALS
OXYGEN SATURATION: 96 % | HEIGHT: 73 IN | BODY MASS INDEX: 21.9 KG/M2 | DIASTOLIC BLOOD PRESSURE: 98 MMHG | SYSTOLIC BLOOD PRESSURE: 166 MMHG | HEART RATE: 94 BPM

## 2022-09-15 DIAGNOSIS — R20.8 ALLODYNIA: Primary | ICD-10-CM

## 2022-09-15 DIAGNOSIS — S32.000D COMPRESSION FRACTURE OF LUMBAR VERTEBRA WITH ROUTINE HEALING, UNSPECIFIED LUMBAR VERTEBRAL LEVEL, SUBSEQUENT ENCOUNTER: ICD-10-CM

## 2022-09-15 DIAGNOSIS — G57.93 NEUROPATHY OF BOTH FEET: ICD-10-CM

## 2022-09-15 DIAGNOSIS — R68.2 DRY MOUTH: ICD-10-CM

## 2022-09-15 DIAGNOSIS — R03.0 ELEVATED BLOOD PRESSURE READING: ICD-10-CM

## 2022-09-15 DIAGNOSIS — R60.0 PEDAL EDEMA: ICD-10-CM

## 2022-09-15 DIAGNOSIS — M79.604 RIGHT LEG PAIN: ICD-10-CM

## 2022-09-15 DIAGNOSIS — R53.83 FATIGUE, UNSPECIFIED TYPE: ICD-10-CM

## 2022-09-15 DIAGNOSIS — M51.36 DDD (DEGENERATIVE DISC DISEASE), LUMBAR: ICD-10-CM

## 2022-09-15 DIAGNOSIS — R39.15 URINARY URGENCY: ICD-10-CM

## 2022-09-15 DIAGNOSIS — R10.9 LEFT FLANK PAIN: ICD-10-CM

## 2022-09-15 LAB
A/G RATIO: 2.2 (ref 1.1–2.2)
ALBUMIN SERPL-MCNC: 4.1 G/DL (ref 3.4–5)
ALP BLD-CCNC: 171 U/L (ref 40–129)
ALT SERPL-CCNC: 26 U/L (ref 10–40)
ANION GAP SERPL CALCULATED.3IONS-SCNC: 13 MMOL/L (ref 3–16)
AST SERPL-CCNC: 34 U/L (ref 15–37)
BASOPHILS ABSOLUTE: 0 K/UL (ref 0–0.2)
BASOPHILS RELATIVE PERCENT: 0.2 %
BILIRUB SERPL-MCNC: 0.3 MG/DL (ref 0–1)
BILIRUBIN, POC: NORMAL
BLOOD URINE, POC: NORMAL
BUN BLDV-MCNC: 7 MG/DL (ref 7–20)
C-REACTIVE PROTEIN: 7.5 MG/L (ref 0–5.1)
CALCIUM SERPL-MCNC: 8.7 MG/DL (ref 8.3–10.6)
CHLORIDE BLD-SCNC: 94 MMOL/L (ref 99–110)
CLARITY, POC: CLEAR
CO2: 21 MMOL/L (ref 21–32)
COLOR, POC: YELLOW
CREAT SERPL-MCNC: <0.5 MG/DL (ref 0.8–1.3)
D DIMER: 1.13 UG/ML FEU (ref 0–0.6)
EOSINOPHILS ABSOLUTE: 0.1 K/UL (ref 0–0.6)
EOSINOPHILS RELATIVE PERCENT: 0.7 %
GFR AFRICAN AMERICAN: >60
GFR NON-AFRICAN AMERICAN: >60
GLUCOSE BLD-MCNC: 110 MG/DL (ref 70–99)
GLUCOSE URINE, POC: NORMAL
HCT VFR BLD CALC: 35.7 % (ref 40.5–52.5)
HEMOGLOBIN: 12.5 G/DL (ref 13.5–17.5)
KETONES, POC: NORMAL
LEUKOCYTE EST, POC: NORMAL
LYMPHOCYTES ABSOLUTE: 1.2 K/UL (ref 1–5.1)
LYMPHOCYTES RELATIVE PERCENT: 15.6 %
MCH RBC QN AUTO: 32.7 PG (ref 26–34)
MCHC RBC AUTO-ENTMCNC: 35 G/DL (ref 31–36)
MCV RBC AUTO: 93.5 FL (ref 80–100)
MONOCYTES ABSOLUTE: 0.6 K/UL (ref 0–1.3)
MONOCYTES RELATIVE PERCENT: 8.4 %
NEUTROPHILS ABSOLUTE: 5.6 K/UL (ref 1.7–7.7)
NEUTROPHILS RELATIVE PERCENT: 75.1 %
NITRITE, POC: NORMAL
PDW BLD-RTO: 12.9 % (ref 12.4–15.4)
PH, POC: 6.5
PLATELET # BLD: 314 K/UL (ref 135–450)
PMV BLD AUTO: 6.3 FL (ref 5–10.5)
POTASSIUM SERPL-SCNC: 3.2 MMOL/L (ref 3.5–5.1)
PROTEIN, POC: 30
RBC # BLD: 3.81 M/UL (ref 4.2–5.9)
SEDIMENTATION RATE, ERYTHROCYTE: 25 MM/HR (ref 0–20)
SODIUM BLD-SCNC: 128 MMOL/L (ref 136–145)
SPECIFIC GRAVITY, POC: 1.02
TOTAL CK: 209 U/L (ref 39–308)
TOTAL PROTEIN: 6 G/DL (ref 6.4–8.2)
UROBILINOGEN, POC: 1
WBC # BLD: 7.4 K/UL (ref 4–11)

## 2022-09-15 PROCEDURE — 99214 OFFICE O/P EST MOD 30 MIN: CPT | Performed by: FAMILY MEDICINE

## 2022-09-15 PROCEDURE — 81002 URINALYSIS NONAUTO W/O SCOPE: CPT | Performed by: FAMILY MEDICINE

## 2022-09-15 PROCEDURE — 1123F ACP DISCUSS/DSCN MKR DOCD: CPT | Performed by: FAMILY MEDICINE

## 2022-09-15 RX ORDER — GABAPENTIN 300 MG/1
CAPSULE ORAL
Qty: 180 CAPSULE | Refills: 0 | Status: SHIPPED | OUTPATIENT
Start: 2022-09-15 | End: 2022-09-20

## 2022-09-15 RX ORDER — ONDANSETRON 4 MG/1
4 TABLET, ORALLY DISINTEGRATING ORAL EVERY 8 HOURS PRN
Qty: 30 TABLET | Refills: 1 | Status: SHIPPED | OUTPATIENT
Start: 2022-09-15

## 2022-09-15 RX ORDER — TRAMADOL HYDROCHLORIDE 50 MG/1
50-100 TABLET ORAL EVERY 6 HOURS PRN
Qty: 28 TABLET | Refills: 1 | Status: SHIPPED | OUTPATIENT
Start: 2022-09-15 | End: 2022-09-21 | Stop reason: SDUPTHER

## 2022-09-15 NOTE — PROGRESS NOTES
[facility]  Clinic Note    Date: 9/15/2022                                               Subjective:     Chief Complaint   Patient presents with    Pain     PAIN IS OUT OF CONTROL, ELEVATED ANXIETY, PAIN DOCTOR CANNOT GET HIM IN UNTIL 2 WEEKS, CANNOT LAY ON BED SHIRT HE HAS ON IS THE ONLY SHIRT HE CAN WEAR DRYING OFF WITH PAPER TOWELS MOST OF HIS PAIN IS IN BACK BUT A NERVE BLOCK IS SCHED FOR 9/27 NOT SLEEPING CANNOT TAKE IN A DEEP BREATH DUE TO PAIN NEEDS HELP PT STATES HE CANNOT TAKE THE PAIN ANY MORE     Edema     BILATERAL EDEMA IN BOTH FEET. HPI    Ddd w/ L4-5 nerve impingement and acute/ subacute comp fx L4 and L5 on mri  Kyphoplasty planned but can't get in to surgeon for awhile  Planned nerve block  Pain to take deep breaths - using dulera  Feet pain is severe  hard to walk - same bilaterally - right always a little worse - right> left leg more swollen  Note from dr. Jeremy Tejeda reviewed  oarrs reviewed and results c/w rx'd meds  Tramadol #28 on 8/23 w/ gabapentin 130 on 9/4  Started on detrol for urgency/ frequency - no change yet  FELL OUT OF BED 3-4 weeks  - pain localized to low lumbar spine and sciatica pain down right side - allodynia over body - can't tolerate clothing on skin  Wife concerned about mental health - still fixated on something in laundry detergent related to pain over body  Wearing adult disposable  In pressure over spine is very painful - strange sensations  Mouth dry - on detrol  Still urgency  Tramadol/ zanaflex not helpful   Getting confused at times  Norco from dental work caused n/v  Taking 1600mg/ day - takes low dose frequently - may give some short term relief.     BP Readings from Last 3 Encounters:   09/15/22 (!) 166/98   08/23/22 128/86   08/08/22 130/80     Pulse Readings from Last 3 Encounters:   09/15/22 94   08/23/22 83   08/08/22 86     Wt Readings from Last 3 Encounters:   08/23/22 166 lb (75.3 kg)   08/08/22 170 lb (77.1 kg)   07/07/22 173 lb (78.5 kg) Patient Active Problem List    Diagnosis Date Noted    Right sided sciatica 08/08/2022    Peripheral polyneuropathy 04/21/2022    DDD (degenerative disc disease), lumbar 09/05/2018    Closed wedge compression fracture of L1 vertebra (HCC) 09/05/2018    Spondylosis of lumbar spine 11/02/2015    Cerebral concussion 01/19/2016    Gall bladder stones 07/10/2013    B12 deficiency 07/10/2013    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 Recommended that he continue current therapy, as will likely drop with oral supplement. Closed wedge compression fracture of L1 vertebra ([address] 09/05/2018    Superior endplate fracture of L1 resulting 80% loss of vertebral body height    DDD (degenerative disc disease), lumbar 09/05/2018    L2-L3, L3-L4, L4-L5.   Disc bulge with narrowing of the neural foramen at these levels    Diverticulitis     Gallbladder problem     S/P lap martha    GERD (gastroesophageal reflux disease)     Hiatal hernia     Migraine     MRSA infection 04/03/2019    Left hand    Nausea & vomiting     Spondylosis of lumbar spine 11/02/2015    On x-ray multilevel     Past Surgical History:   Procedure Laterality Date    CHOLECYSTECTOMY      COLONOSCOPY  9/11    FOOT SURGERY      nerve removed from foot    SHOULDER SURGERY Right 2005    rotator cuff repair    UPPER GASTROINTESTINAL ENDOSCOPY  9/11     Office Visit on 08/08/2022   Component Date Value Ref Range Status    Homocysteine 08/08/2022 8  0 - 10 umol/L Final    Comment: <11 = Desirable  11-14 = Intermediate  15-29 = High  >29 = Very High      MMA, Urine 08/08/2022 6.73  umol/L Final    Methylmalon Acid/Mol Creat 08/08/2022 0.93  0.00 - 3.60 mmol/mol CRT Final    Methymalonic Acid Urine Interpreta* 08/08/2022 See Note   Final    Comment: INTERPRETIVE INFORMATION: Methylmalonic Acid, Urine  Urinary methylmalonic acid, when increased, is an early and sensitive  indicator of vitamin B12 (cobalamin) deficiency. This test can also be  used  to monitor patients with methylmalonic aciduria. Diagnosis of  methylmalonic  aciduria requires an organic acid panel and appropriate clinical  history. This test was developed and its performance characteristics determined  by  [facility]. It has not been cleared or approved by the [employer]  and  Drug Administration. This test was performed in a CLIA certified  laboratory  and is intended for clinical purposes. Creatinine, Ur 08/08/2022 82  mg/dL Final    Creatinine, 24H Ur 81/69/2266 Not Applicable  213 - 1794 mg/d Final    Comment: Performed by [facility],  [address], [address] [facility] [phone]  www. Tico Waddell MD, PHD - Lab.  Director      Urine Total Volume 08/08/2022 RANDOM   Final    Hours Collected 08/08/2022 RANDOM   Final     Family History   Problem Relation Age of Onset    Hypertension Mother     High Cholesterol Mother     Hypertension Father     High Cholesterol Father     Diabetes Type 1  Sister         B 12    Diabetes Type 1  Sister         early peripheral neuropathy    Stroke Brother 79    Hypertension Brother     High Cholesterol Brother     Hypertension Brother     High Cholesterol Brother     Hypertension Brother     High Cholesterol Brother      Current Outpatient Medications   Medication Sig Dispense Refill    tolterodine (DETROL LA) 4 MG extended release capsule Take 1 capsule by mouth daily 30 capsule 2    calcitonin (MIACALCIN) 200 UNIT/ACT nasal spray 1 spray in one nostril daily, alternating nostril side daily 1 each 0    tiZANidine (ZANAFLEX) 4 MG tablet Take 1 tablet by mouth nightly 30 tablet 0    rizatriptan (MAXALT-MLT) 10 MG disintegrating tablet May repeat in 2 hours if needed 18 tablet 5    levocetirizine (XYZAL) 5 MG tablet TAKE 1 TABLET BY MOUTH EVERY EVENING. 30 tablet 5    pantoprazole (PROTONIX) 40 MG tablet TAKE 1 TABLET BY MOUTH TWICE DAILY 60 tablet 5    mometasone-formoterol (DULERA) 200-5 MCG/ACT inhaler INHALE 2 PUFFS INTO THE LUNGS 2 TIMES DAILY RINSE MOUTH AFTER USING. 13 g 5    albuterol sulfate HFA (PROVENTIL;VENTOLIN;PROAIR) 108 (90 Base) MCG/ACT inhaler INHALE 2 PUFFS INTO THE LUNGS FOUR TIMES DAILY AS NEEDED FOR WHEEZING 8.5 g 2    vitamin D (ERGOCALCIFEROL) 1.25 MG (09972 UT) CAPS capsule TAKE 1 CAPSULE BY MOUTH WEEKLY 4 capsule 2    Spacer/Aero-Holding Chambers (COMPACT SPACE CHAMBER/SM MASK) AZAEL AS DIRECTED w/ inhalers 1 each 0    topiramate (TOPAMAX) 25 MG tablet TAKE 3 TABLETS BY MOUTH EVERY MORNING AND 5 TABLETS BY MOUTH EVERY EVENING 240 tablet 2    ipratropium (ATROVENT) 0.06 % nasal spray TAKE 1-2 SPRAYS BY NASAL ROUTE 4 TIMES DAILY AS NEEDED FOR RHINITIS 15 mL 5    albuterol (PROVENTIL) (2.5 MG/3ML) 0.083% nebulizer solution Take 3 mLs by nebulization every 6 hours as needed for Wheezing 75 mL 2    tiotropium (SPIRIVA RESPIMAT) 2.5 MCG/ACT AERS inhaler Inhale 2 puffs into the lungs in the morning. 1 each 5    gabapentin (NEURONTIN) 100 MG capsule Take 1 cap 4-5x/ day as directed 130 capsule 5    gabapentin (NEURONTIN) 300 MG capsule 3 po nightly 90 capsule 5     No current facility-administered medications for this visit.      Allergies   Allergen Reactions    Codeine      Upset stomach     Hydrocodone Nausea And Vomiting       Review of Systems    Objective:  BP (!) 166/98 (Site: Right Upper Arm, Position: Sitting, Cuff Size: Medium Adult)   Pulse 94   Ht 6' 1\" (1.854 m)   SpO2 96%   BMI 21.90 kg/m²     BP Readings from Last 3 Encounters:   09/15/22 (!) 166/98   08/23/22 128/86   08/08/22 130/80       Pulse Readings from Last 3 Encounters:   09/15/22 94   08/23/22 83   08/08/22 86 Wt Readings from Last 3 Encounters:   08/23/22 166 lb (75.3 kg)   08/08/22 170 lb (77.1 kg)   07/07/22 173 lb (78.5 kg)       Physical Exam  Constitutional:       General: He is not in acute distress. Appearance: He is well-developed. Eyes:      General: No scleral icterus. Conjunctiva/sclera: Conjunctivae normal.   Cardiovascular:      Rate and Rhythm: Normal rate and regular rhythm. Heart sounds: Normal heart sounds. No murmur heard. No gallop. Comments: Repeat bp 130/58 right arm  Pulmonary:      Effort: Pulmonary effort is normal. No respiratory distress. Breath sounds: Normal breath sounds. No wheezing, rhonchi or rales. Comments: Rhonchi w/o wheeze right upper lobe    Abdominal:      General: Bowel sounds are normal. There is no distension. Palpations: Abdomen is soft. Tenderness: There is no abdominal tenderness. Skin:     Findings: No erythema or rash. Neurological:      Mental Status: He is alert. Assessment/Plan:      Diagnosis Orders   1. Allodynia        2. DDD (degenerative disc disease), lumbar        3. Compression fracture of lumbar vertebra with routine healing, unspecified lumbar vertebral level, subsequent encounter        4. Pedal edema        5. Urinary urgency  POCT Urinalysis no Micro      6. Elevated blood pressure reading        7.  Left flank pain  traMADol (ULTRAM) 50 MG tablet        Bp high 2/2 pain as repeat sitting at rest okay  Monitor bp - precautions d/ w pt including low salt/ caffeine moderation/ diet/ exercise w/ low fat / increased fruit / veggies d/w pt  Cont dulera for breathing w/ albuterol prn - hold on steroids presently as lungs don't sound tight and recent prednisone  Increase gabapentin up to 1800mg daily and continue to increase by 300mg/d every 3-4 days as tolerated  Refill tramadol and encourage taking full pill qid w/ additional pill if severe pain  Cont miacalcin  Plan for nerve block in next 2 weeks  F/u neurosurgery regarding kyphoplasty but may be too far out for procedure generally  Tx options for pain d/w pt  Mental health/ anxiety related to pain d/w pt and wife  Consider psychology at some point if fails to improve  Check ua - on detrol w/ continued sxs  Consider myrbetriq - keep hydrated but check ua first  oarrs reviewed and results c/w rx'd meds  Refill zofran - on ppi bid    Jay Bean MD, MD  9/15/2022  5:08 PM

## 2022-09-15 NOTE — ADDENDUM NOTE
Addended byRolandorgann [facility] on: 9/15/2022 06:04 PM     Modules accepted: Orders

## 2022-09-16 LAB
ANTI-SS-A IGG: <0.2 AI (ref 0–0.9)
ANTI-SS-B IGG: <0.2 AI (ref 0–0.9)
TSH REFLEX: 1.85 UIU/ML (ref 0.27–4.2)

## 2022-09-18 DIAGNOSIS — M79.89 LEG SWELLING: ICD-10-CM

## 2022-09-18 DIAGNOSIS — R79.89 ELEVATED D-DIMER: Primary | ICD-10-CM

## 2022-09-19 ENCOUNTER — TELEPHONE (OUTPATIENT)
Dept: ADMINISTRATIVE | Age: 72
End: 2022-09-19

## 2022-09-19 NOTE — TELEPHONE ENCOUNTER
PA submitted VIA [facility] for  Gabapentin 300MG capsules   Key: [id number]  Available without authorization.

## 2022-09-20 ENCOUNTER — HOSPITAL ENCOUNTER (OUTPATIENT)
Dept: VASCULAR LAB | Age: 72
Discharge: HOME | End: 2022-09-20
Payer: COMMERCIAL

## 2022-09-20 ENCOUNTER — TELEPHONE (OUTPATIENT)
Dept: FAMILY MEDICINE CLINIC | Age: 72
End: 2022-09-20

## 2022-09-20 DIAGNOSIS — M79.89 LEG SWELLING: ICD-10-CM

## 2022-09-20 DIAGNOSIS — R79.89 ELEVATED D-DIMER: ICD-10-CM

## 2022-09-20 PROCEDURE — 93970 EXTREMITY STUDY: CPT

## 2022-09-20 RX ORDER — GABAPENTIN 600 MG/1
TABLET ORAL
Qty: 120 TABLET | Refills: 2 | Status: ON HOLD | OUTPATIENT
Start: 2022-09-20 | End: 2022-09-29 | Stop reason: HOSPADM

## 2022-09-20 NOTE — TELEPHONE ENCOUNTER
Pt had his doppler done today at 11. Did we get the resutls? Does he need to do anything else? Cathi Bloom

## 2022-09-20 NOTE — TELEPHONE ENCOUNTER
Pt wife is calling about pt medication the Gabapentin 300 mg. The note we have from out PA dept says it is not needed. But pt wife said the Insurance co told her the 800 mg for the day is not covered. Do we need to do a PA for the MG's not the medication. ??

## 2022-09-20 NOTE — TELEPHONE ENCOUNTER
Changed gabapentin from 300 to 600 to take 3/day and can increase to an extra 600mg at nightime on top of this if tolerating the 3/day. Can also use 100mg to ramp up gradually on dose as tolerated. Venous doppler did not show any blood clot. The sensitivity of D-dimer test is good but specificity is not.  In other words, high d-dimer can be caused by other things besides clot

## 2022-09-21 ENCOUNTER — TELEPHONE (OUTPATIENT)
Dept: FAMILY MEDICINE CLINIC | Age: 72
End: 2022-09-21

## 2022-09-21 DIAGNOSIS — R10.9 LEFT FLANK PAIN: ICD-10-CM

## 2022-09-21 RX ORDER — TRAMADOL HYDROCHLORIDE 50 MG/1
50-100 TABLET ORAL EVERY 6 HOURS PRN
Qty: 120 TABLET | Refills: 1 | Status: SHIPPED | OUTPATIENT
Start: 2022-09-21 | End: 2022-10-06

## 2022-09-21 NOTE — TELEPHONE ENCOUNTER
----- Message from Noman Salguero sent at 9/21/2022  3:37 PM EDT -----  Subject: Appointment Request    Reason for Call: Established Patient Appointment needed: Routine Existing   Condition Follow Up    QUESTIONS    Reason for appointment request? Available appointments did not meet   patient need     Additional Information for Provider?  Patient wife requesting a call back   regarding filling out FMLA paperwork and needing the paperwork signed,   please advise  ---------------------------------------------------------------------------  --------------  [address] [employer]  [phone]; OK to leave message on voicemail  ---------------------------------------------------------------------------  --------------  SCRIPT ANSWERS  COVID Screen: Nelda Vogt

## 2022-09-22 ENCOUNTER — TELEPHONE (OUTPATIENT)
Dept: ADMINISTRATIVE | Age: 72
End: 2022-09-22

## 2022-09-22 NOTE — TELEPHONE ENCOUNTER
SPOKE TO PT WIFE ABOUT FMLA, SHE HAS BEEN OFF WORK SINCE 9/19 CURRENTLY KASSY CANNOT CARE FOR HIMSELF. HE IS SCHED FOR NERVE BLOCK ON 9/27 WITH CIANCIOLOL AND THEN WILL SEE DR. Jasmin Mcclure NEUROLOGY ON 9/23 TO DISCUSS KYPHOPLASTY. SHE IS NOT ABLE TO LEAVE HIM HOME ALONE RIGHT NOW HE CANNOT CARE FOR HIMSELF OR DRIVE.  SHE IS NOT SURE WHEN SHE IS GOING TO RETURN TO WORK IT WILL ALL DEPEND ON RESPONSE TO TREATMENT AND WHEN HE CAN GET IN FOR KYPHOPLASTY

## 2022-09-22 NOTE — TELEPHONE ENCOUNTER
Can do fmla for continuous leave for wife through 10/2 for right now, until we see where things stand w/  needing continuous care.

## 2022-09-22 NOTE — TELEPHONE ENCOUNTER
Submitted PA for traMADol HCl 50MG tablets  Via CMM Key: [id number] STATUS: DENIED: FOR QUANTITY. LETTER ATTACHED. If this requires a response please respond to the pool. [address]). Please advise patient thank you.

## 2022-09-23 ENCOUNTER — HOSPITAL ENCOUNTER (INPATIENT)
Age: 72
LOS: 6 days | Discharge: HOME HEALTH | DRG: 479 | End: 2022-09-29
Attending: EMERGENCY MEDICINE | Admitting: INTERNAL MEDICINE
Payer: COMMERCIAL

## 2022-09-23 DIAGNOSIS — M80.08XA AGE-RELATED OSTEOPOROSIS WITH CURRENT PATHOLOGICAL FRACTURE OF VERTEBRA, INITIAL ENCOUNTER (HCC): ICD-10-CM

## 2022-09-23 DIAGNOSIS — M54.50 CHRONIC BILATERAL LOW BACK PAIN WITHOUT SCIATICA: ICD-10-CM

## 2022-09-23 DIAGNOSIS — M46.46 DISCITIS OF LUMBAR REGION: Primary | ICD-10-CM

## 2022-09-23 DIAGNOSIS — G89.29 CHRONIC BILATERAL LOW BACK PAIN WITHOUT SCIATICA: ICD-10-CM

## 2022-09-23 PROBLEM — M54.9 BACK PAIN: Status: ACTIVE | Noted: 2022-09-23

## 2022-09-23 LAB
ANION GAP SERPL CALCULATED.3IONS-SCNC: 9 MMOL/L (ref 3–16)
BASOPHILS ABSOLUTE: 0 K/UL (ref 0–0.2)
BASOPHILS RELATIVE PERCENT: 0.4 %
BUN BLDV-MCNC: 14 MG/DL (ref 7–20)
CALCIUM SERPL-MCNC: 8.9 MG/DL (ref 8.3–10.6)
CHLORIDE BLD-SCNC: 101 MMOL/L (ref 99–110)
CO2: 24 MMOL/L (ref 21–32)
CREAT SERPL-MCNC: 0.6 MG/DL (ref 0.8–1.3)
EOSINOPHILS ABSOLUTE: 0.1 K/UL (ref 0–0.6)
EOSINOPHILS RELATIVE PERCENT: 1.5 %
GFR AFRICAN AMERICAN: >60
GFR NON-AFRICAN AMERICAN: >60
GLUCOSE BLD-MCNC: 90 MG/DL (ref 70–99)
HCT VFR BLD CALC: 38.1 % (ref 40.5–52.5)
HEMOGLOBIN: 12.9 G/DL (ref 13.5–17.5)
LACTIC ACID: 0.9 MMOL/L (ref 0.4–2)
LYMPHOCYTES ABSOLUTE: 1.6 K/UL (ref 1–5.1)
LYMPHOCYTES RELATIVE PERCENT: 17.5 %
MCH RBC QN AUTO: 31.9 PG (ref 26–34)
MCHC RBC AUTO-ENTMCNC: 33.8 G/DL (ref 31–36)
MCV RBC AUTO: 94.5 FL (ref 80–100)
MONOCYTES ABSOLUTE: 0.8 K/UL (ref 0–1.3)
MONOCYTES RELATIVE PERCENT: 8.9 %
NEUTROPHILS ABSOLUTE: 6.6 K/UL (ref 1.7–7.7)
NEUTROPHILS RELATIVE PERCENT: 71.7 %
PDW BLD-RTO: 13 % (ref 12.4–15.4)
PLATELET # BLD: 273 K/UL (ref 135–450)
PMV BLD AUTO: 5.9 FL (ref 5–10.5)
POTASSIUM REFLEX MAGNESIUM: 3.7 MMOL/L (ref 3.5–5.1)
RBC # BLD: 4.03 M/UL (ref 4.2–5.9)
SODIUM BLD-SCNC: 134 MMOL/L (ref 136–145)
WBC # BLD: 9.2 K/UL (ref 4–11)

## 2022-09-23 PROCEDURE — 83605 ASSAY OF LACTIC ACID: CPT

## 2022-09-23 PROCEDURE — 87040 BLOOD CULTURE FOR BACTERIA: CPT

## 2022-09-23 PROCEDURE — 6370000000 HC RX 637 (ALT 250 FOR IP): Performed by: EMERGENCY MEDICINE

## 2022-09-23 PROCEDURE — 2580000003 HC RX 258: Performed by: INTERNAL MEDICINE

## 2022-09-23 PROCEDURE — 2060000000 HC ICU INTERMEDIATE R&B

## 2022-09-23 PROCEDURE — 99285 EMERGENCY DEPT VISIT HI MDM: CPT

## 2022-09-23 PROCEDURE — 6360000002 HC RX W HCPCS: Performed by: INTERNAL MEDICINE

## 2022-09-23 PROCEDURE — 80048 BASIC METABOLIC PNL TOTAL CA: CPT

## 2022-09-23 PROCEDURE — 85025 COMPLETE CBC W/AUTO DIFF WBC: CPT

## 2022-09-23 PROCEDURE — 94640 AIRWAY INHALATION TREATMENT: CPT

## 2022-09-23 PROCEDURE — 6370000000 HC RX 637 (ALT 250 FOR IP): Performed by: INTERNAL MEDICINE

## 2022-09-23 PROCEDURE — 6370000000 HC RX 637 (ALT 250 FOR IP): Performed by: NURSE PRACTITIONER

## 2022-09-23 RX ORDER — ALBUTEROL SULFATE 2.5 MG/3ML
2.5 SOLUTION RESPIRATORY (INHALATION) EVERY 6 HOURS PRN
Status: DISCONTINUED | OUTPATIENT
Start: 2022-09-23 | End: 2022-09-29 | Stop reason: HOSPADM

## 2022-09-23 RX ORDER — SUMATRIPTAN 50 MG/1
100 TABLET, FILM COATED ORAL PRN
Status: DISCONTINUED | OUTPATIENT
Start: 2022-09-23 | End: 2022-09-29 | Stop reason: HOSPADM

## 2022-09-23 RX ORDER — UREA 10 %
500 LOTION (ML) TOPICAL DAILY
COMMUNITY

## 2022-09-23 RX ORDER — SODIUM CHLORIDE 0.9 % (FLUSH) 0.9 %
10 SYRINGE (ML) INJECTION EVERY 12 HOURS SCHEDULED
Status: DISCONTINUED | OUTPATIENT
Start: 2022-09-23 | End: 2022-09-29 | Stop reason: HOSPADM

## 2022-09-23 RX ORDER — LIDOCAINE 4 G/G
1 PATCH TOPICAL DAILY
Status: DISCONTINUED | OUTPATIENT
Start: 2022-09-23 | End: 2022-09-28

## 2022-09-23 RX ORDER — GABAPENTIN 300 MG/1
600 CAPSULE ORAL 2 TIMES DAILY
Status: DISCONTINUED | OUTPATIENT
Start: 2022-09-23 | End: 2022-09-24

## 2022-09-23 RX ORDER — POTASSIUM CHLORIDE 7.45 MG/ML
10 INJECTION INTRAVENOUS PRN
Status: DISCONTINUED | OUTPATIENT
Start: 2022-09-23 | End: 2022-09-29 | Stop reason: HOSPADM

## 2022-09-23 RX ORDER — MORPHINE SULFATE 4 MG/ML
4 INJECTION, SOLUTION INTRAMUSCULAR; INTRAVENOUS ONCE
Status: DISCONTINUED | OUTPATIENT
Start: 2022-09-23 | End: 2022-09-23

## 2022-09-23 RX ORDER — ENOXAPARIN SODIUM 100 MG/ML
40 INJECTION SUBCUTANEOUS DAILY
Status: DISCONTINUED | OUTPATIENT
Start: 2022-09-24 | End: 2022-09-28

## 2022-09-23 RX ORDER — CALCITONIN SALMON 200 [IU]/.09ML
1 SPRAY, METERED NASAL DAILY
Status: DISCONTINUED | OUTPATIENT
Start: 2022-09-23 | End: 2022-09-23 | Stop reason: ALTCHOICE

## 2022-09-23 RX ORDER — METAXALONE 800 MG/1
400 TABLET ORAL 3 TIMES DAILY
Status: DISCONTINUED | OUTPATIENT
Start: 2022-09-23 | End: 2022-09-26

## 2022-09-23 RX ORDER — POTASSIUM CHLORIDE 20 MEQ/1
40 TABLET, EXTENDED RELEASE ORAL PRN
Status: DISCONTINUED | OUTPATIENT
Start: 2022-09-23 | End: 2022-09-29 | Stop reason: HOSPADM

## 2022-09-23 RX ORDER — IPRATROPIUM BROMIDE 42 UG/1
2 SPRAY, METERED NASAL 3 TIMES DAILY
Status: DISCONTINUED | OUTPATIENT
Start: 2022-09-23 | End: 2022-09-29 | Stop reason: HOSPADM

## 2022-09-23 RX ORDER — VITAMIN K2 90 MCG
400 CAPSULE ORAL EVERY EVENING
COMMUNITY

## 2022-09-23 RX ORDER — CALCITONIN SALMON 200 [IU]/.09ML
1 SPRAY, METERED NASAL DAILY
Status: DISCONTINUED | OUTPATIENT
Start: 2022-09-24 | End: 2022-09-29 | Stop reason: HOSPADM

## 2022-09-23 RX ORDER — MAGNESIUM SULFATE IN WATER 40 MG/ML
2000 INJECTION, SOLUTION INTRAVENOUS PRN
Status: DISCONTINUED | OUTPATIENT
Start: 2022-09-23 | End: 2022-09-28

## 2022-09-23 RX ORDER — ALBUTEROL SULFATE 90 UG/1
2 AEROSOL, METERED RESPIRATORY (INHALATION) EVERY 4 HOURS PRN
Status: DISCONTINUED | OUTPATIENT
Start: 2022-09-23 | End: 2022-09-29 | Stop reason: HOSPADM

## 2022-09-23 RX ORDER — SODIUM CHLORIDE 0.9 % (FLUSH) 0.9 %
10 SYRINGE (ML) INJECTION PRN
Status: DISCONTINUED | OUTPATIENT
Start: 2022-09-23 | End: 2022-09-29 | Stop reason: HOSPADM

## 2022-09-23 RX ORDER — ACETAMINOPHEN 650 MG/1
650 SUPPOSITORY RECTAL EVERY 6 HOURS PRN
Status: DISCONTINUED | OUTPATIENT
Start: 2022-09-23 | End: 2022-09-27

## 2022-09-23 RX ORDER — PANTOPRAZOLE SODIUM 40 MG/1
40 TABLET, DELAYED RELEASE ORAL
Status: DISCONTINUED | OUTPATIENT
Start: 2022-09-23 | End: 2022-09-29 | Stop reason: HOSPADM

## 2022-09-23 RX ORDER — GABAPENTIN 300 MG/1
300 CAPSULE ORAL 2 TIMES DAILY
COMMUNITY

## 2022-09-23 RX ORDER — ACETAMINOPHEN 500 MG
1000 TABLET ORAL ONCE
Status: COMPLETED | OUTPATIENT
Start: 2022-09-23 | End: 2022-09-23

## 2022-09-23 RX ORDER — ACETAMINOPHEN 325 MG/1
650 TABLET ORAL EVERY 6 HOURS PRN
Status: DISCONTINUED | OUTPATIENT
Start: 2022-09-23 | End: 2022-09-27

## 2022-09-23 RX ORDER — ONDANSETRON 2 MG/ML
4 INJECTION INTRAMUSCULAR; INTRAVENOUS EVERY 6 HOURS PRN
Status: DISCONTINUED | OUTPATIENT
Start: 2022-09-23 | End: 2022-09-29 | Stop reason: HOSPADM

## 2022-09-23 RX ORDER — TOPIRAMATE 25 MG/1
75 TABLET ORAL 2 TIMES DAILY
Status: DISCONTINUED | OUTPATIENT
Start: 2022-09-23 | End: 2022-09-29 | Stop reason: HOSPADM

## 2022-09-23 RX ORDER — PROMETHAZINE HYDROCHLORIDE 25 MG/1
12.5 TABLET ORAL EVERY 6 HOURS PRN
Status: DISCONTINUED | OUTPATIENT
Start: 2022-09-23 | End: 2022-09-29 | Stop reason: HOSPADM

## 2022-09-23 RX ORDER — RIZATRIPTAN BENZOATE 10 MG/1
10 TABLET, ORALLY DISINTEGRATING ORAL
Status: DISCONTINUED | OUTPATIENT
Start: 2022-09-23 | End: 2022-09-23

## 2022-09-23 RX ORDER — SODIUM CHLORIDE 9 MG/ML
INJECTION, SOLUTION INTRAVENOUS PRN
Status: DISCONTINUED | OUTPATIENT
Start: 2022-09-23 | End: 2022-09-29 | Stop reason: HOSPADM

## 2022-09-23 RX ORDER — TRAMADOL HYDROCHLORIDE 50 MG/1
100 TABLET ORAL EVERY 6 HOURS PRN
Status: DISCONTINUED | OUTPATIENT
Start: 2022-09-23 | End: 2022-09-23

## 2022-09-23 RX ORDER — FENTANYL CITRATE 50 UG/ML
50 INJECTION, SOLUTION INTRAMUSCULAR; INTRAVENOUS EVERY 4 HOURS PRN
Status: DISCONTINUED | OUTPATIENT
Start: 2022-09-23 | End: 2022-09-23

## 2022-09-23 RX ORDER — GABAPENTIN 300 MG/1
300 CAPSULE ORAL 2 TIMES DAILY
Status: DISCONTINUED | OUTPATIENT
Start: 2022-09-24 | End: 2022-09-24

## 2022-09-23 RX ORDER — KETOROLAC TROMETHAMINE 15 MG/ML
15 INJECTION, SOLUTION INTRAMUSCULAR; INTRAVENOUS EVERY 6 HOURS PRN
Status: DISCONTINUED | OUTPATIENT
Start: 2022-09-23 | End: 2022-09-25

## 2022-09-23 RX ORDER — CETIRIZINE HYDROCHLORIDE 10 MG/1
5 TABLET ORAL DAILY
Refills: 5 | Status: DISCONTINUED | OUTPATIENT
Start: 2022-09-24 | End: 2022-09-29 | Stop reason: HOSPADM

## 2022-09-23 RX ADMIN — PANTOPRAZOLE SODIUM 40 MG: 40 TABLET, DELAYED RELEASE ORAL at 21:46

## 2022-09-23 RX ADMIN — GABAPENTIN 600 MG: 300 CAPSULE ORAL at 21:37

## 2022-09-23 RX ADMIN — TOPIRAMATE 75 MG: 25 TABLET, FILM COATED ORAL at 21:46

## 2022-09-23 RX ADMIN — MOMETASONE FUROATE AND FORMOTEROL FUMARATE DIHYDRATE 2 PUFF: 200; 5 AEROSOL RESPIRATORY (INHALATION) at 22:06

## 2022-09-23 RX ADMIN — IPRATROPIUM BROMIDE 2 SPRAY: 42 SPRAY, METERED NASAL at 21:51

## 2022-09-23 RX ADMIN — KETOROLAC TROMETHAMINE 15 MG: 15 INJECTION, SOLUTION INTRAMUSCULAR; INTRAVENOUS at 21:33

## 2022-09-23 RX ADMIN — SODIUM CHLORIDE, PRESERVATIVE FREE 10 ML: 5 INJECTION INTRAVENOUS at 21:38

## 2022-09-23 RX ADMIN — ACETAMINOPHEN 1000 MG: 500 TABLET ORAL at 16:39

## 2022-09-23 RX ADMIN — METAXALONE 400 MG: 800 TABLET ORAL at 21:46

## 2022-09-23 ASSESSMENT — ENCOUNTER SYMPTOMS
COLOR CHANGE: 0
EYES NEGATIVE: 1
SHORTNESS OF BREATH: 0
CHEST TIGHTNESS: 0
VOMITING: 0
RHINORRHEA: 0
COUGH: 0
BACK PAIN: 1
DIARRHEA: 0
ABDOMINAL PAIN: 0
NAUSEA: 0
SORE THROAT: 0
WHEEZING: 0

## 2022-09-23 ASSESSMENT — PAIN SCALES - GENERAL
PAINLEVEL_OUTOF10: 9
PAINLEVEL_OUTOF10: 10
PAINLEVEL_OUTOF10: 8

## 2022-09-23 ASSESSMENT — PAIN - FUNCTIONAL ASSESSMENT
PAIN_FUNCTIONAL_ASSESSMENT: PREVENTS OR INTERFERES SOME ACTIVE ACTIVITIES AND ADLS
PAIN_FUNCTIONAL_ASSESSMENT: 0-10
PAIN_FUNCTIONAL_ASSESSMENT: 0-10

## 2022-09-23 ASSESSMENT — PAIN DESCRIPTION - LOCATION
LOCATION: BACK
LOCATION: BACK

## 2022-09-23 ASSESSMENT — PAIN DESCRIPTION - ORIENTATION: ORIENTATION: LOWER

## 2022-09-23 NOTE — PROGRESS NOTES
Medication Reconciliation    List of medications patient is currently taking is complete. Source of information: 1. Conversation with patient's caregiver at bedside                                      2. EPIC records      Allergies  Codeine and Hydrocodone     Notes regarding home medications:   1. Gabapentin is currently 600mg early morning, 300mg early afternoon and late afternoon, and 600mg before bedtime  2. Patient feels like the tolterodine isn't helping  3.  Topiramate is currently 75mg BID      Lawrence Gross, [address] [place] [address]   9/23/2022  5:54 PM

## 2022-09-23 NOTE — TELEPHONE ENCOUNTER
Please send progress notes to get this PA through - insurance reviewers didn't review the fact that he has a vertebral fracture and bilateral sciatica

## 2022-09-23 NOTE — TELEPHONE ENCOUNTER
RESubmitted PA for traMADol HCl and also sent note for Dr. Benito Espinoza: [address] 03/22/2023. If this requires a response please respond to the pool. [address]). Please advise patient thank you.

## 2022-09-23 NOTE — ED NOTES
Pt requesting pain medicine that is not an opiate. ED provider notified.       SEKOU Quach  09/23/22 1640

## 2022-09-23 NOTE — ED PROVIDER NOTES
[address] [facility] [place]      Pt Name: Katrina Baeza  MRN: [id number]  [place] 1950  Date ofevaluation: 9/23/2022  Provider: Otis Baker MD    CHIEF COMPLAINT       Chief Complaint   Patient presents with    Back Pain     Patient was sent by neurologist for eval of possible infection in his disc following MRI          HISTORY OF PRESENT ILLNESS   (Location/Symptom, Timing/Onset,Context/Setting, Quality, Duration, Modifying Factors, Severity)  Note limiting factors. Katrina Baeza is a 73 y.o. male  who  has a past medical history of Arthritis, Asthma, B12 deficiency, Closed wedge compression fracture of L1 vertebra (HCC), DDD (degenerative disc disease), lumbar, Diverticulitis, Gallbladder problem, GERD (gastroesophageal reflux disease), Hiatal hernia, Migraine, MRSA infection, Nausea & vomiting, and Spondylosis of lumbar spine. who presents to the emergency department    68-year-old male who was sent in by neurosurgery Dr. Merle Medina with concern for discitis. Patient been having severe chronic lower back pain with a history of degenerative disc disease which is his primary risk factor the pain has been getting worse recently. Difficulty with walking or any movement he has to walk with a walker. Patient had a recent MRI that showed concern for possible compression fracture and/or discitis. Denies any fevers or chills. No other modifying factors besides those listed. No other associated symptoms. Denies any fever, chills, nausea, vomiting, diarrhea, chest pain, shortness of breath, dysuria, hematuria, saddle anesthesia, bowel or bladder incontinence, gait ataxia, falls, syncope, numbness, weakness, neck pain. Symptoms are moderate to severe gradual onset constant and worsening. Aching and throbbing in nature nonradiating. The history is provided by the patient. No  was used.      NursingNotes were reviewed. REVIEW OF SYSTEMS    (2-9 systems for level 4, 10 or more for level 5)     Review of Systems   Constitutional: Negative. Negative for fatigue and fever. HENT:  Negative for congestion, rhinorrhea and sore throat. Eyes: Negative. Respiratory:  Negative for cough, chest tightness, shortness of breath and wheezing. Cardiovascular:  Negative for chest pain. Gastrointestinal:  Negative for abdominal pain, diarrhea, nausea and vomiting. Endocrine: Negative. Genitourinary: Negative. Negative for difficulty urinating. Musculoskeletal:  Positive for back pain. Skin:  Negative for color change and rash. Allergic/Immunologic: Negative for environmental allergies and immunocompromised state. Neurological:  Negative for dizziness, weakness, light-headedness, numbness and headaches. Hematological: Negative. All other systems reviewed and are negative. Except as noted above the remainder of the review of systems was reviewed and negative. PAST MEDICAL HISTORY     Past Medical History:   Diagnosis Date    Arthritis     Asthma     B12 deficiency 07/10/2013    Tooks injection every other week and B12 level 500. Recommended that he continue current therapy, as will likely drop with oral supplement. Closed wedge compression fracture of L1 vertebra ([address] 09/05/2018    Superior endplate fracture of L1 resulting 80% loss of vertebral body height    DDD (degenerative disc disease), lumbar 09/05/2018    L2-L3, L3-L4, L4-L5.   Disc bulge with narrowing of the neural foramen at these levels    Diverticulitis     Gallbladder problem     S/P lap martha    GERD (gastroesophageal reflux disease)     Hiatal hernia     Migraine     MRSA infection 04/03/2019    Left hand    Nausea & vomiting     Spondylosis of lumbar spine 11/02/2015    On x-ray multilevel         SURGICALHISTORY       Past Surgical History:   Procedure Laterality Date    CHOLECYSTECTOMY      COLONOSCOPY  9/11    FOOT SURGERY nerve removed from foot    SHOULDER SURGERY Right 2005    rotator cuff repair    UPPER GASTROINTESTINAL ENDOSCOPY  9/11         CURRENT MEDICATIONS       Previous Medications    ALBUTEROL (PROVENTIL) (2.5 MG/3ML) 0.083% NEBULIZER SOLUTION    Take 3 mLs by nebulization every 6 hours as needed for Wheezing    ALBUTEROL SULFATE HFA (PROVENTIL;VENTOLIN;PROAIR) 108 (90 BASE) MCG/ACT INHALER    INHALE 2 PUFFS INTO THE LUNGS FOUR TIMES DAILY AS NEEDED FOR WHEEZING    CALCITONIN (MIACALCIN) 200 UNIT/ACT NASAL SPRAY    1 spray in one nostril daily, alternating nostril side daily    GABAPENTIN (NEURONTIN) 100 MG CAPSULE    Take 1 cap 4-5x/ day as directed    GABAPENTIN (NEURONTIN) 300 MG CAPSULE    3 po nightly    GABAPENTIN (NEURONTIN) 600 MG TABLET    1 po am, 1 po pm and 1-2 po hs    IPRATROPIUM (ATROVENT) 0.06 % NASAL SPRAY    TAKE 1-2 SPRAYS BY NASAL ROUTE 4 TIMES DAILY AS NEEDED FOR RHINITIS    LEVOCETIRIZINE (XYZAL) 5 MG TABLET    TAKE 1 TABLET BY MOUTH EVERY EVENING.    MOMETASONE-FORMOTEROL (DULERA) 200-5 MCG/ACT INHALER    INHALE 2 PUFFS INTO THE LUNGS 2 TIMES DAILY RINSE MOUTH AFTER USING. ONDANSETRON (ZOFRAN ODT) 4 MG DISINTEGRATING TABLET    Take 1 tablet by mouth every 8 hours as needed for Nausea or Vomiting    PANTOPRAZOLE (PROTONIX) 40 MG TABLET    TAKE 1 TABLET BY MOUTH TWICE DAILY    RIZATRIPTAN (MAXALT-MLT) 10 MG DISINTEGRATING TABLET    May repeat in 2 hours if needed    SPACER/AERO-HOLDING CHAMBERS (COMPACT SPACE CHAMBER/SM MASK) AZAEL    AS DIRECTED w/ inhalers    TIOTROPIUM (SPIRIVA RESPIMAT) 2.5 MCG/ACT AERS INHALER    Inhale 2 puffs into the lungs in the morning.     TIZANIDINE (ZANAFLEX) 4 MG TABLET    Take 1 tablet by mouth nightly    TOLTERODINE (DETROL LA) 4 MG EXTENDED RELEASE CAPSULE    Take 1 capsule by mouth daily    TOPIRAMATE (TOPAMAX) 25 MG TABLET    TAKE 3 TABLETS BY MOUTH EVERY MORNING AND 5 TABLETS BY MOUTH EVERY EVENING    TRAMADOL (ULTRAM) 50 MG TABLET    Take 1-2 tablets by mouth every 6 hours as needed for Pain for up to 15 days. TAKE ONE OR TWO TABLETS EVERY 6 HOURS AS NEEDED FOR PAIN    VITAMIN D (ERGOCALCIFEROL) 1.25 MG (34878 UT) CAPS CAPSULE    TAKE 1 CAPSULE BY MOUTH WEEKLY            Codeine and Hydrocodone    FAMILY HISTORY       Family History   Problem Relation Age of Onset    Hypertension Mother     High Cholesterol Mother     Hypertension Father     High Cholesterol Father     Diabetes Type 1  Sister         B 12    Diabetes Type 1  Sister         early peripheral neuropathy    Stroke Brother 79    Hypertension Brother     High Cholesterol Brother     Hypertension Brother     High Cholesterol Brother     Hypertension Brother     High Cholesterol Brother           SOCIAL HISTORY       Social History     Socioeconomic History    Marital status:      Spouse name: Sierra Alcala    Number of children: 5    Years of education: 12   Occupational History    Occupation: CONSTRUCTION - retired 2017   Tobacco Use    Smoking status: Never    Smokeless tobacco: Never   Vaping Use    Vaping Use: Never used   Substance and Sexual Activity    Alcohol use: Not Currently     Comment: glass of wine at night    Drug use: No   Social History Narrative    2 hours 1x/wk exercise. Exercise 25 min 5x/wk. 8/8/22     Social Determinants of Health     Financial Resource Strain: Low Risk     Difficulty of Paying Living Expenses: Not hard at all   Food Insecurity: No Food Insecurity    Worried About Running Out of Food in the Last Year: Never true    Ran Out of Food in the Last Year: Never true   Transportation Needs: No Transportation Needs    Lack of Transportation (Medical): No    Lack of Transportation (Non-Medical):  No       SCREENINGS    Luzmaria Coma Scale  Eye Opening: Spontaneous  Best Verbal Response: Oriented  Best Motor Response: Obeys commands  Luzmaria Coma Scale Score: 15        PHYSICAL EXAM    (up to 7 for level 4, 8 or more for level 5)     ED Triage Vitals [09/23/22 1617]   BP Temp Temp Source Heart Rate Resp SpO2 Height Weight   137/79 98.7 °F (37.1 °C) Oral 91 22 97 % 6' 1\" (1.854 m) 178 lb 3.2 oz (80.8 kg)       Physical Exam  Vitals and nursing note reviewed. Constitutional:       General: He is not in acute distress. Appearance: He is well-developed and normal weight. He is not ill-appearing, toxic-appearing or diaphoretic. HENT:      Head: Normocephalic and atraumatic. Mouth/Throat:      Mouth: Mucous membranes are moist.      Pharynx: Oropharynx is clear. Eyes:      Extraocular Movements: Extraocular movements intact. Cardiovascular:      Rate and Rhythm: Normal rate and regular rhythm. Pulses: Normal pulses. Heart sounds: Normal heart sounds. Pulmonary:      Effort: Pulmonary effort is normal.      Breath sounds: Normal breath sounds. No decreased breath sounds, wheezing, rhonchi or rales. Chest:      Chest wall: No tenderness. Abdominal:      General: Bowel sounds are normal.      Palpations: Abdomen is soft. Tenderness: There is no abdominal tenderness. Musculoskeletal:         General: Normal range of motion. Cervical back: Normal range of motion and neck supple. No swelling, edema, deformity, erythema, signs of trauma, lacerations, rigidity, spasms, torticollis, tenderness, bony tenderness or crepitus. No pain with movement. Normal range of motion. Thoracic back: No swelling, edema, deformity, signs of trauma, lacerations, spasms, tenderness or bony tenderness. Normal range of motion. No scoliosis. Lumbar back: Tenderness and bony tenderness present. No swelling, edema, deformity, signs of trauma or lacerations. No scoliosis. Right lower leg: No edema. Left lower leg: No edema. Comments: Bony lumbar tenderness through the entirety of the lumbar spine   Skin:     General: Skin is warm and dry. Capillary Refill: Capillary refill takes less than 2 seconds. Findings: No rash.    Neurological:      General: No focal deficit present. Mental Status: He is alert. GCS: GCS eye subscore is 4. GCS verbal subscore is 5. GCS motor subscore is 6. Cranial Nerves: Cranial nerves are intact. No cranial nerve deficit, dysarthria or facial asymmetry. Sensory: Sensation is intact. No sensory deficit. Motor: Motor function is intact. No weakness, tremor, atrophy, abnormal muscle tone, seizure activity or pronator drift. Coordination: Coordination is intact. Coordination normal. Finger-Nose-Finger Test and Heel to [facility] Test normal.      Gait: Gait is intact. Gait normal.   Psychiatric:         Mood and Affect: Mood normal.         Behavior: Behavior normal.       RESULTS     EKG: All EKG's are interpreted by the Emergency Department Physician who either signs or Co-signs this chart in the absence of a cardiologist.    RADIOLOGY:   Non-plain filmimages such as CT, Ultrasound and MRI are read by the radiologist.  All images reviewed by the emergency department physician who either signs or cosigns this chart. Interpretation per the Radiologist below, if available at the time ofthis note:    No orders to display         ED BEDSIDE ULTRASOUND:   Performed by ED Physician - none    LABS:  Labs Reviewed   BASIC METABOLIC PANEL W/ REFLEX TO MG FOR LOW K - Abnormal; Notable for the following components:       Result Value    Sodium 134 (*)     Creatinine 0.6 (*)     All other components within normal limits   CBC WITH AUTO DIFFERENTIAL - Abnormal; Notable for the following components:    RBC 4.03 (*)     Hemoglobin 12.9 (*)     Hematocrit 38.1 (*)     All other components within normal limits   CULTURE, BLOOD 1   CULTURE, BLOOD 2   LACTIC ACID       All other labs were within normal range or not returned as of this dictation.     EMERGENCY DEPARTMENT COURSE and DIFFERENTIAL DIAGNOSIS/MDM:   Vitals:    Vitals:    09/23/22 1617   BP: 137/79   Pulse: 91   Resp: 22   Temp: 98.7 °F (37.1 °C)   TempSrc: Oral   SpO2: 97% Weight: 178 lb 3.2 oz (80.8 kg)   Height: 6' 1\" (1.854 m)       Patient was given thefollowing medications:  Medications   acetaminophen (TYLENOL) tablet 1,000 mg (1,000 mg Oral Given 9/23/22 1639)       ED COURSE & MEDICAL DECISION MAKING    Pertinent Labs & Imaging studies reviewed. (See chart for details)   -  Patient seen and evaluated in the emergency department. -  Triage and nursing notes reviewed and incorporated. -  Old chart records reviewed and incorporated. -  Differential diagnosis includes: Differential Diagnosis: Discitis, spinal Epidural Abscess, Vertebral Osteomyelitis, Cauda Equina Syndrome, Spinal Cord Compression, Conus Medullaris Syndrome, ruptured/dissecting Abdominal Aortic Aneurysm, Metastases to the back, Kidney Stone, Pyelonephritis, Fracture or dislocation, other    72-year-old male with history of degenerative disc disease with no red flag symptoms for neurologic changes at this time who is afebrile nontachycardic saturating well on room air normotensive who was sent for an MRI that is concerning for possible discitis and/or compression fracture. Patient was sent by neurosurgery Dr. Lamin Ceja. I personally spoke to Dr. Lamin Ceja and he also came to the ER to see the patient. Patient to be admitted for IR biopsy. Septic work-up was ordered. Patient shows no clinical signs or vital signs of sepsis at this time. No other associated symptoms. Patient was offered morphine for pain control and declined he wants nonnarcotic pain medicine which I will give him. We will plan on admission.      -  Work-up included:  See above  -  ED treatment included: See above  -  Results discussed with patient. The patient is agreeable with plan of care and disposition. Is this patient to be included in the SEP-1 Core Measure due to severe sepsis or septic shock?    No   Exclusion criteria - the patient is NOT to be included for SEP-1 Core Measure due to:  2+ SIRS criteria are not met     REASSESSMENT ED Course as of 09/23/22 1734   Fri Sep 23, 2022   1732 Labs relatively unremarkable vitals remained stable. Exam unchanged. Pain slightly improved. We will plan admission at this time. [SC]      ED Course User Index  [SC] Brissa Whitmore MD         CRITICAL CARE TIME   Total Critical Care time was 21 minutes, excluding separately reportable procedures. There was a high probability of clinically significant/life threatening deterioration in the patient's condition which required my urgent intervention. This includes multiple reevaluations, vital sign monitoring, pulse oximetry monitoring, telemetry monitoring, clinical response to the IV medications, reviewing the nursing notes, consultation time, dictation/documentation time, and interpretation of the labwork. (This time excludes time spent performing procedures). CONSULTS:  IP CONSULT TO SPINE  IP CONSULT TO HOSPITALIST    PROCEDURES:  Unless otherwise noted below, none     Procedures    FINAL IMPRESSION      1. Discitis of lumbar region    2. Chronic bilateral low back pain without sciatica          DISPOSITION/PLAN   DISPOSITION Decision To Admit 09/23/2022 05:34:09 PM      PATIENT REFERREDTO:  No follow-up provider specified.     DISCHARGEMEDICATIONS:  New Prescriptions    No medications on file          (Please note that portions of this note were completed with a voice recognition program.  Efforts were made to edit the dictations but occasionally words are mis-transcribed.)    Brissa Whitmore MD (electronically signed)  Attending Emergency Physician         Brissa Whitmore MD  09/23/22 [phone]

## 2022-09-24 ENCOUNTER — APPOINTMENT (OUTPATIENT)
Dept: MRI IMAGING | Age: 72
DRG: 479 | End: 2022-09-24
Payer: COMMERCIAL

## 2022-09-24 LAB
ANION GAP SERPL CALCULATED.3IONS-SCNC: 10 MMOL/L (ref 3–16)
BASOPHILS ABSOLUTE: 0 K/UL (ref 0–0.2)
BASOPHILS RELATIVE PERCENT: 0.7 %
BILIRUBIN URINE: NEGATIVE
BLOOD, URINE: NEGATIVE
BUN BLDV-MCNC: 9 MG/DL (ref 7–20)
C-REACTIVE PROTEIN: <3 MG/L (ref 0–5.1)
CALCIUM SERPL-MCNC: 8.7 MG/DL (ref 8.3–10.6)
CHLORIDE BLD-SCNC: 101 MMOL/L (ref 99–110)
CLARITY: CLEAR
CO2: 25 MMOL/L (ref 21–32)
COLOR: YELLOW
CREAT SERPL-MCNC: 0.6 MG/DL (ref 0.8–1.3)
EOSINOPHILS ABSOLUTE: 0.1 K/UL (ref 0–0.6)
EOSINOPHILS RELATIVE PERCENT: 2.2 %
GFR AFRICAN AMERICAN: >60
GFR NON-AFRICAN AMERICAN: >60
GLUCOSE BLD-MCNC: 119 MG/DL (ref 70–99)
GLUCOSE URINE: NEGATIVE MG/DL
HCT VFR BLD CALC: 36.5 % (ref 40.5–52.5)
HEMOGLOBIN: 12.7 G/DL (ref 13.5–17.5)
KETONES, URINE: NEGATIVE MG/DL
LEUKOCYTE ESTERASE, URINE: NEGATIVE
LYMPHOCYTES ABSOLUTE: 1.2 K/UL (ref 1–5.1)
LYMPHOCYTES RELATIVE PERCENT: 21.7 %
MCH RBC QN AUTO: 32.4 PG (ref 26–34)
MCHC RBC AUTO-ENTMCNC: 34.6 G/DL (ref 31–36)
MCV RBC AUTO: 93.7 FL (ref 80–100)
MICROSCOPIC EXAMINATION: NORMAL
MONOCYTES ABSOLUTE: 0.6 K/UL (ref 0–1.3)
MONOCYTES RELATIVE PERCENT: 11.2 %
NEUTROPHILS ABSOLUTE: 3.5 K/UL (ref 1.7–7.7)
NEUTROPHILS RELATIVE PERCENT: 64.2 %
NITRITE, URINE: NEGATIVE
PDW BLD-RTO: 12.9 % (ref 12.4–15.4)
PH UA: 6.5 (ref 5–8)
PLATELET # BLD: 241 K/UL (ref 135–450)
PMV BLD AUTO: 5.8 FL (ref 5–10.5)
POTASSIUM REFLEX MAGNESIUM: 3.7 MMOL/L (ref 3.5–5.1)
PRO-BNP: 319 PG/ML (ref 0–124)
PROTEIN UA: NEGATIVE MG/DL
RBC # BLD: 3.9 M/UL (ref 4.2–5.9)
SEDIMENTATION RATE, ERYTHROCYTE: 12 MM/HR (ref 0–20)
SODIUM BLD-SCNC: 136 MMOL/L (ref 136–145)
SPECIFIC GRAVITY UA: 1.02 (ref 1–1.03)
URINE TYPE: NORMAL
UROBILINOGEN, URINE: 1 E.U./DL
WBC # BLD: 5.5 K/UL (ref 4–11)

## 2022-09-24 PROCEDURE — 86140 C-REACTIVE PROTEIN: CPT

## 2022-09-24 PROCEDURE — 2580000003 HC RX 258: Performed by: INTERNAL MEDICINE

## 2022-09-24 PROCEDURE — 94640 AIRWAY INHALATION TREATMENT: CPT

## 2022-09-24 PROCEDURE — 6370000000 HC RX 637 (ALT 250 FOR IP): Performed by: NURSE PRACTITIONER

## 2022-09-24 PROCEDURE — 72158 MRI LUMBAR SPINE W/O & W/DYE: CPT

## 2022-09-24 PROCEDURE — 87086 URINE CULTURE/COLONY COUNT: CPT

## 2022-09-24 PROCEDURE — 81003 URINALYSIS AUTO W/O SCOPE: CPT

## 2022-09-24 PROCEDURE — 6360000002 HC RX W HCPCS: Performed by: NURSE PRACTITIONER

## 2022-09-24 PROCEDURE — 2060000000 HC ICU INTERMEDIATE R&B

## 2022-09-24 PROCEDURE — 6370000000 HC RX 637 (ALT 250 FOR IP): Performed by: STUDENT IN AN ORGANIZED HEALTH CARE EDUCATION/TRAINING PROGRAM

## 2022-09-24 PROCEDURE — 36415 COLL VENOUS BLD VENIPUNCTURE: CPT

## 2022-09-24 PROCEDURE — 6370000000 HC RX 637 (ALT 250 FOR IP): Performed by: INTERNAL MEDICINE

## 2022-09-24 PROCEDURE — 85652 RBC SED RATE AUTOMATED: CPT

## 2022-09-24 PROCEDURE — 6360000002 HC RX W HCPCS: Performed by: INTERNAL MEDICINE

## 2022-09-24 PROCEDURE — 99221 1ST HOSP IP/OBS SF/LOW 40: CPT | Performed by: INTERNAL MEDICINE

## 2022-09-24 PROCEDURE — 80048 BASIC METABOLIC PNL TOTAL CA: CPT

## 2022-09-24 PROCEDURE — 6360000004 HC RX CONTRAST MEDICATION: Performed by: NEUROLOGICAL SURGERY

## 2022-09-24 PROCEDURE — 85025 COMPLETE CBC W/AUTO DIFF WBC: CPT

## 2022-09-24 PROCEDURE — 6360000002 HC RX W HCPCS: Performed by: NEUROLOGICAL SURGERY

## 2022-09-24 PROCEDURE — 83880 ASSAY OF NATRIURETIC PEPTIDE: CPT

## 2022-09-24 PROCEDURE — A9577 INJ MULTIHANCE: HCPCS | Performed by: NEUROLOGICAL SURGERY

## 2022-09-24 PROCEDURE — 94760 N-INVAS EAR/PLS OXIMETRY 1: CPT

## 2022-09-24 RX ORDER — TRAMADOL HYDROCHLORIDE 50 MG/1
50 TABLET ORAL ONCE
Status: COMPLETED | OUTPATIENT
Start: 2022-09-24 | End: 2022-09-24

## 2022-09-24 RX ORDER — GABAPENTIN 400 MG/1
400 CAPSULE ORAL
Status: DISCONTINUED | OUTPATIENT
Start: 2022-09-24 | End: 2022-09-29 | Stop reason: HOSPADM

## 2022-09-24 RX ORDER — MORPHINE SULFATE 2 MG/ML
2 INJECTION, SOLUTION INTRAMUSCULAR; INTRAVENOUS EVERY 4 HOURS PRN
Status: DISCONTINUED | OUTPATIENT
Start: 2022-09-24 | End: 2022-09-25

## 2022-09-24 RX ORDER — LORAZEPAM 0.5 MG/1
0.5 TABLET ORAL EVERY 4 HOURS PRN
Status: DISCONTINUED | OUTPATIENT
Start: 2022-09-24 | End: 2022-09-24

## 2022-09-24 RX ORDER — FUROSEMIDE 10 MG/ML
20 INJECTION INTRAMUSCULAR; INTRAVENOUS ONCE
Status: COMPLETED | OUTPATIENT
Start: 2022-09-24 | End: 2022-09-24

## 2022-09-24 RX ORDER — LORAZEPAM 2 MG/ML
0.5 INJECTION INTRAMUSCULAR ONCE
Status: COMPLETED | OUTPATIENT
Start: 2022-09-24 | End: 2022-09-24

## 2022-09-24 RX ORDER — DOCUSATE SODIUM 100 MG/1
100 CAPSULE, LIQUID FILLED ORAL 2 TIMES DAILY
Status: DISCONTINUED | OUTPATIENT
Start: 2022-09-24 | End: 2022-09-29 | Stop reason: HOSPADM

## 2022-09-24 RX ADMIN — SODIUM CHLORIDE, PRESERVATIVE FREE 10 ML: 5 INJECTION INTRAVENOUS at 20:52

## 2022-09-24 RX ADMIN — KETOROLAC TROMETHAMINE 15 MG: 15 INJECTION, SOLUTION INTRAMUSCULAR; INTRAVENOUS at 03:15

## 2022-09-24 RX ADMIN — PANTOPRAZOLE SODIUM 40 MG: 40 TABLET, DELAYED RELEASE ORAL at 07:02

## 2022-09-24 RX ADMIN — GABAPENTIN 400 MG: 400 CAPSULE ORAL at 18:57

## 2022-09-24 RX ADMIN — TOPIRAMATE 75 MG: 25 TABLET, FILM COATED ORAL at 08:42

## 2022-09-24 RX ADMIN — DOCUSATE SODIUM 100 MG: 100 CAPSULE, LIQUID FILLED ORAL at 20:50

## 2022-09-24 RX ADMIN — GABAPENTIN 400 MG: 400 CAPSULE ORAL at 15:05

## 2022-09-24 RX ADMIN — TOPIRAMATE 75 MG: 25 TABLET, FILM COATED ORAL at 20:50

## 2022-09-24 RX ADMIN — IPRATROPIUM BROMIDE 2 SPRAY: 42 SPRAY, METERED NASAL at 18:58

## 2022-09-24 RX ADMIN — CETIRIZINE HYDROCHLORIDE 5 MG: 10 TABLET, FILM COATED ORAL at 08:43

## 2022-09-24 RX ADMIN — GADOBENATE DIMEGLUMINE 17 ML: 529 INJECTION, SOLUTION INTRAVENOUS at 11:37

## 2022-09-24 RX ADMIN — IPRATROPIUM BROMIDE 2 SPRAY: 42 SPRAY, METERED NASAL at 08:56

## 2022-09-24 RX ADMIN — METAXALONE 400 MG: 800 TABLET ORAL at 15:05

## 2022-09-24 RX ADMIN — METAXALONE 400 MG: 800 TABLET ORAL at 20:51

## 2022-09-24 RX ADMIN — MOMETASONE FUROATE AND FORMOTEROL FUMARATE DIHYDRATE 2 PUFF: 200; 5 AEROSOL RESPIRATORY (INHALATION) at 19:25

## 2022-09-24 RX ADMIN — ENOXAPARIN SODIUM 40 MG: 100 INJECTION SUBCUTANEOUS at 08:45

## 2022-09-24 RX ADMIN — SODIUM CHLORIDE, PRESERVATIVE FREE 10 ML: 5 INJECTION INTRAVENOUS at 08:57

## 2022-09-24 RX ADMIN — MOMETASONE FUROATE AND FORMOTEROL FUMARATE DIHYDRATE 2 PUFF: 200; 5 AEROSOL RESPIRATORY (INHALATION) at 08:00

## 2022-09-24 RX ADMIN — MAGNESIUM CHLORIDE 1 TABLET: 71.5 TABLET ORAL at 08:50

## 2022-09-24 RX ADMIN — METAXALONE 400 MG: 800 TABLET ORAL at 08:41

## 2022-09-24 RX ADMIN — DOCUSATE SODIUM 100 MG: 100 CAPSULE, LIQUID FILLED ORAL at 15:05

## 2022-09-24 RX ADMIN — TRAMADOL HYDROCHLORIDE 50 MG: 50 TABLET, FILM COATED ORAL at 04:45

## 2022-09-24 RX ADMIN — PANTOPRAZOLE SODIUM 40 MG: 40 TABLET, DELAYED RELEASE ORAL at 15:05

## 2022-09-24 RX ADMIN — IPRATROPIUM BROMIDE 2 SPRAY: 42 SPRAY, METERED NASAL at 23:09

## 2022-09-24 RX ADMIN — KETOROLAC TROMETHAMINE 15 MG: 15 INJECTION, SOLUTION INTRAMUSCULAR; INTRAVENOUS at 21:01

## 2022-09-24 RX ADMIN — MORPHINE SULFATE 2 MG: 2 INJECTION, SOLUTION INTRAMUSCULAR; INTRAVENOUS at 23:16

## 2022-09-24 RX ADMIN — FUROSEMIDE 20 MG: 10 INJECTION, SOLUTION INTRAMUSCULAR; INTRAVENOUS at 20:50

## 2022-09-24 RX ADMIN — GABAPENTIN 400 MG: 400 CAPSULE ORAL at 23:07

## 2022-09-24 RX ADMIN — SUMATRIPTAN SUCCINATE 100 MG: 50 TABLET ORAL at 23:24

## 2022-09-24 RX ADMIN — LORAZEPAM 0.5 MG: 2 INJECTION INTRAMUSCULAR; INTRAVENOUS at 10:38

## 2022-09-24 RX ADMIN — GABAPENTIN 600 MG: 300 CAPSULE ORAL at 08:42

## 2022-09-24 RX ADMIN — TIOTROPIUM BROMIDE INHALATION SPRAY 2 PUFF: 3.12 SPRAY, METERED RESPIRATORY (INHALATION) at 08:01

## 2022-09-24 RX ADMIN — ONDANSETRON 4 MG: 2 INJECTION INTRAMUSCULAR; INTRAVENOUS at 23:24

## 2022-09-24 ASSESSMENT — PAIN DESCRIPTION - LOCATION
LOCATION: BACK

## 2022-09-24 ASSESSMENT — PAIN SCALES - GENERAL
PAINLEVEL_OUTOF10: 6
PAINLEVEL_OUTOF10: 10
PAINLEVEL_OUTOF10: 7
PAINLEVEL_OUTOF10: 7
PAINLEVEL_OUTOF10: 5
PAINLEVEL_OUTOF10: 7
PAINLEVEL_OUTOF10: 1
PAINLEVEL_OUTOF10: 8
PAINLEVEL_OUTOF10: 10
PAINLEVEL_OUTOF10: 6
PAINLEVEL_OUTOF10: 9

## 2022-09-24 ASSESSMENT — PAIN DESCRIPTION - FREQUENCY
FREQUENCY: CONTINUOUS

## 2022-09-24 ASSESSMENT — PAIN DESCRIPTION - PAIN TYPE
TYPE: ACUTE PAIN

## 2022-09-24 ASSESSMENT — PAIN DESCRIPTION - DESCRIPTORS
DESCRIPTORS: ACHING

## 2022-09-24 ASSESSMENT — PAIN DESCRIPTION - ONSET
ONSET: ON-GOING

## 2022-09-24 ASSESSMENT — PAIN - FUNCTIONAL ASSESSMENT
PAIN_FUNCTIONAL_ASSESSMENT: PREVENTS OR INTERFERES WITH MANY ACTIVE NOT PASSIVE ACTIVITIES
PAIN_FUNCTIONAL_ASSESSMENT: PREVENTS OR INTERFERES SOME ACTIVE ACTIVITIES AND ADLS
PAIN_FUNCTIONAL_ASSESSMENT: ACTIVITIES ARE NOT PREVENTED
PAIN_FUNCTIONAL_ASSESSMENT: PREVENTS OR INTERFERES SOME ACTIVE ACTIVITIES AND ADLS
PAIN_FUNCTIONAL_ASSESSMENT: ACTIVITIES ARE NOT PREVENTED

## 2022-09-24 ASSESSMENT — PAIN DESCRIPTION - ORIENTATION
ORIENTATION: LOWER;MID
ORIENTATION: LOWER;MID
ORIENTATION: MID;LOWER
ORIENTATION: OUTER;LOWER
ORIENTATION: LOWER;MID

## 2022-09-24 NOTE — PROGRESS NOTES
Patient seen and examined. Pain severe, not well controlled  Ambulatory  LLE swelling, had outpatient doppler negative for DVT  Frequent urination  Blood cultures ordered  Will trend sed rate CRP    Patient had MRI 3 weeks ago which showed edema in L4, L5 and L45 disc space. Three weeks out, pain worse, will order stat MRI lumbar spine with and without contrast to determine if this is discitis or from fall. If imagaing is not appreciable changed then will plan for kyphoplasty, otherwise discitis to be treated by ID after imaging obtained. Answered their questions, in agreement.

## 2022-09-24 NOTE — PLAN OF CARE
Problem: Discharge Planning  Goal: Discharge to home or other facility with appropriate resources  9/24/2022 1519 by Nitin Ledezma RN  Outcome: Progressing  9/24/2022 0508 by Chun Garza RN  Outcome: Progressing  Flowsheets  Taken 9/23/2022 2333  Discharge to home or other facility with appropriate resources: Identify barriers to discharge with patient and caregiver  Taken 9/23/2022 2313  Discharge to home or other facility with appropriate resources: Identify barriers to discharge with patient and caregiver     Problem: Pain  Goal: Verbalizes/displays adequate comfort level or baseline comfort level  9/24/2022 1519 by Nitin Ledezma RN  Outcome: Progressing  9/24/2022 0508 by Chun Garza RN  Outcome: Progressing     Problem: Neurosensory - Adult  Goal: Achieves stable or improved neurological status  9/24/2022 1519 by Nitin Ledezma RN  Outcome: Progressing  9/24/2022 0508 by Chun Garza RN  Outcome: Progressing  Flowsheets (Taken 9/23/2022 2333)  Achieves stable or improved neurological status: Assess for and report changes in neurological status  Goal: Absence of seizures  9/24/2022 1519 by Nitin Ledezma RN  Outcome: Progressing  9/24/2022 0508 by Chun Garza RN  Outcome: Progressing  Flowsheets (Taken 9/23/2022 2333)  Absence of seizures: Monitor for seizure activity.   If seizure occurs, document type and location of movements and any associated apnea  Goal: Remains free of injury related to seizures activity  9/24/2022 1519 by Nitin Ledezma RN  Outcome: Progressing  9/24/2022 0508 by Chun Garza RN  Outcome: Progressing  Flowsheets (Taken 9/23/2022 2333)  Remains free of injury related to seizure activity: Maintain airway, patient safety  and administer oxygen as ordered  Goal: Achieves maximal functionality and self care  9/24/2022 1519 by Nitin Ledezma RN  Outcome: Progressing  9/24/2022 0508 by Chun Garza RN  Outcome: Progressing  Flowsheets (Taken 9/23/2022 2333)  Achieves maximal functionality and self care: Monitor swallowing and airway patency with patient fatigue and changes in neurological status     Problem: Respiratory - Adult  Goal: Achieves optimal ventilation and oxygenation  9/24/2022 1519 by David Kelly RN  Outcome: Progressing  9/24/2022 0508 by Jeremy Haywood RN  Outcome: Progressing     Problem: Cardiovascular - Adult  Goal: Maintains optimal cardiac output and hemodynamic stability  9/24/2022 1519 by David Kelly RN  Outcome: Progressing  9/24/2022 0508 by Jeremy Haywood RN  Outcome: Progressing  Flowsheets (Taken 9/23/2022 2333)  Maintains optimal cardiac output and hemodynamic stability: Monitor blood pressure and heart rate  Goal: Absence of cardiac dysrhythmias or at baseline  9/24/2022 1519 by David Kelly RN  Outcome: Progressing  9/24/2022 0508 by Jeremy Haywood RN  Outcome: Progressing  Flowsheets (Taken 9/23/2022 2333)  Absence of cardiac dysrhythmias or at baseline: Monitor cardiac rate and rhythm     Problem: Skin/Tissue Integrity - Adult  Goal: Skin integrity remains intact  9/24/2022 1519 by David Kelly RN  Outcome: Progressing  9/24/2022 0508 by Jeremy Haywood RN  Outcome: Progressing  Flowsheets (Taken 9/23/2022 2333)  Skin Integrity Remains Intact: Monitor for areas of redness and/or skin breakdown  Goal: Incisions, wounds, or drain sites healing without S/S of infection  9/24/2022 1519 by David Kelly RN  Outcome: Progressing  9/24/2022 0508 by Jeremy Haywood RN  Outcome: Progressing  Goal: Oral mucous membranes remain intact  9/24/2022 1519 by David Kelly RN  Outcome: Progressing  9/24/2022 0508 by Jeremy Haywood RN  Outcome: Progressing  Flowsheets (Taken 9/23/2022 2333)  Oral Mucous Membranes Remain Intact: Assess oral mucosa and hygiene practices     Problem: Musculoskeletal - Adult  Goal: Return mobility to safest level of function  9/24/2022 1519 by David Kelly RN  Outcome: Progressing  9/24/2022 0508 by Jeremy Haywood RN  Outcome: Progressing  Flowsheets (Taken 9/23/2022 2333)  Return Mobility to Safest Level of Function: Assess patient stability and activity tolerance for standing, transferring and ambulating with or without assistive devices  Goal: Maintain proper alignment of affected body part  9/24/2022 1519 by Jesus Griffiths RN  Outcome: Progressing  9/24/2022 0508 by Shana Pena RN  Outcome: Progressing  Flowsheets (Taken 9/23/2022 2333)  Maintain proper alignment of affected body part: Support and protect limb and body alignment per provider's orders  Goal: Return ADL status to a safe level of function  9/24/2022 1519 by Jesus Griffiths RN  Outcome: Progressing  9/24/2022 0508 by Shana Pena RN  Outcome: Progressing  Flowsheets (Taken 9/23/2022 2333)  Return ADL Status to a Safe Level of Function: Administer medication as ordered     Problem: Gastrointestinal - Adult  Goal: Minimal or absence of nausea and vomiting  9/24/2022 1519 by Jesus Griffiths RN  Outcome: Progressing  9/24/2022 0508 by Shana Pena RN  Outcome: Progressing  Flowsheets (Taken 9/23/2022 2333)  Minimal or absence of nausea and vomiting: Administer ordered antiemetic medications as needed  Goal: Maintains or returns to baseline bowel function  9/24/2022 1519 by Jesus Griffiths RN  Outcome: Progressing  9/24/2022 0508 by Shana Pena RN  Outcome: Progressing  [address] (Taken 9/23/2022 2333)  Maintains or returns to baseline bowel function: Assess bowel function  Goal: Maintains adequate nutritional intake  9/24/2022 1519 by Jesus Griffiths RN  Outcome: Progressing  9/24/2022 0508 by Shana Pena RN  Outcome: Progressing  Flowsheets (Taken 9/23/2022 2333)  Maintains adequate nutritional intake: Monitor percentage of each meal consumed  Goal: Establish and maintain optimal ostomy function  9/24/2022 1519 by Jesus Griffiths RN  Outcome: Progressing  9/24/2022 0508 by Shana Pena RN  Outcome: Progressing     Problem: Genitourinary - Adult  Goal: Absence of urinary retention  9/24/2022 1519 by Jayden Richards Mario Blanchard RN  Outcome: Progressing  9/24/2022 0508 by Leanna Pineda RN  Outcome: Progressing  Flowsheets (Taken 9/23/2022 2333)  Absence of urinary retention: Assess patients ability to void and empty bladder  Goal: Urinary catheter remains patent  9/24/2022 1519 by Lori Garzon RN  Outcome: Progressing  9/24/2022 0508 by Leanna Pineda RN  Outcome: Progressing     Problem: Infection - Adult  Goal: Absence of infection at discharge  9/24/2022 1519 by Lori Garzon RN  Outcome: Progressing  9/24/2022 0508 by Leanna Pineda RN  Outcome: Progressing  Flowsheets (Taken 9/23/2022 2333)  Absence of infection at discharge: Assess and monitor for signs and symptoms of infection  Goal: Absence of infection during hospitalization  9/24/2022 1519 by Lori Garzon RN  Outcome: Progressing  9/24/2022 0508 by Leanna Pineda RN  Outcome: Progressing  Flowsheets (Taken 9/23/2022 2333)  Absence of infection during hospitalization: Assess and monitor for signs and symptoms of infection  Goal: Absence of fever/infection during anticipated neutropenic period  9/24/2022 1519 by Lori Garzon RN  Outcome: Progressing  9/24/2022 0508 by Leanna Pineda RN  Outcome: Progressing  Flowsheets (Taken 9/23/2022 2333)  Absence of fever/infection during anticipated neutropenic period: Monitor white blood cell count     Problem: Metabolic/Fluid and Electrolytes - Adult  Goal: Electrolytes maintained within normal limits  9/24/2022 1519 by Lori Garzon RN  Outcome: Progressing  9/24/2022 0508 by Leanna Pineda RN  Outcome: Progressing  Flowsheets (Taken 9/23/2022 2333)  Electrolytes maintained within normal limits: Monitor labs and assess patient for signs and symptoms of electrolyte imbalances  Goal: Hemodynamic stability and optimal renal function maintained  9/24/2022 1519 by Lori Garzon RN  Outcome: Progressing  9/24/2022 0508 by Leanna Pineda RN  Outcome: Progressing  Flowsheets (Taken 9/23/2022 2333)  Hemodynamic stability and optimal renal function maintained: Monitor labs and assess for signs and symptoms of volume excess or deficit  Goal: Glucose maintained within prescribed range  9/24/2022 1519 by Kelly Rojas RN  Outcome: Progressing  9/24/2022 0508 by Bianca De La Torre RN  Outcome: Progressing  Flowsheets (Taken 9/23/2022 2333)  Glucose maintained within prescribed range: Monitor blood glucose as ordered     Problem: Hematologic - Adult  Goal: Maintains hematologic stability  9/24/2022 1519 by Kelly Rojas RN  Outcome: Progressing  9/24/2022 0508 by Bianca De La Torre RN  Outcome: Progressing  Flowsheets (Taken 9/23/2022 2333)  Maintains hematologic stability: Assess for signs and symptoms of bleeding or hemorrhage     Problem: Safety - Adult  Goal: Free from fall injury  9/24/2022 1519 by Kelly Rojas RN  Outcome: Progressing  9/24/2022 0508 by Bianca De La Torre RN  Outcome: Progressing     Problem: ABCDS Injury Assessment  Goal: Absence of physical injury  9/24/2022 1519 by Kelly Rojas RN  Outcome: Progressing  9/24/2022 0508 by Bianca De La Torre RN  Outcome: Progressing

## 2022-09-24 NOTE — FLOWSHEET NOTE
Patient complained all night that his lower back pain is not managed well with the pain medications he's been getting since he arrived at [facility]. BETH Monet placed orders for Lidocaine patch and Voltaren gel and metaxalone muscle relexant orders. Patient refused the lidocaine patch and Voltaren Gel as he said his skin is very sensitive to topicals and adhesives. (He even took off his Tele leads as he said it irritates his skin.)     He got topamax and gabapentin tablets at hs plus toradol prn every six hours. He stated it is \"just not enough to control my pain. I can't even sleep. \"    Everardo Mcnamara , hospitalist, ordered one time Tramadol 100mg as this patient had taken this at home before and it was administered to this patient. Again this patient is already asking when he could have his gabapentin again. Order is b.I.d. So he doesn't get this med till 8am.    When advised about this schedule, patient stated he gets this meds at home every four hours. Although that doesn't show in his home med list. Patient told that Dr. Maribel Almazan will be messaged regarding this in the sticky notes (including request to d/c telemonitoring order). Wife stayed in his room all night to assist him. Patient is to see the Spine specialist for his discitis.

## 2022-09-24 NOTE — PROGRESS NOTES
Occupational Therapy  Status Note    Alyce Marquis  9/24/2022  [id number]      OT orders noted. Per Dr. Phillips Jimmy - MRI ordered stat and hold therapy evaluations at this time. Current orders cancelled. Please reorder when medically appropriate.      Electronically signed by DAIANA Winslow/L#[id number] on 9/24/2022 at 10:26 AM

## 2022-09-24 NOTE — H&P
Hospital Medicine History & Physical      PCP: Sirisha Toro MD    Date of Admission: 9/23/2022    Chief Complaint:  Back pain    History Of Present Illness:    Patient is a 24-year-old male who presents to the hospital due to complaint of back pain, 10/10 intensity, lumbar area. Patient also mentions he was recommended by neurosurgeon to come to the emergency department. Patient recently had MRI that did show L1-L2 compression fracture as well as possible discitis. Patient otherwise denies fever chills diarrhea constipation, he denies history of IVDA. Past Medical History:          Diagnosis Date    Arthritis     Asthma     B12 deficiency 07/10/2013    Tooks injection every other week and B12 level 500. Recommended that he continue current therapy, as will likely drop with oral supplement. Closed wedge compression fracture of L1 vertebra ([address] 09/05/2018    Superior endplate fracture of L1 resulting 80% loss of vertebral body height    DDD (degenerative disc disease), lumbar 09/05/2018    L2-L3, L3-L4, L4-L5. Disc bulge with narrowing of the neural foramen at these levels    Diverticulitis     Gallbladder problem     S/P lap martha    GERD (gastroesophageal reflux disease)     Hiatal hernia     Migraine     MRSA infection 04/03/2019    Left hand    Nausea & vomiting     Spondylosis of lumbar spine 11/02/2015    On x-ray multilevel       Past Surgical History:          Procedure Laterality Date    CHOLECYSTECTOMY      COLONOSCOPY  9/11    FOOT SURGERY      nerve removed from foot    SHOULDER SURGERY Right 2005    rotator cuff repair    UPPER GASTROINTESTINAL ENDOSCOPY  9/11       Medications Prior to Admission:      Prior to Admission medications    Medication Sig Start Date End Date Taking?  Authorizing Provider   Magnesium Gluconate 500 (27 Mg) MG TABS tablet Take 500 mg by mouth daily   Yes Historical Provider, MD   Levomefolate Glucosamine (METHYLFOLATE) 400 MCG CAPS Take 400 mcg by mouth every evening   Yes Historical Provider, MD   gabapentin (NEURONTIN) 300 MG capsule Take 300 mg by mouth in the morning and at bedtime. 1200 and 1600   Yes Historical Provider, MD   traMADol (ULTRAM) 50 MG tablet Take 1-2 tablets by mouth every 6 hours as needed for Pain for up to 15 days. TAKE ONE OR TWO TABLETS EVERY 6 HOURS AS NEEDED FOR PAIN 9/21/22 10/6/22  Jill Perera MD   gabapentin (NEURONTIN) 600 MG tablet 1 po am, 1 po pm and 1-2 po hs  Patient taking differently: Take 600 mg by mouth 2 times daily. 0800 and 2100 9/20/22 10/20/22  Jill Patches, MD   ondansetron (ZOFRAN ODT) 4 MG disintegrating tablet Take 1 tablet by mouth every 8 hours as needed for Nausea or Vomiting 9/15/22   Jill Patches, MD   tolterodine (DETROL LA) 4 MG extended release capsule Take 1 capsule by mouth daily 9/6/22   Jill Patches, MD   calcitonin (MIACALCIN) 200 UNIT/ACT nasal spray 1 spray in one nostril daily, alternating nostril side daily 9/5/22   Jill Perera MD   tiZANidine (ZANAFLEX) 4 MG tablet Take 1 tablet by mouth nightly  Patient not taking: Reported on 9/23/2022 8/23/22   Jill Perera, MD   rizatriptan (MAXALT-MLT) 10 MG disintegrating tablet May repeat in 2 hours if needed 8/23/22   Jill Perera MD   tiotropium (SPIRIVA RESPIMAT) 2.5 MCG/ACT AERS inhaler Inhale 2 puffs into the lungs in the morning. 8/5/22   Jill Perera MD   levocetirizine (XYZAL) 5 MG tablet TAKE 1 TABLET BY MOUTH EVERY EVENING.   Patient taking differently: Take 5 mg by mouth nightly 7/19/22   CELESTINO Hawkins - CNP   pantoprazole (PROTONIX) 40 MG tablet TAKE 1 TABLET BY MOUTH TWICE DAILY  Patient taking differently: Take 40 mg by mouth in the morning and at bedtime 7/18/22   Jillshira Perera MD   mometasone-formoterol [facility]) 200-5 MCG/ACT inhaler INHALE 2 PUFFS INTO THE LUNGS 2 TIMES DAILY RINSE MOUTH AFTER USING. 7/18/22   CELESTINO Hawkins CNP   albuterol sulfate HFA (PROVENTIL;VENTOLIN;PROAIR) 108 (90 Base) MCG/ACT inhaler INHALE 2 PUFFS INTO THE LUNGS FOUR TIMES DAILY AS NEEDED FOR WHEEZING 6/20/22   Kaykay Caruso APRN - CNP   vitamin D (ERGOCALCIFEROL) 1.25 MG (04781 UT) CAPS capsule TAKE 1 CAPSULE BY MOUTH WEEKLY  Patient taking differently: Take 50,000 Units by mouth once a week On Sundays 6/17/22   Kerrie Mo MD   Spacer/Aero-Holding Chambers (COMPACT SPACE CHAMBER/SM MASK) AZAEL AS DIRECTED w/ inhalers 4/21/22   Kerrie Mo MD   topiramate (TOPAMAX) 25 MG tablet TAKE 3 TABLETS BY MOUTH EVERY MORNING AND 5 TABLETS BY MOUTH EVERY EVENING  Patient taking differently: Take 75 mg by mouth 2 times daily 4/21/22   Kerrie Mo MD   ipratropium (ATROVENT) 0.06 % nasal spray TAKE 1-2 SPRAYS BY NASAL ROUTE 4 TIMES DAILY AS NEEDED FOR RHINITIS 1/25/22   Kerrie Mo MD   albuterol (PROVENTIL) (2.5 MG/3ML) 0.083% nebulizer solution Take 3 mLs by nebulization every 6 hours as needed for Wheezing 1/18/22   Kerrie Mo MD       Allergies:  Codeine and Hydrocodone    Social History:      TOBACCO:   reports that he has never smoked. He has never used smokeless tobacco.  ETOH:   reports that he does not currently use alcohol. Family History:       Reviewed in detail and non contributory          Problem Relation Age of Onset    Hypertension Mother     High Cholesterol Mother     Hypertension Father     High Cholesterol Father     Diabetes Type 1  Sister         B 12    Diabetes Type 1  Sister         early peripheral neuropathy    Stroke Brother 79    Hypertension Brother     High Cholesterol Brother     Hypertension Brother     High Cholesterol Brother     Hypertension Brother     High Cholesterol Brother        REVIEW OF SYSTEMS:   Pertinent positives as noted in the HPI. All other systems reviewed and negative.     PHYSICAL EXAM PERFORMED:    BP (!) 147/75   Pulse 81   Temp 98.9 °F (37.2 °C) (Oral)   Resp 18   Ht 6' 1\" (1.854 m)   Wt 178 lb 3.2 oz (80.8 kg)   SpO2 97%   BMI 23.51 kg/m²     General appearance:  NAD  HEENT: Normal cephalic, atraumatic without obvious deformity. Conjunctivae/corneas clear. Neck: Supple, with full range of motion. No cervical lymphadenopathy  Respiratory:  Normal respiratory effort. Clear to auscultation, bilaterally without Rales/Wheezes/Rhonchi. Cardiovascular:  Regular rate and rhythm with normal S1/S2 without murmurs, rubs or gallops. Abdomen: Soft, non-tender, non-distended, normal bowel sounds. Musculoskeletal:  No edema noted bilaterally. No tenderness on palpation   Skin: no rash visible  Neurologic:  Neurologically intact without any focal sensory/motor deficits. grossly non-focal.  Psychiatric:  Alert and oriented, normal mood  Peripheral Pulses: +2 palpable, equal bilaterally       Labs:     Recent Labs     09/23/22  1619   WBC 9.2   HGB 12.9*   HCT 38.1*        Recent Labs     09/23/22  1619   *   K 3.7      CO2 24   BUN 14   CREATININE 0.6*   CALCIUM 8.9     No results for input(s): AST, ALT, BILIDIR, BILITOT, ALKPHOS in the last 72 hours. No results for input(s): INR in the last 72 hours. No results for input(s): Chandana Pander in the last 72 hours. Urinalysis:      Lab Results   Component Value Date/Time    BLOODU NEG 09/15/2022 06:14 PM    SPECGRAV 1.020 09/15/2022 06:14 PM    GLUCOSEU NEG 09/15/2022 06:14 PM       Radiology:       No orders to display           Active Hospital Problems    Diagnosis Date Noted    Back pain [M54.9] 09/23/2022     Priority: Medium       Patient is a 62-year-old male who presents to the hospital due to complaint of back pain, 10/10 intensity, lumbar area. Patient also mentions he was recommended by neurosurgeon to come to the emergency department. Patient recently had MRI that did show L1-L2 compression fracture as well as possible discitis. Patient otherwise denies fever chills diarrhea constipation, he denies history of IVDA.     Assessment  Back pain, compression fracture at L1-L2, also suspicious for discitis  History of GERD  Migraine    Plan  Pain control  Consult neurosurgery  Consult PT/OT  Check ESR, CRP  Patient recently had MRI, will defer ordering another MRI to neurosurgery, holding off of antibiotics for now  Consider consulting infectious disease  Resume home medications  DVT prophylaxis-Lovenox  Diet: ADULT DIET; Regular  Code Status: Full Code    PT/OT Eval Status: ordered    Dispo - pending clinical improvement       Roxann Wright MD    The note was completed using EMR and Dragon dictation system. Every effort was made to ensure accuracy; however, inadvertent computerized transcription errors may be present. Thank you Jill Perera MD for the opportunity to be involved in this patient's care. If you have any questions or concerns please feel free to contact me at [phone].     Roxann Wright MD

## 2022-09-24 NOTE — PROGRESS NOTES
MRI reviewed. Interval worsening of fracture L4 and evidence of fracture of L2 at 40% loss of height. L5 compression fracture is stable. Small amount of edema in T12 vertebral body without loss of height. Discitis is not entirely excluded but there is no paraspinous involvment or epidural extension and sed rate has trended down. Impression Compression fracture x 4 -- T12, L2, L4, L5    Will discuss kyphoplasty procedure with patient.

## 2022-09-24 NOTE — PROGRESS NOTES
Hospitalist Progress Note      PCP: Debbie Payan MD    Date of Admission: 9/23/2022    Chief Complaint: Back pain    Hospital Course: Patient is a 53-year-old male who presents to the hospital due to complaint of back pain, 10/10 intensity, lumbar area. Patient also mentions he was recommended by neurosurgeon to come to the emergency department. Patient recently had MRI that did show L1-L2 compression fracture as well as possible discitis. Patient otherwise denies fever chills diarrhea constipation, he denies history of IVDA. Subjective: Patient laying on left side, states he cannot lay on his back because of the severe pain. Reports swelling in his legs is getting worse as well. Very doughy type edema. Is hesitant to try morphine, has sensitivity with headaches and nausea pain medicine. We will give it a try with premedication Zofran. Currently oral pain medication not controlling pain at all. He feels very miserable. Neurosurgery was consulted, waiting for them to evaluate. He states he does get occasionally short of breath but has asthma. He denies chest pain abdominal pain. Gets occasional migraines. Wife at bedside, discussed plan of care with patient and wife, deny further needs or questions. Assessment/Plan:    Intractable low back pain with radiculopathy  Neuropathy the bilateral feet  Possible discitis  -Sed rate and CRP are normal.  No fever, no leukocytosis hold off on starting IV antibiotics until reviewed by infectious disease  -Consulted neurosurgery, who ordered stat MRI of lumbar spine  -Adjusted gabapentin dose to 400 mg 5 times a day.   He was taking 300 mg 5 times a day at home  -Added morphine IV    Bilateral lower extremity edema  Dyspnea   -Had venous Doppler 9/20/2022, negative bilaterally  -Has not had echo, will order  -Check BNP  -Can trial Lasix  -Albumin normal      History of GERD, migraine  -Continue home meds as ordered    Active Hospital Problems Diagnosis     Back pain [M54.9]      Priority: Medium       Medications:  Reviewed    Infusion Medications    sodium chloride       Scheduled Medications    docusate sodium  100 mg Oral BID    gabapentin  400 mg Oral 5x Daily    LORazepam  0.5 mg IntraVENous Once    sodium chloride flush  10 mL IntraVENous 2 times per day    enoxaparin  40 mg SubCUTAneous Daily    ipratropium  2 spray Each Nostril TID    cetirizine  5 mg Oral Daily    magnesium cl-calcium carbonate  1 tablet Oral Daily with breakfast    mometasone-formoterol  2 puff Inhalation BID    pantoprazole  40 mg Oral BID AC    tiotropium  2 puff Inhalation Daily    topiramate  75 mg Oral BID    metaxalone  400 mg Oral TID    lidocaine  1 patch TransDERmal Daily    calcitonin  1 spray Alternating Nares Daily     PRN Meds: morphine, perflutren lipid microspheres, sodium chloride flush, sodium chloride, potassium chloride **OR** potassium alternative oral replacement **OR** potassium chloride, potassium chloride, magnesium sulfate, promethazine **OR** ondansetron, magnesium hydroxide, acetaminophen **OR** acetaminophen, albuterol, albuterol sulfate HFA, ketorolac, SUMAtriptan      Intake/Output Summary (Last 24 hours) at 9/24/2022 1039  Last data filed at 9/24/2022 0654  Gross per 24 hour   Intake 802 ml   Output --   Net 802 ml       Physical Exam Performed:    /76   Pulse 80   Temp 98.3 °F (36.8 °C) (Oral)   Resp 17   Ht 6' 1\" (1.854 m)   Wt 178 lb 12.7 oz (81.1 kg)   SpO2 97%   BMI 23.59 kg/m²     General appearance: No apparent distress, appears stated age and cooperative. HEENT: Pupils equal, round, and reactive to light. Conjunctivae/corneas clear. Neck: Supple, with full range of motion. No jugular venous distention. Trachea midline. Respiratory:  Normal respiratory effort. Clear to auscultation, bilaterally without Rales/Wheezes/Rhonchi.   Cardiovascular: Regular rate and rhythm with normal S1/S2 without murmurs, rubs or gallops. Abdomen: Soft, non-tender, non-distended with normal bowel sounds. Musculoskeletal: No clubbing, cyanosis. 2+ edema bilaterally. Full range of motion without deformity. Skin: Skin color, texture, turgor normal.  No rashes or lesions. Neurologic:  Neurovascularly intact without any focal sensory/motor deficits. Cranial nerves: II-XII intact, grossly non-focal.  Psychiatric: Alert and oriented, thought content appropriate, normal insight  Capillary Refill: Brisk,< 3 seconds   Peripheral Pulses: +2 palpable, equal bilaterally       Labs:   Recent Labs     09/23/22  1619 09/24/22  0613   WBC 9.2 5.5   HGB 12.9* 12.7*   HCT 38.1* 36.5*    241     Recent Labs     09/23/22  1619 09/24/22  0613   * 136   K 3.7 3.7    101   CO2 24 25   BUN 14 9   CREATININE 0.6* 0.6*   CALCIUM 8.9 8.7     No results for input(s): AST, ALT, BILIDIR, BILITOT, ALKPHOS in the last 72 hours. No results for input(s): INR in the last 72 hours. No results for input(s): L[place] Ill in the last 72 hours. Urinalysis:      Lab Results   Component Value Date/Time    BLOODU NEG 09/15/2022 06:14 PM    SPECGRAV 1.020 09/15/2022 06:14 PM    GLUCOSEU NEG 09/15/2022 06:14 PM       Radiology:  MRI LUMBAR SPINE W WO CONTRAST    (Results Pending)           DVT Prophylaxis: Lovenox  Diet: ADULT DIET; Regular  Code Status: Full Code    PT/OT Eval Status: Eval ordered    Dispo -home once medically stable    CELESTINO Grider CNP      NOTE:  This report was transcribed using voice recognition software. Every effort was made to ensure accuracy; however, inadvertent computerized transcription errors may be present.

## 2022-09-24 NOTE — PLAN OF CARE
Problem: Discharge Planning  Goal: Discharge to home or other facility with appropriate resources  Outcome: Progressing  Flowsheets  Taken 9/23/2022 2333  Discharge to home or other facility with appropriate resources: Identify barriers to discharge with patient and caregiver  Taken 9/23/2022 2313  Discharge to home or other facility with appropriate resources: Identify barriers to discharge with patient and caregiver     Problem: Pain  Goal: Verbalizes/displays adequate comfort level or baseline comfort level  Outcome: Progressing     Problem: Neurosensory - Adult  Goal: Achieves stable or improved neurological status  Outcome: Progressing  Flowsheets (Taken 9/23/2022 2333)  Achieves stable or improved neurological status: Assess for and report changes in neurological status  Goal: Absence of seizures  Outcome: Progressing  Flowsheets (Taken 9/23/2022 2333)  Absence of seizures: Monitor for seizure activity.   If seizure occurs, document type and location of movements and any associated apnea  Goal: Remains free of injury related to seizures activity  Outcome: Progressing  Flowsheets (Taken 9/23/2022 2333)  Remains free of injury related to seizure activity: Maintain airway, patient safety  and administer oxygen as ordered  Goal: Achieves maximal functionality and self care  Outcome: Progressing  Flowsheets (Taken 9/23/2022 2333)  Achieves maximal functionality and self care: Monitor swallowing and airway patency with patient fatigue and changes in neurological status     Problem: Respiratory - Adult  Goal: Achieves optimal ventilation and oxygenation  Outcome: Progressing     Problem: Cardiovascular - Adult  Goal: Maintains optimal cardiac output and hemodynamic stability  Outcome: Progressing  Flowsheets (Taken 9/23/2022 2333)  Maintains optimal cardiac output and hemodynamic stability: Monitor blood pressure and heart rate  Goal: Absence of cardiac dysrhythmias or at baseline  Outcome: Progressing  Flowsheets (Taken 9/23/2022 2333)  Absence of cardiac dysrhythmias or at baseline: Monitor cardiac rate and rhythm     Problem: Skin/Tissue Integrity - Adult  Goal: Skin integrity remains intact  Outcome: Progressing  Flowsheets (Taken 9/23/2022 2333)  Skin Integrity Remains Intact: Monitor for areas of redness and/or skin breakdown  Goal: Incisions, wounds, or drain sites healing without S/S of infection  Outcome: Progressing  Goal: Oral mucous membranes remain intact  Outcome: Progressing  Flowsheets (Taken 9/23/2022 2333)  Oral Mucous Membranes Remain Intact: Assess oral mucosa and hygiene practices     Problem: Musculoskeletal - Adult  Goal: Return mobility to safest level of function  Outcome: Progressing  Flowsheets (Taken 9/23/2022 2333)  Return Mobility to Safest Level of Function: Assess patient stability and activity tolerance for standing, transferring and ambulating with or without assistive devices  Goal: Maintain proper alignment of affected body part  Outcome: Progressing  Flowsheets (Taken 9/23/2022 2333)  Maintain proper alignment of affected body part: Support and protect limb and body alignment per provider's orders  Goal: Return ADL status to a safe level of function  Outcome: Progressing  Flowsheets (Taken 9/23/2022 2333)  Return ADL Status to a Safe Level of Function: Administer medication as ordered     Problem: Gastrointestinal - Adult  Goal: Minimal or absence of nausea and vomiting  Outcome: Progressing  Flowsheets (Taken 9/23/2022 2333)  Minimal or absence of nausea and vomiting: Administer ordered antiemetic medications as needed  Goal: Maintains or returns to baseline bowel function  Outcome: Progressing  Flowsheets (Taken 9/23/2022 2333)  Maintains or returns to baseline bowel function: Assess bowel function  Goal: Maintains adequate nutritional intake  Outcome: Progressing  Flowsheets (Taken 9/23/2022 2333)  Maintains adequate nutritional intake: Monitor percentage of each meal consumed  Goal: Establish and maintain optimal ostomy function  Outcome: Progressing     Problem: Genitourinary - Adult  Goal: Absence of urinary retention  Outcome: Progressing  Flowsheets (Taken 9/23/2022 2333)  Absence of urinary retention: Assess patients ability to void and empty bladder  Goal: Urinary catheter remains patent  Outcome: Progressing     Problem: Infection - Adult  Goal: Absence of infection at discharge  Outcome: Progressing  Flowsheets (Taken 9/23/2022 2333)  Absence of infection at discharge: Assess and monitor for signs and symptoms of infection  Goal: Absence of infection during hospitalization  Outcome: Progressing  Flowsheets (Taken 9/23/2022 2333)  Absence of infection during hospitalization: Assess and monitor for signs and symptoms of infection  Goal: Absence of fever/infection during anticipated neutropenic period  Outcome: Progressing  Flowsheets (Taken 9/23/2022 2333)  Absence of fever/infection during anticipated neutropenic period: Monitor white blood cell count     Problem: Metabolic/Fluid and Electrolytes - Adult  Goal: Electrolytes maintained within normal limits  Outcome: Progressing  Flowsheets (Taken 9/23/2022 2333)  Electrolytes maintained within normal limits: Monitor labs and assess patient for signs and symptoms of electrolyte imbalances  Goal: Hemodynamic stability and optimal renal function maintained  Outcome: Progressing  Flowsheets (Taken 9/23/2022 2333)  Hemodynamic stability and optimal renal function maintained: Monitor labs and assess for signs and symptoms of volume excess or deficit  Goal: Glucose maintained within prescribed range  Outcome: Progressing  Flowsheets (Taken 9/23/2022 2333)  Glucose maintained within prescribed range: Monitor blood glucose as ordered     Problem: ABCDS Injury Assessment  Goal: Absence of physical injury  Outcome: Progressing

## 2022-09-24 NOTE — PROGRESS NOTES
Physical Therapy  Hold Note  Sona Esquivel  [id number]    PT orders received but per Dr. Marshall Cornejo: PT to hold until MRI is completed and new orders are received.     Electronically signed by Lachelle Van, PT [id number] on 9/24/2022 at 10:27 AM

## 2022-09-24 NOTE — CONSULTS
Infectious Diseases Inpatient Consult Note      Reason for Consult:  Back pain and abnormal MRI     Requesting Physician:  Marcial TIRADO     Primary Care Physician:  Hannah Johns MD    History Obtained From:  Epic and Patient      CHIEF COMPLAINT:     Chief Complaint   Patient presents with    Back Pain     Patient was sent by neurologist for eval of possible infection in his disc following MRI          HISTORY OF PRESENT ILLNESS:  73 y.o. man admitted to the hospital secondary to ongoing lower back pain. He has a history of lumbar spondylosis and also history of injuries to the back in the past has he worked as a . Noted to have degenerative disc disease with narrowing of the foramina back in 2018. Also documented closed wedge compression fractures in the past but not been to the hospital secondary to worsening back pain. He was referred to see neurosurgery. Due to acute worsening of symptoms now admitted to the hospital he underwent repeat MRI of the lumbar spine which indicated acute subacute compression fracture involving L4-L5 there is a new acute deformity of the L2 vertebral body with multilevel degenerative changes but no focal fluid collection or concern for discitis. He denies any fever chills is complaining of some hesitancy with urination but no prostate issues before. Past Medical History:    Past Medical History:   Diagnosis Date    Arthritis     Asthma     B12 deficiency 07/10/2013    Tooks injection every other week and B12 level 500. Recommended that he continue current therapy, as will likely drop with oral supplement. Closed wedge compression fracture of L1 vertebra ([address] 09/05/2018    Superior endplate fracture of L1 resulting 80% loss of vertebral body height    DDD (degenerative disc disease), lumbar 09/05/2018    L2-L3, L3-L4, L4-L5.   Disc bulge with narrowing of the neural foramen at these levels    Diverticulitis     Gallbladder problem     S/P lap martha    GERD (gastroesophageal reflux disease)     Hiatal hernia     Migraine     MRSA infection 04/03/2019    Left hand    Nausea & vomiting     Spondylosis of lumbar spine 11/02/2015    On x-ray multilevel       Past Surgical History:    Past Surgical History:   Procedure Laterality Date    CHOLECYSTECTOMY      COLONOSCOPY  9/11    FOOT SURGERY      nerve removed from foot    SHOULDER SURGERY Right 2005    rotator cuff repair    UPPER GASTROINTESTINAL ENDOSCOPY  9/11       Current Medications:    Outpatient Medications Marked as Taking for the 9/23/22 encounter [facility] Encounter)   Medication Sig Dispense Refill    Magnesium Gluconate 500 (27 Mg) MG TABS tablet Take 500 mg by mouth daily      Levomefolate Glucosamine (METHYLFOLATE) 400 MCG CAPS Take 400 mcg by mouth every evening      gabapentin (NEURONTIN) 300 MG capsule Take 300 mg by mouth in the morning and at bedtime.  1200 and 1600         Allergies:  Codeine and Hydrocodone    Immunizations :   Immunization History   Administered Date(s) Administered    COVID-19, MODERNA BLUE border, Primary or Immunocompromised, (age 12y+), IM, 100 mcg/0.5mL 02/10/2021, 03/12/2021    Influenza Vaccine, unspecified formulation 12/13/2016, 11/20/2017    Influenza Virus Vaccine 10/24/2014    Influenza, FLUAD, (age 72 y+), Adjuvanted, 0.5mL 10/19/2021    Influenza, High Dose (Fluzone 65 yrs and older) 09/11/2018, 10/11/2019, 10/28/2020    Influenza, Intradermal, Preservative free 11/02/2015    Pneumococcal Conjugate 13-valent ([id number]) 02/23/2018    Pneumococcal Polysaccharide ([id number]) 12/19/2019    Tdap (Boostrix, Adacel) 11/20/2020    Zoster Recombinant (Shingrix) 11/20/2020, 11/10/2021         Social History:    Social History     Tobacco Use    Smoking status: Never    Smokeless tobacco: Never   Vaping Use    Vaping Use: Never used   Substance Use Topics    Alcohol use: Not Currently     Comment: glass of wine at night    Drug use: No     Social History Tobacco Use   Smoking Status Never   Smokeless Tobacco Never      Family History   Problem Relation Age of Onset    Hypertension Mother     High Cholesterol Mother     Hypertension Father     High Cholesterol Father     Diabetes Type 1  Sister         B 12    Diabetes Type 1  Sister         early peripheral neuropathy    Stroke Brother 79    Hypertension Brother     High Cholesterol Brother     Hypertension Brother     High Cholesterol Brother     Hypertension Brother     High Cholesterol Brother          REVIEW OF SYSTEMS:      Constitutional:  negative for fevers, chills, night sweats  Eyes:  negative for blurred vision, eye discharge, visual disturbance   HEENT:  negative for hearing loss, ear drainage,nasal congestion  Respiratory:  negative for cough, shortness of breath or hemoptysis   Cardiovascular:  negative for chest pain, palpitations, syncope  Gastrointestinal:  negative for nausea, vomiting, diarrhea, constipation, abdominal pain  Genitourinary:  negative for frequency, dysuria, urinary incontinence, hematuria  Hematologic/Lymphatic:  negative for easy bruising, bleeding and lymphadenopathy  Allergic/Immunologic:  negative for recurrent infections, angioedema, anaphylaxis   Endocrine:  negative for weight changes, polyuria, polydipsia and polyphagia  Musculoskeletal:  Back   pain+ , leg weakness+ swelling, decreased range of motion  Integumentary: No rashes, skin lesions  Neurological:  negative for headaches, slurred speech, unilateral weakness  Psychiatric: negative for hallucinations,confusion,agitation.      PHYSICAL EXAM:      Vitals:    /76   Pulse 80   Temp 98.3 °F (36.8 °C) (Oral)   Resp 17   Ht 6' 1\" (1.854 m)   Wt 178 lb 12.7 oz (81.1 kg)   SpO2 97%   BMI 23.59 kg/m²     General Appearance: alert,in no acute distress, +  pallor, no icterus   Skin: warm and dry, no rash or erythema  Head: normocephalic and atraumatic  Eyes: pupils equal, round, and reactive to light, conjunctivae normal  ENT: tympanic membrane, external ear and ear canal normal bilaterally, nose without deformity, nasal mucosa and turbinates normal without polyps  Neck: supple and non-tender without mass, no thyromegaly  no cervical lymphadenopathy  Pulmonary/Chest: clear to auscultation bilaterally- no wheezes, rales or rhonchi, normal air movement, no respiratory distress  Cardiovascular: normal rate, regular rhythm, normal S1 and S2, no murmurs, rubs, clicks, or gallops, no carotid bruits  Abdomen: soft, non-tender, non-distended, normal bowel sounds, no masses or organomegaly  Extremities: no cyanosis, clubbing or edema  Musculoskeletal: normal range of motion, no joint swelling, deformity or tenderness  Integumentary: No rashes, no abnormal skin lesions, no petechiae  Neurologic: reflexes normal and symmetric, no cranial nerve deficit  Psych:  Orientation, sensorium, mood normal   Lines: IV  Lumbar area deformity + and tenderness hyepresthesia+  Rt LEG weaker than Left leg +   DATA:    CBC:   Lab Results   Component Value Date    WBC 5.5 09/24/2022    HGB 12.7 (L) 09/24/2022    HCT 36.5 (L) 09/24/2022    MCV 93.7 09/24/2022     09/24/2022     RENAL:   Lab Results   Component Value Date    CREATININE 0.6 (L) 09/24/2022    BUN 9 09/24/2022     09/24/2022    K 3.7 09/24/2022     09/24/2022    CO2 25 09/24/2022     SED RATE:   Lab Results   Component Value Date/Time    SEDRATE 12 09/24/2022 06:13 AM     CK:   Lab Results   Component Value Date/Time    CKTOTAL 209 09/15/2022 05:47 PM     CRP:   Lab Results   Component Value Date/Time    CRP <3.0 09/24/2022 06:13 AM     Hepatic Function Panel:   Lab Results   Component Value Date/Time    ALKPHOS 171 09/15/2022 05:47 PM    ALT 26 09/15/2022 05:47 PM    AST 34 09/15/2022 05:47 PM    PROT 6.0 09/15/2022 05:47 PM    BILITOT 0.3 09/15/2022 05:47 PM    LABALBU 4.1 09/15/2022 05:47 PM     UA:  Lab Results   Component Value Date/Time    COLORU YELLOW 09/15/2022 06:14 PM    CLARITYU CLEAR 09/15/2022 06:14 PM    GLUCOSEU NEG 09/15/2022 06:14 PM    BILIRUBINUR NEG 09/15/2022 06:14 PM    KETUA NEG 09/15/2022 06:14 PM    SPECGRAV 1.020 09/15/2022 06:14 PM    BLOODU NEG 09/15/2022 06:14 PM    PHUR 6.5 09/15/2022 06:14 PM    PROTEINU 30 09/15/2022 06:14 PM    LEUKOCYTESUR NEG 09/15/2022 06:14 PM      Urine Microscopic: No results found for: LABCAST, BACTERIA, COMU, HYALCAST, WBCUA, RBCUA, EPIU  Urine Reflex to Culture: No results found for: URRFLXCULT    CRP < 3     ESR 12     MICRO: cultures reviewed and updated by me     Procedure Component Value Units Date/Time   Culture, Blood 2 [[id number]] Collected: 09/23/22 1619   Order Status: Sent Specimen: Blood Updated: 09/23/22 1638   Blood Culture 1 [[id number]] Collected: 09/23/22 1619   Order Status: Sent Specimen: Blood Updated: 09/23/22 1638     Blood Culture: No results found for: BC, BLOODCULT2    Viral Culture:    No results found for: COVID19  Urine Culture: No results for input(s): [place] in the last 72 hours.     Scheduled Meds:   docusate sodium  100 mg Oral BID    gabapentin  400 mg Oral 5x Daily    sodium chloride flush  10 mL IntraVENous 2 times per day    enoxaparin  40 mg SubCUTAneous Daily    ipratropium  2 spray Each Nostril TID    cetirizine  5 mg Oral Daily    magnesium cl-calcium carbonate  1 tablet Oral Daily with breakfast    mometasone-formoterol  2 puff Inhalation BID    pantoprazole  40 mg Oral BID AC    tiotropium  2 puff Inhalation Daily    topiramate  75 mg Oral BID    metaxalone  400 mg Oral TID    lidocaine  1 patch TransDERmal Daily    calcitonin  1 spray Alternating Nares Daily       Continuous Infusions:   sodium chloride         PRN Meds:  morphine, perflutren lipid microspheres, sodium chloride flush, sodium chloride, potassium chloride **OR** potassium alternative oral replacement **OR** potassium chloride, potassium chloride, magnesium sulfate, promethazine **OR** ondansetron, magnesium hydroxide, acetaminophen **OR** acetaminophen, albuterol, albuterol sulfate HFA, ketorolac, SUMAtriptan    Imaging:   MRI LUMBAR SPINE W WO CONTRAST    (Results Pending)       MRI Lumbar spine  9/1/22   HISTORY:   ORDERING SYSTEM PROVIDED HISTORY: Acute left-sided low back pain with   left-sided sciatica       FINDINGS:   BONES/ALIGNMENT: There is normal alignment of the spine. There is edema   within the L4 and L5 vertebral bodies compatible with acute to subacute   fracture. There is loss of 25% and 50% of vertebral body heights   respectively. No retropulsion is appreciated. In addition there is edema within the disc space at L4-L5 which presumably is   posttraumatic in etiology. Discitis however is in the differential diagnosis. Old compression fractures at L1 and L2 are identified with loss of 50% and   20% vertebral heights respectively. SPINAL CORD: The conus terminates normally. SOFT TISSUES: No paraspinal mass identified. L1-L2: There is no significant disc herniation, spinal canal stenosis or   neural foraminal narrowing. L2-L3: Shallow disc bulge flattens the thecal sac. L3-L4: Shallow disc bulge flattens the thecal sac. L4-L5: Annular disc bulge and facet arthropathy results in mild bilateral   foraminal stenosis. L5-S1: There is no significant disc herniation, spinal canal stenosis or   neural foraminal narrowing. Impression   Acute to subacute compression fractures at L4 and L5 with loss of 25% and 50%   of vertebral body heights. Stable old compression fractures at L1 and L2. High T2 signal within the disc space at L4-5 most likely is related to injury   to the disc. Discitis is considered less likely though not excluded. All pertinent images and reports for the current Hospitalization were reviewed by me.     IMPRESSION:    Patient Active Problem List   Diagnosis    Multiple chemical sensitivity syndrome Allergic rhinitis    Asthma    Vitamin D deficiency    Arthritis    Migraine    IBS (irritable bowel syndrome)    Gall bladder stones    B12 deficiency    Cerebral concussion    Peripheral polyneuropathy    Right sided sciatica    DDD (degenerative disc disease), lumbar    Spondylosis of lumbar spine    Closed wedge compression fracture of L1 vertebra (HCC)    Back pain     Acute on Chronic back pain  DJD of LUMBAR spine  Mutiple Vertebral Compression fractures  MRI abnormal with foraminal stenosis and no fluid collections  No fevers no h/o Bacteremia  NO uti  CRP < 3   ESR 12     No concern for infectious process at this time based on the current evaluation - NSG following would be ok to proceed with surgical approach if necessary         Labs, Microbiology, Radiology and pertinent results from current hospitalization and care every where were reviewed by me as a part of the consultation. PLAN :  No Antibiotics indicated  Check UA and urine cx as he is c/o some urinary hesitancy  Will sign off      Discussed with patient/Family and Nursing     Thanks for allowing me to participate in your patient's care please call me with any questions or concerns.     Dr. Mary Gomez MD  [facility] Physician  Phone: [phone]   Fax : [phone]

## 2022-09-25 PROBLEM — M48.061 DEGENERATIVE LUMBAR SPINAL STENOSIS: Status: ACTIVE | Noted: 2022-09-25

## 2022-09-25 PROBLEM — R39.11 URINARY HESITANCY: Status: ACTIVE | Noted: 2022-09-25

## 2022-09-25 PROBLEM — S32.020A COMPRESSION FRACTURE OF L2 LUMBAR VERTEBRA (HCC): Status: ACTIVE | Noted: 2022-09-25

## 2022-09-25 LAB
ANION GAP SERPL CALCULATED.3IONS-SCNC: 13 MMOL/L (ref 3–16)
BASOPHILS ABSOLUTE: 0 K/UL (ref 0–0.2)
BASOPHILS RELATIVE PERCENT: 0.4 %
BUN BLDV-MCNC: 8 MG/DL (ref 7–20)
CALCIUM SERPL-MCNC: 9.3 MG/DL (ref 8.3–10.6)
CHLORIDE BLD-SCNC: 101 MMOL/L (ref 99–110)
CO2: 22 MMOL/L (ref 21–32)
CREAT SERPL-MCNC: <0.5 MG/DL (ref 0.8–1.3)
EOSINOPHILS ABSOLUTE: 0.1 K/UL (ref 0–0.6)
EOSINOPHILS RELATIVE PERCENT: 1.1 %
GFR AFRICAN AMERICAN: >60
GFR NON-AFRICAN AMERICAN: >60
GLUCOSE BLD-MCNC: 125 MG/DL (ref 70–99)
HCT VFR BLD CALC: 39.4 % (ref 40.5–52.5)
HEMOGLOBIN: 13.7 G/DL (ref 13.5–17.5)
LYMPHOCYTES ABSOLUTE: 1.3 K/UL (ref 1–5.1)
LYMPHOCYTES RELATIVE PERCENT: 19.1 %
MAGNESIUM: 2 MG/DL (ref 1.8–2.4)
MCH RBC QN AUTO: 32.4 PG (ref 26–34)
MCHC RBC AUTO-ENTMCNC: 34.7 G/DL (ref 31–36)
MCV RBC AUTO: 93.3 FL (ref 80–100)
MONOCYTES ABSOLUTE: 0.7 K/UL (ref 0–1.3)
MONOCYTES RELATIVE PERCENT: 9.5 %
NEUTROPHILS ABSOLUTE: 4.9 K/UL (ref 1.7–7.7)
NEUTROPHILS RELATIVE PERCENT: 69.9 %
PDW BLD-RTO: 13.1 % (ref 12.4–15.4)
PLATELET # BLD: 276 K/UL (ref 135–450)
PMV BLD AUTO: 6.1 FL (ref 5–10.5)
POTASSIUM REFLEX MAGNESIUM: 3.3 MMOL/L (ref 3.5–5.1)
RBC # BLD: 4.22 M/UL (ref 4.2–5.9)
SODIUM BLD-SCNC: 136 MMOL/L (ref 136–145)
WBC # BLD: 7 K/UL (ref 4–11)

## 2022-09-25 PROCEDURE — 36415 COLL VENOUS BLD VENIPUNCTURE: CPT

## 2022-09-25 PROCEDURE — 6360000002 HC RX W HCPCS: Performed by: NURSE PRACTITIONER

## 2022-09-25 PROCEDURE — 80048 BASIC METABOLIC PNL TOTAL CA: CPT

## 2022-09-25 PROCEDURE — 6370000000 HC RX 637 (ALT 250 FOR IP): Performed by: INTERNAL MEDICINE

## 2022-09-25 PROCEDURE — 94640 AIRWAY INHALATION TREATMENT: CPT

## 2022-09-25 PROCEDURE — 2580000003 HC RX 258: Performed by: INTERNAL MEDICINE

## 2022-09-25 PROCEDURE — 6370000000 HC RX 637 (ALT 250 FOR IP): Performed by: NURSE PRACTITIONER

## 2022-09-25 PROCEDURE — 85025 COMPLETE CBC W/AUTO DIFF WBC: CPT

## 2022-09-25 PROCEDURE — 2060000000 HC ICU INTERMEDIATE R&B

## 2022-09-25 PROCEDURE — 94760 N-INVAS EAR/PLS OXIMETRY 1: CPT

## 2022-09-25 PROCEDURE — 6360000002 HC RX W HCPCS: Performed by: INTERNAL MEDICINE

## 2022-09-25 PROCEDURE — 83735 ASSAY OF MAGNESIUM: CPT

## 2022-09-25 RX ORDER — TRAMADOL HYDROCHLORIDE 50 MG/1
50 TABLET ORAL EVERY 6 HOURS SCHEDULED
Status: DISCONTINUED | OUTPATIENT
Start: 2022-09-25 | End: 2022-09-29 | Stop reason: HOSPADM

## 2022-09-25 RX ORDER — FUROSEMIDE 20 MG/1
20 TABLET ORAL DAILY
Status: DISCONTINUED | OUTPATIENT
Start: 2022-09-25 | End: 2022-09-29 | Stop reason: HOSPADM

## 2022-09-25 RX ORDER — KETOROLAC TROMETHAMINE 15 MG/ML
15 INJECTION, SOLUTION INTRAMUSCULAR; INTRAVENOUS EVERY 6 HOURS
Status: DISCONTINUED | OUTPATIENT
Start: 2022-09-25 | End: 2022-09-26

## 2022-09-25 RX ORDER — LORAZEPAM 0.5 MG/1
0.5 TABLET ORAL EVERY 6 HOURS PRN
Status: DISCONTINUED | OUTPATIENT
Start: 2022-09-25 | End: 2022-09-29 | Stop reason: HOSPADM

## 2022-09-25 RX ORDER — TAMSULOSIN HYDROCHLORIDE 0.4 MG/1
0.4 CAPSULE ORAL DAILY
Status: DISCONTINUED | OUTPATIENT
Start: 2022-09-25 | End: 2022-09-29 | Stop reason: HOSPADM

## 2022-09-25 RX ADMIN — KETOROLAC TROMETHAMINE 15 MG: 15 INJECTION, SOLUTION INTRAMUSCULAR; INTRAVENOUS at 20:42

## 2022-09-25 RX ADMIN — METAXALONE 400 MG: 800 TABLET ORAL at 09:57

## 2022-09-25 RX ADMIN — SODIUM CHLORIDE, PRESERVATIVE FREE 10 ML: 5 INJECTION INTRAVENOUS at 20:42

## 2022-09-25 RX ADMIN — TIOTROPIUM BROMIDE INHALATION SPRAY 2 PUFF: 3.12 SPRAY, METERED RESPIRATORY (INHALATION) at 09:14

## 2022-09-25 RX ADMIN — METAXALONE 400 MG: 800 TABLET ORAL at 23:36

## 2022-09-25 RX ADMIN — PANTOPRAZOLE SODIUM 40 MG: 40 TABLET, DELAYED RELEASE ORAL at 06:46

## 2022-09-25 RX ADMIN — GABAPENTIN 400 MG: 400 CAPSULE ORAL at 06:46

## 2022-09-25 RX ADMIN — POTASSIUM CHLORIDE 40 MEQ: 1500 TABLET, EXTENDED RELEASE ORAL at 08:58

## 2022-09-25 RX ADMIN — TOPIRAMATE 75 MG: 25 TABLET, FILM COATED ORAL at 08:57

## 2022-09-25 RX ADMIN — IPRATROPIUM BROMIDE 2 SPRAY: 42 SPRAY, METERED NASAL at 09:11

## 2022-09-25 RX ADMIN — MAGNESIUM CHLORIDE 1 TABLET: 71.5 TABLET ORAL at 08:57

## 2022-09-25 RX ADMIN — PANTOPRAZOLE SODIUM 40 MG: 40 TABLET, DELAYED RELEASE ORAL at 15:13

## 2022-09-25 RX ADMIN — GABAPENTIN 400 MG: 400 CAPSULE ORAL at 23:36

## 2022-09-25 RX ADMIN — ACETAMINOPHEN 650 MG: 325 TABLET, FILM COATED ORAL at 03:18

## 2022-09-25 RX ADMIN — ENOXAPARIN SODIUM 40 MG: 100 INJECTION SUBCUTANEOUS at 09:00

## 2022-09-25 RX ADMIN — KETOROLAC TROMETHAMINE 15 MG: 15 INJECTION, SOLUTION INTRAMUSCULAR; INTRAVENOUS at 15:14

## 2022-09-25 RX ADMIN — IPRATROPIUM BROMIDE 2 SPRAY: 42 SPRAY, METERED NASAL at 20:45

## 2022-09-25 RX ADMIN — SODIUM CHLORIDE, PRESERVATIVE FREE 10 ML: 5 INJECTION INTRAVENOUS at 09:13

## 2022-09-25 RX ADMIN — TOPIRAMATE 75 MG: 25 TABLET, FILM COATED ORAL at 20:41

## 2022-09-25 RX ADMIN — KETOROLAC TROMETHAMINE 15 MG: 15 INJECTION, SOLUTION INTRAMUSCULAR; INTRAVENOUS at 09:00

## 2022-09-25 RX ADMIN — LORAZEPAM 0.5 MG: 0.5 TABLET ORAL at 23:36

## 2022-09-25 RX ADMIN — TRAMADOL HYDROCHLORIDE 50 MG: 50 TABLET ORAL at 23:36

## 2022-09-25 RX ADMIN — GABAPENTIN 400 MG: 400 CAPSULE ORAL at 15:13

## 2022-09-25 RX ADMIN — DOCUSATE SODIUM 100 MG: 100 CAPSULE, LIQUID FILLED ORAL at 20:41

## 2022-09-25 RX ADMIN — MOMETASONE FUROATE AND FORMOTEROL FUMARATE DIHYDRATE 2 PUFF: 200; 5 AEROSOL RESPIRATORY (INHALATION) at 09:10

## 2022-09-25 RX ADMIN — GABAPENTIN 400 MG: 400 CAPSULE ORAL at 12:07

## 2022-09-25 RX ADMIN — KETOROLAC TROMETHAMINE 15 MG: 15 INJECTION, SOLUTION INTRAMUSCULAR; INTRAVENOUS at 03:13

## 2022-09-25 RX ADMIN — CETIRIZINE HYDROCHLORIDE 5 MG: 10 TABLET, FILM COATED ORAL at 08:53

## 2022-09-25 RX ADMIN — DOCUSATE SODIUM 100 MG: 100 CAPSULE, LIQUID FILLED ORAL at 08:58

## 2022-09-25 RX ADMIN — MOMETASONE FUROATE AND FORMOTEROL FUMARATE DIHYDRATE 2 PUFF: 200; 5 AEROSOL RESPIRATORY (INHALATION) at 21:26

## 2022-09-25 RX ADMIN — GABAPENTIN 400 MG: 400 CAPSULE ORAL at 19:46

## 2022-09-25 RX ADMIN — TAMSULOSIN HYDROCHLORIDE 0.4 MG: 0.4 CAPSULE ORAL at 15:49

## 2022-09-25 ASSESSMENT — PAIN SCALES - WONG BAKER
WONGBAKER_NUMERICALRESPONSE: 4
WONGBAKER_NUMERICALRESPONSE: 6
WONGBAKER_NUMERICALRESPONSE: 4
WONGBAKER_NUMERICALRESPONSE: 4

## 2022-09-25 ASSESSMENT — PAIN SCALES - GENERAL
PAINLEVEL_OUTOF10: 9
PAINLEVEL_OUTOF10: 8
PAINLEVEL_OUTOF10: 9
PAINLEVEL_OUTOF10: 8
PAINLEVEL_OUTOF10: 8
PAINLEVEL_OUTOF10: 7
PAINLEVEL_OUTOF10: 10
PAINLEVEL_OUTOF10: 3
PAINLEVEL_OUTOF10: 8
PAINLEVEL_OUTOF10: 7
PAINLEVEL_OUTOF10: 9
PAINLEVEL_OUTOF10: 7
PAINLEVEL_OUTOF10: 8

## 2022-09-25 ASSESSMENT — PAIN DESCRIPTION - ORIENTATION
ORIENTATION: OTHER (COMMENT)
ORIENTATION: LOWER
ORIENTATION: LOWER;INNER
ORIENTATION: MID
ORIENTATION: MID
ORIENTATION: LOWER
ORIENTATION: MID

## 2022-09-25 ASSESSMENT — PAIN DESCRIPTION - LOCATION
LOCATION: BACK

## 2022-09-25 ASSESSMENT — PAIN DESCRIPTION - FREQUENCY
FREQUENCY: CONTINUOUS

## 2022-09-25 ASSESSMENT — PAIN DESCRIPTION - DESCRIPTORS
DESCRIPTORS: ACHING;DISCOMFORT
DESCRIPTORS: PENETRATING;PRESSURE
DESCRIPTORS: HEAVINESS;PENETRATING
DESCRIPTORS: ACHING;DISCOMFORT;PRESSURE
DESCRIPTORS: ACHING;DISCOMFORT;SHARP
DESCRIPTORS: ACHING;DISCOMFORT;SHARP
DESCRIPTORS: ACHING;DISCOMFORT
DESCRIPTORS: ACHING;DISCOMFORT;HEAVINESS

## 2022-09-25 ASSESSMENT — PAIN DESCRIPTION - PAIN TYPE
TYPE: ACUTE PAIN

## 2022-09-25 ASSESSMENT — PAIN DESCRIPTION - ONSET
ONSET: ON-GOING
ONSET: ON-GOING

## 2022-09-25 NOTE — PROGRESS NOTES
Hospitalist Progress Note      PCP: Linh Farrell MD    Date of Admission: 9/23/2022    Chief Complaint: Back pain    Hospital Course: Patient is a 68-year-old male who presents to the hospital due to complaint of back pain, 10/10 intensity, lumbar area. Patient also mentions he was recommended by neurosurgeon to come to the emergency department. Patient recently had MRI that did show L1-L2 compression fracture as well as possible discitis. Patient otherwise denies fever chills diarrhea constipation, he denies history of IVDA. Subjective: Patient laying on left side, states he cannot lay on his back because of the severe pain. Also complains of sensory issues, for which she lays in bed naked with a seat over himself. Patient with numerous complaints. States the Lasix made him pee all night long, for which he did not like. He does not think it helped his legs. Would not let me inspect the legs to feel the edema. On visual exam the edema seems to be lessened. Patient attributes that to elevating his legs in the bed although he was doing that yesterday. Recommended further IV Lasix to continue to help improve edema, which he refuses. He wants to only take oral Lasix during the day, at half of the \"lowest dose possible\". Explained to patient he would not have good benefit from that low of a dose, and recommended Lasix 20 mg oral daily. He will think about it. He did not tolerate the IV morphine, causing anxiety and agitation, wife reports he paced a lot with it. Ativan seemed to help calm him down. He now reports he has not peed all morning. We will obtain a bladder scan. Wife at bedside, discussed plan of care with patient and wife, deny further needs or questions. Assessment/Plan:    Intractable low back pain with radiculopathy  Neuropathy the bilateral feet  -Sed rate and CRP are normal.  No fever, no leukocytosis. ID agree there is no discitis.   -Consulted neurosurgery, who ordered stat MRI of lumbar spine. MRI showed L4 fracture has worsened. They recommended kyphoplasty. Neurosurgeon called and spoke to wife I was in the room, plan for kyphoplasty on Wednesday. Patient is to remain in hospital until after kyphoplasty. -Adjusted gabapentin dose to 400 mg 5 times a day. He was taking 300 mg 5 times a day at home  -DC IV morphine  -Schedule Toradol and tramadol every 6 hours. But give alternating doses of each every 3 hours. Discussed with pharmacist, who will time it appropriately on the [facility]. Bilateral lower extremity edema  Dyspnea   -Had venous Doppler 9/20/2022, negative bilaterally  -Echo ordered  -Check BNP  -Seem to have good response with Lasix, patient reported lots of output for which she was not happy with. Edema does look better though.   Ordered Lasix 20 mg p.o. daily.  -Patient having sensory issues, will not tolerate HARJINDER hose.  -Albumin normal    Hypokalemia  -Replacement ordered      History of GERD, migraine  -Continue home meds as ordered    Active Hospital Problems    Diagnosis     Compression fracture of L2 lumbar vertebra (HCC) [S32.020A]      Priority: Medium    Degenerative lumbar spinal stenosis [M48.061]      Priority: Medium    Urinary hesitancy [R39.11]      Priority: Medium    Back pain [M54.9]      Priority: Medium       Medications:  Reviewed    Infusion Medications    sodium chloride       Scheduled Medications    furosemide  20 mg Oral Daily    ketorolac  15 mg IntraVENous Q6H    traMADol  50 mg Oral 4 times per day    docusate sodium  100 mg Oral BID    gabapentin  400 mg Oral 5x Daily    sodium chloride flush  10 mL IntraVENous 2 times per day    enoxaparin  40 mg SubCUTAneous Daily    ipratropium  2 spray Each Nostril TID    cetirizine  5 mg Oral Daily    magnesium cl-calcium carbonate  1 tablet Oral Daily with breakfast    mometasone-formoterol  2 puff Inhalation BID    pantoprazole  40 mg Oral BID AC    tiotropium  2 puff Inhalation Daily topiramate  75 mg Oral BID    metaxalone  400 mg Oral TID    lidocaine  1 patch TransDERmal Daily    calcitonin  1 spray Alternating Nares Daily     PRN Meds: LORazepam, perflutren lipid microspheres, sodium chloride flush, sodium chloride, potassium chloride **OR** potassium alternative oral replacement **OR** potassium chloride, potassium chloride, magnesium sulfate, promethazine **OR** ondansetron, magnesium hydroxide, acetaminophen **OR** acetaminophen, albuterol, albuterol sulfate HFA, SUMAtriptan      Intake/Output Summary (Last 24 hours) at 9/25/2022 1155  Last data filed at 9/24/2022 2045  Gross per 24 hour   Intake 240 ml   Output --   Net 240 ml       Physical Exam Performed:    BP (!) 146/63   Pulse 76   Temp 98.3 °F (36.8 °C) (Oral)   Resp 18   Ht 6' 1\" (1.854 m)   Wt 155 lb 3.3 oz (70.4 kg)   SpO2 98%   BMI 20.48 kg/m²     General appearance: No apparent distress, appears stated age and cooperative. HEENT: Pupils equal, round, and reactive to light. Conjunctivae/corneas clear. Neck: Supple, with full range of motion. No jugular venous distention. Trachea midline. Respiratory:  Normal respiratory effort. Clear to auscultation, bilaterally without Rales/Wheezes/Rhonchi. Cardiovascular: Regular rate and rhythm with normal S1/S2 without murmurs, rubs or gallops. Abdomen: Soft, non-tender, non-distended with normal bowel sounds. Musculoskeletal: No clubbing, cyanosis. 2+ edema bilaterally. Full range of motion without deformity. Severe low back pain  Skin: Skin color, texture, turgor normal.  No rashes or lesions. Neurologic:  Neurovascularly intact without any focal sensory/motor deficits. Cranial nerves: II-XII intact, grossly non-focal.  Psychiatric: Alert and oriented, thought content appropriate, normal insight.   Flat affect affect with underlying anxiety and agitation  Capillary Refill: Brisk,< 3 seconds   Peripheral Pulses: +2 palpable, equal bilaterally       Labs:   Recent Labs 09/23/22  1619 09/24/22  0613 09/25/22  0456   WBC 9.2 5.5 7.0   HGB 12.9* 12.7* 13.7   HCT 38.1* 36.5* 39.4*    241 276     Recent Labs     09/23/22  1619 09/24/22  0613 09/25/22  0456   * 136 136   K 3.7 3.7 3.3*    101 101   CO2 24 25 22   BUN 14 9 8   CREATININE 0.6* 0.6* <0.5*   CALCIUM 8.9 8.7 9.3     No results for input(s): AST, ALT, BILIDIR, BILITOT, ALKPHOS in the last 72 hours. No results for input(s): INR in the last 72 hours. No results for input(s): [employer] in the last 72 hours. Urinalysis:      Lab Results   Component Value Date/Time    NITRU Negative 09/24/2022 09:00 PM    BLOODU Negative 09/24/2022 09:00 PM    SPECGRAV 1.018 09/24/2022 09:00 PM    GLUCOSEU Negative 09/24/2022 09:00 PM       Radiology:  MRI LUMBAR SPINE W WO CONTRAST   Final Result   1. Again seen are acute to subacute compression deformities involving the L4   and L5 vertebral bodies with slight interval loss of height of L4. No   significant change in loss of height of L5.   2. Since the prior exam, there has been interval development of an acute   compression deformity of the L2 vertebral body with approximately 35% loss of   height. 3. Since the prior exam, development of minimal bone marrow edema along the   superior endplate of R14, which may represent a microtrabecular fracture   without significant loss of height. 4. Multilevel degenerative changes as above. Overall findings at L4-L5 are presumably related to the compression   deformities. Discitis while not entirely excluded is felt to be less likely. DVT Prophylaxis: Lovenox  Diet: ADULT DIET; Regular  Code Status: Full Code    PT/OT Eval Status: Eval ordered    Dispo -home once medically stable    Anna Marquez, APRN - CNP      NOTE:  This report was transcribed using voice recognition software. Every effort was made to ensure accuracy; however, inadvertent computerized transcription errors may be present.

## 2022-09-25 NOTE — PLAN OF CARE
Problem: Pain  Goal: Verbalizes/displays adequate comfort level or baseline comfort level  9/25/2022 0008 by Pablo Roy RN  Outcome: Progressing  9/24/2022 1519 by Pola Key RN  Outcome: Progressing     Problem: Neurosensory - Adult  Goal: Achieves stable or improved neurological status  9/25/2022 0008 by Pablo Roy RN  Outcome: Progressing  9/24/2022 1519 by Pola Key RN  Outcome: Progressing  Flowsheets (Taken 9/24/2022 0825)  Achieves stable or improved neurological status: Assess for and report changes in neurological status     Problem: Neurosensory - Adult  Goal: Achieves maximal functionality and self care  9/25/2022 0008 by Pablo Roy RN  Outcome: Progressing  9/24/2022 1519 by Pola Key RN  Outcome: Progressing  Flowsheets (Taken 9/24/2022 0825)  Achieves maximal functionality and self care: Monitor swallowing and airway patency with patient fatigue and changes in neurological status     Problem: Respiratory - Adult  Goal: Achieves optimal ventilation and oxygenation  9/25/2022 0008 by Pablo Roy RN  Outcome: Progressing  9/24/2022 1519 by Pola Key RN  Outcome: Progressing     Problem: Cardiovascular - Adult  Goal: Maintains optimal cardiac output and hemodynamic stability  9/25/2022 0008 by Pablo Roy RN  Outcome: Progressing  9/24/2022 1519 by Pola Key RN  Outcome: Progressing  Flowsheets (Taken 9/24/2022 0825)  Maintains optimal cardiac output and hemodynamic stability: Monitor blood pressure and heart rate     Problem: Cardiovascular - Adult  Goal: Absence of cardiac dysrhythmias or at baseline  9/25/2022 0008 by Pablo Roy RN  Outcome: Progressing  9/24/2022 1519 by Pola Key RN  Outcome: Progressing  Flowsheets (Taken 9/24/2022 0825)  Absence of cardiac dysrhythmias or at baseline: Monitor cardiac rate and rhythm     Problem: Skin/Tissue Integrity - Adult  Goal: Skin integrity remains intact  9/25/2022 0008 by Pablo Roy RN  Outcome: Progressing  9/24/2022 1519 by Levi Park RN  Outcome: Progressing  Flowsheets (Taken 9/24/2022 0825)  Skin Integrity Remains Intact: Monitor for areas of redness and/or skin breakdown     Problem: Musculoskeletal - Adult  Goal: Return mobility to safest level of function  9/25/2022 0008 by Alexander Hodges RN  Outcome: Progressing  9/24/2022 1519 by Levi Park RN  Outcome: Progressing  Flowsheets (Taken 9/24/2022 0825)  Return Mobility to Safest Level of Function: Assess patient stability and activity tolerance for standing, transferring and ambulating with or without assistive devices     Problem: Musculoskeletal - Adult  Goal: Maintain proper alignment of affected body part  9/25/2022 0008 by Alexander Hodges RN  Outcome: Progressing  9/24/2022 1519 by Levi Park RN  Outcome: Progressing  Flowsheets (Taken 9/24/2022 0825)  Maintain proper alignment of affected body part: Support and protect limb and body alignment per provider's orders     Problem: Musculoskeletal - Adult  Goal: Return ADL status to a safe level of function  9/25/2022 0008 by Alexander Hodges RN  Outcome: Progressing  9/24/2022 1519 by Levi Park RN  Outcome: Progressing  Flowsheets (Taken 9/24/2022 0825)  Return ADL Status to a Safe Level of Function: Administer medication as ordered     Problem: Gastrointestinal - Adult  Goal: Minimal or absence of nausea and vomiting  9/25/2022 0008 by Alexander Hodges RN  Outcome: Progressing  9/24/2022 1519 by Levi Park RN  Outcome: Progressing  Flowsheets (Taken 9/24/2022 0825)  Minimal or absence of nausea and vomiting: Administer IV fluids as ordered to ensure adequate hydration     Problem: Gastrointestinal - Adult  Goal: Maintains or returns to baseline bowel function  9/25/2022 0008 by Alexander Hodges RN  Outcome: Progressing  9/24/2022 1519 by Levi Park RN  Outcome: Progressing  Flowsheets (Taken 9/24/2022 0825)  Maintains or returns to baseline bowel function: Assess bowel function     Problem: Gastrointestinal - Adult  Goal: Maintains adequate nutritional intake  9/25/2022 0008 by Meeta Friedman RN  Outcome: Progressing  9/24/2022 1519 by Bryson Gold RN  Outcome: Progressing  Flowsheets (Taken 9/24/2022 0825)  Maintains adequate nutritional intake: Monitor percentage of each meal consumed     Problem: Genitourinary - Adult  Goal: Absence of urinary retention  9/25/2022 0008 by Meeta Friedman RN  Outcome: Progressing  9/24/2022 1519 by Bryson Gold RN  Outcome: Progressing     Problem: Infection - Adult  Goal: Absence of infection at discharge  9/25/2022 0008 by Meeta Friedman RN  Outcome: Progressing  9/24/2022 1519 by Bryson Gold RN  Outcome: Progressing  Flowsheets (Taken 9/24/2022 0825)  Absence of infection at discharge: Assess and monitor for signs and symptoms of infection     Problem: Metabolic/Fluid and Electrolytes - Adult  Goal: Electrolytes maintained within normal limits  9/25/2022 0008 by Meeta Friedman RN  Outcome: Progressing  9/24/2022 1519 by Bryson Gold RN  Outcome: Progressing  Flowsheets (Taken 9/24/2022 0825)  Electrolytes maintained within normal limits: Monitor labs and assess patient for signs and symptoms of electrolyte imbalances     Problem: Metabolic/Fluid and Electrolytes - Adult  Goal: Hemodynamic stability and optimal renal function maintained  9/25/2022 0008 by Meeta Friedman RN  Outcome: Progressing  9/24/2022 1519 by Bryson Gold RN  Outcome: Progressing  Flowsheets (Taken 9/24/2022 0825)  Hemodynamic stability and optimal renal function maintained: Monitor labs and assess for signs and symptoms of volume excess or deficit     Problem: Hematologic - Adult  Goal: Maintains hematologic stability  9/25/2022 0008 by Meeta Friedman RN  Outcome: Progressing  9/24/2022 1519 by Bryson Gold RN  Outcome: Progressing  Flowsheets (Taken 9/24/2022 0825)  Maintains hematologic stability: Assess for signs and symptoms of bleeding or hemorrhage     Problem: Safety - Adult  Goal: Free from fall injury  9/25/2022 0008 by Heber Waters RN  Outcome: Progressing  9/24/2022 1519 by Carlo Watt RN  Outcome: Progressing     Problem: ABCDS Injury Assessment  Goal: Absence of physical injury  9/25/2022 0008 by Heber Waters RN  Outcome: Progressing  9/24/2022 1519 by Carlo Watt RN  Outcome: Progressing

## 2022-09-25 NOTE — PLAN OF CARE
Problem: Discharge Planning  Goal: Discharge to home or other facility with appropriate resources  Outcome: Progressing     Problem: Pain  Goal: Verbalizes/displays adequate comfort level or baseline comfort level  Outcome: Progressing     Problem: Neurosensory - Adult  Goal: Achieves stable or improved neurological status  Outcome: Progressing  Goal: Absence of seizures  Outcome: Progressing  Goal: Remains free of injury related to seizures activity  Outcome: Progressing  Goal: Achieves maximal functionality and self care  Outcome: Progressing     Problem: Respiratory - Adult  Goal: Achieves optimal ventilation and oxygenation  Outcome: Progressing     Problem: Cardiovascular - Adult  Goal: Maintains optimal cardiac output and hemodynamic stability  Outcome: Progressing  Goal: Absence of cardiac dysrhythmias or at baseline  Outcome: Progressing     Problem: Skin/Tissue Integrity - Adult  Goal: Skin integrity remains intact  Outcome: Progressing  Goal: Incisions, wounds, or drain sites healing without S/S of infection  Outcome: Progressing  Goal: Oral mucous membranes remain intact  Outcome: Progressing     Problem: Musculoskeletal - Adult  Goal: Return mobility to safest level of function  Outcome: Progressing  Goal: Maintain proper alignment of affected body part  Outcome: Progressing  Goal: Return ADL status to a safe level of function  Outcome: Progressing     Problem: Gastrointestinal - Adult  Goal: Minimal or absence of nausea and vomiting  Outcome: Progressing  Goal: Maintains or returns to baseline bowel function  Outcome: Progressing  Goal: Maintains adequate nutritional intake  Outcome: Progressing  Goal: Establish and maintain optimal ostomy function  Outcome: Progressing     Problem: Genitourinary - Adult  Goal: Absence of urinary retention  Outcome: Progressing  Goal: Urinary catheter remains patent  Outcome: Progressing     Problem: Infection - Adult  Goal: Absence of infection at discharge  Outcome: Progressing  Goal: Absence of infection during hospitalization  Outcome: Progressing  Goal: Absence of fever/infection during anticipated neutropenic period  Outcome: Progressing     Problem: Metabolic/Fluid and Electrolytes - Adult  Goal: Electrolytes maintained within normal limits  Outcome: Progressing  Goal: Hemodynamic stability and optimal renal function maintained  Outcome: Progressing  Goal: Glucose maintained within prescribed range  Outcome: Progressing     Problem: Hematologic - Adult  Goal: Maintains hematologic stability  Outcome: Progressing     Problem: Safety - Adult  Goal: Free from fall injury  Outcome: Progressing     Problem: ABCDS Injury Assessment  Goal: Absence of physical injury  Outcome: Progressing

## 2022-09-26 ENCOUNTER — APPOINTMENT (OUTPATIENT)
Dept: GENERAL RADIOLOGY | Age: 72
DRG: 479 | End: 2022-09-26
Payer: COMMERCIAL

## 2022-09-26 LAB
ANION GAP SERPL CALCULATED.3IONS-SCNC: 10 MMOL/L (ref 3–16)
BASOPHILS ABSOLUTE: 0 K/UL (ref 0–0.2)
BASOPHILS RELATIVE PERCENT: 0.4 %
BUN BLDV-MCNC: 13 MG/DL (ref 7–20)
CALCIUM SERPL-MCNC: 8.8 MG/DL (ref 8.3–10.6)
CHLORIDE BLD-SCNC: 102 MMOL/L (ref 99–110)
CO2: 23 MMOL/L (ref 21–32)
CREAT SERPL-MCNC: 0.6 MG/DL (ref 0.8–1.3)
EOSINOPHILS ABSOLUTE: 0.1 K/UL (ref 0–0.6)
EOSINOPHILS RELATIVE PERCENT: 1.4 %
GFR AFRICAN AMERICAN: >60
GFR NON-AFRICAN AMERICAN: >60
GLUCOSE BLD-MCNC: 107 MG/DL (ref 70–99)
HCT VFR BLD CALC: 37.1 % (ref 40.5–52.5)
HEMOGLOBIN: 12.9 G/DL (ref 13.5–17.5)
LV EF: 68 %
LVEF MODALITY: NORMAL
LYMPHOCYTES ABSOLUTE: 1.5 K/UL (ref 1–5.1)
LYMPHOCYTES RELATIVE PERCENT: 20.6 %
MAGNESIUM: 2 MG/DL (ref 1.8–2.4)
MCH RBC QN AUTO: 32.3 PG (ref 26–34)
MCHC RBC AUTO-ENTMCNC: 34.8 G/DL (ref 31–36)
MCV RBC AUTO: 92.8 FL (ref 80–100)
MONOCYTES ABSOLUTE: 0.8 K/UL (ref 0–1.3)
MONOCYTES RELATIVE PERCENT: 10 %
NEUTROPHILS ABSOLUTE: 5.1 K/UL (ref 1.7–7.7)
NEUTROPHILS RELATIVE PERCENT: 67.6 %
PDW BLD-RTO: 13.1 % (ref 12.4–15.4)
PLATELET # BLD: 278 K/UL (ref 135–450)
PMV BLD AUTO: 6.1 FL (ref 5–10.5)
POTASSIUM REFLEX MAGNESIUM: 3.4 MMOL/L (ref 3.5–5.1)
RBC # BLD: 4 M/UL (ref 4.2–5.9)
SODIUM BLD-SCNC: 135 MMOL/L (ref 136–145)
URINE CULTURE, ROUTINE: NORMAL
WBC # BLD: 7.5 K/UL (ref 4–11)

## 2022-09-26 PROCEDURE — 2580000003 HC RX 258: Performed by: INTERNAL MEDICINE

## 2022-09-26 PROCEDURE — 83735 ASSAY OF MAGNESIUM: CPT

## 2022-09-26 PROCEDURE — 6360000002 HC RX W HCPCS: Performed by: NURSE PRACTITIONER

## 2022-09-26 PROCEDURE — 73502 X-RAY EXAM HIP UNI 2-3 VIEWS: CPT

## 2022-09-26 PROCEDURE — 6370000000 HC RX 637 (ALT 250 FOR IP): Performed by: NURSE PRACTITIONER

## 2022-09-26 PROCEDURE — 6360000002 HC RX W HCPCS: Performed by: INTERNAL MEDICINE

## 2022-09-26 PROCEDURE — 94640 AIRWAY INHALATION TREATMENT: CPT

## 2022-09-26 PROCEDURE — 93306 TTE W/DOPPLER COMPLETE: CPT

## 2022-09-26 PROCEDURE — 6370000000 HC RX 637 (ALT 250 FOR IP): Performed by: INTERNAL MEDICINE

## 2022-09-26 PROCEDURE — 80048 BASIC METABOLIC PNL TOTAL CA: CPT

## 2022-09-26 PROCEDURE — 94760 N-INVAS EAR/PLS OXIMETRY 1: CPT

## 2022-09-26 PROCEDURE — 51798 US URINE CAPACITY MEASURE: CPT

## 2022-09-26 PROCEDURE — 2060000000 HC ICU INTERMEDIATE R&B

## 2022-09-26 PROCEDURE — 85025 COMPLETE CBC W/AUTO DIFF WBC: CPT

## 2022-09-26 PROCEDURE — 36415 COLL VENOUS BLD VENIPUNCTURE: CPT

## 2022-09-26 RX ORDER — PREDNISONE 20 MG/1
40 TABLET ORAL DAILY
Status: DISCONTINUED | OUTPATIENT
Start: 2022-09-26 | End: 2022-09-28

## 2022-09-26 RX ORDER — METHOCARBAMOL 750 MG/1
750 TABLET, FILM COATED ORAL 3 TIMES DAILY
Status: DISCONTINUED | OUTPATIENT
Start: 2022-09-26 | End: 2022-09-27

## 2022-09-26 RX ADMIN — KETOROLAC TROMETHAMINE 15 MG: 15 INJECTION, SOLUTION INTRAMUSCULAR; INTRAVENOUS at 08:16

## 2022-09-26 RX ADMIN — GABAPENTIN 400 MG: 400 CAPSULE ORAL at 11:03

## 2022-09-26 RX ADMIN — CETIRIZINE HYDROCHLORIDE 5 MG: 10 TABLET, FILM COATED ORAL at 08:15

## 2022-09-26 RX ADMIN — TOPIRAMATE 75 MG: 25 TABLET, FILM COATED ORAL at 20:25

## 2022-09-26 RX ADMIN — PANTOPRAZOLE SODIUM 40 MG: 40 TABLET, DELAYED RELEASE ORAL at 15:38

## 2022-09-26 RX ADMIN — TRAMADOL HYDROCHLORIDE 50 MG: 50 TABLET ORAL at 23:39

## 2022-09-26 RX ADMIN — GABAPENTIN 400 MG: 400 CAPSULE ORAL at 15:38

## 2022-09-26 RX ADMIN — METHOCARBAMOL TABLETS 750 MG: 750 TABLET, COATED ORAL at 14:33

## 2022-09-26 RX ADMIN — PREDNISONE 40 MG: 20 TABLET ORAL at 14:30

## 2022-09-26 RX ADMIN — KETOROLAC TROMETHAMINE 15 MG: 15 INJECTION, SOLUTION INTRAMUSCULAR; INTRAVENOUS at 02:44

## 2022-09-26 RX ADMIN — GABAPENTIN 400 MG: 400 CAPSULE ORAL at 07:41

## 2022-09-26 RX ADMIN — ACETAMINOPHEN 650 MG: 325 TABLET, FILM COATED ORAL at 20:25

## 2022-09-26 RX ADMIN — TRAMADOL HYDROCHLORIDE 50 MG: 50 TABLET ORAL at 07:41

## 2022-09-26 RX ADMIN — METHOCARBAMOL TABLETS 750 MG: 750 TABLET, COATED ORAL at 20:25

## 2022-09-26 RX ADMIN — TRAMADOL HYDROCHLORIDE 50 MG: 50 TABLET ORAL at 12:04

## 2022-09-26 RX ADMIN — TRAMADOL HYDROCHLORIDE 50 MG: 50 TABLET ORAL at 17:32

## 2022-09-26 RX ADMIN — IPRATROPIUM BROMIDE 2 SPRAY: 42 SPRAY, METERED NASAL at 17:00

## 2022-09-26 RX ADMIN — TAMSULOSIN HYDROCHLORIDE 0.4 MG: 0.4 CAPSULE ORAL at 08:16

## 2022-09-26 RX ADMIN — MAGNESIUM CHLORIDE 1 TABLET: 71.5 TABLET ORAL at 08:16

## 2022-09-26 RX ADMIN — ONDANSETRON 4 MG: 2 INJECTION INTRAMUSCULAR; INTRAVENOUS at 07:41

## 2022-09-26 RX ADMIN — POTASSIUM CHLORIDE 40 MEQ: 1500 TABLET, EXTENDED RELEASE ORAL at 08:16

## 2022-09-26 RX ADMIN — METAXALONE 400 MG: 800 TABLET ORAL at 08:17

## 2022-09-26 RX ADMIN — FUROSEMIDE 20 MG: 20 TABLET ORAL at 08:15

## 2022-09-26 RX ADMIN — PANTOPRAZOLE SODIUM 40 MG: 40 TABLET, DELAYED RELEASE ORAL at 07:41

## 2022-09-26 RX ADMIN — DOCUSATE SODIUM 100 MG: 100 CAPSULE, LIQUID FILLED ORAL at 08:16

## 2022-09-26 RX ADMIN — TOPIRAMATE 75 MG: 25 TABLET, FILM COATED ORAL at 08:15

## 2022-09-26 RX ADMIN — ACETAMINOPHEN 650 MG: 325 TABLET, FILM COATED ORAL at 02:52

## 2022-09-26 RX ADMIN — IPRATROPIUM BROMIDE 2 SPRAY: 42 SPRAY, METERED NASAL at 08:17

## 2022-09-26 RX ADMIN — TIOTROPIUM BROMIDE INHALATION SPRAY 2 PUFF: 3.12 SPRAY, METERED RESPIRATORY (INHALATION) at 08:00

## 2022-09-26 RX ADMIN — GABAPENTIN 400 MG: 400 CAPSULE ORAL at 23:39

## 2022-09-26 RX ADMIN — ENOXAPARIN SODIUM 40 MG: 100 INJECTION SUBCUTANEOUS at 08:17

## 2022-09-26 RX ADMIN — SODIUM CHLORIDE, PRESERVATIVE FREE 10 ML: 5 INJECTION INTRAVENOUS at 20:26

## 2022-09-26 RX ADMIN — SODIUM CHLORIDE, PRESERVATIVE FREE 10 ML: 5 INJECTION INTRAVENOUS at 08:23

## 2022-09-26 RX ADMIN — DOCUSATE SODIUM 100 MG: 100 CAPSULE, LIQUID FILLED ORAL at 20:25

## 2022-09-26 RX ADMIN — GABAPENTIN 400 MG: 400 CAPSULE ORAL at 17:33

## 2022-09-26 RX ADMIN — IPRATROPIUM BROMIDE 2 SPRAY: 42 SPRAY, METERED NASAL at 20:25

## 2022-09-26 RX ADMIN — MOMETASONE FUROATE AND FORMOTEROL FUMARATE DIHYDRATE 2 PUFF: 200; 5 AEROSOL RESPIRATORY (INHALATION) at 20:51

## 2022-09-26 RX ADMIN — MOMETASONE FUROATE AND FORMOTEROL FUMARATE DIHYDRATE 2 PUFF: 200; 5 AEROSOL RESPIRATORY (INHALATION) at 08:00

## 2022-09-26 ASSESSMENT — PAIN DESCRIPTION - PAIN TYPE
TYPE: ACUTE PAIN

## 2022-09-26 ASSESSMENT — PAIN SCALES - WONG BAKER
WONGBAKER_NUMERICALRESPONSE: 4
WONGBAKER_NUMERICALRESPONSE: 8
WONGBAKER_NUMERICALRESPONSE: 6
WONGBAKER_NUMERICALRESPONSE: 4
WONGBAKER_NUMERICALRESPONSE: 6
WONGBAKER_NUMERICALRESPONSE: 8
WONGBAKER_NUMERICALRESPONSE: 8
WONGBAKER_NUMERICALRESPONSE: 6

## 2022-09-26 ASSESSMENT — PAIN DESCRIPTION - LOCATION
LOCATION: BACK
LOCATION: LEG
LOCATION: BACK
LOCATION: BACK
LOCATION: BACK;LEG
LOCATION: BACK
LOCATION: HIP
LOCATION: BACK
LOCATION: BACK
LOCATION: BACK;LEG
LOCATION: BACK

## 2022-09-26 ASSESSMENT — PAIN DESCRIPTION - ONSET
ONSET: ON-GOING

## 2022-09-26 ASSESSMENT — PAIN DESCRIPTION - ORIENTATION
ORIENTATION: MID
ORIENTATION: MID
ORIENTATION: LOWER
ORIENTATION: MID;LOWER
ORIENTATION: MID
ORIENTATION: LEFT
ORIENTATION: MID

## 2022-09-26 ASSESSMENT — PAIN - FUNCTIONAL ASSESSMENT
PAIN_FUNCTIONAL_ASSESSMENT: PREVENTS OR INTERFERES SOME ACTIVE ACTIVITIES AND ADLS

## 2022-09-26 ASSESSMENT — PAIN DESCRIPTION - DESCRIPTORS
DESCRIPTORS: DISCOMFORT
DESCRIPTORS: ACHING;DISCOMFORT
DESCRIPTORS: DISCOMFORT;ACHING;PENETRATING
DESCRIPTORS: DISCOMFORT;PENETRATING
DESCRIPTORS: DISCOMFORT;PENETRATING
DESCRIPTORS: DISCOMFORT;ACHING
DESCRIPTORS: SPASM;DISCOMFORT;ACHING
DESCRIPTORS: ACHING;DISCOMFORT
DESCRIPTORS: ACHING;DISCOMFORT
DESCRIPTORS: ACHING;DISCOMFORT;CRAMPING

## 2022-09-26 ASSESSMENT — PAIN SCALES - GENERAL
PAINLEVEL_OUTOF10: 8
PAINLEVEL_OUTOF10: 9
PAINLEVEL_OUTOF10: 8
PAINLEVEL_OUTOF10: 10
PAINLEVEL_OUTOF10: 8
PAINLEVEL_OUTOF10: 9
PAINLEVEL_OUTOF10: 7
PAINLEVEL_OUTOF10: 9
PAINLEVEL_OUTOF10: 9
PAINLEVEL_OUTOF10: 10
PAINLEVEL_OUTOF10: 3
PAINLEVEL_OUTOF10: 1
PAINLEVEL_OUTOF10: 8

## 2022-09-26 ASSESSMENT — PAIN DESCRIPTION - FREQUENCY
FREQUENCY: CONTINUOUS

## 2022-09-26 NOTE — PROGRESS NOTES
[facility] Medicine Progress Note      Admit Date: 9/23/2022       CC: F/U for back pain     HPI: Patient is a 33-year-old male who presents to the hospital due to complaint of back pain, 10/10 intensity, lumbar area. Patient also mentions he was recommended by neurosurgeon to come to the emergency department. Patient recently had MRI that did show L1-L2 compression fracture as well as possible discitis. Patient otherwise denies fever chills diarrhea constipation, he denies history of IVDA. 9/25 Patient laying on left side, states he cannot lay on his back because of the severe pain. Also complains of sensory issues, for which he lays in bed naked with a sheet over himself. Patient with numerous complaints. States the Lasix made him pee all night long, for which he did not like. He does not think it helped his legs. Would not let me inspect the legs to feel the edema. On visual exam the edema seems to be lessened. Patient attributes that to elevating his legs in the bed although he was doing that yesterday. Recommended further IV Lasix to continue to help improve edema, which he refuses. He wants to only take oral Lasix during the day, at half of the \"lowest dose possible\". Explained to patient he would not have good benefit from that low of a dose, and recommended Lasix 20 mg oral daily. He will think about it. He did not tolerate the IV morphine, causing anxiety and agitation, wife reports he paced a lot with it. Ativan seemed to help calm him down. He now reports he has not peed all morning. We will obtain a bladder scan. Wife at bedside, discussed plan of care with patient and wife, deny further needs or questions. Interval History/Subjective: kyphoplasty set up for weds. Cont pain meds as ordered. Will order prednisone for c/o sciatica pain currently. States he tolerates that well. We can change back to toradol with ultram if prednisone doesn't help his sciatica.  He wasn't sure prednisone will help. Will change muscle relaxer to robaxin 750mg tid. Will get right hip xray for c/o pain and tenderness on palpation. States he had fallen and no one ordered xray. Wife states he has bad arthritis and knows he will need hip replacement ultimately. Review of Systems:       The patient denied headaches, visual changes, LOC, SOB, CP, ABD pain, N/V/D, skin changes, new or worsening weakness or neuromuscular deficits. Comprehensive ROS negative except as mentioned above. Past Medical History:        Diagnosis Date    Arthritis     Asthma     B12 deficiency 07/10/2013    Tooks injection every other week and B12 level 500. Recommended that he continue current therapy, as will likely drop with oral supplement. Closed wedge compression fracture of L1 vertebra ([address] 09/05/2018    Superior endplate fracture of L1 resulting 80% loss of vertebral body height    DDD (degenerative disc disease), lumbar 09/05/2018    L2-L3, L3-L4, L4-L5. Disc bulge with narrowing of the neural foramen at these levels    Diverticulitis     Gallbladder problem     S/P lap martha    GERD (gastroesophageal reflux disease)     Hiatal hernia     Migraine     MRSA infection 04/03/2019    Left hand    Nausea & vomiting     Spondylosis of lumbar spine 11/02/2015    On x-ray multilevel       Past Surgical History:        Procedure Laterality Date    CHOLECYSTECTOMY      COLONOSCOPY  9/11    FOOT SURGERY      nerve removed from foot    SHOULDER SURGERY Right 2005    rotator cuff repair    UPPER GASTROINTESTINAL ENDOSCOPY  9/11       Allergies:  Codeine and Hydrocodone    Past medical and surgical history reviewed. Any changes have been noted.      PHYSICAL EXAM:  /69   Pulse 88   Temp 97.8 °F (36.6 °C) (Oral)   Resp 18   Ht 6' 1\" (1.854 m)   Wt 155 lb 13.8 oz (70.7 kg)   SpO2 97%   BMI 20.56 kg/m²     No intake or output data in the 24 hours ending 09/26/22 1102     General appearance:   No apparent distress, appears stated age. Cooperative. HEENT:  Normocephalic, atraumatic. PERRLA. EOMi. Conjunctivae/corneas clear, no icterus, non-injected. Neck: Supple, with full range of motion. No jugular venous distention. Trachea midline. Respiratory:  Normal respiratory effort. Clear to auscultation, bilaterally without Rales/Wheezes/Rhonchi. Cardiovascular:  Regular rate and rhythm without murmurs, rubs or gallops. Abdomen: Soft, non-tender, non-distended, without rebound or guarding. Normal bowel sounds. Musculoskeletal: right hip pain on palpation, bilat feed edema 3+  , tender right lower leg on palpation  Skin: Skin color, texture, turgor normal.  No rashes or lesions. Neurologic:  Neurovascularly intact without any focal sensory/motor deficits. Cranial nerves: II-XII intact, grossly intact. No facial asymmetry, tongue midline. Psychiatric:  Alert and oriented, thought content appropriate  Capillary Refill: Brisk,< 3 seconds   Peripheral Pulses: +2 palpable, equal bilaterally       LABS:    Lab Results   Component Value Date    WBC 7.5 09/26/2022    HGB 12.9 (L) 09/26/2022    HCT 37.1 (L) 09/26/2022    MCV 92.8 09/26/2022     09/26/2022    LYMPHOPCT 20.6 09/26/2022    RBC 4.00 (L) 09/26/2022    MCH 32.3 09/26/2022    MCHC 34.8 09/26/2022    RDW 13.1 09/26/2022       Lab Results   Component Value Date    CREATININE 0.6 (L) 09/26/2022    BUN 13 09/26/2022     (L) 09/26/2022    K 3.4 (L) 09/26/2022     09/26/2022    CO2 23 09/26/2022       Lab Results   Component Value Date/Time    MG 2.00 09/26/2022 05:40 AM       Lab Results   Component Value Date    ALT 26 09/15/2022    AST 34 09/15/2022    ALKPHOS 171 (H) 09/15/2022    BILITOT 0.3 09/15/2022        No flowsheet data found. No results found for: LABA1C    Imaging:  MRI LUMBAR SPINE W WO CONTRAST   Final Result   1.  Again seen are acute to subacute compression deformities involving the L4   and L5 vertebral bodies with slight interval loss of height of L4. No   significant change in loss of height of L5.   2. Since the prior exam, there has been interval development of an acute   compression deformity of the L2 vertebral body with approximately 35% loss of   height. 3. Since the prior exam, development of minimal bone marrow edema along the   superior endplate of C11, which may represent a microtrabecular fracture   without significant loss of height. 4. Multilevel degenerative changes as above. Overall findings at L4-L5 are presumably related to the compression   deformities. Discitis while not entirely excluded is felt to be less likely. Scheduled and prn Medications:    Scheduled Meds:   furosemide  20 mg Oral Daily    ketorolac  15 mg IntraVENous Q6H    traMADol  50 mg Oral 4 times per day    tamsulosin  0.4 mg Oral Daily    docusate sodium  100 mg Oral BID    gabapentin  400 mg Oral 5x Daily    sodium chloride flush  10 mL IntraVENous 2 times per day    enoxaparin  40 mg SubCUTAneous Daily    ipratropium  2 spray Each Nostril TID    cetirizine  5 mg Oral Daily    magnesium cl-calcium carbonate  1 tablet Oral Daily with breakfast    mometasone-formoterol  2 puff Inhalation BID    pantoprazole  40 mg Oral BID AC    tiotropium  2 puff Inhalation Daily    topiramate  75 mg Oral BID    metaxalone  400 mg Oral TID    lidocaine  1 patch TransDERmal Daily    calcitonin  1 spray Alternating Nares Daily     Continuous Infusions:   sodium chloride       PRN Meds:. LORazepam, perflutren lipid microspheres, sodium chloride flush, sodium chloride, potassium chloride **OR** potassium alternative oral replacement **OR** potassium chloride, potassium chloride, magnesium sulfate, promethazine **OR** ondansetron, magnesium hydroxide, acetaminophen **OR** acetaminophen, albuterol, albuterol sulfate HFA, SUMAtriptan    Assessment & Plan:        Intractable low back pain with radiculopathy  Neuropathy the bilateral feet  -Sed rate and CRP are normal.  No fever, no leukocytosis. ID agree there is no discitis. -Consulted neurosurgery, who ordered stat MRI of lumbar spine. MRI showed L4 fracture has worsened. They recommended kyphoplasty. Neurosurgeon called and spoke to wife I was in the room, plan for kyphoplasty on Wednesday. Patient is to remain in hospital until after kyphoplasty. -Adjusted gabapentin dose to 400 mg 5 times a day. He was taking 300 mg 5 times a day at home  -DC IV morphine  -Schedule Toradol and tramadol every 6 hours. holding toradol with prednisone given for sciatica but we can change back if prednisone doesn't help to avoid anti-inflamm together. But give alternating doses of each every 3 hours. Discussed with pharmacist, who will time it appropriately on the [facility]  - robaxin 750mg tid instead of skelaxin          Bilateral lower extremity edema  Dyspnea   -Had venous Doppler 9/20/2022, negative bilaterally  -Echo ordered  -Check BNP  -Seem to have good response with Lasix, patient reported lots of output for which she was not happy with. Edema does look better though. Ordered Lasix 20 mg p.o. daily.  -Patient having sensory issues, will not tolerate HARJINDER hose.  -Albumin normal    Right hip pain  - xray ordered   - hx arthritis          Hypokalemia  -Replacement ordered            History of GERD, migraine  -Continue home meds as ordered           Active Hospital Problems     Diagnosis      Compression fracture of L2 lumbar vertebra ([address] [S32.020A]         Priority: Medium    Degenerative lumbar spinal stenosis [M48.061]         Priority: Medium    Urinary hesitancy [R39.11]         Priority: Medium    Back pain [M54.9]         Priority: Medium        Continue current regimen/therapies. Monitor. Adjust medical regimen as appropriate. Body mass index is 20.56 kg/m².     The patient and / or the family were informed of the results of any tests, a time was given to answer questions, a plan was proposed and they agreed with plan. DVT ppx: lovenox      Diet: ADULT DIET;  Regular    Consults:  IP CONSULT TO SPINE  IP CONSULT TO HOSPITALIST  IP CONSULT TO INFECTIOUS DISEASES    DISPO/placement plan: pending     Code Status: Full Code      CELESTINO Seals CNP  09/26/22

## 2022-09-26 NOTE — PROGRESS NOTES
09/26/22 1632   Encounter Summary   Encounter Overview/Reason  Initial Encounter;Rituals, Rites and Sacraments   Service Provided For: Patient   Referral/Consult From: Other    Support System Spouse   Last Encounter  09/26/22  (9/26 SOS ,[facility] Fr CADENA)   Complexity of Encounter Low   Begin Time 1600   End Time  1633   Total Time Calculated 33 min   Encounter    Type Initial Screen/Assessment   Spiritual/Emotional needs   Type Spiritual Support   Rituals, Rites and Sacraments   Type Sacrament of Sick; Anointing;Rastafari Communion   Grief, Loss, and Adjustments   Type Adjustment to illness   Assessment/Intervention/Outcome   Assessment Anxious; Concerns with suffering; Hopeful   Intervention Active listening;Discussed belief system/Jew practices/coty;Discussed illness injury and its impact; Discussed meaning/purpose;Discussed relationship with God;Explored/Affirmed feelings, thoughts, concerns;Explored Coping Skills/Resources   Outcome Connection/Belonging;Encouraged;Coping;Engaged in conversation;Expressed feelings, needs, and concerns;Expressed Gratitude   Fr CADENA

## 2022-09-26 NOTE — PLAN OF CARE
Problem: Pain  Goal: Verbalizes/displays adequate comfort level or baseline comfort level  9/25/2022 2322 by Grace Ya RN  Outcome: Progressing  9/25/2022 1955 by Ismael Casey RN  Outcome: Progressing     Problem: Neurosensory - Adult  Goal: Achieves stable or improved neurological status  9/25/2022 2322 by Grace Ya RN  Outcome: Progressing  9/25/2022 1955 by Ismael Casey RN  Outcome: Progressing     Problem: Neurosensory - Adult  Goal: Achieves maximal functionality and self care  9/25/2022 2322 by Grace Ya RN  Outcome: Progressing  9/25/2022 1955 by Ismael Casey RN  Outcome: Progressing     Problem: Musculoskeletal - Adult  Goal: Return mobility to safest level of function  9/25/2022 2322 by Grace Ya RN  Outcome: Progressing  9/25/2022 1955 by Ismael Casey RN  Outcome: Progressing     Problem: Musculoskeletal - Adult  Goal: Maintain proper alignment of affected body part  9/25/2022 2322 by Grace Ya RN  Outcome: Progressing  9/25/2022 1955 by Ismael Casey RN  Outcome: Progressing     Problem: Safety - Adult  Goal: Free from fall injury  9/25/2022 2322 by Grace Ya RN  Outcome: Progressing  9/25/2022 1955 by Ismael Casey RN  Outcome: Progressing

## 2022-09-26 NOTE — FLOWSHEET NOTE
Pt awake and alert, irritable tonight. Pt c/o being really sore. Scheduled meds given per order. Wife at bedside. Call light and needs in reach.

## 2022-09-27 ENCOUNTER — ANESTHESIA EVENT (OUTPATIENT)
Dept: OPERATING ROOM | Age: 72
DRG: 479 | End: 2022-09-27
Payer: COMMERCIAL

## 2022-09-27 PROBLEM — Z01.818 PRE-OPERATIVE EXAMINATION: Status: ACTIVE | Noted: 2022-09-27

## 2022-09-27 PROBLEM — R60.0 LOCALIZED EDEMA: Status: ACTIVE | Noted: 2022-09-27

## 2022-09-27 PROBLEM — E78.2 MIXED HYPERLIPIDEMIA: Status: ACTIVE | Noted: 2022-09-27

## 2022-09-27 PROBLEM — I25.10 CORONARY ARTERY CALCIFICATION: Status: ACTIVE | Noted: 2022-09-27

## 2022-09-27 PROBLEM — I25.84 CORONARY ARTERY CALCIFICATION: Status: ACTIVE | Noted: 2022-09-27

## 2022-09-27 LAB
ANION GAP SERPL CALCULATED.3IONS-SCNC: 12 MMOL/L (ref 3–16)
APTT: 33.6 SEC (ref 23–34.3)
BASOPHILS ABSOLUTE: 0 K/UL (ref 0–0.2)
BASOPHILS RELATIVE PERCENT: 0.1 %
BLOOD CULTURE, ROUTINE: NORMAL
BUN BLDV-MCNC: 14 MG/DL (ref 7–20)
CALCIUM SERPL-MCNC: 9.3 MG/DL (ref 8.3–10.6)
CHLORIDE BLD-SCNC: 99 MMOL/L (ref 99–110)
CO2: 23 MMOL/L (ref 21–32)
CREAT SERPL-MCNC: <0.5 MG/DL (ref 0.8–1.3)
CULTURE, BLOOD 2: NORMAL
EKG ATRIAL RATE: 95 BPM
EKG DIAGNOSIS: NORMAL
EKG P AXIS: 74 DEGREES
EKG P-R INTERVAL: 214 MS
EKG Q-T INTERVAL: 366 MS
EKG QRS DURATION: 90 MS
EKG QTC CALCULATION (BAZETT): 459 MS
EKG R AXIS: 76 DEGREES
EKG T AXIS: 65 DEGREES
EKG VENTRICULAR RATE: 95 BPM
EOSINOPHILS ABSOLUTE: 0 K/UL (ref 0–0.6)
EOSINOPHILS RELATIVE PERCENT: 0.1 %
GFR AFRICAN AMERICAN: >60
GFR NON-AFRICAN AMERICAN: >60
GLUCOSE BLD-MCNC: 128 MG/DL (ref 70–99)
HCT VFR BLD CALC: 37.5 % (ref 40.5–52.5)
HEMOGLOBIN: 12.9 G/DL (ref 13.5–17.5)
INR BLD: 1.02 (ref 0.87–1.14)
LYMPHOCYTES ABSOLUTE: 1.4 K/UL (ref 1–5.1)
LYMPHOCYTES RELATIVE PERCENT: 12.8 %
MCH RBC QN AUTO: 32.2 PG (ref 26–34)
MCHC RBC AUTO-ENTMCNC: 34.4 G/DL (ref 31–36)
MCV RBC AUTO: 93.5 FL (ref 80–100)
MONOCYTES ABSOLUTE: 0.7 K/UL (ref 0–1.3)
MONOCYTES RELATIVE PERCENT: 6.1 %
NEUTROPHILS ABSOLUTE: 9 K/UL (ref 1.7–7.7)
NEUTROPHILS RELATIVE PERCENT: 80.9 %
PDW BLD-RTO: 13.1 % (ref 12.4–15.4)
PLATELET # BLD: 286 K/UL (ref 135–450)
PMV BLD AUTO: 6.3 FL (ref 5–10.5)
POTASSIUM REFLEX MAGNESIUM: 4 MMOL/L (ref 3.5–5.1)
PROTHROMBIN TIME: 13.3 SEC (ref 11.7–14.5)
RBC # BLD: 4.01 M/UL (ref 4.2–5.9)
SARS-COV-2, NAAT: NOT DETECTED
SODIUM BLD-SCNC: 134 MMOL/L (ref 136–145)
WBC # BLD: 11.1 K/UL (ref 4–11)

## 2022-09-27 PROCEDURE — 6370000000 HC RX 637 (ALT 250 FOR IP): Performed by: INTERNAL MEDICINE

## 2022-09-27 PROCEDURE — 6370000000 HC RX 637 (ALT 250 FOR IP): Performed by: NURSE PRACTITIONER

## 2022-09-27 PROCEDURE — 6360000002 HC RX W HCPCS: Performed by: INTERNAL MEDICINE

## 2022-09-27 PROCEDURE — 2580000003 HC RX 258: Performed by: INTERNAL MEDICINE

## 2022-09-27 PROCEDURE — 87635 SARS-COV-2 COVID-19 AMP PRB: CPT

## 2022-09-27 PROCEDURE — 85730 THROMBOPLASTIN TIME PARTIAL: CPT

## 2022-09-27 PROCEDURE — 2060000000 HC ICU INTERMEDIATE R&B

## 2022-09-27 PROCEDURE — 85610 PROTHROMBIN TIME: CPT

## 2022-09-27 PROCEDURE — 94760 N-INVAS EAR/PLS OXIMETRY 1: CPT

## 2022-09-27 PROCEDURE — 99223 1ST HOSP IP/OBS HIGH 75: CPT | Performed by: INTERNAL MEDICINE

## 2022-09-27 PROCEDURE — 85025 COMPLETE CBC W/AUTO DIFF WBC: CPT

## 2022-09-27 PROCEDURE — 93010 ELECTROCARDIOGRAM REPORT: CPT | Performed by: INTERNAL MEDICINE

## 2022-09-27 PROCEDURE — 94640 AIRWAY INHALATION TREATMENT: CPT

## 2022-09-27 PROCEDURE — 36415 COLL VENOUS BLD VENIPUNCTURE: CPT

## 2022-09-27 PROCEDURE — 93005 ELECTROCARDIOGRAM TRACING: CPT | Performed by: INTERNAL MEDICINE

## 2022-09-27 PROCEDURE — 80048 BASIC METABOLIC PNL TOTAL CA: CPT

## 2022-09-27 RX ORDER — ACETAMINOPHEN 650 MG/1
650 SUPPOSITORY RECTAL EVERY 6 HOURS
Status: DISCONTINUED | OUTPATIENT
Start: 2022-09-27 | End: 2022-09-29 | Stop reason: HOSPADM

## 2022-09-27 RX ORDER — ACETAMINOPHEN 325 MG/1
650 TABLET ORAL EVERY 6 HOURS
Status: DISCONTINUED | OUTPATIENT
Start: 2022-09-27 | End: 2022-09-29 | Stop reason: HOSPADM

## 2022-09-27 RX ORDER — ACETAMINOPHEN 325 MG/1
650 TABLET ORAL EVERY 4 HOURS PRN
Status: DISCONTINUED | OUTPATIENT
Start: 2022-09-27 | End: 2022-09-29 | Stop reason: HOSPADM

## 2022-09-27 RX ORDER — BISACODYL 10 MG
10 SUPPOSITORY, RECTAL RECTAL DAILY PRN
Status: DISCONTINUED | OUTPATIENT
Start: 2022-09-27 | End: 2022-09-29 | Stop reason: HOSPADM

## 2022-09-27 RX ORDER — POLYETHYLENE GLYCOL 3350 17 G/17G
17 POWDER, FOR SOLUTION ORAL DAILY
Status: DISCONTINUED | OUTPATIENT
Start: 2022-09-27 | End: 2022-09-29 | Stop reason: HOSPADM

## 2022-09-27 RX ORDER — KETOROLAC TROMETHAMINE 15 MG/ML
15 INJECTION, SOLUTION INTRAMUSCULAR; INTRAVENOUS EVERY 6 HOURS PRN
Status: DISCONTINUED | OUTPATIENT
Start: 2022-09-27 | End: 2022-09-28

## 2022-09-27 RX ORDER — METHOCARBAMOL 500 MG/1
1000 TABLET, FILM COATED ORAL 3 TIMES DAILY
Status: DISCONTINUED | OUTPATIENT
Start: 2022-09-27 | End: 2022-09-28

## 2022-09-27 RX ADMIN — TRAMADOL HYDROCHLORIDE 50 MG: 50 TABLET ORAL at 11:37

## 2022-09-27 RX ADMIN — GABAPENTIN 400 MG: 400 CAPSULE ORAL at 11:39

## 2022-09-27 RX ADMIN — SODIUM CHLORIDE, PRESERVATIVE FREE 10 ML: 5 INJECTION INTRAVENOUS at 08:24

## 2022-09-27 RX ADMIN — GABAPENTIN 400 MG: 400 CAPSULE ORAL at 15:01

## 2022-09-27 RX ADMIN — TRAMADOL HYDROCHLORIDE 50 MG: 50 TABLET ORAL at 23:47

## 2022-09-27 RX ADMIN — DOCUSATE SODIUM 100 MG: 100 CAPSULE, LIQUID FILLED ORAL at 08:13

## 2022-09-27 RX ADMIN — ACETAMINOPHEN 650 MG: 325 TABLET ORAL at 13:59

## 2022-09-27 RX ADMIN — IPRATROPIUM BROMIDE 2 SPRAY: 42 SPRAY, METERED NASAL at 23:48

## 2022-09-27 RX ADMIN — TOPIRAMATE 75 MG: 25 TABLET, FILM COATED ORAL at 08:13

## 2022-09-27 RX ADMIN — IPRATROPIUM BROMIDE 2 SPRAY: 42 SPRAY, METERED NASAL at 16:53

## 2022-09-27 RX ADMIN — MOMETASONE FUROATE AND FORMOTEROL FUMARATE DIHYDRATE 2 PUFF: 200; 5 AEROSOL RESPIRATORY (INHALATION) at 09:32

## 2022-09-27 RX ADMIN — METHOCARBAMOL TABLETS 1000 MG: 500 TABLET, COATED ORAL at 20:37

## 2022-09-27 RX ADMIN — TOPIRAMATE 75 MG: 25 TABLET, FILM COATED ORAL at 20:38

## 2022-09-27 RX ADMIN — TIOTROPIUM BROMIDE INHALATION SPRAY 2 PUFF: 3.12 SPRAY, METERED RESPIRATORY (INHALATION) at 09:32

## 2022-09-27 RX ADMIN — BISACODYL 10 MG: 10 SUPPOSITORY RECTAL at 16:50

## 2022-09-27 RX ADMIN — ACETAMINOPHEN 650 MG: 325 TABLET, FILM COATED ORAL at 06:06

## 2022-09-27 RX ADMIN — PREDNISONE 40 MG: 20 TABLET ORAL at 08:13

## 2022-09-27 RX ADMIN — MOMETASONE FUROATE AND FORMOTEROL FUMARATE DIHYDRATE 2 PUFF: 200; 5 AEROSOL RESPIRATORY (INHALATION) at 20:44

## 2022-09-27 RX ADMIN — ACETAMINOPHEN 650 MG: 325 TABLET ORAL at 19:27

## 2022-09-27 RX ADMIN — MAGNESIUM CHLORIDE 1 TABLET: 71.5 TABLET ORAL at 08:13

## 2022-09-27 RX ADMIN — PANTOPRAZOLE SODIUM 40 MG: 40 TABLET, DELAYED RELEASE ORAL at 06:02

## 2022-09-27 RX ADMIN — METHOCARBAMOL TABLETS 750 MG: 750 TABLET, COATED ORAL at 08:13

## 2022-09-27 RX ADMIN — TRAMADOL HYDROCHLORIDE 50 MG: 50 TABLET ORAL at 06:02

## 2022-09-27 RX ADMIN — GABAPENTIN 400 MG: 400 CAPSULE ORAL at 06:02

## 2022-09-27 RX ADMIN — DOCUSATE SODIUM 100 MG: 100 CAPSULE, LIQUID FILLED ORAL at 20:38

## 2022-09-27 RX ADMIN — SODIUM CHLORIDE, PRESERVATIVE FREE 10 ML: 5 INJECTION INTRAVENOUS at 20:55

## 2022-09-27 RX ADMIN — TAMSULOSIN HYDROCHLORIDE 0.4 MG: 0.4 CAPSULE ORAL at 08:13

## 2022-09-27 RX ADMIN — CETIRIZINE HYDROCHLORIDE 5 MG: 10 TABLET, FILM COATED ORAL at 08:11

## 2022-09-27 RX ADMIN — GABAPENTIN 400 MG: 400 CAPSULE ORAL at 23:47

## 2022-09-27 RX ADMIN — ENOXAPARIN SODIUM 40 MG: 100 INJECTION SUBCUTANEOUS at 08:13

## 2022-09-27 RX ADMIN — PANTOPRAZOLE SODIUM 40 MG: 40 TABLET, DELAYED RELEASE ORAL at 16:52

## 2022-09-27 RX ADMIN — GABAPENTIN 400 MG: 400 CAPSULE ORAL at 19:00

## 2022-09-27 RX ADMIN — METHOCARBAMOL TABLETS 1000 MG: 500 TABLET, COATED ORAL at 14:00

## 2022-09-27 RX ADMIN — POLYETHYLENE GLYCOL 3350 17 G: 17 POWDER, FOR SOLUTION ORAL at 14:02

## 2022-09-27 RX ADMIN — IPRATROPIUM BROMIDE 2 SPRAY: 42 SPRAY, METERED NASAL at 08:18

## 2022-09-27 RX ADMIN — TRAMADOL HYDROCHLORIDE 50 MG: 50 TABLET ORAL at 19:01

## 2022-09-27 RX ADMIN — ACETAMINOPHEN 650 MG: 325 TABLET ORAL at 23:47

## 2022-09-27 ASSESSMENT — PAIN DESCRIPTION - DESCRIPTORS
DESCRIPTORS: ACHING;DISCOMFORT;SHARP
DESCRIPTORS: ACHING;CRAMPING;DISCOMFORT
DESCRIPTORS: ACHING;DISCOMFORT
DESCRIPTORS: ACHING;DISCOMFORT
DESCRIPTORS: ACHING;DISCOMFORT;PRESSURE
DESCRIPTORS: ACHING;DISCOMFORT
DESCRIPTORS: DULL;DISCOMFORT

## 2022-09-27 ASSESSMENT — PAIN DESCRIPTION - ORIENTATION
ORIENTATION: MID
ORIENTATION: LOWER
ORIENTATION: MID

## 2022-09-27 ASSESSMENT — PAIN SCALES - WONG BAKER
WONGBAKER_NUMERICALRESPONSE: 6

## 2022-09-27 ASSESSMENT — PAIN SCALES - GENERAL
PAINLEVEL_OUTOF10: 8
PAINLEVEL_OUTOF10: 7
PAINLEVEL_OUTOF10: 7
PAINLEVEL_OUTOF10: 8
PAINLEVEL_OUTOF10: 8
PAINLEVEL_OUTOF10: 10
PAINLEVEL_OUTOF10: 10
PAINLEVEL_OUTOF10: 8
PAINLEVEL_OUTOF10: 7
PAINLEVEL_OUTOF10: 7
PAINLEVEL_OUTOF10: 8
PAINLEVEL_OUTOF10: 7

## 2022-09-27 ASSESSMENT — PAIN DESCRIPTION - LOCATION
LOCATION: BACK;LEG;HIP
LOCATION: BACK
LOCATION: BACK;LEG
LOCATION: BACK;LEG
LOCATION: BACK;FOOT
LOCATION: BACK
LOCATION: BACK
LOCATION: BACK;LEG
LOCATION: FOOT;BACK
LOCATION: BACK;LEG

## 2022-09-27 ASSESSMENT — PAIN DESCRIPTION - ONSET
ONSET: ON-GOING

## 2022-09-27 ASSESSMENT — PAIN DESCRIPTION - PAIN TYPE
TYPE: ACUTE PAIN

## 2022-09-27 ASSESSMENT — PAIN DESCRIPTION - FREQUENCY
FREQUENCY: CONTINUOUS

## 2022-09-27 ASSESSMENT — PAIN - FUNCTIONAL ASSESSMENT
PAIN_FUNCTIONAL_ASSESSMENT: PREVENTS OR INTERFERES SOME ACTIVE ACTIVITIES AND ADLS

## 2022-09-27 NOTE — PLAN OF CARE
Problem: Pain  Goal: Verbalizes/displays adequate comfort level or baseline comfort level  9/27/2022 0015 by Kelin Barrera RN  Outcome: Progressing  9/26/2022 1602 by Dipesh Ricci RN  Outcome: Progressing  Flowsheets (Taken 9/26/2022 1600)  Verbalizes/displays adequate comfort level or baseline comfort level: Assess pain using appropriate pain scale     Problem: Musculoskeletal - Adult  Goal: Return mobility to safest level of function  9/27/2022 0015 by Kelin Barrera RN  Outcome: Progressing  9/26/2022 1602 by Dipesh Ricci RN  Outcome: Progressing  Flowsheets (Taken 9/26/2022 1200)  Return Mobility to Safest Level of Function: Assist with transfers and ambulation using safe patient handling equipment as needed     Problem: Genitourinary - Adult  Goal: Absence of urinary retention  9/27/2022 0015 by Kelin Barrera RN  Outcome: Progressing  9/26/2022 1602 by Dipesh Ricci RN  Outcome: Progressing  Flowsheets (Taken 9/26/2022 1200)  Absence of urinary retention: Monitor intake/output and perform bladder scan as needed     Problem: Safety - Adult  Goal: Free from fall injury  9/27/2022 0015 by Kelin Barrera RN  Outcome: Progressing  9/26/2022 1602 by Dipesh Ricci RN  Outcome: Progressing     Problem: ABCDS Injury Assessment  Goal: Absence of physical injury  9/27/2022 0015 by Kelin Barrera RN  Outcome: Progressing  9/26/2022 1602 by Dipesh Ricci RN  Outcome: Progressing

## 2022-09-27 NOTE — PLAN OF CARE
Problem: Discharge Planning  Goal: Discharge to home or other facility with appropriate resources  Outcome: Progressing  Flowsheets (Taken 9/27/2022 0900)  Discharge to home or other facility with appropriate resources: Identify barriers to discharge with patient and caregiver     Problem: Pain  Goal: Verbalizes/displays adequate comfort level or baseline comfort level  Outcome: Progressing     Problem: Neurosensory - Adult  Goal: Achieves stable or improved neurological status  Outcome: Progressing  Flowsheets (Taken 9/27/2022 0900)  Achieves stable or improved neurological status: Assess for and report changes in neurological status  Goal: Absence of seizures  Outcome: Progressing  Flowsheets (Taken 9/27/2022 0900)  Absence of seizures: Monitor for seizure activity.   If seizure occurs, document type and location of movements and any associated apnea  Goal: Remains free of injury related to seizures activity  Outcome: Progressing  Flowsheets (Taken 9/27/2022 0900)  Remains free of injury related to seizure activity: Maintain airway, patient safety  and administer oxygen as ordered  Goal: Achieves maximal functionality and self care  Outcome: Progressing  Flowsheets (Taken 9/27/2022 0900)  Achieves maximal functionality and self care: Monitor swallowing and airway patency with patient fatigue and changes in neurological status     Problem: Respiratory - Adult  Goal: Achieves optimal ventilation and oxygenation  Outcome: Progressing  Flowsheets (Taken 9/27/2022 0900)  Achieves optimal ventilation and oxygenation: Assess for changes in respiratory status     Problem: Cardiovascular - Adult  Goal: Maintains optimal cardiac output and hemodynamic stability  Outcome: Progressing  Flowsheets (Taken 9/27/2022 0900)  Maintains optimal cardiac output and hemodynamic stability: Monitor urine output and notify Licensed Independent Practitioner for values outside of normal range  Goal: Absence of cardiac dysrhythmias or at baseline  Outcome: Progressing     Problem: Skin/Tissue Integrity - Adult  Goal: Skin integrity remains intact  Outcome: Progressing  Flowsheets (Taken 9/27/2022 0900)  Skin Integrity Remains Intact: Monitor for areas of redness and/or skin breakdown  Goal: Incisions, wounds, or drain sites healing without S/S of infection  Outcome: Progressing  Flowsheets (Taken 9/27/2022 0900)  Incisions, Wounds, or Drain Sites Healing Without Sign and Symptoms of Infection: TWICE DAILY: Assess and document skin integrity  Goal: Oral mucous membranes remain intact  Outcome: Progressing     Problem: Musculoskeletal - Adult  Goal: Return mobility to safest level of function  Outcome: Progressing  Flowsheets (Taken 9/27/2022 0900)  Return Mobility to Safest Level of Function: Assess patient stability and activity tolerance for standing, transferring and ambulating with or without assistive devices  Goal: Maintain proper alignment of affected body part  Outcome: Progressing  Goal: Return ADL status to a safe level of function  Outcome: Progressing  Flowsheets (Taken 9/27/2022 0900)  Return ADL Status to a Safe Level of Function: Administer medication as ordered     Problem: Gastrointestinal - Adult  Goal: Minimal or absence of nausea and vomiting  Outcome: Progressing  Goal: Maintains or returns to baseline bowel function  Outcome: Progressing  Flowsheets (Taken 9/27/2022 0900)  Maintains or returns to baseline bowel function: Assess bowel function  Goal: Maintains adequate nutritional intake  Outcome: Progressing  Flowsheets (Taken 9/27/2022 0900)  Maintains adequate nutritional intake: Monitor intake and output, weight and lab values  Goal: Establish and maintain optimal ostomy function  Outcome: Progressing     Problem: Genitourinary - Adult  Goal: Absence of urinary retention  Outcome: Progressing  Flowsheets (Taken 9/27/2022 0900)  Absence of urinary retention: Assess patients ability to void and empty bladder  Goal: Urinary catheter remains patent  Outcome: Progressing     Problem: Infection - Adult  Goal: Absence of infection at discharge  Outcome: Progressing  Flowsheets (Taken 9/27/2022 0900)  Absence of infection at discharge: Assess and monitor for signs and symptoms of infection  Goal: Absence of infection during hospitalization  Outcome: Progressing  Flowsheets (Taken 9/27/2022 0900)  Absence of infection during hospitalization: Assess and monitor for signs and symptoms of infection  Goal: Absence of fever/infection during anticipated neutropenic period  Outcome: Progressing     Problem: Metabolic/Fluid and Electrolytes - Adult  Goal: Electrolytes maintained within normal limits  Outcome: Progressing  Flowsheets (Taken 9/27/2022 0900)  Electrolytes maintained within normal limits: Administer electrolyte replacement as ordered  Goal: Hemodynamic stability and optimal renal function maintained  Outcome: Progressing  Flowsheets (Taken 9/27/2022 0900)  Hemodynamic stability and optimal renal function maintained: Monitor intake, output and patient weight  Goal: Glucose maintained within prescribed range  Outcome: Progressing     Problem: Hematologic - Adult  Goal: Maintains hematologic stability  Outcome: Progressing     Problem: Safety - Adult  Goal: Free from fall injury  Outcome: Progressing     Problem: ABCDS Injury Assessment  Goal: Absence of physical injury  Outcome: Progressing

## 2022-09-27 NOTE — PROGRESS NOTES
[facility] Medicine Progress Note      Admit Date: 9/23/2022       CC: F/U for back pain    HPI: Patient is a 61-year-old male who presents to the hospital due to complaint of back pain, 10/10 intensity, lumbar area. Patient also mentions he was recommended by neurosurgeon to come to the emergency department. Patient recently had MRI that did show L1-L2 compression fracture as well as possible discitis. Patient otherwise denies fever chills diarrhea constipation, he denies history of IVDA. 9/25 Patient laying on left side, states he cannot lay on his back because of the severe pain. Also complains of sensory issues, for which he lays in bed naked with a sheet over himself. Patient with numerous complaints. States the Lasix made him pee all night long, for which he did not like. He does not think it helped his legs. Would not let me inspect the legs to feel the edema. On visual exam the edema seems to be lessened. Patient attributes that to elevating his legs in the bed although he was doing that yesterday. Recommended further IV Lasix to continue to help improve edema, which he refuses. He wants to only take oral Lasix during the day, at half of the \"lowest dose possible\". Explained to patient he would not have good benefit from that low of a dose, and recommended Lasix 20 mg oral daily. He will think about it. He did not tolerate the IV morphine, causing anxiety and agitation, wife reports he paced a lot with it. Ativan seemed to help calm him down. He now reports he has not peed all morning. We will obtain a bladder scan. Wife at bedside, discussed plan of care with patient and wife, deny further needs or questions. 9/26: kyphoplasty set up for weds. Cont pain meds as ordered. Will order prednisone for c/o sciatica pain currently. States he tolerates that well. We can change back to toradol with ultram if prednisone doesn't help his sciatica. He wasn't sure prednisone will help. Will change muscle relaxer to robaxin 750mg tid. Will get right hip xray for c/o pain and tenderness on palpation. States he had fallen and no one ordered xray. Wife states he has bad arthritis and knows he will need hip replacement ultimately. Interval History/Subjective: WBC bump slightly with start of prednisone for sciatica. To be expected. Awaiting kyphoplasty weds. Patient up walking around in room with rollator. Increase robaxin dose, cont steroid for now for sciatica, will add miralax and suppos for constipation. Right hip xray neg for fx. Echo with diastolic dysfunction. . EF 65-70%. Will consult cardiology for bilat LE edema and echo findings. Review of Systems:       The patient denied headaches, visual changes, LOC, SOB, CP, ABD pain, N/V/D, skin changes, new or worsening weakness or neuromuscular deficits. Comprehensive ROS negative except as mentioned above. Past Medical History:        Diagnosis Date    Arthritis     Asthma     B12 deficiency 07/10/2013    Tooks injection every other week and B12 level 500. Recommended that he continue current therapy, as will likely drop with oral supplement. Closed wedge compression fracture of L1 vertebra ([address] 09/05/2018    Superior endplate fracture of L1 resulting 80% loss of vertebral body height    DDD (degenerative disc disease), lumbar 09/05/2018    L2-L3, L3-L4, L4-L5.   Disc bulge with narrowing of the neural foramen at these levels    Diverticulitis     Gallbladder problem     S/P lap martha    GERD (gastroesophageal reflux disease)     Hiatal hernia     Migraine     MRSA infection 04/03/2019    Left hand    Nausea & vomiting     Spondylosis of lumbar spine 11/02/2015    On x-ray multilevel       Past Surgical History:        Procedure Laterality Date    CHOLECYSTECTOMY      COLONOSCOPY  9/11    FOOT SURGERY      nerve removed from foot    SHOULDER SURGERY Right 2005    rotator cuff repair    UPPER GASTROINTESTINAL ENDOSCOPY  9/11       Allergies:  Codeine and Hydrocodone    Past medical and surgical history reviewed. Any changes have been noted. PHYSICAL EXAM:  BP (!) 143/63   Pulse 92   Temp 98.5 °F (36.9 °C) (Oral)   Resp 16   Ht 6' 1\" (1.854 m)   Wt 160 lb 11.5 oz (72.9 kg)   SpO2 97%   BMI 21.20 kg/m²     No intake or output data in the 24 hours ending 09/27/22 0842   General appearance:   No apparent distress, appears stated age. Cooperative. HEENT:  Normocephalic, atraumatic. PERRLA. EOMi. Conjunctivae/corneas clear, no icterus, non-injected. Neck: Supple, with full range of motion. No jugular venous distention. Trachea midline. Respiratory:  Normal respiratory effort. Clear to auscultation, bilaterally without Rales/Wheezes/Rhonchi. Cardiovascular:  Regular rate and rhythm without murmurs, rubs or gallops. Abdomen: Soft, non-tender, non-distended, without rebound or guarding. Normal bowel sounds. Musculoskeletal: right hip pain on palpation, bilat feed edema 3+  , tender right lower leg on palpation  Skin: Skin color, texture, turgor normal.  No rashes or lesions. Neurologic:  Neurovascularly intact without any focal sensory/motor deficits. Cranial nerves: II-XII intact, grossly intact. No facial asymmetry, tongue midline.    Psychiatric:  Alert and oriented, thought content appropriate  Capillary Refill: Brisk,< 3 seconds   Peripheral Pulses: +2 palpable, equal bilaterally        LABS:    Lab Results   Component Value Date    WBC 11.1 (H) 09/27/2022    HGB 12.9 (L) 09/27/2022    HCT 37.5 (L) 09/27/2022    MCV 93.5 09/27/2022     09/27/2022    LYMPHOPCT 12.8 09/27/2022    RBC 4.01 (L) 09/27/2022    MCH 32.2 09/27/2022    MCHC 34.4 09/27/2022    RDW 13.1 09/27/2022       Lab Results   Component Value Date    CREATININE <0.5 (L) 09/27/2022    BUN 14 09/27/2022     (L) 09/27/2022    K 4.0 09/27/2022    CL 99 09/27/2022    CO2 23 09/27/2022       Lab Results   Component Value Date/Time    MG Infusions:   sodium chloride       PRN Meds:. LORazepam, perflutren lipid microspheres, sodium chloride flush, sodium chloride, potassium chloride **OR** potassium alternative oral replacement **OR** potassium chloride, potassium chloride, magnesium sulfate, promethazine **OR** ondansetron, magnesium hydroxide, acetaminophen **OR** acetaminophen, albuterol, albuterol sulfate HFA, SUMAtriptan    Assessment & Plan:        Intractable low back pain with radiculopathy  Neuropathy the bilateral feet  -Sed rate and CRP are normal.  No fever, no leukocytosis. ID agree there is no discitis. -Consulted neurosurgery, who ordered stat MRI of lumbar spine. MRI showed L4 fracture has worsened. They recommended kyphoplasty. Neurosurgeon called and spoke to wife I was in the room, plan for kyphoplasty on Wednesday. Patient is to remain in hospital until after kyphoplasty. -Adjusted gabapentin dose to 400 mg 5 times a day. He was taking 300 mg 5 times a day at home  -DC IV morphine  -Schedule Toradol and tramadol every 6 hours. holding toradol with prednisone given for sciatica but we can change back if prednisone doesn't help to avoid anti-inflamm together. But give alternating doses of each every 3 hours. Discussed with pharmacist, who will time it appropriately on the [facility]  - robaxin 750mg tid instead of skelaxin            Bilateral lower extremity edema  Dyspnea   -Had venous Doppler 9/20/2022, negative bilaterally  -Echo ordered- grade II diastolic dysfunction  -Check BNP  -Seem to have good response with Lasix, patient reported lots of output for which she was not happy with. Edema does look better though. Ordered Lasix 20 mg p.o. daily. - refused today  -Patient having sensory issues, will not tolerate HARJINDER hose.  -Albumin normal     Right hip pain  - xray ordered -- neg for fx.  - hx arthritis   - c/o sciatica - started prednisone        Hypokalemia  -Replacement ordered          History of GERD, migraine  -Continue home meds as ordered               Active Hospital Problems     Diagnosis      Compression fracture of L2 lumbar vertebra ([facility] [address] [S32.020A]         Priority: Medium    Degenerative lumbar spinal stenosis [M48.061]         Priority: Medium    Urinary hesitancy [R39.11]         Priority: Medium    Back pain [M54.9]         Priority: Medium       Continue current regimen/therapies. Monitor. Adjust medical regimen as appropriate. Body mass index is 21.2 kg/m². The patient and / or the family were informed of the results of any tests, a time was given to answer questions, a plan was proposed and they agreed with plan. DVT ppx: lovenox  GI ppx: protonix    Diet: ADULT DIET;  Regular    Consults:  IP CONSULT TO SPINE  IP CONSULT TO HOSPITALIST  IP CONSULT TO INFECTIOUS DISEASES    DISPO/placement plan: pending     Code Status: Full Code      CELESTINO Lafleur CNP  09/27/22

## 2022-09-27 NOTE — CARE COORDINATION
Attempted to see the patient two times today. Has been on the bedpan both times. Will attempt again tomorrow.      August MA RN  Case Management  [phone]    Electronically signed by August Funez RN on 9/27/2022 at 5:17 PM

## 2022-09-27 NOTE — CONSULTS
Referring practitioner: Fartun Mohamud NP  Reason for Consultation: \"Grade 2 diastolic dysfunction with bilat LE edema, here for back pain and kyphoplasty tomorrow\"  Chief Complaint: Back pain    Subjective:   History of Present Illness:  Vicente Pulido is a 73 y.o. patient who presented to the hospital with complaints of severe acute on chronic back pain. The patient's wife is present bedside who also assists in history. The patient has been having back pain for at least 6 months and was recommended to be evaluated in the emergency department secondary to the severity of the pain. An MRI showed compression fracture and possible discitis. He denies fevers or chills. The patient also complained of worsening lower extremity edema. He endorses chronic lower extremity edema. He is on Neurontin, NSAIDs, and now is on oral prednisone. In general, his chronic lower extremity edema improves with leg elevation. He denies any personal cardiovascular disease. His functional status has been limited recently by his back pain. Prior to his acute pain, he denied having any exertional chest pain or pressure, PND, or orthopnea. He reports compliance with his medications. He had an echocardiogram which was interpreted as grade 2 diastolic dysfunction but I disagree with the interpretation and his echo shows normal diastolic function. The left atrium is also reported as moderately dilated but is clearly over measured and is normal in size. Past Medical History:   has a past medical history of Arthritis, Asthma, B12 deficiency, Closed wedge compression fracture of L1 vertebra (HCC), DDD (degenerative disc disease), lumbar, Diverticulitis, Gallbladder problem, GERD (gastroesophageal reflux disease), Hiatal hernia, Migraine, MRSA infection, Nausea & vomiting, and Spondylosis of lumbar spine. Surgical History:   has a past surgical history that includes Colonoscopy (9/11);  Upper gastrointestinal endoscopy (9/11); shoulder surgery (Right, 2005); Foot surgery; and Cholecystectomy. Social History:   reports that he has never smoked. He has never used smokeless tobacco. He reports that he does not currently use alcohol. He reports that he does not use drugs. Family History:  family history includes Diabetes Type 1  in his sister and sister; High Cholesterol in his brother, brother, brother, father, and mother; Hypertension in his brother, brother, brother, father, and mother; Stroke (age of onset: 79) in his brother. Home Medications:  Were reviewed and are listed in nursing record and/or below  Prior to Admission medications    Medication Sig Start Date End Date Taking? Authorizing Provider   Magnesium Gluconate 500 (27 Mg) MG TABS tablet Take 500 mg by mouth daily   Yes Historical Provider, MD   Levomefolate Glucosamine (METHYLFOLATE) 400 MCG CAPS Take 400 mcg by mouth every evening   Yes Historical Provider, MD   gabapentin (NEURONTIN) 300 MG capsule Take 300 mg by mouth in the morning and at bedtime. 1200 and 1600   Yes Historical Provider, MD   traMADol (ULTRAM) 50 MG tablet Take 1-2 tablets by mouth every 6 hours as needed for Pain for up to 15 days. TAKE ONE OR TWO TABLETS EVERY 6 HOURS AS NEEDED FOR PAIN 9/21/22 10/6/22  Jay Hunt MD   gabapentin (NEURONTIN) 600 MG tablet 1 po am, 1 po pm and 1-2 po hs  Patient taking differently: Take 600 mg by mouth 2 times daily.  0800 and 2100 9/20/22 10/20/22  Jay Hunt MD   ondansetron (ZOFRAN ODT) 4 MG disintegrating tablet Take 1 tablet by mouth every 8 hours as needed for Nausea or Vomiting 9/15/22   Jay Hunt MD   tolterodine (DETROL LA) 4 MG extended release capsule Take 1 capsule by mouth daily 9/6/22   Jay Hunt MD   calcitonin (MIACALCIN) 200 UNIT/ACT nasal spray 1 spray in one nostril daily, alternating nostril side daily 9/5/22   Jay Hunt MD   tiZANidine (ZANAFLEX) 4 MG tablet Take 1 tablet by mouth nightly  Patient not taking: Reported on 9/23/2022 8/23/22   Hannah Johns MD   rizatriptan (MAXALT-MLT) 10 MG disintegrating tablet May repeat in 2 hours if needed 8/23/22   Hannah Johns MD   tiotropium (SPIRIVA RESPIMAT) 2.5 MCG/ACT AERS inhaler Inhale 2 puffs into the lungs in the morning. 8/5/22   Hannah Johns MD   levocetirizine (XYZAL) 5 MG tablet TAKE 1 TABLET BY MOUTH EVERY EVENING.   Patient taking differently: Take 5 mg by mouth nightly 7/19/22   CELESTINO Silvestre CNP   pantoprazole (PROTONIX) 40 MG tablet TAKE 1 TABLET BY MOUTH TWICE DAILY  Patient taking differently: Take 40 mg by mouth in the morning and at bedtime 7/18/22   Hannah Johns MD   mometasone-formoterol [facility]) 200-5 MCG/ACT inhaler INHALE 2 PUFFS INTO THE LUNGS 2 TIMES DAILY RINSE MOUTH AFTER USING. 7/18/22   CELESTINO Silvestre CNP   albuterol sulfate HFA (PROVENTIL;VENTOLIN;PROAIR) 108 (90 Base) MCG/ACT inhaler INHALE 2 PUFFS INTO THE LUNGS FOUR TIMES DAILY AS NEEDED FOR WHEEZING 6/20/22   CELESTINO Steven CNP   vitamin D (ERGOCALCIFEROL) 1.25 MG (31209 UT) CAPS capsule TAKE 1 CAPSULE BY MOUTH WEEKLY  Patient taking differently: Take 50,000 Units by mouth once a week On Sundays 6/17/22   Hannah Johns MD   Spacer/Aero-Holding Chambers (COMPACT SPACE CHAMBER/SM MASK) AZAEL AS DIRECTED w/ inhalers 4/21/22   Hannah Johns MD   topiramate (TOPAMAX) 25 MG tablet TAKE 3 TABLETS BY MOUTH EVERY MORNING AND 5 TABLETS BY MOUTH EVERY EVENING  Patient taking differently: Take 75 mg by mouth 2 times daily 4/21/22   Hannah Johns MD   ipratropium (ATROVENT) 0.06 % nasal spray TAKE 1-2 SPRAYS BY NASAL ROUTE 4 TIMES DAILY AS NEEDED FOR RHINITIS 1/25/22   Hannah Johns MD   albuterol (PROVENTIL) (2.5 MG/3ML) 0.083% nebulizer solution Take 3 mLs by nebulization every 6 hours as needed for Wheezing 1/18/22   Hannah Johns MD        CURRENT Medications:  magic (miracle) mouthwash with nystatin, 4x Daily PRN  acetaminophen (TYLENOL) tablet 650 mg, Q4H PRN  acetaminophen (TYLENOL) tablet 650 mg, Q6H   Or  acetaminophen (TYLENOL) suppository 650 mg, Q6H  methocarbamol (ROBAXIN) tablet 1,000 mg, TID  bisacodyl (DULCOLAX) suppository 10 mg, Daily PRN  polyethylene glycol (GLYCOLAX) packet 17 g, Daily  predniSONE (DELTASONE) tablet 40 mg, Daily  furosemide (LASIX) tablet 20 mg, Daily  traMADol (ULTRAM) tablet 50 mg, 4 times per day  LORazepam (ATIVAN) tablet 0.5 mg, Q6H PRN  tamsulosin (FLOMAX) capsule 0.4 mg, Daily  docusate sodium (COLACE) capsule 100 mg, BID  perflutren lipid microspheres (DEFINITY) injection 1.65 mg, ONCE PRN  gabapentin (NEURONTIN) capsule 400 mg, 5x Daily  sodium chloride flush 0.9 % injection 10 mL, 2 times per day  sodium chloride flush 0.9 % injection 10 mL, PRN  0.9 % sodium chloride infusion, PRN  potassium chloride (KLOR-CON M) extended release tablet 40 mEq, PRN   Or  potassium bicarb-citric acid (EFFER-K) effervescent tablet 40 mEq, PRN   Or  potassium chloride 10 mEq/100 mL IVPB (Peripheral Line), PRN  potassium chloride 10 mEq/100 mL IVPB (Peripheral Line), PRN  magnesium sulfate 2000 mg in 50 mL IVPB premix, PRN  enoxaparin (LOVENOX) injection 40 mg, Daily  promethazine (PHENERGAN) tablet 12.5 mg, Q6H PRN   Or  ondansetron (ZOFRAN) injection 4 mg, Q6H PRN  magnesium hydroxide (MILK OF MAGNESIA) 400 MG/5ML suspension 30 mL, Daily PRN  albuterol (PROVENTIL) nebulizer solution 2.5 mg, Q6H PRN  albuterol sulfate HFA (PROVENTIL;VENTOLIN;PROAIR) 108 (90 Base) MCG/ACT inhaler 2 puff, Q4H PRN  ipratropium (ATROVENT) 0.06 % nasal spray 2 spray, TID  cetirizine (ZYRTEC) tablet 5 mg, Daily  magnesium cl-calcium carbonate (SLOW-MAG) tablet 1 tablet, Daily with breakfast  mometasone-formoterol (DULERA) 200-5 MCG/ACT inhaler 2 puff, BID  pantoprazole (PROTONIX) tablet 40 mg, BID AC  tiotropium (SPIRIVA RESPIMAT) 2.5 MCG/ACT inhaler 2 puff, Daily  topiramate (TOPAMAX) tablet 75 mg, BID  SUMAtriptan (IMITREX) tablet 100 mg, PRN  lidocaine 4 % external patch 1 patch, Daily  calcitonin (MIACALCIN) nasal spray 1 spray (Patient Supplied), Daily    Allergies:  Codeine and Hydrocodone     Review of Systems:   Constitutional: no unanticipated weight loss. There's been no change in energy level, sleep pattern, or activity level. No fevers, chills. Eyes: No visual changes or diplopia. No scleral icterus. ENT: No Headaches, hearing loss or vertigo. No mouth sores or sore throat. Cardiovascular: as reviewed in HPI  Respiratory: No cough or wheezing, no sputum production. No hemoptysis. Gastrointestinal: No abdominal pain, appetite loss, blood in stools. No change in bowel or bladder habits. Genitourinary: No dysuria, trouble voiding, or hematuria. Musculoskeletal:  No gait disturbance, no joint complaints. Integumentary: No rash or pruritis. Neurological: No headache, diplopia, change in muscle strength, numbness or tingling. Psychiatric: No anxiety or depression. Endocrine: No temperature intolerance. No excessive thirst, fluid intake, or urination. No tremor. Hematologic/Lymphatic: No abnormal bruising or bleeding, blood clots or swollen lymph nodes. Allergic/Immunologic: No nasal congestion or hives. Objective:   PHYSICAL EXAM:    Vitals:    09/27/22 0932   BP: 143/63   Pulse: 92   Resp: 16   Temp: 98.5   SpO2: 98%    Weight: 160 lb 11.5 oz (72.9 kg)       General Appearance:  Alert, cooperative, no distress, appears stated age. Head:  Normocephalic, without obvious abnormality, atraumatic. Eyes:  Pupils equal and round. No scleral icterus. Mouth: Moist mucosa, no pharyngeal erythema. Nose: Nares normal. No drainage or sinus tenderness. Neck: Supple, symmetrical, trachea midline. No adenopathy. No tenderness/mass/nodules. No carotid bruit or elevated JVD. Lungs:   Clear to auscultation bilaterally, respirations unlabored. No wheeze, rales, or rhonchi. Chest Wall:  No tenderness or deformity. Heart:  Regular rate.  S1/S2 normal. No murmur, rub, or gallop. Abdomen:   Soft, non-tender, bowel sounds active. Musculoskeletal: No muscle wasting or digital clubbing. Extremities: Extremities normal, atraumatic. No cyanosis. 2+ BLE edema. Pulses: 2+ radial and carotid pulses, symmetric. Skin: No rashes or lesions. Pysch: Normal mood and affect. Alert and oriented x 4. Neurologic: Moves all extremities. Follows commands. Labs     CBC:   Lab Results   Component Value Date/Time    WBC 11.1 2022 04:44 AM    RBC 4.01 2022 04:44 AM    HGB 12.9 2022 04:44 AM    HCT 37.5 2022 04:44 AM    MCV 93.5 2022 04:44 AM    RDW 13.1 2022 04:44 AM     2022 04:44 AM     CMP:  Lab Results   Component Value Date/Time     2022 04:44 AM    K 4.0 2022 04:44 AM    CL 99 2022 04:44 AM    CO2 23 2022 04:44 AM    BUN 14 2022 04:44 AM    CREATININE <0.5 2022 04:44 AM    GFRAA >60 2022 04:44 AM    GFRAA >60 2013 02:20 PM    AGRATIO 2.2 09/15/2022 05:47 PM    LABGLOM >60 2022 04:44 AM    GLUCOSE 128 2022 04:44 AM    PROT 6.0 09/15/2022 05:47 PM    CALCIUM 9.3 2022 04:44 AM    BILITOT 0.3 09/15/2022 05:47 PM    ALKPHOS 171 09/15/2022 05:47 PM    AST 34 09/15/2022 05:47 PM    ALT 26 09/15/2022 05:47 PM     PT/INR:  No results found for: PTINR  HgBA1c:No results found for: LABA1C  Lab Results   Component Value Date    CKTOTAL 209 09/15/2022     The 10-year ASCVD risk score (Mariah KNIGHT, et al., 2019) is: 15.7%    Values used to calculate the score:      Age: 73 years      Sex: Male      Is Non- : No      Diabetic: No      Tobacco smoker: No      Systolic Blood Pressure: 335 mmHg      Is BP treated: No      HDL Cholesterol: 56 mg/dL      Total Cholesterol: 137 mg/dL    Cardiac Data     EK2022. Sinus rhythm with sinus arrhythmia with first-degree AV block. Cannot rule out septal infarct. Echo: 2022. Personally interpreted. Normal left ventricular systolic function with an estimated ejection fraction of 60%. Normal diastolic function. No wall motion abnormalities. Normal wall thickness. Normal RV function. No significant valvular abnormalities. Telemetry: Personally interpreted. Sinus. Assessment and Plan   1) Pre-operative risk assessment. Patient is low cardiac risk based on RCRI score of 0. Patient's risk should not preclude him from proceeding with surgery. No additional cardiac testing is required prior to surgery. 2) Lower extremity edema. Etiology is likely multifactorial.  The patient's echocardiogram shows normal LV systolic, LV diastolic, and RV function. His BNP is normal for age. CHF is the etiology seems unlikely. Medication such as Neurontin and steroids likely contributing. 3) Mixed hyperlipidemia. Patient's 10-year CVD risk is >15%. Chronic statin therapy seems appropriate. The patient states he would be willing to start as an outpatient but currently feels overwhelmed with his medical issues and upcoming surgery. The patient request outpatient follow-up to initiate therapy. 4) Coronary artery calcification. Patient with a abnormal calcium score 8/2021 but was not on chronic statin therapy. He is agreeable to statin use as described above but prefers to initiate therapy as an outpatient. 5) Lumbar compression fracture/pain. Management per neurosurgery and primary team.    Overall, the problems requiring hospitalization are high in severity. Will sign off. Call with questions. Will arrange outpatient follow-up. Thank you for allowing us to participate in the care of Ubaldo Mark Kingsleyine [address]  9/27/2022 12:55 PM

## 2022-09-28 ENCOUNTER — ANESTHESIA (OUTPATIENT)
Dept: OPERATING ROOM | Age: 72
DRG: 479 | End: 2022-09-28
Payer: COMMERCIAL

## 2022-09-28 ENCOUNTER — APPOINTMENT (OUTPATIENT)
Dept: GENERAL RADIOLOGY | Age: 72
DRG: 479 | End: 2022-09-28
Payer: COMMERCIAL

## 2022-09-28 PROBLEM — F42.2 MIXED OBSESSIONAL THOUGHTS AND ACTS: Status: ACTIVE | Noted: 2022-09-28

## 2022-09-28 LAB
ANION GAP SERPL CALCULATED.3IONS-SCNC: 9 MMOL/L (ref 3–16)
BASOPHILS ABSOLUTE: 0 K/UL (ref 0–0.2)
BASOPHILS RELATIVE PERCENT: 0.2 %
BUN BLDV-MCNC: 11 MG/DL (ref 7–20)
CALCIUM SERPL-MCNC: 9.1 MG/DL (ref 8.3–10.6)
CHLORIDE BLD-SCNC: 99 MMOL/L (ref 99–110)
CO2: 24 MMOL/L (ref 21–32)
CREAT SERPL-MCNC: 0.5 MG/DL (ref 0.8–1.3)
EOSINOPHILS ABSOLUTE: 0 K/UL (ref 0–0.6)
EOSINOPHILS RELATIVE PERCENT: 0.6 %
GFR AFRICAN AMERICAN: >60
GFR NON-AFRICAN AMERICAN: >60
GLUCOSE BLD-MCNC: 96 MG/DL (ref 70–99)
HCT VFR BLD CALC: 35.7 % (ref 40.5–52.5)
HEMOGLOBIN: 12.5 G/DL (ref 13.5–17.5)
LYMPHOCYTES ABSOLUTE: 1.4 K/UL (ref 1–5.1)
LYMPHOCYTES RELATIVE PERCENT: 19.9 %
MAGNESIUM: 1.9 MG/DL (ref 1.8–2.4)
MCH RBC QN AUTO: 32.4 PG (ref 26–34)
MCHC RBC AUTO-ENTMCNC: 34.9 G/DL (ref 31–36)
MCV RBC AUTO: 92.8 FL (ref 80–100)
MONOCYTES ABSOLUTE: 0.7 K/UL (ref 0–1.3)
MONOCYTES RELATIVE PERCENT: 9.5 %
NEUTROPHILS ABSOLUTE: 4.9 K/UL (ref 1.7–7.7)
NEUTROPHILS RELATIVE PERCENT: 69.8 %
PDW BLD-RTO: 12.8 % (ref 12.4–15.4)
PLATELET # BLD: 267 K/UL (ref 135–450)
PMV BLD AUTO: 6.4 FL (ref 5–10.5)
POTASSIUM REFLEX MAGNESIUM: 3.3 MMOL/L (ref 3.5–5.1)
RBC # BLD: 3.85 M/UL (ref 4.2–5.9)
SODIUM BLD-SCNC: 132 MMOL/L (ref 136–145)
WBC # BLD: 7.1 K/UL (ref 4–11)

## 2022-09-28 PROCEDURE — 6370000000 HC RX 637 (ALT 250 FOR IP): Performed by: NEUROLOGICAL SURGERY

## 2022-09-28 PROCEDURE — 3600000002 HC SURGERY LEVEL 2 BASE: Performed by: NEUROLOGICAL SURGERY

## 2022-09-28 PROCEDURE — A4217 STERILE WATER/SALINE, 500 ML: HCPCS | Performed by: NEUROLOGICAL SURGERY

## 2022-09-28 PROCEDURE — 94640 AIRWAY INHALATION TREATMENT: CPT

## 2022-09-28 PROCEDURE — 6360000002 HC RX W HCPCS: Performed by: ANESTHESIOLOGY

## 2022-09-28 PROCEDURE — 85025 COMPLETE CBC W/AUTO DIFF WBC: CPT

## 2022-09-28 PROCEDURE — 2580000003 HC RX 258: Performed by: NEUROLOGICAL SURGERY

## 2022-09-28 PROCEDURE — 7100000001 HC PACU RECOVERY - ADDTL 15 MIN: Performed by: NEUROLOGICAL SURGERY

## 2022-09-28 PROCEDURE — 0QS03ZZ REPOSITION LUMBAR VERTEBRA, PERCUTANEOUS APPROACH: ICD-10-PCS | Performed by: NEUROLOGICAL SURGERY

## 2022-09-28 PROCEDURE — 6360000004 HC RX CONTRAST MEDICATION: Performed by: NEUROLOGICAL SURGERY

## 2022-09-28 PROCEDURE — 36415 COLL VENOUS BLD VENIPUNCTURE: CPT

## 2022-09-28 PROCEDURE — 2500000003 HC RX 250 WO HCPCS

## 2022-09-28 PROCEDURE — 3600000012 HC SURGERY LEVEL 2 ADDTL 15MIN: Performed by: NEUROLOGICAL SURGERY

## 2022-09-28 PROCEDURE — C1894 INTRO/SHEATH, NON-LASER: HCPCS | Performed by: NEUROLOGICAL SURGERY

## 2022-09-28 PROCEDURE — 0QB03ZX EXCISION OF LUMBAR VERTEBRA, PERCUTANEOUS APPROACH, DIAGNOSTIC: ICD-10-PCS | Performed by: NEUROLOGICAL SURGERY

## 2022-09-28 PROCEDURE — 3700000001 HC ADD 15 MINUTES (ANESTHESIA): Performed by: NEUROLOGICAL SURGERY

## 2022-09-28 PROCEDURE — 0QU03JZ SUPPLEMENT LUMBAR VERTEBRA WITH SYNTHETIC SUBSTITUTE, PERCUTANEOUS APPROACH: ICD-10-PCS | Performed by: NEUROLOGICAL SURGERY

## 2022-09-28 PROCEDURE — 7100000000 HC PACU RECOVERY - FIRST 15 MIN: Performed by: NEUROLOGICAL SURGERY

## 2022-09-28 PROCEDURE — 6360000002 HC RX W HCPCS: Performed by: NEUROLOGICAL SURGERY

## 2022-09-28 PROCEDURE — 2060000000 HC ICU INTERMEDIATE R&B

## 2022-09-28 PROCEDURE — 2709999900 HC NON-CHARGEABLE SUPPLY: Performed by: NEUROLOGICAL SURGERY

## 2022-09-28 PROCEDURE — 3209999900 FLUORO FOR SURGICAL PROCEDURES

## 2022-09-28 PROCEDURE — 2580000003 HC RX 258: Performed by: ANESTHESIOLOGY

## 2022-09-28 PROCEDURE — 72100 X-RAY EXAM L-S SPINE 2/3 VWS: CPT

## 2022-09-28 PROCEDURE — C1713 ANCHOR/SCREW BN/BN,TIS/BN: HCPCS | Performed by: NEUROLOGICAL SURGERY

## 2022-09-28 PROCEDURE — 6360000002 HC RX W HCPCS

## 2022-09-28 PROCEDURE — 88307 TISSUE EXAM BY PATHOLOGIST: CPT

## 2022-09-28 PROCEDURE — 80048 BASIC METABOLIC PNL TOTAL CA: CPT

## 2022-09-28 PROCEDURE — 6360000002 HC RX W HCPCS: Performed by: NURSE PRACTITIONER

## 2022-09-28 PROCEDURE — 83735 ASSAY OF MAGNESIUM: CPT

## 2022-09-28 PROCEDURE — 3700000000 HC ANESTHESIA ATTENDED CARE: Performed by: NEUROLOGICAL SURGERY

## 2022-09-28 PROCEDURE — 99231 SBSQ HOSP IP/OBS SF/LOW 25: CPT | Performed by: REGISTERED NURSE

## 2022-09-28 PROCEDURE — 2500000003 HC RX 250 WO HCPCS: Performed by: NEUROLOGICAL SURGERY

## 2022-09-28 DEVICE — BONE CEMENT C01B HV-R WITH MIXER  US
Type: IMPLANTABLE DEVICE | Site: SPINE LUMBAR | Status: FUNCTIONAL
Brand: KYPHON® HV-R® BONE CEMENT AND KYPHON® MIXER PACK

## 2022-09-28 DEVICE — CEMENT C01A KYPHX HV-R BONE CEMENT EN
Type: IMPLANTABLE DEVICE | Site: SPINE LUMBAR | Status: FUNCTIONAL
Brand: KYPHON® HV-R® BONE CEMENT

## 2022-09-28 RX ORDER — LORAZEPAM 2 MG/ML
0.5 INJECTION INTRAMUSCULAR
Status: COMPLETED | OUTPATIENT
Start: 2022-09-28 | End: 2022-09-28

## 2022-09-28 RX ORDER — SODIUM CHLORIDE 0.9 % (FLUSH) 0.9 %
5-40 SYRINGE (ML) INJECTION PRN
Status: DISCONTINUED | OUTPATIENT
Start: 2022-09-28 | End: 2022-09-28 | Stop reason: HOSPADM

## 2022-09-28 RX ORDER — PROPOFOL 10 MG/ML
INJECTION, EMULSION INTRAVENOUS PRN
Status: DISCONTINUED | OUTPATIENT
Start: 2022-09-28 | End: 2022-09-28 | Stop reason: SDUPTHER

## 2022-09-28 RX ORDER — SODIUM CHLORIDE 0.9 % (FLUSH) 0.9 %
5-40 SYRINGE (ML) INJECTION EVERY 12 HOURS SCHEDULED
Status: DISCONTINUED | OUTPATIENT
Start: 2022-09-28 | End: 2022-09-29 | Stop reason: HOSPADM

## 2022-09-28 RX ORDER — FENTANYL CITRATE 50 UG/ML
50 INJECTION, SOLUTION INTRAMUSCULAR; INTRAVENOUS EVERY 5 MIN PRN
Status: DISCONTINUED | OUTPATIENT
Start: 2022-09-28 | End: 2022-09-28

## 2022-09-28 RX ORDER — MIDAZOLAM HYDROCHLORIDE 1 MG/ML
INJECTION INTRAMUSCULAR; INTRAVENOUS PRN
Status: DISCONTINUED | OUTPATIENT
Start: 2022-09-28 | End: 2022-09-28 | Stop reason: SDUPTHER

## 2022-09-28 RX ORDER — SODIUM CHLORIDE 9 MG/ML
INJECTION, SOLUTION INTRAVENOUS CONTINUOUS
Status: DISCONTINUED | OUTPATIENT
Start: 2022-09-28 | End: 2022-09-28 | Stop reason: HOSPADM

## 2022-09-28 RX ORDER — FENTANYL CITRATE 50 UG/ML
INJECTION, SOLUTION INTRAMUSCULAR; INTRAVENOUS PRN
Status: DISCONTINUED | OUTPATIENT
Start: 2022-09-28 | End: 2022-09-28 | Stop reason: SDUPTHER

## 2022-09-28 RX ORDER — HALOPERIDOL 5 MG/ML
1 INJECTION INTRAMUSCULAR
Status: DISCONTINUED | OUTPATIENT
Start: 2022-09-28 | End: 2022-09-28

## 2022-09-28 RX ORDER — SODIUM CHLORIDE 0.9 % (FLUSH) 0.9 %
5-40 SYRINGE (ML) INJECTION PRN
Status: DISCONTINUED | OUTPATIENT
Start: 2022-09-28 | End: 2022-09-28

## 2022-09-28 RX ORDER — DIPHENHYDRAMINE HYDROCHLORIDE 50 MG/ML
12.5 INJECTION INTRAMUSCULAR; INTRAVENOUS
Status: DISCONTINUED | OUTPATIENT
Start: 2022-09-28 | End: 2022-09-28

## 2022-09-28 RX ORDER — DEXAMETHASONE SODIUM PHOSPHATE 4 MG/ML
INJECTION, SOLUTION INTRA-ARTICULAR; INTRALESIONAL; INTRAMUSCULAR; INTRAVENOUS; SOFT TISSUE PRN
Status: DISCONTINUED | OUTPATIENT
Start: 2022-09-28 | End: 2022-09-28 | Stop reason: SDUPTHER

## 2022-09-28 RX ORDER — OXYCODONE HYDROCHLORIDE 5 MG/1
5 TABLET ORAL
Status: DISCONTINUED | OUTPATIENT
Start: 2022-09-28 | End: 2022-09-28

## 2022-09-28 RX ORDER — LIDOCAINE HYDROCHLORIDE 20 MG/ML
INJECTION, SOLUTION EPIDURAL; INFILTRATION; INTRACAUDAL; PERINEURAL PRN
Status: DISCONTINUED | OUTPATIENT
Start: 2022-09-28 | End: 2022-09-28 | Stop reason: SDUPTHER

## 2022-09-28 RX ORDER — SODIUM CHLORIDE 0.9 % (FLUSH) 0.9 %
5-40 SYRINGE (ML) INJECTION EVERY 12 HOURS SCHEDULED
Status: DISCONTINUED | OUTPATIENT
Start: 2022-09-28 | End: 2022-09-28 | Stop reason: HOSPADM

## 2022-09-28 RX ORDER — ONDANSETRON 2 MG/ML
4 INJECTION INTRAMUSCULAR; INTRAVENOUS
Status: DISCONTINUED | OUTPATIENT
Start: 2022-09-28 | End: 2022-09-28

## 2022-09-28 RX ORDER — SODIUM CHLORIDE 9 MG/ML
INJECTION, SOLUTION INTRAVENOUS PRN
Status: DISCONTINUED | OUTPATIENT
Start: 2022-09-28 | End: 2022-09-28

## 2022-09-28 RX ORDER — SUCCINYLCHOLINE/SOD CL,ISO/PF 200MG/10ML
SYRINGE (ML) INTRAVENOUS PRN
Status: DISCONTINUED | OUTPATIENT
Start: 2022-09-28 | End: 2022-09-28 | Stop reason: SDUPTHER

## 2022-09-28 RX ORDER — ONDANSETRON 2 MG/ML
INJECTION INTRAMUSCULAR; INTRAVENOUS PRN
Status: DISCONTINUED | OUTPATIENT
Start: 2022-09-28 | End: 2022-09-28 | Stop reason: SDUPTHER

## 2022-09-28 RX ORDER — SODIUM CHLORIDE 9 MG/ML
INJECTION, SOLUTION INTRAVENOUS PRN
Status: DISCONTINUED | OUTPATIENT
Start: 2022-09-28 | End: 2022-09-28 | Stop reason: HOSPADM

## 2022-09-28 RX ORDER — MEPERIDINE HYDROCHLORIDE 25 MG/ML
12.5 INJECTION INTRAMUSCULAR; INTRAVENOUS; SUBCUTANEOUS
Status: COMPLETED | OUTPATIENT
Start: 2022-09-28 | End: 2022-09-28

## 2022-09-28 RX ORDER — PHENYLEPHRINE HCL IN 0.9% NACL 1 MG/10 ML
SYRINGE (ML) INTRAVENOUS PRN
Status: DISCONTINUED | OUTPATIENT
Start: 2022-09-28 | End: 2022-09-28 | Stop reason: SDUPTHER

## 2022-09-28 RX ORDER — SODIUM CHLORIDE 0.9 % (FLUSH) 0.9 %
5-40 SYRINGE (ML) INJECTION PRN
Status: DISCONTINUED | OUTPATIENT
Start: 2022-09-28 | End: 2022-09-29 | Stop reason: HOSPADM

## 2022-09-28 RX ORDER — EPHEDRINE SULFATE/0.9% NACL/PF 50 MG/5 ML
SYRINGE (ML) INTRAVENOUS PRN
Status: DISCONTINUED | OUTPATIENT
Start: 2022-09-28 | End: 2022-09-28 | Stop reason: SDUPTHER

## 2022-09-28 RX ORDER — SODIUM CHLORIDE 0.9 % (FLUSH) 0.9 %
5-40 SYRINGE (ML) INJECTION EVERY 12 HOURS SCHEDULED
Status: DISCONTINUED | OUTPATIENT
Start: 2022-09-28 | End: 2022-09-28

## 2022-09-28 RX ORDER — SODIUM CHLORIDE 9 MG/ML
INJECTION, SOLUTION INTRAVENOUS PRN
Status: DISCONTINUED | OUTPATIENT
Start: 2022-09-28 | End: 2022-09-29 | Stop reason: HOSPADM

## 2022-09-28 RX ORDER — LORAZEPAM 2 MG/ML
0.5 INJECTION INTRAMUSCULAR ONCE
Status: COMPLETED | OUTPATIENT
Start: 2022-09-28 | End: 2022-09-28

## 2022-09-28 RX ORDER — BUPIVACAINE HYDROCHLORIDE AND EPINEPHRINE 5; 5 MG/ML; UG/ML
INJECTION, SOLUTION EPIDURAL; INTRACAUDAL; PERINEURAL
Status: COMPLETED | OUTPATIENT
Start: 2022-09-28 | End: 2022-09-28

## 2022-09-28 RX ADMIN — SODIUM CHLORIDE, PRESERVATIVE FREE 10 ML: 5 INJECTION INTRAVENOUS at 21:57

## 2022-09-28 RX ADMIN — MIDAZOLAM 2 MG: 1 INJECTION INTRAMUSCULAR; INTRAVENOUS at 09:33

## 2022-09-28 RX ADMIN — Medication 120 MG: at 09:39

## 2022-09-28 RX ADMIN — TRAMADOL HYDROCHLORIDE 50 MG: 50 TABLET ORAL at 17:24

## 2022-09-28 RX ADMIN — Medication 200 MCG: at 10:00

## 2022-09-28 RX ADMIN — ONDANSETRON 4 MG: 2 INJECTION INTRAMUSCULAR; INTRAVENOUS at 09:47

## 2022-09-28 RX ADMIN — KETOROLAC TROMETHAMINE 15 MG: 15 INJECTION, SOLUTION INTRAMUSCULAR; INTRAVENOUS at 03:44

## 2022-09-28 RX ADMIN — LORAZEPAM 0.5 MG: 2 INJECTION INTRAMUSCULAR; INTRAVENOUS at 11:46

## 2022-09-28 RX ADMIN — DEXAMETHASONE SODIUM PHOSPHATE 10 MG: 4 INJECTION, SOLUTION INTRAMUSCULAR; INTRAVENOUS at 09:47

## 2022-09-28 RX ADMIN — ACETAMINOPHEN 650 MG: 325 TABLET, FILM COATED ORAL at 21:59

## 2022-09-28 RX ADMIN — GABAPENTIN 400 MG: 400 CAPSULE ORAL at 21:56

## 2022-09-28 RX ADMIN — Medication 10 MG: at 10:30

## 2022-09-28 RX ADMIN — MEPERIDINE HYDROCHLORIDE 12.5 MG: 25 INJECTION INTRAMUSCULAR; INTRAVENOUS; SUBCUTANEOUS at 11:39

## 2022-09-28 RX ADMIN — Medication 200 MCG: at 09:58

## 2022-09-28 RX ADMIN — FENTANYL CITRATE 50 MCG: 50 INJECTION INTRAMUSCULAR; INTRAVENOUS at 10:59

## 2022-09-28 RX ADMIN — Medication 10 MG: at 10:19

## 2022-09-28 RX ADMIN — FENTANYL CITRATE 50 MCG: 50 INJECTION, SOLUTION INTRAMUSCULAR; INTRAVENOUS at 11:42

## 2022-09-28 RX ADMIN — LIDOCAINE HYDROCHLORIDE 60 MG: 20 INJECTION, SOLUTION EPIDURAL; INFILTRATION; INTRACAUDAL; PERINEURAL at 09:39

## 2022-09-28 RX ADMIN — PROPOFOL 140 MG: 10 INJECTION, EMULSION INTRAVENOUS at 09:39

## 2022-09-28 RX ADMIN — ACETAMINOPHEN 650 MG: 325 TABLET ORAL at 18:35

## 2022-09-28 RX ADMIN — PANTOPRAZOLE SODIUM 40 MG: 40 TABLET, DELAYED RELEASE ORAL at 17:24

## 2022-09-28 RX ADMIN — GABAPENTIN 400 MG: 400 CAPSULE ORAL at 18:36

## 2022-09-28 RX ADMIN — HYDROMORPHONE HYDROCHLORIDE 0.5 MG: 1 INJECTION, SOLUTION INTRAMUSCULAR; INTRAVENOUS; SUBCUTANEOUS at 12:31

## 2022-09-28 RX ADMIN — TOPIRAMATE 75 MG: 25 TABLET, FILM COATED ORAL at 21:56

## 2022-09-28 RX ADMIN — Medication 20 MG: at 10:02

## 2022-09-28 RX ADMIN — LORAZEPAM 0.5 MG: 2 INJECTION INTRAMUSCULAR; INTRAVENOUS at 12:15

## 2022-09-28 RX ADMIN — MOMETASONE FUROATE AND FORMOTEROL FUMARATE DIHYDRATE 2 PUFF: 200; 5 AEROSOL RESPIRATORY (INHALATION) at 19:31

## 2022-09-28 RX ADMIN — FENTANYL CITRATE 50 MCG: 50 INJECTION, SOLUTION INTRAMUSCULAR; INTRAVENOUS at 11:56

## 2022-09-28 RX ADMIN — SODIUM CHLORIDE: 9 INJECTION, SOLUTION INTRAVENOUS at 09:02

## 2022-09-28 RX ADMIN — Medication 1500 MG: at 09:05

## 2022-09-28 RX ADMIN — FENTANYL CITRATE 50 MCG: 50 INJECTION INTRAMUSCULAR; INTRAVENOUS at 09:38

## 2022-09-28 RX ADMIN — DOCUSATE SODIUM 100 MG: 100 CAPSULE, LIQUID FILLED ORAL at 21:56

## 2022-09-28 RX ADMIN — Medication 10 MG: at 10:54

## 2022-09-28 ASSESSMENT — PAIN DESCRIPTION - LOCATION
LOCATION: BACK

## 2022-09-28 ASSESSMENT — PAIN DESCRIPTION - FREQUENCY
FREQUENCY: INTERMITTENT

## 2022-09-28 ASSESSMENT — PAIN - FUNCTIONAL ASSESSMENT
PAIN_FUNCTIONAL_ASSESSMENT: PREVENTS OR INTERFERES SOME ACTIVE ACTIVITIES AND ADLS
PAIN_FUNCTIONAL_ASSESSMENT: PREVENTS OR INTERFERES SOME ACTIVE ACTIVITIES AND ADLS
PAIN_FUNCTIONAL_ASSESSMENT: ACTIVITIES ARE NOT PREVENTED
PAIN_FUNCTIONAL_ASSESSMENT: PREVENTS OR INTERFERES SOME ACTIVE ACTIVITIES AND ADLS
PAIN_FUNCTIONAL_ASSESSMENT: PREVENTS OR INTERFERES SOME ACTIVE ACTIVITIES AND ADLS

## 2022-09-28 ASSESSMENT — PAIN SCALES - GENERAL
PAINLEVEL_OUTOF10: 6
PAINLEVEL_OUTOF10: 10
PAINLEVEL_OUTOF10: 8
PAINLEVEL_OUTOF10: 8
PAINLEVEL_OUTOF10: 7
PAINLEVEL_OUTOF10: 10

## 2022-09-28 ASSESSMENT — PAIN DESCRIPTION - ONSET
ONSET: ON-GOING
ONSET: ON-GOING
ONSET: GRADUAL

## 2022-09-28 ASSESSMENT — PAIN DESCRIPTION - DESCRIPTORS
DESCRIPTORS: SPASM
DESCRIPTORS: DISCOMFORT;ACHING
DESCRIPTORS: ACHING
DESCRIPTORS: ACHING
DESCRIPTORS: SHARP
DESCRIPTORS: ACHING
DESCRIPTORS: ACHING;STABBING

## 2022-09-28 ASSESSMENT — PAIN DESCRIPTION - PAIN TYPE
TYPE: ACUTE PAIN
TYPE: SURGICAL PAIN
TYPE: CHRONIC PAIN
TYPE: SURGICAL PAIN

## 2022-09-28 ASSESSMENT — PAIN SCALES - WONG BAKER
WONGBAKER_NUMERICALRESPONSE: 6

## 2022-09-28 ASSESSMENT — PAIN DESCRIPTION - ORIENTATION
ORIENTATION: LOWER
ORIENTATION: MID
ORIENTATION: LOWER

## 2022-09-28 ASSESSMENT — LIFESTYLE VARIABLES: SMOKING_STATUS: 0

## 2022-09-28 NOTE — CONSULTS
PSYCHIATRY CONSULT, INITIAL EVALUATION    Referring Provider: CELESTINO Chan, BETH    CC/Reason for Consult: OCD and anxiety    Psychiatric Dx: Insomnia due to other medical condition  2. Anxiety, unspecified. 3. OCD, mixed    Assessment   This a 73 y.o male with PMHx GERD, Asthma, Migraine, DDD, and chronic pain who was sent by neurosurgery Dr Azam Griffiths with concern for discitis. The patient has been suffering from chronic back pain and worsened over few months. He under went through L2, L4, L5 kyphoplasty with bone biopsy earlier today. During my visit, the patient was wakening up from sedation he received for the procedure. The patient's wife who is at the bed side provided me with the information I documented for psychiatric evaluation. The patient is awake but not oriented to give his history in detail. The patient's spouse denies any suicidal ideation or homicidal ideation. She endorses the patient was anxious, feeling irritable due to the worsening pain before he came to hospital.     RECOMMENDATIONS:     Psychiatric  - patient may benefit from Remeron 7.5 mg/nightly. - May add low dose Luvox if OCD symptoms worsens. - may need to taper off Gabapentin dose slightly. - Avoid/limit use Benzodiazepines due to increased risk of confusion, falls and worsening behavior issues. - provide support care. - reviewed labs; TSH, vitamin D are normal. Will check Vitamin B12 and folate level     Dispo: D/c to home or ECF when medically stable. Safety: RF: anxiety, depression, insomnia, chronic pain. However, the patient is NOT imminent danger to self or someone at this time. Pink slip: N/A  ___________________________________________________________________________    HPI:   context: patient was sent by neurosurgery due to concern of discitis. He under went  through L2, L4, L5 kyphoplasty with bone biopsy earlier today.  During my visit, the patient was not fully awake as he was recovering from the sedation he received earlier today for the procedure. The patient's wife states, he has extremely anxious for a while. He also repetitively engages some activities such as checking over and over on some stuff in the house, and skin picking. Per wife he has no history of psychiatric disorders. However, the patient later during my interview stated he was on some medications about 20 years ago which I could not verify it. The patient's wife also states he has difficulty of falling asleep or remain asleep. She states he does not get more than few hours every night. He has also decreased appetite. associated symptoms: anxiety, repetitive behavior, insomnia   modifying factors: chronic pain, progression of illness  Timing: acute  duration: months   severity: moderate     Collateral information: The patient wife is at the bed side who gave me the patient's information for the psychiatric interview. ROS: No respiratory distress noted. Past Psychiatric History:        Hosp: denies        Diagnoses: depression? Anxiety, insomnia        Med trials: gabapentin        Outpt: denies       Suicide Attempts: denies     Substance Use History:     Nicotine: denies      Alcohol:  social drinker      Illicits: denies     Past Medical History:   Past Medical History:   Diagnosis Date    Arthritis     Asthma     B12 deficiency 07/10/2013    Tooks injection every other week and B12 level 500. Recommended that he continue current therapy, as will likely drop with oral supplement. Closed wedge compression fracture of L1 vertebra ([address] 09/05/2018    Superior endplate fracture of L1 resulting 80% loss of vertebral body height    DDD (degenerative disc disease), lumbar 09/05/2018    L2-L3, L3-L4, L4-L5.   Disc bulge with narrowing of the neural foramen at these levels    Diverticulitis     Gallbladder problem     S/P lap martha    GERD (gastroesophageal reflux disease)     Hiatal hernia     Migraine     MRSA infection 04/03/2019 (NEURONTIN) 600 MG tablet 1 po am, 1 po pm and 1-2 po hs (Patient taking differently: Take 600 mg by mouth 2 times daily. 0800 and 2100) 120 tablet 2    ondansetron (ZOFRAN ODT) 4 MG disintegrating tablet Take 1 tablet by mouth every 8 hours as needed for Nausea or Vomiting 30 tablet 1    tolterodine (DETROL LA) 4 MG extended release capsule Take 1 capsule by mouth daily 30 capsule 2    calcitonin (MIACALCIN) 200 UNIT/ACT nasal spray 1 spray in one nostril daily, alternating nostril side daily 1 each 0    tiZANidine (ZANAFLEX) 4 MG tablet Take 1 tablet by mouth nightly (Patient not taking: Reported on 9/23/2022) 30 tablet 0    rizatriptan (MAXALT-MLT) 10 MG disintegrating tablet May repeat in 2 hours if needed 18 tablet 5    tiotropium (SPIRIVA RESPIMAT) 2.5 MCG/ACT AERS inhaler Inhale 2 puffs into the lungs in the morning. 1 each 5    levocetirizine (XYZAL) 5 MG tablet TAKE 1 TABLET BY MOUTH EVERY EVENING. (Patient taking differently: Take 5 mg by mouth nightly) 30 tablet 5    pantoprazole (PROTONIX) 40 MG tablet TAKE 1 TABLET BY MOUTH TWICE DAILY (Patient taking differently: Take 40 mg by mouth in the morning and at bedtime) 60 tablet 5    mometasone-formoterol (DULERA) 200-5 MCG/ACT inhaler INHALE 2 PUFFS INTO THE LUNGS 2 TIMES DAILY RINSE MOUTH AFTER USING.  13 g 5    albuterol sulfate HFA (PROVENTIL;VENTOLIN;PROAIR) 108 (90 Base) MCG/ACT inhaler INHALE 2 PUFFS INTO THE LUNGS FOUR TIMES DAILY AS NEEDED FOR WHEEZING 8.5 g 2    vitamin D (ERGOCALCIFEROL) 1.25 MG (07676 UT) CAPS capsule TAKE 1 CAPSULE BY MOUTH WEEKLY (Patient taking differently: Take 50,000 Units by mouth once a week On Sundays) 4 capsule 2    Spacer/Aero-Holding Chambers (COMPACT SPACE CHAMBER/SM MASK) AZAEL AS DIRECTED w/ inhalers 1 each 0    topiramate (TOPAMAX) 25 MG tablet TAKE 3 TABLETS BY MOUTH EVERY MORNING AND 5 TABLETS BY MOUTH EVERY EVENING (Patient taking differently: Take 75 mg by mouth 2 times daily) 240 tablet 2    ipratropium (ATROVENT) 0.06 % nasal spray TAKE 1-2 SPRAYS BY NASAL ROUTE 4 TIMES DAILY AS NEEDED FOR RHINITIS 15 mL 5    albuterol (PROVENTIL) (2.5 MG/3ML) 0.083% nebulizer solution Take 3 mLs by nebulization every 6 hours as needed for Wheezing 75 mL 2       Medications:  Scheduled Meds:   sodium chloride flush  5-40 mL IntraVENous 2 times per day    acetaminophen  650 mg Oral Q6H    Or    acetaminophen  650 mg Rectal Q6H    polyethylene glycol  17 g Oral Daily    furosemide  20 mg Oral Daily    traMADol  50 mg Oral 4 times per day    tamsulosin  0.4 mg Oral Daily    docusate sodium  100 mg Oral BID    gabapentin  400 mg Oral 5x Daily    sodium chloride flush  10 mL IntraVENous 2 times per day    ipratropium  2 spray Each Nostril TID    cetirizine  5 mg Oral Daily    magnesium cl-calcium carbonate  1 tablet Oral Daily with breakfast    mometasone-formoterol  2 puff Inhalation BID    pantoprazole  40 mg Oral BID AC    tiotropium  2 puff Inhalation Daily    topiramate  75 mg Oral BID    calcitonin  1 spray Alternating Nares Daily     PRN Meds:.sodium chloride flush, sodium chloride, HYDROmorphone, magic (miracle) mouthwash with nystatin, acetaminophen, bisacodyl, LORazepam, sodium chloride flush, sodium chloride, potassium chloride **OR** potassium alternative oral replacement **OR** potassium chloride, potassium chloride, promethazine **OR** ondansetron, magnesium hydroxide, albuterol, albuterol sulfate HFA, SUMAtriptan    OBJECTIVE:  .  Vitals:    09/28/22 1230 09/28/22 1245 09/28/22 1300 09/28/22 1453   BP: (!) 150/90 (!) 161/92 (!) 152/82    Pulse: (!) 114 (!) 104 (!) 102 98   Resp: 21 10 21    Temp:  97.5 °F (36.4 °C)     TempSrc:       SpO2: 100% 96% 95%    Weight:       Height:           MSE:   Appearance    alert  Motor: Normal strength and tone, No abnormal movements, tics or mannerisms.   Speech    spontaneous  Language    0 - no aphasia, normal  Mood/Affect    RC  Thought Process   RC  Thought Content RC  Associations  RC  Attention/Concentration    RC  Orientation    [address] [facility]    RC  Insight/Judgement RC    Labs:     Recent Labs     09/26/22  0540 09/27/22  0444 09/28/22  0431   WBC 7.5 11.1* 7.1   HGB 12.9* 12.9* 12.5*   HCT 37.1* 37.5* 35.7*   MCV 92.8 93.5 92.8    286 267     Recent Labs     09/26/22  0540 09/27/22  0444 09/28/22  0431   * 134* 132*   K 3.4* 4.0 3.3*    99 99   CO2 23 23 24   BUN 13 14 11   MG 2.00  --  1.90     No results for input(s): AST, ALT, ALBUMIN, TOTALPROTIEN in the last 72 hours. Invalid input(s): BILT, BILD   Lab Results   Component Value Date/Time    COLORU Yellow 09/24/2022 09:00 PM    NITRU Negative 09/24/2022 09:00 PM    GLUCOSEU Negative 09/24/2022 09:00 PM    KETUA Negative 09/24/2022 09:00 PM    UROBILINOGEN 1.0 09/24/2022 09:00 PM    BILIRUBINUR Negative 09/24/2022 09:00 PM    BILIRUBINUR NEG 09/15/2022 06:14 PM     No results found for: LABA1C  No results found for: EAG  Lab Results   Component Value Date    CHOL 137 09/14/2021    CHOL 159 01/29/2016     Lab Results   Component Value Date    TRIG 29 09/14/2021    TRIG 56 01/29/2016     Lab Results   Component Value Date    HDL 56 09/14/2021    HDL 58 01/29/2016     Lab Results   Component Value Date    LDLCALC see below 09/14/2021    [address] 01/29/2016     Lab Results   Component Value Date    LABVLDL see below 09/14/2021    LABVLDL 11 01/29/2016     No results found for: CHOLHDLRATIO  No results found for: TSH, A7VQGVL, S9JVXAN, THYROIDAB  No results found for: CQMVTKR3S6  Lab Results   Component Value Date    [id number] 495 12/28/2017     Lab Results   Component Value Date    FOLATE 11.26 05/23/2013       Last Drug screen: None available    Imaging: MRI Lumbar Spine 9/24/22  Impression   1. Again seen are acute to subacute compression deformities involving the L4   and L5 vertebral bodies with slight interval loss of height of L4.   No   significant change in loss of height of L5.   2. Since the prior exam, there has been interval development of an acute   compression deformity of the L2 vertebral body with approximately 35% loss of   height. 3. Since the prior exam, development of minimal bone marrow edema along the   superior endplate of O31, which may represent a microtrabecular fracture   without significant loss of height. 4. Multilevel degenerative changes as above. Overall findings at L4-L5 are presumably related to the compression   deformities. Discitis while not entirely excluded is felt to be less likely. EKG: NSR with 1 degree AV Block       Abigail Booker, LOVE, PMHNP-BC, CNP  [facility]  Thank you for this consult, please call the psychiatry consult line for further questions.

## 2022-09-28 NOTE — ANESTHESIA PRE PROCEDURE
Department of Anesthesiology  Preprocedure Note       Name:  Melissa Muhammad   Age:  73 y.o.  :  1950                                          MRN:  [id number]         Date:  2022      Surgeon: Lexi Ulrich):  Gagan Matthew MD    Procedure: Procedure(s):  L2, L4, L5 KYPHOPLASTY WITH BONE BIOPSY    Medications prior to admission:   Prior to Admission medications    Medication Sig Start Date End Date Taking? Authorizing Provider   Magnesium Gluconate 500 (27 Mg) MG TABS tablet Take 500 mg by mouth daily   Yes Historical Provider, MD   Levomefolate Glucosamine (METHYLFOLATE) 400 MCG CAPS Take 400 mcg by mouth every evening   Yes Historical Provider, MD   gabapentin (NEURONTIN) 300 MG capsule Take 300 mg by mouth in the morning and at bedtime. 1200 and 1600   Yes Historical Provider, MD   traMADol (ULTRAM) 50 MG tablet Take 1-2 tablets by mouth every 6 hours as needed for Pain for up to 15 days. TAKE ONE OR TWO TABLETS EVERY 6 HOURS AS NEEDED FOR PAIN 9/21/22 10/6/22  Albina Escobar MD   gabapentin (NEURONTIN) 600 MG tablet 1 po am, 1 po pm and 1-2 po hs  Patient taking differently: Take 600 mg by mouth 2 times daily.  0800 and 2100 9/20/22 10/20/22  Albina Escobar MD   ondansetron (ZOFRAN ODT) 4 MG disintegrating tablet Take 1 tablet by mouth every 8 hours as needed for Nausea or Vomiting 9/15/22   Albina Escobar MD   tolterodine (DETROL LA) 4 MG extended release capsule Take 1 capsule by mouth daily 22   Albina Escobar MD   calcitonin (MIACALCIN) 200 UNIT/ACT nasal spray 1 spray in one nostril daily, alternating nostril side daily 22   Albina Escobar MD   tiZANidine (ZANAFLEX) 4 MG tablet Take 1 tablet by mouth nightly  Patient not taking: Reported on 2022   Albina Escobar MD   rizatriptan (MAXALT-MLT) 10 MG disintegrating tablet May repeat in 2 hours if needed 22   Albina Escobar MD   tiotropium (SPIRIVA RESPIMAT) 2.5 MCG/ACT AERS inhaler Inhale 2 puffs into the lungs in the morning. 8/5/22   Viral Elena MD   levocetirizine (XYZAL) 5 MG tablet TAKE 1 TABLET BY MOUTH EVERY EVENING.   Patient taking differently: Take 5 mg by mouth nightly 7/19/22   CELESTINO Stokes CNP   pantoprazole (PROTONIX) 40 MG tablet TAKE 1 TABLET BY MOUTH TWICE DAILY  Patient taking differently: Take 40 mg by mouth in the morning and at bedtime 7/18/22   Viral Elena MD   mometasone-formoterol [facility]) 200-5 MCG/ACT inhaler INHALE 2 PUFFS INTO THE LUNGS 2 TIMES DAILY RINSE MOUTH AFTER USING. 7/18/22   CELESTINO Stokes CNP   albuterol sulfate HFA (PROVENTIL;VENTOLIN;PROAIR) 108 (90 Base) MCG/ACT inhaler INHALE 2 PUFFS INTO THE LUNGS FOUR TIMES DAILY AS NEEDED FOR WHEEZING 6/20/22   CELESTINO Farrar CNP   vitamin D (ERGOCALCIFEROL) 1.25 MG (77132 UT) CAPS capsule TAKE 1 CAPSULE BY MOUTH WEEKLY  Patient taking differently: Take 50,000 Units by mouth once a week On Sundays 6/17/22   Viral Elena MD   Spacer/Aero-Holding Chambers (COMPACT SPACE CHAMBER/SM MASK) AZAEL AS DIRECTED w/ inhalers 4/21/22   Viral Elena MD   topiramate (TOPAMAX) 25 MG tablet TAKE 3 TABLETS BY MOUTH EVERY MORNING AND 5 TABLETS BY MOUTH EVERY EVENING  Patient taking differently: Take 75 mg by mouth 2 times daily 4/21/22   Viral Elena MD   ipratropium (ATROVENT) 0.06 % nasal spray TAKE 1-2 SPRAYS BY NASAL ROUTE 4 TIMES DAILY AS NEEDED FOR RHINITIS 1/25/22   Viral Elena MD   albuterol (PROVENTIL) (2.5 MG/3ML) 0.083% nebulizer solution Take 3 mLs by nebulization every 6 hours as needed for Wheezing 1/18/22   Viral Elena MD       Current medications:    Current Facility-Administered Medications   Medication Dose Route Frequency Provider Last Rate Last Admin    0.9 % sodium chloride infusion   IntraVENous Continuous Christopher Romero MD        sodium chloride flush 0.9 % injection 5-40 mL  5-40 mL IntraVENous 2 times per day Christopher Romero MD        sodium chloride flush 0.9 % injection 5-40 mL  5-40 mL IntraVENous PRN Treva Ramon MD        0.9 % sodium chloride infusion   IntraVENous PRN Treva Ramon MD        vancomycin (VANCOCIN) 1500 mg in dextrose 5 % 250 mL IVPB  1,500 mg IntraVENous Once Karmen Maya MD        magic (miracle) mouthwash with nystatin  5 mL Swish & Spit 4x Daily PRN Palmer Sasser, APRN - CNP        acetaminophen (TYLENOL) tablet 650 mg  650 mg Oral Q4H PRN Palmer Sasser, APRN - CNP        acetaminophen (TYLENOL) tablet 650 mg  650 mg Oral Q6H Palmer Sasser, APRN - CNP   650 mg at 09/27/22 2347    Or    acetaminophen (TYLENOL) suppository 650 mg  650 mg Rectal Q6H Palmer Sasser, APRN - CNP        methocarbamol (ROBAXIN) tablet 1,000 mg  1,000 mg Oral TID Palmer Sasser, APRN - CNP   1,000 mg at 09/27/22 2037    bisacodyl (DULCOLAX) suppository 10 mg  10 mg Rectal Daily PRN Palmer Sasser, APRN - CNP   10 mg at 09/27/22 1650    polyethylene glycol (GLYCOLAX) packet 17 g  17 g Oral Daily Palmer Sasser, APRN - CNP   17 g at 09/27/22 1402    ketorolac (TORADOL) injection 15 mg  15 mg IntraVENous Q6H PRN Divina Shaw APRN - CNP   15 mg at 09/28/22 0344    predniSONE (DELTASONE) tablet 40 mg  40 mg Oral Daily Palmer Sasser, APRN - CNP   40 mg at 09/27/22 0813    furosemide (LASIX) tablet 20 mg  20 mg Oral Daily CELESTINO Blue - CNP   20 mg at 09/26/22 0815    traMADol (ULTRAM) tablet 50 mg  50 mg Oral 4 times per day CELESTINO Blue - CNP   50 mg at 09/27/22 2347    LORazepam (ATIVAN) tablet 0.5 mg  0.5 mg Oral Q6H PRN CELESTINO Blue - CNP   0.5 mg at 09/25/22 2336    tamsulosin (FLOMAX) capsule 0.4 mg  0.4 mg Oral Daily CELESTINO Blue - CNP   0.4 mg at 09/27/22 0813    docusate sodium (COLACE) capsule 100 mg  100 mg Oral BID CELESTINO Blue CNP   100 mg at 09/27/22 2038    perflutren lipid microspheres (DEFINITY) injection 1.65 mg  1.5 mL IntraVENous ONCE PRN CELESTINO Blue CNP  gabapentin (NEURONTIN) capsule 400 mg  400 mg Oral 5x Daily Alisson Pel, APRN - CNP   400 mg at 09/27/22 2347    sodium chloride flush 0.9 % injection 10 mL  10 mL IntraVENous 2 times per day Tierra Buchanan MD   10 mL at 09/27/22 2055    sodium chloride flush 0.9 % injection 10 mL  10 mL IntraVENous PRN Tierra Buchanan MD        0.9 % sodium chloride infusion   IntraVENous PRN Tierra Buchanan MD        potassium chloride (KLOR-CON M) extended release tablet 40 mEq  40 mEq Oral PRN Tierra Buchanan MD   40 mEq at 09/26/22 0816    Or    potassium bicarb-citric acid (EFFER-K) effervescent tablet 40 mEq  40 mEq Oral PRN Tierra Buchanan MD        Or    potassium chloride 10 mEq/100 mL IVPB (Peripheral Line)  10 mEq IntraVENous PRN Tierra Buchanan MD        potassium chloride 10 mEq/100 mL IVPB (Peripheral Line)  10 mEq IntraVENous PRN Tierra Buchanan MD        magnesium sulfate 2000 mg in 50 mL IVPB premix  2,000 mg IntraVENous PRN Tierra Buchanan MD        enoxaparin (LOVENOX) injection 40 mg  40 mg SubCUTAneous Daily Tierra Buchanan MD   40 mg at 09/27/22 0813    promethazine (PHENERGAN) tablet 12.5 mg  12.5 mg Oral Q6H PRN Tierra Buchanan MD        Or    ondansetron (ZOFRAN) injection 4 mg  4 mg IntraVENous Q6H PRN Tierra Buchanan MD   4 mg at 09/26/22 0741    magnesium hydroxide (MILK OF MAGNESIA) 400 MG/5ML suspension 30 mL  30 mL Oral Daily PRN Tierra Buchanan MD        albuterol (PROVENTIL) nebulizer solution 2.5 mg  2.5 mg Nebulization Q6H PRN Tierra Buchanan MD        albuterol sulfate HFA (PROVENTIL;VENTOLIN;PROAIR) 108 (90 Base) MCG/ACT inhaler 2 puff  2 puff Inhalation Q4H PRN Tierra Buchanan MD        ipratropium (ATROVENT) 0.06 % nasal spray 2 spray  2 spray Each Nostril TID Tierra Buchanan MD   2 spray at 09/27/22 2348    cetirizine (ZYRTEC) tablet 5 mg  5 mg Oral Daily Tierra Buchanan MD   5 mg at 09/27/22 0811    magnesium cl-calcium carbonate (SLOW-MAG) tablet 1 tablet  1 tablet Oral Daily with breakfast Dolly Medina MD   1 tablet at 09/27/22 0813    mometasone-formoterol (DULERA) 200-5 MCG/ACT inhaler 2 puff  2 puff Inhalation BID Dolly Medina MD   2 puff at 09/27/22 2044    pantoprazole (PROTONIX) tablet 40 mg  40 mg Oral BID AC Dolly Medina MD   40 mg at 09/27/22 1652    tiotropium (SPIRIVA RESPIMAT) 2.5 MCG/ACT inhaler 2 puff  2 puff Inhalation Daily Dolly Medina MD   2 puff at 09/27/22 0932    topiramate (TOPAMAX) tablet 75 mg  75 mg Oral BID Dolly Medina MD   75 mg at 09/27/22 2038    SUMAtriptan (IMITREX) tablet 100 mg  100 mg Oral PRN Dolly Medina MD   100 mg at 09/24/22 2324    lidocaine 4 % external patch 1 patch  1 patch TransDERmal Daily Ciera Monet APRN - CNP        calcitonin (MIACALCIN) nasal spray 1 spray (Patient Supplied)  1 spray Alternating Nares Daily Dolly Medina MD           Allergies: Allergies   Allergen Reactions    Codeine      Upset stomach     Hydrocodone Nausea And Vomiting       Problem List:    Patient Active Problem List   Diagnosis Code    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 Remedy.Glory    Peripheral polyneuropathy G62.9    Right sided sciatica M54.31    DDD (degenerative disc disease), lumbar M51.36    Spondylosis of lumbar spine M47.816    Closed wedge compression fracture of L1 vertebra (HCC) S32.010A    Back pain M54.9    Compression fracture of L2 lumbar vertebra (HCC) S32.020A    Degenerative lumbar spinal stenosis M48.061    Urinary hesitancy R39.11    Pre-operative examination Z01.818    Localized edema R60.0    Coronary artery calcification I25.10, I25.84    Mixed hyperlipidemia E78.2       Past Medical History:        Diagnosis Date    Arthritis     Asthma     B12 deficiency 07/10/2013 Tooks injection every other week and B12 level 500. Recommended that he continue current therapy, as will likely drop with oral supplement.  Closed wedge compression fracture of L1 vertebra (HCC) 09/05/2018    Superior endplate fracture of L1 resulting 80% loss of vertebral body height    DDD (degenerative disc disease), lumbar 09/05/2018    L2-L3, L3-L4, L4-L5.   Disc bulge with narrowing of the neural foramen at these levels    Diverticulitis     Gallbladder problem     S/P lap martha    GERD (gastroesophageal reflux disease)     Hiatal hernia     Migraine     MRSA infection 04/03/2019    Left hand    Nausea & vomiting     Spondylosis of lumbar spine 11/02/2015    On x-ray multilevel       Past Surgical History:        Procedure Laterality Date    CHOLECYSTECTOMY      COLONOSCOPY  9/11    FOOT SURGERY      nerve removed from foot    SHOULDER SURGERY Right 2005    rotator cuff repair    UPPER GASTROINTESTINAL ENDOSCOPY  9/11       Social History:    Social History     Tobacco Use    Smoking status: Never    Smokeless tobacco: Never   Substance Use Topics    Alcohol use: Not Currently     Comment: glass of wine at night                                Counseling given: Not Answered      Vital Signs (Current):   Vitals:    09/27/22 2045 09/28/22 0017 09/28/22 0343 09/28/22 0815   BP:   127/70 137/77   Pulse: 97  87 89   Resp: 16 18 18 18   Temp:   98.2 °F (36.8 °C) 97.2 °F (36.2 °C)   TempSrc:   Oral Temporal   SpO2: 97%  97% 98%   Weight:       Height:                                                  BP Readings from Last 3 Encounters:   09/28/22 137/77   09/15/22 (!) 166/98   08/23/22 128/86       NPO Status: Time of last liquid consumption: 2200                        Time of last solid consumption: 2000                        Date of last liquid consumption: 09/27/22                        Date of last solid food consumption: 09/27/22    BMI:   Wt Readings from Last 3 Encounters:   09/27/22 160 lb 11.5 oz (72.9 kg)   08/23/22 166 lb (75.3 kg)   08/08/22 170 lb (77.1 kg)     Body mass index is 21.2 kg/m². CBC:   Lab Results   Component Value Date/Time    WBC 7.1 09/28/2022 04:31 AM    RBC 3.85 09/28/2022 04:31 AM    HGB 12.5 09/28/2022 04:31 AM    HCT 35.7 09/28/2022 04:31 AM    MCV 92.8 09/28/2022 04:31 AM    RDW 12.8 09/28/2022 04:31 AM     09/28/2022 04:31 AM       CMP:   Lab Results   Component Value Date/Time     09/28/2022 04:31 AM    K 3.3 09/28/2022 04:31 AM    CL 99 09/28/2022 04:31 AM    CO2 24 09/28/2022 04:31 AM    BUN 11 09/28/2022 04:31 AM    CREATININE 0.5 09/28/2022 04:31 AM    GFRAA >60 09/28/2022 04:31 AM    GFRAA >60 05/23/2013 02:20 PM    AGRATIO 2.2 09/15/2022 05:47 PM    LABGLOM >60 09/28/2022 04:31 AM    GLUCOSE 96 09/28/2022 04:31 AM    PROT 6.0 09/15/2022 05:47 PM    CALCIUM 9.1 09/28/2022 04:31 AM    BILITOT 0.3 09/15/2022 05:47 PM    ALKPHOS 171 09/15/2022 05:47 PM    AST 34 09/15/2022 05:47 PM    ALT 26 09/15/2022 05:47 PM       POC Tests: No results for input(s): POCGLU, POCNA, POCK, POCCL, POCBUN, POCHEMO, POCHCT in the last 72 hours. Coags:   Lab Results   Component Value Date/Time    PROTIME 13.3 09/27/2022 12:30 PM    INR 1.02 09/27/2022 12:30 PM    APTT 33.6 09/27/2022 12:30 PM       HCG (If Applicable): No results found for: PREGTESTUR, PREGSERUM, HCG, HCGQUANT     ABGs: No results found for: PHART, PO2ART, NQB8WZN, XPN7GRD, BEART, X2KQVSIW     Type & Screen (If Applicable):  No results found for: LABABO, LABRH    Drug/Infectious Status (If Applicable):  No results found for: HIV, HEPCAB    COVID-19 Screening (If Applicable):   Lab Results   Component Value Date/Time    COVID19 Not Detected 09/27/2022 12:25 PM           Anesthesia Evaluation  Patient summary reviewed   history of anesthetic complications: PONV.   Airway: Mallampati: III  TM distance: >3 FB   Neck ROM: full  Mouth opening: > = 3 FB   Dental:    (+) poor dentition      Pulmonary:   (+) asthma:     (-) not a current smoker                           Cardiovascular:        (-) past MI, CABG/stent and  angina    ECG reviewed                        Neuro/Psych:   (+) neuromuscular disease (peripheral (upper extrmity) neuropathy: paresthesias, weakness):,    (-) seizures           GI/Hepatic/Renal:   (+) GERD:,      (-) liver disease and no renal disease       Endo/Other: Negative Endo/Other ROS                    Abdominal:             Vascular: negative vascular ROS. Other Findings:           Anesthesia Plan      general     ASA 3       Induction: intravenous. MIPS: Postoperative opioids intended and Prophylactic antiemetics administered. Anesthetic plan and risks discussed with patient. Plan discussed with CRNA. This pre-anesthesia assessment may be used as a history and physical.    DOS STAFF ADDENDUM:    Pt seen and examined, chart reviewed (including anesthesia, drug and allergy history). No interval changes to history and physical examination. Anesthetic plan, risks, benefits, alternatives, and personnel involved discussed with patient. Patient verbalized an understanding and agrees to proceed.       Britt Chowdhury MD  September 28, 2022  9:03 AM

## 2022-09-28 NOTE — BRIEF OP NOTE
Brief Postoperative Note      Patient: Bob Page  YOB: 1950  MRN: [id number]    Date of Procedure: 9/28/2022    Pre-Op Diagnosis: Pathologic fracture osteoporosis lumbar L2, L4, L5    Post-Op Diagnosis: Same       Procedure(s):  L2, L4, L5 KYPHOPLASTY WITH L2, L4 BONE BIOPSY    Surgeon(s):  Simi Leavitt MD    Assistant:  Surgical Assistant: Oc Gonzales    Anesthesia: General    Estimated Blood Loss (mL): Minimal    Complications: None    Specimens:   ID Type Source Tests Collected by Time Destination   A : A. L2 BONE BIOPSY Tissue Biopsy SURGICAL PATHOLOGY Simi Leavitt MD 9/28/2022 1033    B : B. L4 BONE BIOPSY Tissue Biopsy SURGICAL PATHOLOGY Simi Leavitt MD 9/28/2022 1034        Implants:  Implant Name Type Inv.  Item Serial No.  Lot No. LRB No. Used Action   KIT CEMENT BONE COMBO [place] [place] HV-R - [id number]  KIT CEMENT BONE COMBO [employer] [place] HV-R  [employer] [facility] [id number] N/A 1 Implanted   CEMENT BNE HI VISC RADIOPAQUE [employer]ON HV-R - [id number]  CEMENT BNE HI VISC RADIOPAQUE KYPHON HV-R  [employer] [facility] [id number] N/A 1 Implanted   CEMENT BNE HI VISC RADIOPAQUE [employer] HV-R - [id number]  CEMENT BNE HI VISC RADIOPAQUE KYPHON HV-R  [employer] [facility] [id number] N/A 1 Implanted   KIT CEMENT BONE COMBO [employer] HV-R - [id number]  KIT CEMENT BONE COMBO [employer] [place] HV-R  [employer] [facility] [id number] N/A 1 Implanted         Drains: * No LDAs found *    Findings: osteoporotic bone, good filling of kyphoplasty cement L3, L4, L5, bone biopsy L2, L4    Electronically signed by Simi Leavitt MD on 9/28/2022 at 11:14 AM    [id number]

## 2022-09-28 NOTE — ANESTHESIA POSTPROCEDURE EVALUATION
Department of Anesthesiology  Postprocedure Note    Patient: Ana Gutierrez  MRN: [id number]  YOB: 1950  Date of evaluation: 9/28/2022      Procedure Summary     Date: 09/28/22 Room / Location: [address] [place] [address] / [facility]    Anesthesia Start: 9299 Anesthesia Stop: 7977    Procedure: L2, L4, L5 KYPHOPLASTY WITH L2, L4 BONE BIOPSY (Spine Lumbar) Diagnosis:       Age-related osteoporosis with current pathological fracture of vertebra, initial encounter ([facility] [address]      (AGE RELATED FRACTURE)    Surgeons: Lizabeth Peña MD Responsible Provider: Andrea Alvarez MD    Anesthesia Type: general ASA Status: 3          Anesthesia Type: No value filed.     Nabil Phase I: Nabil Score: 10    Nabil Phase II:        Anesthesia Post Evaluation    Patient location during evaluation: PACU  Patient participation: complete - patient participated  Level of consciousness: awake and alert  Pain score: 3  Nausea & Vomiting: no nausea  Complications: no  Cardiovascular status: hemodynamically stable  Respiratory status: acceptable  Hydration status: stable

## 2022-09-28 NOTE — PROGRESS NOTES
[facility] Medicine Progress Note      Admit Date: 9/23/2022       CC: F/U for back pain    HPI: Patient is a 59-year-old male who presents to the hospital due to complaint of back pain, 10/10 intensity, lumbar area. Patient also mentions he was recommended by neurosurgeon to come to the emergency department. Patient recently had MRI that did show L1-L2 compression fracture as well as possible discitis. Patient otherwise denies fever chills diarrhea constipation, he denies history of IVDA. 9/25 Patient laying on left side, states he cannot lay on his back because of the severe pain. Also complains of sensory issues, for which he lays in bed naked with a sheet over himself. Patient with numerous complaints. States the Lasix made him pee all night long, for which he did not like. He does not think it helped his legs. Would not let me inspect the legs to feel the edema. On visual exam the edema seems to be lessened. Patient attributes that to elevating his legs in the bed although he was doing that yesterday. Recommended further IV Lasix to continue to help improve edema, which he refuses. He wants to only take oral Lasix during the day, at half of the \"lowest dose possible\". Explained to patient he would not have good benefit from that low of a dose, and recommended Lasix 20 mg oral daily. He will think about it. He did not tolerate the IV morphine, causing anxiety and agitation, wife reports he paced a lot with it. Ativan seemed to help calm him down. He now reports he has not peed all morning. We will obtain a bladder scan. Wife at bedside, discussed plan of care with patient and wife, deny further needs or questions. 9/26: kyphoplasty set up for weds. Cont pain meds as ordered. Will order prednisone for c/o sciatica pain currently. States he tolerates that well. We can change back to toradol with ultram if prednisone doesn't help his sciatica. He wasn't sure prednisone will help. Will change muscle relaxer to robaxin 750mg tid. Will get right hip xray for c/o pain and tenderness on palpation. States he had fallen and no one ordered xray. Wife states he has bad arthritis and knows he will need hip replacement ultimately. 9/28: WBC bump slightly with start of prednisone for sciatica. To be expected. Awaiting kyphoplasty weds. Patient up walking around in room with rollator. Increase robaxin dose, cont steroid for now for sciatica, will add miralax and suppos for constipation. Right hip xray neg for fx. Echo with diastolic dysfunction. . EF 65-70%. Will consult cardiology for bilat LE edema and echo findings. Interval History/Subjective: patient back from surgery and sleeping. Discussed with wife at length his anxiety and OCD as she is concerned this may be worsened by his acute pain. Also with his osteoporosis and high dose gabapentin, may benefit from lowering this dose which could also be contributing to his LE edema as well. Psych NP in with patient during my rounding on the patient. Review of Systems:       The patient denied headaches, visual changes, LOC, SOB, CP, ABD pain, N/V/D, skin changes, new or worsening weakness or neuromuscular deficits. Comprehensive ROS negative except as mentioned above. Past Medical History:        Diagnosis Date    Arthritis     Asthma     B12 deficiency 07/10/2013    Tooks injection every other week and B12 level 500. Recommended that he continue current therapy, as will likely drop with oral supplement. Closed wedge compression fracture of L1 vertebra ([facility] [address] 09/05/2018    Superior endplate fracture of L1 resulting 80% loss of vertebral body height    DDD (degenerative disc disease), lumbar 09/05/2018    L2-L3, L3-L4, L4-L5.   Disc bulge with narrowing of the neural foramen at these levels    Diverticulitis     Gallbladder problem     S/P lap martha    GERD (gastroesophageal reflux disease)     Hiatal hernia Migraine     MRSA infection 04/03/2019    Left hand    Nausea & vomiting     Spondylosis of lumbar spine 11/02/2015    On x-ray multilevel       Past Surgical History:        Procedure Laterality Date    CHOLECYSTECTOMY      COLONOSCOPY  9/11    FOOT SURGERY      nerve removed from foot    SHOULDER SURGERY Right 2005    rotator cuff repair    UPPER GASTROINTESTINAL ENDOSCOPY  9/11       Allergies:  Codeine and Hydrocodone    Past medical and surgical history reviewed. Any changes have been noted. PHYSICAL EXAM:  BP (!) 152/82   Pulse 98   Temp 97.5 °F (36.4 °C)   Resp 21   Ht 6' 1\" (1.854 m)   Wt 160 lb 11.5 oz (72.9 kg)   SpO2 95%   BMI 21.20 kg/m²       Intake/Output Summary (Last 24 hours) at 9/28/2022 1640  Last data filed at 9/28/2022 1245  Gross per 24 hour   Intake 1000 ml   Output 1200 ml   Net -200 ml      No intake or output data in the 24 hours ending 09/27/22 0842   General appearance:   No apparent distress, appears stated age. Cooperative. HEENT:  Normocephalic, atraumatic. PERRLA. EOMi. Conjunctivae/corneas clear, no icterus, non-injected. Neck: Supple, with full range of motion. No jugular venous distention. Trachea midline. Respiratory:  Normal respiratory effort. Clear to auscultation, bilaterally without Rales/Wheezes/Rhonchi. Cardiovascular:  Regular rate and rhythm without murmurs, rubs or gallops. Abdomen: Soft, non-tender, non-distended, without rebound or guarding. Normal bowel sounds. Musculoskeletal: right hip pain on palpation, bilat feed edema 3+  , tender right lower leg on palpation  Skin: Skin color, texture, turgor normal.  No rashes or lesions. Neurologic:  Neurovascularly intact without any focal sensory/motor deficits. Cranial nerves: II-XII intact, grossly intact. No facial asymmetry, tongue midline.    Psychiatric:  Alert and oriented, thought content appropriate  Capillary Refill: Brisk,< 3 seconds   Peripheral Pulses: +2 palpable, equal bilaterally LABS:    Lab Results   Component Value Date    WBC 7.1 09/28/2022    HGB 12.5 (L) 09/28/2022    HCT 35.7 (L) 09/28/2022    MCV 92.8 09/28/2022     09/28/2022    LYMPHOPCT 19.9 09/28/2022    RBC 3.85 (L) 09/28/2022    MCH 32.4 09/28/2022    MCHC 34.9 09/28/2022    RDW 12.8 09/28/2022       Lab Results   Component Value Date    CREATININE 0.5 (L) 09/28/2022    BUN 11 09/28/2022     (L) 09/28/2022    K 3.3 (L) 09/28/2022    CL 99 09/28/2022    CO2 24 09/28/2022       Lab Results   Component Value Date/Time    MG 1.90 09/28/2022 04:31 AM       Lab Results   Component Value Date    ALT 26 09/15/2022    AST 34 09/15/2022    ALKPHOS 171 (H) 09/15/2022    BILITOT 0.3 09/15/2022        No flowsheet data found. No results found for: LABA1C    Imaging:  FLUORO FOR SURGICAL PROCEDURES   Final Result      XR LUMBAR SPINE (2-3 VIEWS)   Final Result      XR HIP RIGHT (2-3 VIEWS)   Final Result   No displaced fractures in the pelvis or right hip. MRI LUMBAR SPINE W WO CONTRAST   Final Result   1. Again seen are acute to subacute compression deformities involving the L4   and L5 vertebral bodies with slight interval loss of height of L4. No   significant change in loss of height of L5.   2. Since the prior exam, there has been interval development of an acute   compression deformity of the L2 vertebral body with approximately 35% loss of   height. 3. Since the prior exam, development of minimal bone marrow edema along the   superior endplate of R89, which may represent a microtrabecular fracture   without significant loss of height. 4. Multilevel degenerative changes as above. Overall findings at L4-L5 are presumably related to the compression   deformities. Discitis while not entirely excluded is felt to be less likely.              Scheduled and prn Medications:    Scheduled Meds:   sodium chloride flush  5-40 mL IntraVENous 2 times per day    acetaminophen  650 mg Oral Q6H    Or acetaminophen  650 mg Rectal Q6H    polyethylene glycol  17 g Oral Daily    furosemide  20 mg Oral Daily    traMADol  50 mg Oral 4 times per day    tamsulosin  0.4 mg Oral Daily    docusate sodium  100 mg Oral BID    gabapentin  400 mg Oral 5x Daily    sodium chloride flush  10 mL IntraVENous 2 times per day    ipratropium  2 spray Each Nostril TID    cetirizine  5 mg Oral Daily    magnesium cl-calcium carbonate  1 tablet Oral Daily with breakfast    mometasone-formoterol  2 puff Inhalation BID    pantoprazole  40 mg Oral BID AC    tiotropium  2 puff Inhalation Daily    topiramate  75 mg Oral BID    calcitonin  1 spray Alternating Nares Daily     Continuous Infusions:   sodium chloride      sodium chloride       PRN Meds:.sodium chloride flush, sodium chloride, HYDROmorphone, magic (miracle) mouthwash with nystatin, acetaminophen, bisacodyl, LORazepam, sodium chloride flush, sodium chloride, potassium chloride **OR** potassium alternative oral replacement **OR** potassium chloride, potassium chloride, promethazine **OR** ondansetron, magnesium hydroxide, albuterol, albuterol sulfate HFA, SUMAtriptan    Assessment & Plan:        Intractable low back pain with radiculopathy  Neuropathy the bilateral feet  -Sed rate and CRP are normal.  No fever, no leukocytosis. ID agree there is no discitis. -Consulted neurosurgery, who ordered stat MRI of lumbar spine. MRI showed L4 fracture has worsened. They recommended kyphoplasty. Neurosurgeon called and spoke to wife I was in the room, plan for kyphoplasty on Wednesday. Patient is to remain in hospital until after kyphoplasty. -Adjusted gabapentin dose to 400 mg 5 times a day. He was taking 300 mg 5 times a day at home  -DC IV morphine  -Schedule Toradol and tramadol every 6 hours. holding toradol with prednisone given for sciatica but we can change back if prednisone doesn't help to avoid anti-inflamm together. But give alternating doses of each every 3 hours. Discussed with pharmacist, who will time it appropriately on the [facility]  - robaxin 750mg tid instead of skelaxin     Osteoporotic bone tissue likely contributing to spinal fracture  - L2, L4, L5  - kyphoplasty with L2, L4 bone biopsy 9/28/22  - pain meds as needed  - rheumatology referral given by NSGY for outpatient f/u    Anxiety and OCD  - psych consult  - perhaps the patient's acute pain has been contributing to his symptoms which seem to have worsened lately per wife, but may benefit from med recs            Bilateral lower extremity edema, likely multifactorial  Dyspnea   -Had venous Doppler 9/20/2022, negative bilaterally  -Echo ordered- grade II diastolic dysfunction  -Check BNP  -Seem to have good response with Lasix, patient reported lots of output for which she was not happy with. Edema does look better though. Ordered Lasix 20 mg p.o. daily. - refused today  -Patient having sensory issues, will not tolerate HARJINDER hose.  -Albumin normal  - takes neurontin and currently steroids for acute sciatic which can both worsen swelling, perhaps a titrated dose on neurontin on d/c will help     Right hip pain  - xray ordered -- neg for fx.  - hx arthritis   - c/o sciatica - started prednisone        Hypokalemia  -Replacement ordered        History of GERD, migraine  -Continue home meds as ordered       Continue current regimen/therapies. Monitor. Adjust medical regimen as appropriate. Body mass index is 21.2 kg/m². The patient and / or the family were informed of the results of any tests, a time was given to answer questions, a plan was proposed and they agreed with plan. DVT ppx: lovenox  GI ppx: protonix    Diet: ADULT DIET;  Regular    Consults:  IP CONSULT TO SPINE  IP CONSULT TO HOSPITALIST  IP CONSULT TO INFECTIOUS DISEASES  IP CONSULT TO CARDIOLOGY  IP CONSULT TO PSYCHIATRY    DISPO/placement plan: pending     Code Status: Full Code      CELESTINO West - BETH  09/28/22

## 2022-09-28 NOTE — PROGRESS NOTES
Patient admitted to PACU # 13 from OR at 1119 post : L2, L4, L5 KYPHOPLASTY WITH L2, L4 BONE BIOPSY  per DR. Elisa Simpson. Attached to PACU monitoring system and report received from anesthesia provider. Patient was reported to be hemodynamically stable during procedure. Patient drowsy on admission and denied pain.

## 2022-09-28 NOTE — PROGRESS NOTES
PACU Transfer Note    Vitals:    09/28/22 1300   BP: (!) 152/82   Pulse: (!) 102   Resp: 21   Temp: 97.5 °F (36.4 °C)   SpO2: 95%       In: 700 [I.V.:700]  Out: 1200 [Urine:1200]    Pain assessment:  present - adequately treated  Pain Level: 8    Report given to Receiving unit [facility] RN.    9/28/2022 1:14 PM

## 2022-09-29 ENCOUNTER — PATIENT MESSAGE (OUTPATIENT)
Dept: FAMILY MEDICINE CLINIC | Age: 72
End: 2022-09-29

## 2022-09-29 VITALS
HEIGHT: 73 IN | TEMPERATURE: 98.1 F | RESPIRATION RATE: 17 BRPM | BODY MASS INDEX: 20.83 KG/M2 | SYSTOLIC BLOOD PRESSURE: 127 MMHG | HEART RATE: 80 BPM | OXYGEN SATURATION: 100 % | DIASTOLIC BLOOD PRESSURE: 65 MMHG | WEIGHT: 157.19 LBS

## 2022-09-29 LAB
ANION GAP SERPL CALCULATED.3IONS-SCNC: 8 MMOL/L (ref 3–16)
BASOPHILS ABSOLUTE: 0 K/UL (ref 0–0.2)
BASOPHILS RELATIVE PERCENT: 0.2 %
BUN BLDV-MCNC: 13 MG/DL (ref 7–20)
CALCIUM SERPL-MCNC: 8.9 MG/DL (ref 8.3–10.6)
CHLORIDE BLD-SCNC: 101 MMOL/L (ref 99–110)
CO2: 27 MMOL/L (ref 21–32)
CREAT SERPL-MCNC: 0.5 MG/DL (ref 0.8–1.3)
EOSINOPHILS ABSOLUTE: 0 K/UL (ref 0–0.6)
EOSINOPHILS RELATIVE PERCENT: 0.2 %
GFR AFRICAN AMERICAN: >60
GFR NON-AFRICAN AMERICAN: >60
GLUCOSE BLD-MCNC: 106 MG/DL (ref 70–99)
HCT VFR BLD CALC: 34.8 % (ref 40.5–52.5)
HEMOGLOBIN: 12.2 G/DL (ref 13.5–17.5)
LYMPHOCYTES ABSOLUTE: 1.7 K/UL (ref 1–5.1)
LYMPHOCYTES RELATIVE PERCENT: 17.7 %
MCH RBC QN AUTO: 32.5 PG (ref 26–34)
MCHC RBC AUTO-ENTMCNC: 34.9 G/DL (ref 31–36)
MCV RBC AUTO: 93.2 FL (ref 80–100)
MONOCYTES ABSOLUTE: 1 K/UL (ref 0–1.3)
MONOCYTES RELATIVE PERCENT: 10.2 %
NEUTROPHILS ABSOLUTE: 6.8 K/UL (ref 1.7–7.7)
NEUTROPHILS RELATIVE PERCENT: 71.7 %
PDW BLD-RTO: 13.2 % (ref 12.4–15.4)
PLATELET # BLD: 252 K/UL (ref 135–450)
PMV BLD AUTO: 6.2 FL (ref 5–10.5)
POTASSIUM REFLEX MAGNESIUM: 3.8 MMOL/L (ref 3.5–5.1)
RBC # BLD: 3.74 M/UL (ref 4.2–5.9)
SODIUM BLD-SCNC: 136 MMOL/L (ref 136–145)
WBC # BLD: 9.5 K/UL (ref 4–11)

## 2022-09-29 PROCEDURE — 97166 OT EVAL MOD COMPLEX 45 MIN: CPT

## 2022-09-29 PROCEDURE — 6370000000 HC RX 637 (ALT 250 FOR IP): Performed by: NEUROLOGICAL SURGERY

## 2022-09-29 PROCEDURE — 97116 GAIT TRAINING THERAPY: CPT

## 2022-09-29 PROCEDURE — 80048 BASIC METABOLIC PNL TOTAL CA: CPT

## 2022-09-29 PROCEDURE — 36415 COLL VENOUS BLD VENIPUNCTURE: CPT

## 2022-09-29 PROCEDURE — 94760 N-INVAS EAR/PLS OXIMETRY 1: CPT

## 2022-09-29 PROCEDURE — 85025 COMPLETE CBC W/AUTO DIFF WBC: CPT

## 2022-09-29 PROCEDURE — 94640 AIRWAY INHALATION TREATMENT: CPT

## 2022-09-29 PROCEDURE — 97161 PT EVAL LOW COMPLEX 20 MIN: CPT

## 2022-09-29 PROCEDURE — 97535 SELF CARE MNGMENT TRAINING: CPT

## 2022-09-29 PROCEDURE — 97530 THERAPEUTIC ACTIVITIES: CPT

## 2022-09-29 RX ORDER — GABAPENTIN 100 MG/1
100 CAPSULE ORAL 2 TIMES DAILY
Qty: 180 CAPSULE | Refills: 1 | Status: SHIPPED | OUTPATIENT
Start: 2022-09-29 | End: 2023-03-28

## 2022-09-29 RX ORDER — METHOCARBAMOL 500 MG/1
750 TABLET, FILM COATED ORAL 3 TIMES DAILY PRN
Qty: 20 TABLET | Refills: 0 | Status: SHIPPED | OUTPATIENT
Start: 2022-09-29 | End: 2022-10-09

## 2022-09-29 RX ORDER — RIZATRIPTAN BENZOATE 10 MG/1
TABLET, ORALLY DISINTEGRATING ORAL
Qty: 18 TABLET | Refills: 5 | Status: SHIPPED | OUTPATIENT
Start: 2022-09-29

## 2022-09-29 RX ORDER — TRAMADOL HYDROCHLORIDE 50 MG/1
50 TABLET ORAL EVERY 6 HOURS PRN
Qty: 20 TABLET | Refills: 0 | Status: SHIPPED | OUTPATIENT
Start: 2022-09-29 | End: 2022-10-12 | Stop reason: SDUPTHER

## 2022-09-29 RX ORDER — GABAPENTIN 100 MG/1
400 CAPSULE ORAL 4 TIMES DAILY
Qty: 480 CAPSULE | Refills: 1 | Status: SHIPPED | OUTPATIENT
Start: 2022-09-29 | End: 2023-03-28

## 2022-09-29 RX ORDER — MIRTAZAPINE 7.5 MG/1
7.5 TABLET, FILM COATED ORAL NIGHTLY
Qty: 20 TABLET | Refills: 1 | Status: SHIPPED | OUTPATIENT
Start: 2022-09-29 | End: 2022-10-21 | Stop reason: SDUPTHER

## 2022-09-29 RX ADMIN — MOMETASONE FUROATE AND FORMOTEROL FUMARATE DIHYDRATE 2 PUFF: 200; 5 AEROSOL RESPIRATORY (INHALATION) at 08:26

## 2022-09-29 RX ADMIN — TRAMADOL HYDROCHLORIDE 50 MG: 50 TABLET ORAL at 00:33

## 2022-09-29 RX ADMIN — GABAPENTIN 400 MG: 400 CAPSULE ORAL at 13:09

## 2022-09-29 RX ADMIN — IPRATROPIUM BROMIDE 2 SPRAY: 42 SPRAY, METERED NASAL at 09:07

## 2022-09-29 RX ADMIN — MAGNESIUM CHLORIDE 1 TABLET: 71.5 TABLET ORAL at 09:07

## 2022-09-29 RX ADMIN — ACETAMINOPHEN 650 MG: 325 TABLET ORAL at 13:09

## 2022-09-29 RX ADMIN — TIOTROPIUM BROMIDE INHALATION SPRAY 2 PUFF: 3.12 SPRAY, METERED RESPIRATORY (INHALATION) at 08:26

## 2022-09-29 RX ADMIN — TOPIRAMATE 75 MG: 25 TABLET, FILM COATED ORAL at 09:05

## 2022-09-29 RX ADMIN — GABAPENTIN 400 MG: 400 CAPSULE ORAL at 05:53

## 2022-09-29 RX ADMIN — POLYETHYLENE GLYCOL 3350 17 G: 17 POWDER, FOR SOLUTION ORAL at 09:06

## 2022-09-29 RX ADMIN — CETIRIZINE HYDROCHLORIDE 5 MG: 10 TABLET, FILM COATED ORAL at 09:05

## 2022-09-29 RX ADMIN — PANTOPRAZOLE SODIUM 40 MG: 40 TABLET, DELAYED RELEASE ORAL at 05:52

## 2022-09-29 RX ADMIN — DOCUSATE SODIUM 100 MG: 100 CAPSULE, LIQUID FILLED ORAL at 09:06

## 2022-09-29 RX ADMIN — ACETAMINOPHEN 650 MG: 325 TABLET ORAL at 05:52

## 2022-09-29 RX ADMIN — TRAMADOL HYDROCHLORIDE 50 MG: 50 TABLET ORAL at 05:53

## 2022-09-29 ASSESSMENT — PAIN DESCRIPTION - LOCATION
LOCATION: BACK;LEG
LOCATION: BACK;LEG
LOCATION: BACK
LOCATION: BACK

## 2022-09-29 ASSESSMENT — PAIN SCALES - GENERAL
PAINLEVEL_OUTOF10: 10
PAINLEVEL_OUTOF10: 10
PAINLEVEL_OUTOF10: 3
PAINLEVEL_OUTOF10: 4

## 2022-09-29 ASSESSMENT — PAIN DESCRIPTION - PAIN TYPE: TYPE: SURGICAL PAIN

## 2022-09-29 ASSESSMENT — PAIN SCALES - WONG BAKER
WONGBAKER_NUMERICALRESPONSE: 6

## 2022-09-29 ASSESSMENT — PAIN DESCRIPTION - ORIENTATION: ORIENTATION: LOWER

## 2022-09-29 ASSESSMENT — PAIN DESCRIPTION - DESCRIPTORS: DESCRIPTORS: ACHING

## 2022-09-29 ASSESSMENT — PAIN DESCRIPTION - ONSET: ONSET: ON-GOING

## 2022-09-29 ASSESSMENT — PAIN DESCRIPTION - FREQUENCY: FREQUENCY: INTERMITTENT

## 2022-09-29 NOTE — PROGRESS NOTES
POD 1 kyphoplasty L2, L4, L5  Resting, rousable,  Neuro intact  Incisions clean and dry  Reports improvement in his back pain. Okay to discharge from my standpoint. Patient's wife was given followup appointments    No strenuous activity  Tegaderm dressings can come off today.

## 2022-09-29 NOTE — PLAN OF CARE
Problem: Pain  Goal: Verbalizes/displays adequate comfort level or baseline comfort level  Flowsheets (Taken 9/29/2022 0221)  Verbalizes/displays adequate comfort level or baseline comfort level:   Encourage patient to monitor pain and request assistance   Assess pain using appropriate pain scale   Implement non-pharmacological measures as appropriate and evaluate response  Note: D: PT. With complaints of lower back pain, medicated with Tylenol 650mg every 4 hours and Tramadol 1 tab every 6 hours  A: encouraged to call for assist as needed  R: resting quietly after medication

## 2022-09-29 NOTE — CARE COORDINATION
INITIAL CASE MANAGEMENT ASSESSMENT    Reviewed chart, met with patient to assess possible discharge needs. Explained Case Management role/services. Living Situation: Patient lives with his wife in a house with 2 steps to enter. ADLs: Independent PTA     DME: None    PT/OT Recs: Discharge Recommendations: Christopher Fontanez scored a 17/24 on the AM-PAC short mobility form. At this time, this patient demonstrates the endurance and safety to discharge home with HHPT and 24 hour supervision/assistance and a follow up treatment frequency of 2-3x/wk. Home with Home health PT, 24 hour supervision or assist   PT Equipment Recommendations  Equipment Needed: Yes (Bed rail)     Active Services: None     Transportation: Active /Wife will transport     Medications: [facility] on [address]/No barrier    PCP: Marissa Aguillon MD      HD/PD: N/A    PLAN/COMMENTS: Discharge to home with Jamey Bhatia for PT/OT. The Plan for Transition of Care is related to the following treatment goals: Strengthening, Balance training, Functional mobility training, Transfer training, Gait training, Stair training, Patient/Caregiver education & training, Safety education & training, Home exercise program, Therapeutic activities, Endurance training    The Patient and/or patient representative Wife Airam Buckley was provided with a choice of provider and agrees   with the discharge plan. [x] Yes [] No    Freedom of choice list was provided with basic dialogue that supports the patient's individualized plan of care/goals, treatment preferences and shares the quality data associated with the providers. [x] Yes [] No     No agency preference. Referral sent to [facility]. SW/CM provided contact information for patient or family to call with any questions. SW/CM will follow and assist as needed.    Electronically signed by Akua Han RN on 9/29/2022 at 1:40 PM

## 2022-09-29 NOTE — PROGRESS NOTES
Physical Therapy  Facility/Department: [facility]  Physical Therapy Initial Assessment    Name: Sondra Thomas  : 1950  MRN: [id number]  Date of Service: 2022    Discharge Recommendations: Sondra Thomas scored a 17/24 on the AM-PAC short mobility form. Current research shows that an AM-PAC score of 18 or greater is typically associated with a discharge to the patient's home setting. Based on the patient's AM-PAC score and their current functional mobility deficits, it is recommended that the patient have 2-3 sessions per week of Physical Therapy at d/c to increase the patient's independence. At this time, this patient demonstrates the endurance and safety to discharge home with HHPT and 24 hour supervision/assistance and a follow up treatment frequency of 2-3x/wk. Please see assessment section for further patient specific details. If patient discharges prior to next session this note will serve as a discharge summary. Please see below for the latest assessment towards goals. Home with Home health PT, 24 hour supervision or assist   PT Equipment Recommendations  Equipment Needed: Yes (Bed rail)      Patient Diagnosis(es): The primary encounter diagnosis was Discitis of lumbar region. Diagnoses of Chronic bilateral low back pain without sciatica and Age-related osteoporosis with current pathological fracture of vertebra, initial encounter [facility]) were also pertinent to this visit. Past Medical History:  has a past medical history of Arthritis, Asthma, B12 deficiency, Closed wedge compression fracture of L1 vertebra (HCC), DDD (degenerative disc disease), lumbar, Diverticulitis, Gallbladder problem, GERD (gastroesophageal reflux disease), Hiatal hernia, Migraine, MRSA infection, Nausea & vomiting, and Spondylosis of lumbar spine. Past Surgical History:  has a past surgical history that includes Colonoscopy ();  Upper gastrointestinal endoscopy (); shoulder surgery (Right, ); Foot surgery; Cholecystectomy; and Spine surgery (N/A, 9/28/2022). Assessment   Body Structures, Functions, Activity Limitations Requiring Skilled Therapeutic Intervention: Decreased functional mobility ; Decreased balance;Decreased endurance;Decreased strength; Increased pain;Decreased posture  Assessment: Pt is a 72 y/o who presents s/p L2, L4, and L5 kyphoplasty. Two weeks ago, prior to the onset of the pts low back pain, he was independent with all functional mobility and ADLs without the use of an AD. In the past two weeks and today, the pt required the use of a rollator walker for performance of functional mobility tasks. Pt was able to demonstrate safe and functional use of the rollator. Pt has assistance at home and will be safe to return home with assist PRN and HHPT. Pt will continue to benefit from skilled PT to facilitate return to PLOF and to promote independence.   Treatment Diagnosis: Impaired functional mobility  Therapy Prognosis: Good  Decision Making: Low Complexity  Clinical Presentation: Stable  Barriers to Learning: None  Requires PT Follow-Up: Yes  Activity Tolerance  Activity Tolerance Comments: Pt tolerated the PT/OT initial evaluation well with no significant limitations     Plan   Plan  Plan: 3-5 times per week  Current Treatment Recommendations: Strengthening, Balance training, Functional mobility training, Transfer training, Gait training, Stair training, Patient/Caregiver education & training, Safety education & training, Home exercise program, Therapeutic activities, Endurance training  Safety Devices  Type of Devices: Call light within reach (Pt is up ad niya in the room, RN aware)     Restrictions  Restrictions/Precautions  Restrictions/Precautions: Fall Risk, Up as Tolerated (High fall risk)  Position Activity Restriction  Spinal Precautions: No Bending, No Lifting, No Twisting     Subjective   General  Chart Reviewed: Yes  Patient assessed for rehabilitation services?: Yes  Additional Pertinent Hx: 27-year-old male who was sent in by neurosurgery Dr. Dee Dee Wagoner with concern for discitis. Patient been having severe chronic lower back pain with a history of degenerative disc disease which is his primary risk factor the pain has been getting worse recently. Difficulty with walking or any movement he has to walk with a walker. Patient had a recent MRI that showed concern for possible compression fracture and/or discitis. Denies any fevers or chills. No other modifying factors besides those listed. No other associated symptoms. Denies any fever, chills, nausea, vomiting, diarrhea, chest pain, shortness of breath, dysuria, hematuria, saddle anesthesia, bowel or bladder incontinence, gait ataxia, falls, syncope, numbness, weakness, neck pain. Symptoms are moderate to severe gradual onset constant and worsening. Aching and throbbing in nature nonradiating. S/P L2, L4, L5 KYPHOPLASTY WITH L2, L4 BONE BIOPSY  Response To Previous Treatment: Not applicable  Family / Caregiver Present: Yes (Wife)  Referring Practitioner: CELESTINO Thornton CNP  Referral Date : 09/29/22  Diagnosis: Back pain  Follows Commands: Impaired (Follows one step commands with repetition of instruction)  General Comment  Comments: Pt seated EOB on arrival with his wife assisting him getting dressed. Pt agreeable to participate in PT/OT initial evaluation. Subjective  Subjective: Pt reports 4/10 low back pain, denies the need for pain interventions.      Social/Functional History  Social/Functional History  Lives With: Spouse  Type of Home: House  Home Layout: One level  Home Access: Stairs to enter without rails  Entrance Stairs - Number of Steps: 2  Bathroom Shower/Tub: Walk-in shower  Bathroom Toilet: Standard  Bathroom Accessibility: Not accessible  Home Equipment: Rollator  Has the patient had two or more falls in the past year or any fall with injury in the past year?: Yes (2)  ADL Assistance: Independent  Homemaking Assistance: Independent  Homemaking Responsibilities: Yes  Ambulation Assistance: Independent  Transfer Assistance: Independent  Active : Yes  Mode of Transportation: Truck  Occupation: Retired  Type of Occupation: Construction    Vision/Hearing  Vision  Vision: Impaired  Vision Exceptions: Wears glasses at all times  Hearing  Hearing: Within functional limits      Cognition   Cognition  Overall Cognitive Status: Exceptions  Attention Span: Attends with cues to redirect  Safety Judgement: Decreased awareness of need for safety;Decreased awareness of need for assistance  Problem Solving: Decreased awareness of errors  Insights: Decreased awareness of deficits  Cognition Comment: Pt reports he has OCD, pt very particular and likes to guide session. Objective   Observation/Palpation  Posture:  (Mild forward head, rounded shoulders, and increased thoracic kyphosis in sitting and standing)  Observation: Pt on RA on arrival. Lower back bandaging with red tinged fluid, however it is contained.   Gross Assessment  Sensation: Intact (Pt denies any numbness/tingling)     AROM RLE (degrees)  RLE AROM: WFL  AROM LLE (degrees)  LLE AROM : WFL  Strength RLE  Comment: Grossly >3/5 based on observed functional mobility  Strength LLE  Comment: Grossly >3/5 based on observed functional mobility     Bed mobility  Supine to Sit: Stand by assistance (Verbal cues for log roll technique, HOB flat, use of bed rail, increased time required to complete task)  Sit to Supine: Stand by assistance (Verbal cues required for log roll technique, increased time required to complete task, use of bed rail, pt only able to complete sit to L sidelying transfer as it is pain for him to lie on his back)  Scooting: Stand by assistance  Transfers  Sit to Stand: Stand by assistance (Up to rollator walker, verbal cues required for hand placement)  Stand to sit: Stand by assistance (With rollator walker)  Bed to Chair: Stand by assistance (Via stand step transfer, with rollator walker)  Ambulation  Surface: level tile  Device: Rollator  Assistance: Stand by assistance  Quality of Gait: Decreased step lengths and heights, shuffling gait, normal MARK, steady with no LOB, decreased speed  Distance: 200'     Balance  Sitting - Static:  (Supervision)  Standing - Static:  (SBA with B UE support on rollator walker)  Standing - Dynamic:  (SBA with B UE support on rollator walker)     Other Therapeutic Interventions  Pt completed toilet transfer with use of grab bar and SBA. AM-PAC Score  AM-PAC Inpatient Mobility Raw Score : 17 (09/29/22 1208)  AM-PAC Inpatient T-Scale Score : 42.13 (09/29/22 1208)  Mobility Inpatient CMS 0-100% Score: 50.57 (09/29/22 1208)  Mobility Inpatient CMS G-Code Modifier : CK (09/29/22 1208)     Goals  Short Term Goals  Time Frame for Short term goals: By discharge  Short term goal 1: Pt will transfer supine to/from sit using the log roll technique independently  Short term goal 2: Pt will transfer sit to/from stand independently  Short term goal 3: Pt will amb at least 250' with rollator walker and mod I  Short term goal 4: Pt will asc/dec 2 steps with no HR independently  Patient Goals   Patient goals :  To return home    Education  Patient Education  Education Given To: Patient  Education Provided: Role of Therapy;Plan of Care;Precautions;Transfer Training (Equipment, bed mobility training)  Education Method: Verbal  Barriers to Learning: None  Education Outcome: Verbalized understanding    Therapy Time   Individual Concurrent Group Co-treatment   Time In       1053   Time Out       1135   Minutes       42   Timed Code Treatment Minutes: HORACIO Brwon [place] PT, DPT [id number]

## 2022-09-29 NOTE — OP NOTE
[address]           [address] [facility]                                OPERATIVE REPORT    PATIENT NAME: Rosemarie Rubio                    :        1950  MED REC NO:   [id number]                          ROOM:       200  ACCOUNT NO:   [de-identified]                           ADMIT DATE: 2022  PROVIDER:     Modesta Tripp MD    DATE OF PROCEDURE:  2022    PREOPERATIVE DIAGNOSIS:  Pathologic fracture secondary to osteoporosis  lumbar L2, L4, L5.    POSTOPERATIVE DIAGNOSIS:  Pathologic fracture secondary to osteoporosis  lumbar L2, L4, L5.    OPERATION PERFORMED:  1. Kyphoplasty L2, L4, L5.  2.  Percutaneous reduction of compression fracture L2, L4, L5.  3.  Bone biopsy bilateral complete L2, L4.  4.  Physician-directed and interpreted intraoperative fluoroscopy. SURGEON:  Modesta Tripp MD    ANESTHESIA:  General endotracheal.    COMPLICATIONS:  None apparent. INDICATIONS:  The patient is a 72-year-old who was admitted from clinic  to the ER and was admitted for intractable back pain. Imaging showed  fracture of L4, L5 and there was concern for discitis. He underwent a  workup for this. Blood cultures were negative. Sed rate trended down. Followup MRI showed interval worsening of his compression fractures  without paraspinous extension or epidural extension, and in addition he  had compression of L2. He elected to undergo a kyphoplasty. OPERATIVE PROCEDURE:  After obtaining informed consent, he was taken to  the operating suite on 2022. General endotracheal intubation was  initiated. He was positioned prone onto a Sha table with care taken  to pad pressure points. Antibiotics were administered. Levels were  localized intraoperatively with AP and lateral fluoroscopy. Puncture  sites were planned and he was prepped and draped in typical surgical  fashion.   Skin was infiltrated with Marcaine and epinephrine. Scalpel  was used to make punctures. Jamshidi needles were then advanced through  the pedicles bilaterally of L2, L4, and L5 under AP and lateral  fluoroscopic guidance. Bone biopsies of L2 and L4 were performed  bilaterally and delivered off the field as a specimen. Bone drills were  advanced bilaterally under fluoroscopy into L2, L4 and L5 and then  kyphoplasty balloons were positioned into the vertebral bodies  bilaterally of L2, L4, and L5. Balloons were inflated under fluoroscopy  and this completed percutaneous reduction of the compression fractures  of L2, L4, and L5. Balloons were deflated. Confirmation of deflation  was achieved under fluoroscopy and the balloons were removed. Kyphoplasty cement was then advanced bilaterally through L2, L4 and L5.   Jamshidi needles into the vertebral bodies under fluoroscopic AP and  lateral visualization, there was good filling with interdigitation of  cement and crossing of the midline. After placement of kyphoplasty  cement, inner trocars of the Jamshidi needles were placed and after a  period of waiting, the Jamshidi needles were removed. Final  fluoroscopic images were obtained. The dressings were applied. The  patient was extubated and transferred to Recovery in hemodynamically  stable condition. There were no apparent complications. All counts  were correct. A time-out procedure was performed prior to incision. I  was present for the entire procedure. ESTIMATED BLOOD LOSS:  4 mL.         Yuliana Juarez MD    D: 09/28/2022 11:29:59       T: 09/28/2022 11:52:56     [email or web address]  Job#: [id number]     Doc#: [id number]    CC:

## 2022-09-29 NOTE — CARE COORDINATION
DISCHARGE SUMMARY     DATE OF DISCHARGE: 9/29/22    DISCHARGE DESTINATION: Home with Wife    HOME CARE: Yes    Agency Name: Discharging to Facility/ Agency   Name: [facility]  Address:   [place] [address]. [place] [address]  Phone:  [phone]  Fax:  [phone]     HEMODIALYSIS: No    TRANSPORTATION: Private Car    NEW DME ORDERED: no    COMMENTS: Patient will discharge to home with his Wife and Azaleafaisal Bhatia.   Electronically signed by Roya Mehta RN on 9/29/2022 at 1:47 PM

## 2022-09-29 NOTE — PLAN OF CARE
Problem: Discharge Planning  Goal: Discharge to home or other facility with appropriate resources  Outcome: Adequate for Discharge  Flowsheets (Taken 9/29/2022 0900)  Discharge to home or other facility with appropriate resources: Identify barriers to discharge with patient and caregiver     Problem: Pain  Goal: Verbalizes/displays adequate comfort level or baseline comfort level  9/29/2022 1446 by Leticia Roper RN  Outcome: Adequate for Discharge  Flowsheets (Taken 9/29/2022 0958)  Verbalizes/displays adequate comfort level or baseline comfort level: Encourage patient to monitor pain and request assistance  9/29/2022 0221 by Rodrigue Pulido RN  Flowsheets (Taken 9/29/2022 0221)  Verbalizes/displays adequate comfort level or baseline comfort level:   Encourage patient to monitor pain and request assistance   Assess pain using appropriate pain scale   Implement non-pharmacological measures as appropriate and evaluate response  Note: D: PT. With complaints of lower back pain, medicated with Tylenol 650mg every 4 hours and Tramadol 1 tab every 6 hours  A: encouraged to call for assist as needed  R: resting quietly after medication     Problem: Neurosensory - Adult  Goal: Achieves stable or improved neurological status  Outcome: Adequate for Discharge  Flowsheets (Taken 9/29/2022 0900)  Achieves stable or improved neurological status: Assess for and report changes in neurological status  Goal: Absence of seizures  Outcome: Adequate for Discharge  Flowsheets (Taken 9/29/2022 0900)  Absence of seizures: Monitor for seizure activity.   If seizure occurs, document type and location of movements and any associated apnea  Goal: Remains free of injury related to seizures activity  Outcome: Adequate for Discharge  Flowsheets (Taken 9/29/2022 0900)  Remains free of injury related to seizure activity: Maintain airway, patient safety  and administer oxygen as ordered  Goal: Achieves maximal functionality and self care  Outcome: Adequate for Discharge  Flowsheets (Taken 9/29/2022 0900)  Achieves maximal functionality and self care: Monitor swallowing and airway patency with patient fatigue and changes in neurological status     Problem: Respiratory - Adult  Goal: Achieves optimal ventilation and oxygenation  Outcome: Adequate for Discharge  Flowsheets (Taken 9/29/2022 0900)  Achieves optimal ventilation and oxygenation: Assess for changes in respiratory status     Problem: Cardiovascular - Adult  Goal: Maintains optimal cardiac output and hemodynamic stability  Outcome: Adequate for Discharge  Flowsheets (Taken 9/29/2022 0900)  Maintains optimal cardiac output and hemodynamic stability: Monitor urine output and notify Licensed Independent Practitioner for values outside of normal range  Goal: Absence of cardiac dysrhythmias or at baseline  Outcome: Adequate for Discharge  Flowsheets (Taken 9/29/2022 0900)  Absence of cardiac dysrhythmias or at baseline: Monitor cardiac rate and rhythm     Problem: Skin/Tissue Integrity - Adult  Goal: Skin integrity remains intact  Outcome: Adequate for Discharge  Flowsheets (Taken 9/29/2022 0900)  Skin Integrity Remains Intact: Monitor for areas of redness and/or skin breakdown  Goal: Incisions, wounds, or drain sites healing without S/S of infection  Outcome: Adequate for Discharge  Flowsheets (Taken 9/29/2022 0900)  Incisions, Wounds, or Drain Sites Healing Without Sign and Symptoms of Infection: TWICE DAILY: Assess and document skin integrity  Goal: Oral mucous membranes remain intact  Outcome: Adequate for Discharge  Flowsheets (Taken 9/29/2022 0900)  Oral Mucous Membranes Remain Intact: Assess oral mucosa and hygiene practices     Problem: Musculoskeletal - Adult  Goal: Return mobility to safest level of function  Outcome: Adequate for Discharge  Flowsheets (Taken 9/29/2022 0900)  Return Mobility to Safest Level of Function: Assess patient stability and activity tolerance for standing, transferring and ambulating with or without assistive devices  Goal: Maintain proper alignment of affected body part  Outcome: Adequate for Discharge  Flowsheets (Taken 9/29/2022 0900)  Maintain proper alignment of affected body part: Support and protect limb and body alignment per provider's orders  Goal: Return ADL status to a safe level of function  Outcome: Adequate for Discharge  Flowsheets (Taken 9/29/2022 0900)  Return ADL Status to a Safe Level of Function: Administer medication as ordered     Problem: Gastrointestinal - Adult  Goal: Minimal or absence of nausea and vomiting  Outcome: Adequate for Discharge  Flowsheets (Taken 9/29/2022 0900)  Minimal or absence of nausea and vomiting: Administer IV fluids as ordered to ensure adequate hydration  Goal: Maintains or returns to baseline bowel function  Outcome: Adequate for Discharge  Flowsheets (Taken 9/29/2022 0900)  Maintains or returns to baseline bowel function: Assess bowel function  Goal: Maintains adequate nutritional intake  Outcome: Adequate for Discharge  Flowsheets (Taken 9/29/2022 0900)  Maintains adequate nutritional intake: Monitor percentage of each meal consumed  Goal: Establish and maintain optimal ostomy function  Outcome: Adequate for Discharge  Flowsheets (Taken 9/29/2022 0900)  Establish and maintain optimal ostomy function: Monitor output from ostomies     Problem: Genitourinary - Adult  Goal: Absence of urinary retention  Outcome: Adequate for Discharge  Flowsheets (Taken 9/29/2022 0900)  Absence of urinary retention: Assess patients ability to void and empty bladder  Goal: Urinary catheter remains patent  Outcome: Adequate for Discharge     Problem: Infection - Adult  Goal: Absence of infection at discharge  Outcome: Adequate for Discharge  Flowsheets (Taken 9/29/2022 0900)  Absence of infection at discharge: Assess and monitor for signs and symptoms of infection  Goal: Absence of infection during hospitalization  Outcome: Adequate for Discharge  Flowsheets (Taken 9/29/2022 0900)  Absence of infection during hospitalization: Monitor lab/diagnostic results  Goal: Absence of fever/infection during anticipated neutropenic period  Outcome: Adequate for Discharge  Flowsheets (Taken 9/29/2022 0900)  Absence of fever/infection during anticipated neutropenic period: Monitor white blood cell count     Problem: Metabolic/Fluid and Electrolytes - Adult  Goal: Electrolytes maintained within normal limits  Outcome: Adequate for Discharge  Flowsheets (Taken 9/29/2022 0900)  Electrolytes maintained within normal limits: Administer electrolyte replacement as ordered  Goal: Hemodynamic stability and optimal renal function maintained  Outcome: Adequate for Discharge  Flowsheets (Taken 9/29/2022 0900)  Hemodynamic stability and optimal renal function maintained: Monitor labs and assess for signs and symptoms of volume excess or deficit  Goal: Glucose maintained within prescribed range  Outcome: Adequate for Discharge  Flowsheets (Taken 9/29/2022 0900)  Glucose maintained within prescribed range: Monitor blood glucose as ordered     Problem: Hematologic - Adult  Goal: Maintains hematologic stability  Outcome: Adequate for Discharge  Flowsheets (Taken 9/29/2022 0900)  Maintains hematologic stability: Assess for signs and symptoms of bleeding or hemorrhage     Problem: Safety - Adult  Goal: Free from fall injury  Outcome: Adequate for Discharge     Problem: ABCDS Injury Assessment  Goal: Absence of physical injury  Outcome: Adequate for Discharge     Problem: Skin/Tissue Integrity  Goal: Absence of new skin breakdown  Description: 1. Monitor for areas of redness and/or skin breakdown  2. Assess vascular access sites hourly  3. Every 4-6 hours minimum:  Change oxygen saturation probe site  4. Every 4-6 hours:  If on nasal continuous positive airway pressure, respiratory therapy assess nares and determine need for appliance change or resting period.   Outcome: Adequate for Discharge

## 2022-09-29 NOTE — TELEPHONE ENCOUNTER
From: Aakash Israel  To: Dr. Donetta Koyanagi: 9/29/2022 7:20 AM EDT  Subject: For Dr Bashir Patel and [facility]. Valdemar Horton has the kiphoplasty yesterday. Dr Edmund Cano the neurosurgeon said it looks like he has extremely bad osteoporosis. He also had a another spinal fracture making four new fractures. Neurosurgeon could only repair three. We have bone density scan scheduled and rheumatologist appointment. The FMLA forms that were done only asks for leave through this weekend. Since I don't know how Anand's recovery and stability in walking will go, can there be an addendum to the [facility] paperwork for intermittent leave? Valdemar Horton has been using a walker to walk and has not been sleeping more than 2 hours at a time so he is still unsteady at this point. I am hoping they will release us with meds that might help his sleep and anxiety.  Thank you Mary Strange

## 2022-09-29 NOTE — DISCHARGE SUMMARY
Hospital Medicine Discharge Summary    Patient ID: Aleksander Patel      Patient's PCP: Santhosh Valentine MD    Admit Date: 9/23/2022     Discharge Date:   9/29/22    Admitting Physician: Jil Keene MD     Discharge Physician: CELESTINO Lange - CNP       Discharge Diagnoses: Active Hospital Problems    Diagnosis Date Noted    Mixed obsessional thoughts and acts [F42.2] 09/28/2022     Priority: Medium    Pre-operative examination [A47.859] 09/27/2022     Priority: Medium    Localized edema [R60.0] 09/27/2022     Priority: Medium    Coronary artery calcification [I25.10, I25.84] 09/27/2022     Priority: Medium    Mixed hyperlipidemia [E78.2] 09/27/2022     Priority: Medium    Compression fracture of L2 lumbar vertebra ([address] [S32.020A] 09/25/2022     Priority: Medium    Degenerative lumbar spinal stenosis [M48.061] 09/25/2022     Priority: Medium    Urinary hesitancy [R39.11] 09/25/2022     Priority: Medium    Back pain [M54.9] 09/23/2022     Priority: Medium       The patient was seen and examined on day of discharge and this discharge summary is in conjunction with any daily progress note from day of discharge. Disposition:  [] Home  [x] Home with home health [] Rehab [] Psych [] SNF  [] LTAC  [] Long term nursing home or group home [] Transfer to ICU  [] Transfer to PCU [] Other:    Hospital Course: Patient is a 24-year-old male who presents to the hospital due to complaint of back pain, 10/10 intensity, lumbar area. Patient also mentions he was recommended by neurosurgeon to come to the emergency department. Patient recently had MRI that did show L1-L2 compression fracture as well as possible discitis. Patient otherwise denies fever chills diarrhea constipation, he denies history of IVDA. Patient underwent kyphoplasty with Dr. Keri Chow 9/28/22. He is doing well up POD1 walking around the halls and in the room with his rollator. He was seen by Psychiatry yesterday for anxiety and OCD. Wean down on gabapentin dose which would be causing pedal edema, as well as osteoporosis. He will discuss starting statin with PCP. Intractable low back pain with radiculopathy  Neuropathy the bilateral feet  -Sed rate and CRP are normal.  No fever, no leukocytosis. ID agree there is no discitis. -Consulted neurosurgery, who ordered stat MRI of lumbar spine. MRI showed L4 fracture has worsened. They recommended kyphoplasty. -Adjusted gabapentin dose to 400 mg 5 times a day. He was taking 300 mg 5 times a day at home  - much discussion during admission about his gabapentin dosing. It seems like he was starting out with 300mg morning dose and dosing himself randomly with 100mg pills from 9am until 4pm and then taking higher dose of 700mg at night. He states he likes the 100mg pills for that reason, but explained that he should do a more regular dosing of this so that he can be titrated down adequately and safely. We settled on 400mg qid and if he wanted to do 300 mg in the mornings, he could but to f/u with his PCP going forward and to not just take it prn as he was doing . They agreed. - narcotic Rx on discharge   -DC IV morphine  - robaxin rx since he said others don't help   Osteoporotic bone tissue likely contributing to spinal fracture  - L2, L4, L5  - kyphoplasty with L2, L4 bone biopsy 9/28/22  - rheumatology referral given by Mitul Jauregui for outpatient f/u     Anxiety and OCD  - psych consult  - perhaps the patient's acute pain has been contributing to his symptoms which seem to have worsened lately per wife, but may benefit from med recs    - patient may benefit from Remeron 7.5 mg/nightly. - Rx given on d/c.  - May add low dose Luvox if OCD symptoms worsens. - may need to taper off Gabapentin dose slightly. - Avoid/limit use Benzodiazepines due to increased risk of confusion, falls and worsening behavior issues. - provide support care.    - reviewed labs; TSH, vitamin D are normal. Will lesions. Neurologic:  Neurovascularly intact without any focal sensory/motor deficits. Cranial nerves: II-XII intact, grossly intact. No facial asymmetry, tongue midline. Psychiatric:  Alert and oriented, thought content appropriate  Capillary Refill: Brisk,< 3 seconds   Peripheral Pulses: +2 palpable, equal bilaterally       Consults:     IP CONSULT TO SPINE  IP CONSULT TO HOSPITALIST  IP CONSULT TO INFECTIOUS DISEASES  IP CONSULT TO CARDIOLOGY  IP CONSULT TO PSYCHIATRY    Diagnostic tests: Imaging:  [facility] FOR SURGICAL PROCEDURES   Final Result       XR LUMBAR SPINE (2-3 VIEWS)   Final Result       XR HIP RIGHT (2-3 VIEWS)   Final Result   No displaced fractures in the pelvis or right hip. MRI LUMBAR SPINE W WO CONTRAST   Final Result   1. Again seen are acute to subacute compression deformities involving the L4   and L5 vertebral bodies with slight interval loss of height of L4. No   significant change in loss of height of L5.   2. Since the prior exam, there has been interval development of an acute   compression deformity of the L2 vertebral body with approximately 35% loss of   height. 3. Since the prior exam, development of minimal bone marrow edema along the   superior endplate of Q87, which may represent a microtrabecular fracture   without significant loss of height. 4. Multilevel degenerative changes as above. Overall findings at L4-L5 are presumably related to the compression   deformities. Discitis while not entirely excluded is felt to be less likely           Labs:  For convenience and continuity at follow-up the following most recent labs are provided:      CBC:    Lab Results   Component Value Date/Time    WBC 9.5 09/29/2022 05:00 AM    HGB 12.2 09/29/2022 05:00 AM    HCT 34.8 09/29/2022 05:00 AM     09/29/2022 05:00 AM       Renal:    Lab Results   Component Value Date/Time     09/29/2022 05:00 AM    K 3.8 09/29/2022 05:00 AM     09/29/2022 05:00 AM    CO2 27 09/29/2022 05:00 AM    BUN 13 09/29/2022 05:00 AM    CREATININE 0.5 09/29/2022 05:00 AM    CALCIUM 8.9 09/29/2022 05:00 AM           Discharge Instructions/Follow-up:  meds as above. Rheumatology f/u   Neurosurgery f/u   PCP f/u       D/C condition: good     Code status: full        Discharge Medications:     Current Discharge Medication List             Details   Magnesium Gluconate 500 (27 Mg) MG TABS tablet Take 500 mg by mouth daily      Levomefolate Glucosamine (METHYLFOLATE) 400 MCG CAPS Take 400 mcg by mouth every evening      gabapentin (NEURONTIN) 300 MG capsule Take 300 mg by mouth in the morning and at bedtime. 1200 and 1600      traMADol (ULTRAM) 50 MG tablet Take 1-2 tablets by mouth every 6 hours as needed for Pain for up to 15 days. TAKE ONE OR TWO TABLETS EVERY 6 HOURS AS NEEDED FOR PAIN  Qty: 120 tablet, Refills: 1    Comments: Reduce doses taken as pain becomes manageable  Associated Diagnoses: Left flank pain      gabapentin (NEURONTIN) 600 MG tablet 1 po am, 1 po pm and 1-2 po hs  Qty: 120 tablet, Refills: 2      ondansetron (ZOFRAN ODT) 4 MG disintegrating tablet Take 1 tablet by mouth every 8 hours as needed for Nausea or Vomiting  Qty: 30 tablet, Refills: 1      tolterodine (DETROL LA) 4 MG extended release capsule Take 1 capsule by mouth daily  Qty: 30 capsule, Refills: 2      calcitonin (MIACALCIN) 200 UNIT/ACT nasal spray 1 spray in one nostril daily, alternating nostril side daily  Qty: 1 each, Refills: 0    Associated Diagnoses: Compression fracture of lumbar vertebra, unspecified lumbar vertebral level, initial encounter ([facility])      tiZANidine (ZANAFLEX) 4 MG tablet Take 1 tablet by mouth nightly  Qty: 30 tablet, Refills: 0      rizatriptan (MAXALT-MLT) 10 MG disintegrating tablet May repeat in 2 hours if needed  Qty: 18 tablet, Refills: 5    Comments: Rizatriptan works well but migraine frequency has been increased and needs 18/ month. Other options have not been as effective.  Please allow him 18/ month on rizatriptan for chronic migraine      tiotropium (SPIRIVA RESPIMAT) 2.5 MCG/ACT AERS inhaler Inhale 2 puffs into the lungs in the morning. Qty: 1 each, Refills: 5      levocetirizine (XYZAL) 5 MG tablet TAKE 1 TABLET BY MOUTH EVERY EVENING. Qty: 30 tablet, Refills: 5      pantoprazole (PROTONIX) 40 MG tablet TAKE 1 TABLET BY MOUTH TWICE DAILY  Qty: 60 tablet, Refills: 5      mometasone-formoterol (DULERA) 200-5 MCG/ACT inhaler INHALE 2 PUFFS INTO THE LUNGS 2 TIMES DAILY RINSE MOUTH AFTER USING. Qty: 13 g, Refills: 5    Comments: This prescription was filled on 12/24/2021. Any refills authorized will be placed on file. albuterol sulfate HFA (PROVENTIL;VENTOLIN;PROAIR) 108 (90 Base) MCG/ACT inhaler INHALE 2 PUFFS INTO THE LUNGS FOUR TIMES DAILY AS NEEDED FOR WHEEZING  Qty: 8.5 g, Refills: 2      vitamin D (ERGOCALCIFEROL) 1.25 MG (75900 UT) CAPS capsule TAKE 1 CAPSULE BY MOUTH WEEKLY  Qty: 4 capsule, Refills: 2      Spacer/Aero-Holding Chambers (COMPACT SPACE CHAMBER/SM MASK) AZAEL AS DIRECTED w/ inhalers  Qty: 1 each, Refills: 0      topiramate (TOPAMAX) 25 MG tablet TAKE 3 TABLETS BY MOUTH EVERY MORNING AND 5 TABLETS BY MOUTH EVERY EVENING  Qty: 240 tablet, Refills: 2      ipratropium (ATROVENT) 0.06 % nasal spray TAKE 1-2 SPRAYS BY NASAL ROUTE 4 TIMES DAILY AS NEEDED FOR RHINITIS  Qty: 15 mL, Refills: 5      albuterol (PROVENTIL) (2.5 MG/3ML) 0.083% nebulizer solution Take 3 mLs by nebulization every 6 hours as needed for Wheezing  Qty: 75 mL, Refills: 2    Comments: This prescription was filled on 12/6/2021. Any refills authorized will be placed on file. Time Spent on discharge is more than 30 minutes in the examination, evaluation, counseling and review of medications and discharge plan. Signed:    CELESTINO Siegel - CNP   9/29/2022      Thank you Kamilah King MD for the opportunity to be involved in this patient's care.  If you have any questions or concerns please feel free to contact me at [phone].

## 2022-09-29 NOTE — ACP (ADVANCE CARE PLANNING)
Advance Care Planning     Advance Care Planning Activator (Inpatient)  Conversation Note      Date of ACP Conversation: 9/29/2022     Conversation Conducted with: Patient with Decision Making Capacity    ACP Activator: Bayron Tolentino RN    Health Care Decision Maker:     Current Designated Health Care Decision Maker:     Primary Decision Maker: Celso Lilibeth - [phone]    Secondary Decision Maker: Shiv Wilburn - Child - [phone]    Supplemental (Other) Decision Maker: Venkata Smith - Brother/Sister - [phone]    Care Preferences    Ventilation: \"If you were in your present state of health and suddenly became very ill and were unable to breathe on your own, what would your preference be about the use of a ventilator (breathing machine) if it were available to you? \"      Would the patient desire the use of ventilator (breathing machine)?: yes    \"If your health worsens and it becomes clear that your chance of recovery is unlikely, what would your preference be about the use of a ventilator (breathing machine) if it were available to you? \"     Would the patient desire the use of ventilator (breathing machine)?: No      Resuscitation  \"CPR works best to restart the heart when there is a sudden event, like a heart attack, in someone who is otherwise healthy. Unfortunately, CPR does not typically restart the heart for people who have serious health conditions or who are very sick. \"    \"In the event your heart stopped as a result of an underlying serious health condition, would you want attempts to be made to restart your heart (answer \"yes\" for attempt to resuscitate) or would you prefer a natural death (answer \"no\" for do not attempt to resuscitate)? \" yes       [] Yes   [x] No   Educated Patient / Rhonda Leap regarding differences between Advance Directives and portable DNR orders.     Length of ACP Conversation in minutes:  3    Conversation Outcomes:  [x] ACP discussion completed  [] Existing advance directive reviewed with patient; no changes to patient's previously recorded wishes  [] New Advance Directive completed  [] Portable Do Not Rescitate prepared for Provider review and signature  [] POLST/POST/MOLST/MOST prepared for Provider review and signature      Follow-up plan:    [] Schedule follow-up conversation to continue planning  [] Referred individual to Provider for additional questions/concerns   [] Advised patient/agent/surrogate to review completed ACP document and update if needed with changes in condition, patient preferences or care setting    [x] This note routed to one or more involved healthcare providers

## 2022-09-29 NOTE — DISCHARGE INSTR - COC
Continuity of Care Form    Patient Name: Aleksander Patel   :  1950  MRN:  [id number]    Admit date:  2022  Discharge date:  ***    Code Status Order: Full Code   Advance Directives:   [facility] Documentation       Date/Time Healthcare Directive Type of Healthcare Directive Copy in [address] Agent's Name Healthcare Agent's Phone Number    [phone] No, patient does not have an advance directive for healthcare treatment -- -- -- -- --            Admitting Physician:  Jil Keene MD  PCP: Santhosh Valentine MD    Discharging Nurse: [facility] Unit/Room#: Y2H-3280/9158-55  Discharging Unit Phone Number: ***    Emergency Contact:   Extended Emergency Contact Information  Primary Emergency Contact: [facility]  Address: [facility] [address] Phone: [phone]  Work Phone: [phone]  Mobile Phone: [phone]  Relation: Spouse  Secondary Emergency Contact: Dara Haddad  Home Phone: [phone]  Relation: Brother/Sister    Past Surgical History:  Past Surgical History:   Procedure Laterality Date    CHOLECYSTECTOMY      COLONOSCOPY      FOOT SURGERY      nerve removed from foot    SHOULDER SURGERY Right     rotator cuff repair    SPINE SURGERY N/A 2022    L2, L4, L5 KYPHOPLASTY WITH L2, L4 BONE BIOPSY performed by Ji Frazier MD at [address]         Immunization History:   Immunization History   Administered Date(s) Administered    COVID-19, MODERNA BLUE border, Primary or Immunocompromised, (age 12y+), IM, 100 mcg/0.5mL 02/10/2021, 2021    Influenza Vaccine, unspecified formulation 2016, 2017    Influenza Virus Vaccine 10/24/2014    Influenza, FLUAD, (age 72 y+), Adjuvanted, 0.5mL 10/19/2021    Influenza, High Dose (Fluzone 65 yrs and older) 2018, 10/11/2019, 10/28/2020    Influenza, Intradermal, Preservative free 11/02/2015    Pneumococcal Conjugate 13-valent ([id number]) 02/23/2018    Pneumococcal Polysaccharide (Qlnhnzgqo47) 12/19/2019    Tdap (Boostrix, Adacel) 11/20/2020    Zoster Recombinant (Shingrix) 11/20/2020, 11/10/2021       Active Problems:  Patient Active Problem List   Diagnosis Code    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 9P9Z    Peripheral polyneuropathy G62.9    Right sided sciatica M54.31    DDD (degenerative disc disease), lumbar M51.36    Spondylosis of lumbar spine M47.816    Closed wedge compression fracture of L1 vertebra (HCC) S32.010A    Back pain M54.9    Compression fracture of L2 lumbar vertebra (HCC) S32.020A    Degenerative lumbar spinal stenosis M48.061    Urinary hesitancy R39.11    Pre-operative examination Z01.818    Localized edema R60.0    Coronary artery calcification I25.10, I25.84    Mixed hyperlipidemia E78.2    Mixed obsessional thoughts and acts F42.2       Isolation/Infection:   Isolation            No Isolation          Patient Infection Status       Infection Onset Added Last Indicated Last Indicated By Review Planned Expiration Resolved Resolved By    None active    Resolved    MRSA 04/03/19 04/06/19 04/03/19 WOUND CULTURE   57/52/18 Burnetta Cooks    Per provider, No isolation needed.              Nurse Assessment:  Last Vital Signs: /65   Pulse 80   Temp 98.1 °F (36.7 °C) (Oral)   Resp 17   Ht 6' 1\" (1.854 m)   Wt 157 lb 3 oz (71.3 kg)   SpO2 100%   BMI 20.74 kg/m²     Last documented pain score (0-10 scale): Pain Level: 4  Last Weight:   Wt Readings from Last 1 Encounters:   09/29/22 157 lb 3 oz (71.3 kg)     Mental Status:  {IP PT MENTAL STATUS:58062}    [facility]  [facility]}    Nursing Mobility/ADLs:  Walking   {CHP DME DSJP:132422071}  Transfer  {CHP DME FBCS:983403850}  Bathing  {CHP DME XMII:247367958}  Dressing  {CHP DME WAEM:378515804}  Toileting  {CHP DME ULHV:013374901}  Feeding  {CHP DME ZNGH:624436761}  Med Admin  {CHP DME QLFM:243130376}  Med Delivery   { NAI MED Delivery:972591035}    Wound Care Documentation and Therapy:  Incision 14 Abdomen (Active)   Number of days:        Incision 22 Back Lower (Active)   Dressing Status Clean;Dry; Intact 22 09   Incision Cleansed Cleansed with saline 22   Dressing/Treatment Dry dressing;Tegaderm/transparent film dressing 22 0900   Closure Steri-Strips 22 0900   Margins Approximated 22 0900   Drainage Amount Small 22 0900   Drainage Description Sanguinous 22 0900   Number of days: 1        Elimination:  Continence: Bowel: {YES / UO:07573}  Bladder: {YES / J}  Urinary Catheter: {Urinary Catheter:542510962}   Colostomy/Ileostomy/Ileal Conduit: {YES / DL:02698}       Date of Last BM: ***    Intake/Output Summary (Last 24 hours) at 2022 1344  Last data filed at 2022 0900  Gross per 24 hour   Intake 240 ml   Output --   Net 240 ml     I/O last 3 completed shifts:   In: 700 [I.V.:700]  Out: 1200 [Urine:1200]    Safety Concerns:     [address] [employer] Safety Concerns:120912795}    Impairments/Disabilities:      [address] [employer] Impairments/Disabilities:547858115}    Nutrition Therapy:  Current Nutrition Therapy:   [address] [employer] Diet List:907607103}    Routes of Feeding: {CHP DME Other Feedings:243146019}  Liquids: {Slp liquid thickness:61827}  Daily Fluid Restriction: {CHP DME Yes amt example:182552739}  Last Modified Barium Swallow with Video (Video Swallowing Test): {Done Not Done ZMBB:997856063}    Treatments at the Time of Hospital Discharge:   Respiratory Treatments: ***  Oxygen Therapy:  {Therapy; copd oxygen:00288}  Ventilator:    { CC Vent ZF}    Rehab Therapies: {THERAPEUTIC INTERVENTION:2210672105}  Weight Bearing Status/Restrictions: [address] [employer] Weight Bearin}  Other Medical Equipment (for information only, NOT a DME order):  {EQUIPMENT:622389407}  Other Treatments: ***    Patient's personal belongings (please select all that are sent with patient):  {CHP DME Belongings:741073032}    RN SIGNATURE:  {Esignature:991553627}    CASE MANAGEMENT/SOCIAL WORK SECTION    Inpatient Status Date: 22    Readmission Risk Assessment Score:  Readmission Risk              Risk of Unplanned Readmission:  15           Discharging to Facility/ Agency   Name: Discharging to Facility/ Agency   Name: [facility]  Address:   [place] [address]  Phone:  [phone]  Fax:  [phone]       Dialysis Facility (if applicable)   Name:  Address:  Dialysis Schedule:  Phone:  Fax:    / signature: Electronically signed by Desiree Traylor RN on 22 at 1:45 PM EDT    PHYSICIAN SECTION    Prognosis: Good    Condition at Discharge: Stable    Rehab Potential (if transferring to Rehab): Good    Recommended Labs or Other Treatments After Discharge: pt/ot    Physician Certification: I certify the above information and transfer of Miller Lopez  is necessary for the continuing treatment of the diagnosis listed and that he requires [address] for less 30 days.      Update Admission H&P: No change in H&P    PHYSICIAN SIGNATURE:  Electronically signed by Normajean Denver, APRN - CNP on 22 at 1:54 PM EDT Dr. Gwen Claros

## 2022-09-29 NOTE — CARE COORDINATION
[facility]    Referral received from  to follow for home care services. [facility] unable to staff timely, will require alternate agency, no preference, CM aware. Referral accepted by Celine Mahoney with [facility], will pull from [facility].      Mark Buchanan RN, BSN CTN  [facility] [phone]

## 2022-09-29 NOTE — PROGRESS NOTES
Occupational Therapy  Facility/Department: [facility]  Occupational Therapy Initial Assessment    Name: Conni Jeans  : 1950  MRN: [id number]  Date of Service: 2022    Discharge Recommendations:  24 hour supervision or assist, Home with Home health OT  OT Equipment Recommendations  Equipment Needed: Yes  Mobility Devices: ADL Assistive Devices  ADL Assistive Devices: Shower Chair with back; Toileting - Raised Toilet Seat with arms  Other: wife reports planning on purchasing  LIFT chair at [employer]. Discussed shower chair and RTS / toilet safety frame / 3-in-1 commode, wife and pt verbalized understanding. pt has a [id number]. Conni Jeans scored a 18/24 on the AM-PAC ADL Inpatient form. Current research shows that an AM-PAC score of 18 or greater is typically associated with a discharge to the patient's home setting. Based on the patient's AM-PAC score, and their current ADL deficits, it is recommended that the patient have 2-3 sessions per week of Occupational Therapy at d/c to increase the patient's independence. At this time, this patient demonstrates the endurance and safety to discharge home with 24 hour assist and a home OT follow up treatment frequency of 2-3x/wk. Please see assessment section for further patient specific details. If patient discharges prior to next session this note will serve as a discharge summary. Please see below for the latest assessment towards goals. Patient Diagnosis(es): The primary encounter diagnosis was Discitis of lumbar region. Diagnoses of Chronic bilateral low back pain without sciatica and Age-related osteoporosis with current pathological fracture of vertebra, initial encounter [facility]) were also pertinent to this visit.   Past Medical History:  has a past medical history of Arthritis, Asthma, B12 deficiency, Closed wedge compression fracture of L1 vertebra ([address] DDD (degenerative disc disease), lumbar, Diverticulitis, Gallbladder problem, GERD (gastroesophageal reflux disease), Hiatal hernia, Migraine, MRSA infection, Nausea & vomiting, and Spondylosis of lumbar spine. Past Surgical History:  has a past surgical history that includes Colonoscopy (9/11); Upper gastrointestinal endoscopy (9/11); shoulder surgery (Right, 2005); Foot surgery; Cholecystectomy; and Spine surgery (N/A, 9/28/2022). Assessment   Performance deficits / Impairments: Decreased functional mobility ; Decreased ADL status; Decreased high-level IADLs;Decreased endurance;Decreased posture;Decreased strength  Assessment: Per Lorena Castillo MD's op note 9/28/2022 : \"The patient is a 63-year-old who was admitted from clinic  to the ER and was admitted for intractable back pain. Imaging showed  fracture of L4, L5 and there was concern for discitis. He underwent a  workup for this. Blood cultures were negative. Sed rate trended down. Followup MRI showed interval worsening of his compression fractures  without paraspinous extension or epidural extension, and in addition he  had compression of L2. He elected to undergo a kyphoplasty. \" Pt s/p L2, L4, L5 KYPHOPLASTY WITH L2, L4 BONE BIOPSY on 9/28/2022. PTA, pt living with wife and up until 2 weeks ago independent ADLs and fxl mobility with no AD. Pt currently functioning below baseline, limited in self-care by back pain/restrictions  affecting pt's fxl mobility, fxl activity tolerance, and ADL status s/p surgery. This date, pt required SBA fxl transfers/mobility with 4WW, mod A dressing +educated on ADL retraining with spinal precautions, and anticipate pt would require overall min/mod A for ADLs. Will cont to see on acute to address the above limitations and maximize pt's safety and independence. Anticipate pt will be safe to return home with wife's assist and home OT.   Prognosis: Good  Decision Making: Medium Complexity  History: see above  REQUIRES OT FOLLOW-UP: Yes  Activity Tolerance  Activity Tolerance: Patient Tolerated treatment well;Patient limited by pain        Plan   Plan  Times per Week: 3-5  Current Treatment Recommendations: Balance training, Functional mobility training, Endurance training, Strengthening, Self-Care / ADL, Safety education & training, Patient/Caregiver education & training, Equipment evaluation, education, & procurement     Restrictions  Restrictions/Precautions  Restrictions/Precautions: Fall Risk  Position Activity Restriction  Spinal Precautions: No Bending, No Lifting, No Twisting    Subjective   General  Chart Reviewed: Yes  Additional Pertinent Hx: Per Jasper Gramajo MD's op note 9/28/2022 : \"The patient is a 71-year-old who was admitted from clinic  to the ER and was admitted for intractable back pain. Imaging showed  fracture of L4, L5 and there was concern for discitis. He underwent a  workup for this. Blood cultures were negative. Sed rate trended down. Followup MRI showed interval worsening of his compression fractures  without paraspinous extension or epidural extension, and in addition he  had compression of L2. He elected to undergo a kyphoplasty. \" Pt s/p L2, L4, L5 KYPHOPLASTY WITH L2, L4 BONE BIOPSY on 9/28/2022. Family / Caregiver Present: Yes (wife)  Referring Practitioner: CELESTINO Patrick CNP  Diagnosis: Pathologic fracture secondary to osteoporosis  lumbar L2, L4, L5  Subjective  Subjective: Pt met b/s for OT eval/tx. Pt seated EOB getting dressed on arrival, agreeable to participate in therapy. Pt reports 4/10 back pain. Wife present at bedside assisting pt.      Social/Functional History  Social/Functional History  Lives With: Spouse  Type of Home: House  Home Layout: One level  Home Access: Stairs to enter without rails  Entrance Stairs - Number of Steps: 2  Bathroom Shower/Tub: Walk-in shower  Bathroom Toilet: Standard  Bathroom Accessibility: Not accessible  Home Equipment: Rollator  Has the patient had two or more falls in the past year or any fall with injury in the past year?: Yes (2)  ADL Assistance: Independent  Homemaking Assistance: Independent  Homemaking Responsibilities: Yes  Ambulation Assistance: Independent  Transfer Assistance: Independent  Active : Yes  Mode of Transportation: Truck  Occupation: Retired  Type of Occupation: Construction       Objective     Safety Devices  Type of Devices: Call light within reach; Left in bed    Balance  Sitting: Intact  Standing: With support (SBA at 4WW)  Gait  Overall Level of Assistance: Stand-by assistance (Pt completed fxl mobility ~200 ft with 4WW and SBA.)  Assistive Device: Walker, rollator    Transfers  Sit to stand: Stand by assistance (increased time/effort from recliner. Wife reports planning on purchasing a LIFT chair.)  Stand to sit: Stand by assistance  Transfer Comments: to/from 4WW, VC's for hand placement  Toilet Transfers  Toilet - Technique: Ambulating  Equipment Used: Grab bars  Toilet Transfer: Stand by assistance    AROM: Within functional limits (not formally assessed, but observed to be [facility] for purpose for purpose of ADLs)  Strength: Within functional limits (not formally assessed, but observed to be grossly [facility] as seen with fxl transfers)    ADL  Grooming: Stand by assistance  Grooming Skilled Clinical Factors: standing at sink to wash hands  LE Bathing Skilled Clinical Factors: discussed recommendation for shower chair for safety and energy conservation d/t decreased standing tolerance with back pain, pt verbalized understanding. UE Dressing: Minimal assistance  UE Dressing Skilled Clinical Factors: to don/doff shirt  LE Dressing Skilled Clinical Factors: pt educated on LB dressing techniques with spinal precautions (i.e. no bending). Pt required assist to thread B LE's and SBA for pt to manage over hips. Toileting Skilled Clinical Factors: pt demo'd toilet transfer SBA, denied need to void. pt reports has been toileting on his own.  Pt educated on DME for commode (i.e. RTS vs toilet safety frame vs 3-in-1 commde) as pt reports difficulty getting up from lower surfaces. Pt and wife verbalized understanding. Additional Comments: Anticipate pt is independent feeding, mod A bathing and dressing based on ROM/strength, balance, endurance. Bed mobility  Supine to Sit: Unable to assess (NT, pt seated EOB at start of session)  Sit to Supine: Contact guard assistance (education on logroll technique)    Vision  Vision: Impaired  Vision Exceptions: Wears glasses at all times  Hearing  Hearing: Within functional limits    Cognition  Overall Cognitive Status: Exceptions  Attention Span: Attends with cues to redirect  Safety Judgement: Decreased awareness of need for safety;Decreased awareness of need for assistance  Problem Solving: Decreased awareness of errors  Insights: Decreased awareness of deficits  Cognition Comment: pt reports he has OCD, pt very particular and likes to get guide session. Education Given To: Patient  Education Provided: Role of Therapy;Plan of Care;Transfer Training;ADL Adaptive Strategies;Precautions  Education Outcome: Verbalized understanding;Demonstrated understanding                        AM-PAC Score        AM-[facility] Inpatient Daily Activity Raw Score: 18 (09/29/22 1151)  AM-PAC Inpatient ADL T-Scale Score : 38.66 (09/29/22 1151)  ADL Inpatient CMS 0-100% Score: 46.65 (09/29/22 1151)  ADL Inpatient CMS G-Code Modifier : CK (09/29/22 1151)           Goals  Short Term Goals  Time Frame for Short term goals: Prior to d/c:  Short Term Goal 1: Pt will bathe with supervision using A/E and DME. Short Term Goal 2: Pt will dress with supervision using A/E. Short Term Goal 3: Pt will toilet with supervision. Short Term Goal 4: Pt will complete fxl mobility and fxl transfers to/from ADL surfaces with supervision using AD. Short Term Goal 5: Pt will complete bed mobility with supevision using logroll technique in prep for ADL transfer.   Long Term Goals  Time Frame for Long term goals : STGs=LTGs  Patient Goals   Patient goals : to return home.        Therapy Time   Individual Concurrent Group Co-treatment   Time In 8853         Time Out 1120         Minutes 27         Timed Code Treatment Minutes: 1000 Lester Liz, OTR/L [id number]

## 2022-09-29 NOTE — PROGRESS NOTES
Reviewed AVS with patient and spouse, all questions answered, paper scripts given to them. IV removed, wheeled to car.

## 2022-09-30 ENCOUNTER — PATIENT MESSAGE (OUTPATIENT)
Dept: FAMILY MEDICINE CLINIC | Age: 72
End: 2022-09-30

## 2022-09-30 ENCOUNTER — CARE COORDINATION (OUTPATIENT)
Dept: CASE MANAGEMENT | Age: 72
End: 2022-09-30

## 2022-09-30 DIAGNOSIS — M54.9 BILATERAL BACK PAIN, UNSPECIFIED BACK LOCATION, UNSPECIFIED CHRONICITY: Primary | ICD-10-CM

## 2022-09-30 RX ORDER — SOLIFENACIN SUCCINATE 10 MG/1
10 TABLET, FILM COATED ORAL DAILY
Qty: 14 TABLET | Refills: 0 | Status: SHIPPED | OUTPATIENT
Start: 2022-09-30 | End: 2022-10-13

## 2022-09-30 NOTE — CARE COORDINATION
[facility] 45 Transitions Initial Follow Up Call    Call within 2 business days of discharge: Yes    Patient: Zaira Aggarwal Patient : 1950   MRN: [id number]  Reason for Admission: Back pain 14% [id number] [facility] PCP  Discharge Date: 22 RARS: Readmission Risk Score: 14      Last Discharge [address]       Date Complaint Diagnosis Description Type Department Provider    22 Back Pain Discitis of lumbar region . .. ED to Hosp-Admission (Discharged) (ADMITTED) Janey Mccarthy MD; Sarita Trejo Co... Spoke with: Donna Jaramillo (wife-HIPPA verified) who reports that patient is doing OK just trying to keep the pain level down. She states that PT from [facility] will be in today to work with patient. Wife denies cp, sob, cough, dizziness, headache, n/v, diarrhea, abdominal pains, fever, or chills. Patient report that appetite and fluid intake is good and denies any problems with bowels. She states that patient continues with urinary frequency she states that the doctor is aware. Wife states that patient is taking all medications as ordered. Writer and wife did a complete medication review and 1111f was completed. Patient has several orders for  gabapentin wife do understand how to administer the medication. Wife denies any needs at this time. Writer advised that patient continue with breathing exercises and getting up moving every 1 1/2 to 2 hrs and she v/u. Wife instructed to continue to monitor s/s, reporting any that may present to MD immediately for early intervention. Reminded of COVID 19 precautions. Agreeable to f/u calls. [facility] provided contact information for future needs.      Facility: [facility]    Non-face-to-face services provided:  Obtained and reviewed discharge summary and/or continuity of care documents  Education of patient/family/caregiver/guardian to support self-management-s/s of Covid and when to contact the doctor  Assessment and support for treatment adherence and medication management-.    Transitions of Care Initial Call    Was this an external facility discharge? No Discharge Facility: [facility] to be reviewed by the provider   Additional needs identified to be addressed with provider: No  none             Method of communication with provider : none    Advance Care Planning:   Does patient have an Advance Directive: not on file. LPN Care Coordinator contacted the family by telephone to perform post hospital discharge assessment. Verified name and  with family as identifiers. Provided introduction to self, and explanation of the LPN CC role. LPN CC reviewed discharge instructions, medical action plan and red flags with family who verbalized understanding. Family given an opportunity to ask questions and does not have any further questions or concerns at this time. Were discharge instructions available to patient? Yes. Reviewed appropriate site of care based on symptoms and resources available to patient including: PCP  Specialist  Home health  When to call 911. The family agrees to contact the PCP office for questions related to their healthcare. Medication reconciliation was performed with family, who verbalizes understanding of administration of home medications. Advised obtaining a 90-day supply of all daily and as-needed medications. Was patient discharged with a pulse oximeter? no    LPN CC provided contact information. Plan for follow-up call in 5-7 days based on severity of symptoms and risk factors.   Plan for next call: symptom management-.        Care Transitions 24 Hour Call    Care Transitions Interventions         Follow Up  Future Appointments   Date Time Provider Starr Noriega   10/14/2022 10:15 AM ARIELLE Villanueva 2   10/14/2022  1:15 PM Earnest Lawson MD [facility]   10/21/2022  9:00 AM Peace Hancock MD [facility]       Tolu Feng LPN

## 2022-09-30 NOTE — TELEPHONE ENCOUNTER
From: Sondra Lucas  To: Dr. Krystle Molina: 9/30/2022 11:01 AM EDT  Subject: Bladder medication    Hello. Domenic Barnes has a prescription for tolterodine tartrate for bladder control. It has not been helping since he started. He has urgency during the night and has to jump up to urinate which is hurting his back. Can this dose be increased?  Thank you

## 2022-10-05 NOTE — TELEPHONE ENCOUNTER
Patient called and said his wife's work needs the FMLA forms back today as they have been waiting a while. Yuly Dumont would also like to talk to dr Adrienne Simpson to let him know how bad he is.       He cant even open the refrigerator door

## 2022-10-07 ENCOUNTER — OFFICE VISIT (OUTPATIENT)
Dept: FAMILY MEDICINE CLINIC | Age: 72
End: 2022-10-07
Payer: COMMERCIAL

## 2022-10-07 VITALS
RESPIRATION RATE: 20 BRPM | HEIGHT: 73 IN | HEART RATE: 92 BPM | BODY MASS INDEX: 22.26 KG/M2 | DIASTOLIC BLOOD PRESSURE: 68 MMHG | SYSTOLIC BLOOD PRESSURE: 120 MMHG | TEMPERATURE: 98 F | WEIGHT: 168 LBS

## 2022-10-07 DIAGNOSIS — R60.0 BILATERAL LEG EDEMA: Primary | ICD-10-CM

## 2022-10-07 DIAGNOSIS — G57.93 NEUROPATHY OF BOTH FEET: ICD-10-CM

## 2022-10-07 DIAGNOSIS — R63.5 WEIGHT GAIN: ICD-10-CM

## 2022-10-07 PROCEDURE — 99214 OFFICE O/P EST MOD 30 MIN: CPT | Performed by: NURSE PRACTITIONER

## 2022-10-07 PROCEDURE — 1123F ACP DISCUSS/DSCN MKR DOCD: CPT | Performed by: NURSE PRACTITIONER

## 2022-10-07 RX ORDER — FUROSEMIDE 20 MG/1
20 TABLET ORAL 2 TIMES DAILY PRN
Qty: 14 TABLET | Refills: 1 | Status: SHIPPED | OUTPATIENT
Start: 2022-10-07 | End: 2022-10-21

## 2022-10-07 ASSESSMENT — ENCOUNTER SYMPTOMS
SINUS PRESSURE: 0
CONSTIPATION: 0
DIARRHEA: 0
NAUSEA: 0
SHORTNESS OF BREATH: 0
COUGH: 0
CHEST TIGHTNESS: 0
EYE DISCHARGE: 0
BACK PAIN: 1
ABDOMINAL PAIN: 0
ABDOMINAL DISTENTION: 0
SINUS PAIN: 0
COLOR CHANGE: 1

## 2022-10-07 NOTE — PROGRESS NOTES
Date of Service:  10/7/2022    Rome Ackerman (:  1950) is a 73 y.o. male, here for evaluation of the following medical concerns:    Chief Complaint   Patient presents with    Leg Swelling     Bilateral swelling, increase in Sx last four day with redness          HPI    Leg Swelling, redness concerns  Pt reports bilateral leg swelling but lately has been worse. Says the RLE is always swollen but the swelling is progressively worsening, up into the thighs and almost to the hip. Says the swelling was so big and painful last night that he considered going to the ER. Pt was in the hospital -, just released 8 days ago. Pt was in hospital for discitis of lumbar region. Cardiac tests all negative. US both lower extremities a couple weeks ago, WNL, no blood clots. Pt unable to wear compression stockings due to pain and neuropathy. Pt cannot even tolerate wearing clothes at home due to disorder where clothes cause more discomfort and anxiety so he reports he doesn't even wear socks. Redness new since hospital discharge. Echo EF 65-70%. Back surgeon thought back surgery would help. Says leg swelling has been ongoing for 6+ months but worse lately. Does not like diuretics, says the urgency and how slow he is getting out of bed, is very frustrating. Discussed compression with ace wraps and says he cannot tolerate that against his skin. Wife here at appt and says listening to him complain is a nightmare and he needs to learn to muscle through. Discussed intentional toileting. Review of Systems   Constitutional:  Negative for activity change, appetite change, fatigue, fever and unexpected weight change. HENT:  Negative for congestion, ear pain, sinus pressure and sinus pain. Eyes:  Negative for discharge and visual disturbance. Respiratory:  Negative for cough, chest tightness and shortness of breath. Cardiovascular:  Positive for leg swelling. Negative for chest pain and palpitations. Patient said you told her to come here in January for thyroid labs and she wasn't sure what else.  Can you put lab orders in and send back to me to schedule?  Patient wants to make sure you check her for diabetes too.   Gastrointestinal:  Negative for abdominal distention, abdominal pain, constipation, diarrhea and nausea. Endocrine: Negative for cold intolerance, heat intolerance, polydipsia, polyphagia and polyuria. Genitourinary:  Positive for urgency. Negative for decreased urine volume, difficulty urinating, dysuria, flank pain and frequency. Musculoskeletal:  Positive for back pain. Negative for arthralgias, gait problem, joint swelling, myalgias and neck pain. Skin:  Positive for color change. Negative for rash and wound. Allergic/Immunologic: Negative for food allergies and immunocompromised state. Neurological:  Positive for weakness. Negative for dizziness, tremors, speech difficulty, light-headedness, numbness and headaches. Hematological:  Negative for adenopathy. Does not bruise/bleed easily. Psychiatric/Behavioral:  Positive for sleep disturbance. Negative for confusion, decreased concentration, self-injury and suicidal ideas. The patient is nervous/anxious. Prior to Visit Medications    Medication Sig Taking?  Authorizing Provider   furosemide (LASIX) 20 MG tablet Take 1 tablet by mouth 2 times daily as needed (leg swelling) Yes CELESTINO Peñaloza CNP   solifenacin (VESICARE) 10 MG tablet Take 1 tablet by mouth daily Yes Gagan Woods MD   rizatriptan (MAXALT-MLT) 10 MG disintegrating tablet May repeat in 2 hours if needed Yes CELESTINO Fonseca CNP   methocarbamol (ROBAXIN) 500 MG tablet Take 1.5 tablets by mouth 3 times daily as needed (muscle pain) Yes CELESTINO Fonseca CNP   mirtazapine (REMERON) 7.5 MG tablet Take 1 tablet by mouth nightly Yes CELESTINO Fonseca CNP   Magnesium Gluconate 500 (27 Mg) MG TABS tablet Take 500 mg by mouth daily Yes Historical Provider, MD   Levomefolate Glucosamine (METHYLFOLATE) 400 MCG CAPS Take 400 mcg by mouth every evening Yes Historical Provider, MD   ondansetron (ZOFRAN ODT) 4 MG disintegrating tablet Take 1 tablet by mouth every 8 hours as needed for Nausea or Vomiting Yes Santhosh Valentine MD   tiotropium (SPIRIVA RESPIMAT) 2.5 MCG/ACT AERS inhaler Inhale 2 puffs into the lungs in the morning. Yes Santhosh Valentine MD   levocetirizine (XYZAL) 5 MG tablet TAKE 1 TABLET BY MOUTH EVERY EVENING. Patient taking differently: Take 5 mg by mouth nightly Yes CELESTINO Hollins CNP   pantoprazole (PROTONIX) 40 MG tablet TAKE 1 TABLET BY MOUTH TWICE DAILY  Patient taking differently: Take 40 mg by mouth in the morning and at bedtime Yes Santhosh Valentine MD   mometasone-formoterol (DULERA) 200-5 MCG/ACT inhaler INHALE 2 PUFFS INTO THE LUNGS 2 TIMES DAILY RINSE MOUTH AFTER USING. Yes CELESTINO Villalobos CNP   albuterol sulfate HFA (PROVENTIL;VENTOLIN;PROAIR) 108 (90 Base) MCG/ACT inhaler INHALE 2 PUFFS INTO THE LUNGS FOUR TIMES DAILY AS NEEDED FOR WHEEZING Yes CELESTINO Peña CNP   vitamin D (ERGOCALCIFEROL) 1.25 MG (48017 UT) CAPS capsule TAKE 1 CAPSULE BY MOUTH WEEKLY  Patient taking differently: Take 50,000 Units by mouth once a week On Sundays Yes Santhosh Valentine MD   Spacer/Aero-Holding Chambers (COMPACT SPACE CHAMBER/SM MASK) AZAEL AS DIRECTED w/ inhalers Yes Santhosh Valentine MD   topiramate (TOPAMAX) 25 MG tablet TAKE 3 TABLETS BY MOUTH EVERY MORNING AND 5 TABLETS BY MOUTH EVERY EVENING  Patient taking differently: Take 75 mg by mouth 2 times daily Yes Santhosh Valentine MD   ipratropium (ATROVENT) 0.06 % nasal spray TAKE 1-2 SPRAYS BY NASAL ROUTE 4 TIMES DAILY AS NEEDED FOR RHINITIS Yes Santhosh Valentine MD   albuterol (PROVENTIL) (2.5 MG/3ML) 0.083% nebulizer solution Take 3 mLs by nebulization every 6 hours as needed for Wheezing Yes Santhosh Valentine MD   gabapentin (NEURONTIN) 100 MG capsule Take 1 capsule by mouth 2 times daily for 180 days. Intended supply: 90 days  CELESTINO Lange CNP   gabapentin (NEURONTIN) 100 MG capsule Take 4 capsules by mouth 4 times daily for 180 days.  Intended supply: 90 days  Adela George Raymundo, APRN - CNP   gabapentin (NEURONTIN) 300 MG capsule Take 300 mg by mouth in the morning and at bedtime. 1200 and 1600  Historical Provider, MD   tolterodine (DETROL LA) 4 MG extended release capsule Take 1 capsule by mouth daily  Patient not taking: Reported on 10/7/2022  Olivia Deshpande MD   calcitonin (MIACALCIN) 200 UNIT/ACT nasal spray 1 spray in one nostril daily, alternating nostril side daily  Olivia Deshpande MD        Social History     Tobacco Use    Smoking status: Never    Smokeless tobacco: Never   Substance Use Topics    Alcohol use: Not Currently     Comment: glass of wine at night        Vitals:    10/07/22 1047   BP: 120/68   Site: Right Upper Arm   Position: Sitting   Cuff Size: Medium Adult   Pulse: 92   Resp: 20   Temp: 98 °F (36.7 °C)   TempSrc: Temporal   Weight: 168 lb (76.2 kg)   Height: 6' 1\" (1.854 m)     Estimated body mass index is 22.16 kg/m² as calculated from the following:    Height as of this encounter: 6' 1\" (1.854 m). Weight as of this encounter: 168 lb (76.2 kg). Physical Exam  Vitals reviewed. Constitutional:       General: He is awake. Appearance: Normal appearance. He is well-developed, well-groomed and normal weight. He is not ill-appearing or toxic-appearing. HENT:      Head: Normocephalic and atraumatic. Right Ear: Hearing, tympanic membrane, ear canal and external ear normal.      Left Ear: Hearing, tympanic membrane, ear canal and external ear normal.      Nose: Nose normal.      Mouth/Throat:      Lips: Pink. Mouth: Mucous membranes are moist.      Pharynx: Oropharynx is clear. Eyes:      General: Lids are normal.      Extraocular Movements: Extraocular movements intact. Conjunctiva/sclera: Conjunctivae normal.      Pupils: Pupils are equal, round, and reactive to light. Neck:      Thyroid: No thyromegaly. Vascular: No carotid bruit. Cardiovascular:      Rate and Rhythm: Normal rate. Pulses: Normal pulses.            Carotid perception normal.         Mood and Affect: Affect normal. Mood is anxious. Speech: Speech normal.         Behavior: Behavior normal. Behavior is cooperative. Thought Content: Thought content normal.         Cognition and Memory: Cognition and memory normal.         Judgment: Judgment normal.             ASSESSMENT/PLAN:  1. Bilateral leg edema  -     furosemide (LASIX) 20 MG tablet; Take 1 tablet by mouth 2 times daily as needed (leg swelling), Disp-14 tablet, R-1Normal   Discussed intentional toileting   Discussed taking only when swollen, skip on days legs are not swollen   Take only one daily if minimally swollen   Discussed compression- ace wraps, place over cotton socks if that makes more tolerable   Elevate when possible    Limit sodium   Says he gets up and moves around regularly because he doesn't sleep well due to pain   The goal is to not be on diuretic long term, BMP in 1-2 weeks  2. Weight gain    Up 11 lbs since discharge a week ago  3.  Neuropathy of both feet   Cardiology thinks gabapentin is contributing to swelling   Wife thinks this is contributing to poor behaviors/burgess behavior   Pt has been 1200 mg daily in total, was on 2000 mg in total in hospital, down to 1600 and now 1200 mg   Other pain meds were tried at hospital like morphine and such and pt says they made him crazy and nothing really worked   Taking tramadol 1 tab every 4 hours, not helping much- call when refill needed if continuing   Thus far Dr Shantell Craig has managed pain medication only once in August and then September   Neurosurgeon discussed significant osteoporosis- but not currently managing, plan for DEXA scan and rheumatologist   Doesn't feel like the tramadol is really helping   Wants to try to work down on the gabapentin, pt doesn't like how much he is on   Has in home PT and OT currently   On nasal spray calcintonin spray for compression fracture of spine    Care Gaps Addressed  Hep C screen recommended with next blood draw   COVID booster recommended  Flu vaccine recommended   AWV due  [place] 2015      I have reviewed patient's pertinent medical history, relevant laboratory and imaging studies, and past/future health maintenance. Discussed with the patient the importance of adhering to their current medication regimen as directed. Advised the patient that they should continue to work on eating a healthy balanced diet and staying active by exercising within their personal limits. Orders as listed above. Patient was advised to keep future appointments with their respective specialty care team(s). Patient had the opportunity to ask questions, all of which were answered to the best of my ability and with patient satisfaction. Patient understands and is agreeable with the care plan following today's visit. Patient is to schedule an appointment for any new or worsening symptoms. Go to ER for significant shortness of breath, chest pain, or uncontrolled pain or fever. I discussed with patient the risk and benefits of any medications that were prescribed today. I verified that the patient understands their medications, labs, and/or procedures. The patient is doing well with current medication regimen and does not have any barriers to adherence. The patient's self-management abilities are good. Return in about 2 weeks (around 10/21/2022) for swelling in legs, BMP. An electronic signature was used to authenticate this note.     --CELESTINO Jones - CNP on 10/7/2022 at 11:37 AM

## 2022-10-10 ENCOUNTER — CARE COORDINATION (OUTPATIENT)
Dept: CASE MANAGEMENT | Age: 72
End: 2022-10-10

## 2022-10-10 NOTE — CARE COORDINATION
[facility]    Care Transition Nurse contacted the patient by telephone to follow up after admission on 2022. Verified name and  with family as identifiers. Patient: Bala Chavez  Patient : 1950   MRN: [id number]  Reason for Admission: discitis of lumbar region  Discharge Date: 22 RARS: Readmission Risk Score: 14      Needs to be reviewed by the provider   Additional needs identified to be addressed with provider: No               Method of communication with provider: none. Spoke with Deatra Klinefelter - wife (HIPPA verified). She states Anand's back pain is poorly managed at this time. It is not good but it is better than when he was in the hospital. [facility] rated his pain at 15/10 while in the hospital and now it is @ 8/10. Rich states [facility] remains active. She states his urinary urgency is better - but not good. She states he is trying to work on intentional toileting. Deatra Klinefelter states the edema in his legs bilaterally is their largest concern. She states that is continuing. She states Ashley Mosley is tolerating his legs being wrapped with ace bandages. She states he keeps them elevated slightly. Deatra Klinefelter states they follow a low sodium diet. Deatra Klinefelter states that Ashley Mosley will be seeing a neurologist tomorrow. She states he will also be seeing a rheumatologist. Deatra Klinefelter denies any needs for Ashley Mosley at this time.       Follow Up  Future Appointments   Date Time Provider Starr Noriega   10/14/2022 10:15 AM ARIELLE CARVALHO  Laurie Villanueva 2   10/21/2022  9:00 AM Raghav Nayak MD [facility]   10/31/2022  2:45 PM Hersey Barthel, MD [facility]     Advance Care Planning:   Advance Care Planning   Healthcare Decision Maker:    Primary Decision Maker: Bridget Cross - [phone]    Secondary Decision Maker: Sunshine George - Child - [phone]    Supplemental (Other) Decision Maker: Laurie Contreras - Brother/Sister - [address]  Lucille Ochoa has a Living Will and Healthcare POA.          Care Transitions Subsequent and Final Call    Subsequent and Final Calls  Do you have any ongoing symptoms?: No  Have your medications changed?: No  Do you have any questions related to your medications?: No  Do you currently have any active services?: Yes  Are you currently active with any services?: Home Health  Do you have any needs or concerns that I can assist you with?: No  Identified Barriers: Stress  Care Transitions Interventions  Other Interventions:                Win Graves RN BSN  Care Transition Nurse  [phone]

## 2022-10-12 ENCOUNTER — TELEPHONE (OUTPATIENT)
Dept: FAMILY MEDICINE CLINIC | Age: 72
End: 2022-10-12

## 2022-10-12 DIAGNOSIS — M46.46 DISCITIS OF LUMBAR REGION: ICD-10-CM

## 2022-10-12 RX ORDER — TRAMADOL HYDROCHLORIDE 50 MG/1
50 TABLET ORAL EVERY 6 HOURS PRN
Qty: 120 TABLET | Refills: 0 | Status: SHIPPED | OUTPATIENT
Start: 2022-10-12 | End: 2022-10-26 | Stop reason: SDUPTHER

## 2022-10-12 NOTE — TELEPHONE ENCOUNTER
Patient would like his tramadol refilled     [facility] [address], [facility]

## 2022-10-13 RX ORDER — TOPIRAMATE 25 MG/1
TABLET ORAL
Qty: 240 TABLET | Refills: 2 | Status: SHIPPED | OUTPATIENT
Start: 2022-10-13

## 2022-10-13 RX ORDER — SOLIFENACIN SUCCINATE 10 MG/1
10 TABLET, FILM COATED ORAL DAILY
Qty: 14 TABLET | Refills: 0 | Status: SHIPPED | OUTPATIENT
Start: 2022-10-13 | End: 2022-10-26 | Stop reason: SDUPTHER

## 2022-10-14 ENCOUNTER — HOSPITAL ENCOUNTER (OUTPATIENT)
Dept: WOMENS IMAGING | Age: 72
Discharge: HOME | End: 2022-10-14
Payer: COMMERCIAL

## 2022-10-14 ENCOUNTER — HOSPITAL ENCOUNTER (OUTPATIENT)
Age: 72
Discharge: HOME | End: 2022-10-14
Payer: COMMERCIAL

## 2022-10-14 ENCOUNTER — HOSPITAL ENCOUNTER (OUTPATIENT)
Dept: GENERAL RADIOLOGY | Age: 72
Discharge: HOME | End: 2022-10-14
Payer: COMMERCIAL

## 2022-10-14 ENCOUNTER — TELEPHONE (OUTPATIENT)
Dept: CARDIOLOGY CLINIC | Age: 72
End: 2022-10-14

## 2022-10-14 DIAGNOSIS — M54.50 LOW BACK PAIN, UNSPECIFIED BACK PAIN LATERALITY, UNSPECIFIED CHRONICITY, UNSPECIFIED WHETHER SCIATICA PRESENT: ICD-10-CM

## 2022-10-14 DIAGNOSIS — R60.0 BILATERAL LEG EDEMA: Primary | ICD-10-CM

## 2022-10-14 DIAGNOSIS — M80.08XD AGE-RELATED OSTEOPOR WITH CURR PATHOL FX OF VERTEBRA WITH ROUTINE HEAL: ICD-10-CM

## 2022-10-14 DIAGNOSIS — S32.000A COMPRESSION FRACTURE OF LUMBAR VERTEBRA, UNSPECIFIED LUMBAR VERTEBRAL LEVEL, INITIAL ENCOUNTER (HCC): ICD-10-CM

## 2022-10-14 PROCEDURE — 77080 DXA BONE DENSITY AXIAL: CPT

## 2022-10-14 PROCEDURE — 72100 X-RAY EXAM L-S SPINE 2/3 VWS: CPT

## 2022-10-14 NOTE — TELEPHONE ENCOUNTER
Talked to [facility] scheduling to get this test scheduled and the first opening they have at [facility] is not until Dec 27th due to staffing. Checked our [facility] office and they don't have anything available and now waiting for [facility] scheduling  to call me back to see if we can get the test done at our [facility] location. I talked to wife Zen Nguyen [phone] and  told I would call her back as soon as I hear anything.

## 2022-10-14 NOTE — TELEPHONE ENCOUNTER
Dr. Jay Frazier received a new patient appointment from Dr. Carla Aguillon for worsening LE edema. Patient is scheduled to see Dr. Travis Sanchez on 10/31. Discussed with Dr. Jay Frazier, arrange for venous reflux studies and if abnormal can arrange for an appointment with Dr. Jay Frazier. Please call to schedule reflux studies, which do not have to be completed prior to his follow up.

## 2022-10-14 NOTE — TELEPHONE ENCOUNTER
Patient has called and now been scheduled for his reflex venous study at [facility] on 10/18/22 @ 1:00pm and to arrive at 12:30pm

## 2022-10-17 RX ORDER — GABAPENTIN 100 MG/1
CAPSULE ORAL
Qty: 130 CAPSULE | OUTPATIENT
Start: 2022-10-17

## 2022-10-17 RX ORDER — SOLIFENACIN SUCCINATE 10 MG/1
10 TABLET, FILM COATED ORAL DAILY
Qty: 14 TABLET | Refills: 0 | OUTPATIENT
Start: 2022-10-17 | End: 2023-10-17

## 2022-10-17 NOTE — TELEPHONE ENCOUNTER
gabapentin (NEURONTIN) 100 MG capsule 480 capsule 1 9/29/2022 3/28/2023    Sig - Route: Take 4 capsules by mouth 4 times daily for 180 days.  Intended supply: 90 days - Oral    Sent to pharmacy as: Gabapentin 100 MG Oral Capsule (NEURONTIN)    E-Prescribing Status: Receipt confirmed by pharmacy (9/29/2022 12:00 PM EDT)      Ordering and Authorizing Provider: CELESTINO Olivarez CNP NPI: [id number]   Ordering User:  CELESTINO Olivarez CNP    REFILL NOT APPROPRIATE

## 2022-10-17 NOTE — TELEPHONE ENCOUNTER
solifenacin (VESICARE) 10 MG tablet 14 tablet 0 10/13/2022 10/13/2023    Sig - Route: TAKE 1 TABLET BY MOUTH DAILY - Oral    Sent to pharmacy as:  Solifenacin Succinate 10 MG Oral Tablet (VESICARE)    E-Prescribing Status: Receipt confirmed by pharmacy (10/13/2022  1:78 PM EDT)    Jarred Chavis

## 2022-10-18 ENCOUNTER — HOSPITAL ENCOUNTER (OUTPATIENT)
Dept: VASCULAR LAB | Age: 72
Discharge: HOME | End: 2022-10-18
Payer: COMMERCIAL

## 2022-10-18 DIAGNOSIS — R60.0 BILATERAL LEG EDEMA: ICD-10-CM

## 2022-10-18 PROCEDURE — 93970 EXTREMITY STUDY: CPT

## 2022-10-19 ENCOUNTER — CARE COORDINATION (OUTPATIENT)
Dept: CASE MANAGEMENT | Age: 72
End: 2022-10-19

## 2022-10-19 NOTE — CARE COORDINATION
[facility] Care Transitions Follow Up Call    Care Transition Nurse contacted the patient by telephone to follow up after admission on 2022. Verified name and  with family as identifiers. Patient: Perla Single  Patient : 1950   MRN: [id number]  Reason for Admission: Discitis of lumbar region  Discharge Date: 22 RARS: Readmission Risk Score: 14      Needs to be reviewed by the provider   Additional needs identified to be addressed with provider: No               Method of communication with provider: none. Spoke with Olayinka. She states Angella Moreno is resting. [employer] states he continues to have pain & swelling in his legs. She states they see the PCP on Friday. They see the [facility] pain management MD on Monday of next week. [employer] states Angella Moreno had a vein study yesterday. She states they are just hoping to find the cause of his issues so they can know what to do to treat them. Follow Up  Future Appointments   Date Time Provider Starr Noriega   10/21/2022  9:00 AM Robert Rush MD [facility]   10/31/2022  2:45 PM Herrera Harris MD [facility]        Care Transitions Subsequent and Final Call    Subsequent and Final Calls  Are you currently active with any services?: Home Health  Care Transitions Interventions  Other Interventions:             Care Transition Nurse provided contact information for future needs.      Vito Jain RN BSN  Care Transition Nurse  [phone]

## 2022-10-21 ENCOUNTER — TELEPHONE (OUTPATIENT)
Dept: FAMILY MEDICINE CLINIC | Age: 72
End: 2022-10-21

## 2022-10-21 ENCOUNTER — OFFICE VISIT (OUTPATIENT)
Dept: FAMILY MEDICINE CLINIC | Age: 72
End: 2022-10-21
Payer: COMMERCIAL

## 2022-10-21 VITALS
HEART RATE: 94 BPM | BODY MASS INDEX: 20.79 KG/M2 | SYSTOLIC BLOOD PRESSURE: 132 MMHG | WEIGHT: 162 LBS | HEIGHT: 74 IN | OXYGEN SATURATION: 98 % | DIASTOLIC BLOOD PRESSURE: 70 MMHG

## 2022-10-21 DIAGNOSIS — M62.830 LUMBAR PARASPINAL MUSCLE SPASM: ICD-10-CM

## 2022-10-21 DIAGNOSIS — S32.040G CLOSED COMPRESSION FRACTURE OF L4 LUMBAR VERTEBRA WITH DELAYED HEALING, SUBSEQUENT ENCOUNTER: Primary | ICD-10-CM

## 2022-10-21 DIAGNOSIS — R60.0 EDEMA OF BOTH LEGS: ICD-10-CM

## 2022-10-21 DIAGNOSIS — R60.0 BILATERAL LEG EDEMA: ICD-10-CM

## 2022-10-21 PROCEDURE — 99214 OFFICE O/P EST MOD 30 MIN: CPT | Performed by: FAMILY MEDICINE

## 2022-10-21 PROCEDURE — 1123F ACP DISCUSS/DSCN MKR DOCD: CPT | Performed by: FAMILY MEDICINE

## 2022-10-21 RX ORDER — FUROSEMIDE 20 MG/1
20 TABLET ORAL DAILY
Qty: 30 TABLET | Refills: 0 | Status: SHIPPED | OUTPATIENT
Start: 2022-10-21 | End: 2022-11-20

## 2022-10-21 RX ORDER — METHOCARBAMOL 750 MG/1
750 TABLET, FILM COATED ORAL 4 TIMES DAILY
Qty: 120 TABLET | Refills: 1 | Status: SHIPPED | OUTPATIENT
Start: 2022-10-21 | End: 2022-11-20

## 2022-10-21 RX ORDER — MIRTAZAPINE 7.5 MG/1
7.5 TABLET, FILM COATED ORAL NIGHTLY
Qty: 30 TABLET | Refills: 5 | Status: SHIPPED | OUTPATIENT
Start: 2022-10-21

## 2022-10-21 RX ORDER — POTASSIUM CHLORIDE 750 MG/1
10 TABLET, EXTENDED RELEASE ORAL DAILY
Qty: 30 TABLET | Refills: 0 | Status: SHIPPED | OUTPATIENT
Start: 2022-10-21

## 2022-10-21 NOTE — TELEPHONE ENCOUNTER
Saw Dr. Hollie Gee today. Patient is very swollen legs. By testing it is venous reflux. He did not want continue Lasix. He wears compression socks. Tries to elevate. He just had a kyphoplasty so he cannot do it so much. He is avoiding salt. Lower extremity venous insufficiency  A prescription for Lasix 20 mg daily and potassium 10 mEq daily #30 of each was written. Can do calf pumps to help. Cannot lay on the couch and put his legs up on the armrest or lay in bed and put his Up on a couple of couch cushions. We will see Dr. Nenita Ariza soon who can create a final plan. They are considering venous ablation.

## 2022-10-21 NOTE — PROGRESS NOTES
[address] Note    Date: 10/21/2022                                               Subjective:     Chief Complaint   Patient presents with    Results     REVIEW RESULTS OF REFLEX TEST, LEGS ARE SWOLLEN HARD TO KEEP STOCKINGS ON        HPI  Had venous reflux study ordered by dr. Siu Alert  Scheduled to see dr. Jhony Johnson, cardio end of this month  Persistent bad swelling  Right deep common femoral reflux noted and superficial reflux noted on left  S/p L2,4, 5 kyphoplasty for back pain   Given lasix earlier in month for swelling - no improvement  Down to 800/d gabapentin  COMFORT BETTER W/ SOCKS  Reduced gabapentin  Aggravating back w/ bending over to adjust stockings  Has knot that fluctuates in distal right medial thigh. A lot of problems w/ back pain - feels like knees giving out  Seen by podiatrist  Locks up when gets shooting pain - hard to stand  Had recent xray - sees neurosurgery back soon - f/u kyphoplasty - had 4 incisions.   Tizanidine - didn't help  Robaxin seemed to work better  More tired - nocturia bad -and pain limiting sleep  BP Readings from Last 3 Encounters:   10/21/22 132/70   10/07/22 120/68   09/29/22 127/65     Pulse Readings from Last 3 Encounters:   10/21/22 94   10/07/22 92   09/29/22 80     Wt Readings from Last 3 Encounters:   10/21/22 162 lb (73.5 kg)   10/07/22 168 lb (76.2 kg)   09/29/22 157 lb 3 oz (71.3 kg)            Patient Active Problem List    Diagnosis Date Noted    Mixed obsessional thoughts and acts 09/28/2022    Pre-operative examination 09/27/2022    Localized edema 09/27/2022    Coronary artery calcification 09/27/2022    Mixed hyperlipidemia 09/27/2022    Compression fracture of L2 lumbar vertebra ([address] 09/25/2022    Degenerative lumbar spinal stenosis 09/25/2022    Urinary hesitancy 09/25/2022    Back pain 09/23/2022    Right sided sciatica 08/08/2022    Peripheral polyneuropathy 04/21/2022    DDD (degenerative disc disease), lumbar 09/05/2018    Closed wedge compression fracture of L1 vertebra (HCC) 09/05/2018    Spondylosis of lumbar spine 11/02/2015    Cerebral concussion 01/19/2016    Gall bladder stones 07/10/2013    B12 deficiency 07/10/2013    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 Recommended that he continue current therapy, as will likely drop with oral supplement. Closed wedge compression fracture of L1 vertebra ([address] 09/05/2018    Superior endplate fracture of L1 resulting 80% loss of vertebral body height    DDD (degenerative disc disease), lumbar 09/05/2018    L2-L3, L3-L4, L4-L5. Disc bulge with narrowing of the neural foramen at these levels    Diverticulitis     Gallbladder problem     S/P lap martha    GERD (gastroesophageal reflux disease)     Hiatal hernia     Migraine     MRSA infection 04/03/2019    Left hand    Nausea & vomiting     Spondylosis of lumbar spine 11/02/2015    On x-ray multilevel     Past Surgical History:   Procedure Laterality Date    CHOLECYSTECTOMY      COLONOSCOPY  9/11    FOOT SURGERY      nerve removed from foot    SHOULDER SURGERY Right 2005    rotator cuff repair    SPINE SURGERY N/A 9/28/2022    L2, L4, L5 KYPHOPLASTY WITH L2, L4 BONE BIOPSY performed by Elida Zamudio MD at [address] [place] [address]  9/11     No results displayed because visit has over 200 results.         Family History   Problem Relation Age of Onset    Hypertension Mother     High Cholesterol Mother     Hypertension Father     High Cholesterol Father     Diabetes Type 1  Sister         B 12    Diabetes Type 1  Sister         early peripheral neuropathy    Stroke Brother 79 Hypertension Brother     High Cholesterol Brother     Hypertension Brother     High Cholesterol Brother     Hypertension Brother     High Cholesterol Brother      Current Outpatient Medications   Medication Sig Dispense Refill    solifenacin (VESICARE) 10 MG tablet TAKE 1 TABLET BY MOUTH DAILY 14 tablet 0    topiramate (TOPAMAX) 25 MG tablet TAKE 3 TABLETS BY MOUTH EVERY MORNING AND 5 TABLETS EVERY EVENING 240 tablet 2    traMADol (ULTRAM) 50 MG tablet Take 1 tablet by mouth every 6 hours as needed for Pain for up to 30 days. 120 tablet 0    furosemide (LASIX) 20 MG tablet Take 1 tablet by mouth 2 times daily as needed (leg swelling) 14 tablet 1    rizatriptan (MAXALT-MLT) 10 MG disintegrating tablet May repeat in 2 hours if needed 18 tablet 5    mirtazapine (REMERON) 7.5 MG tablet Take 1 tablet by mouth nightly 20 tablet 1    gabapentin (NEURONTIN) 100 MG capsule Take 1 capsule by mouth 2 times daily for 180 days. Intended supply: 90 days 180 capsule 1    gabapentin (NEURONTIN) 100 MG capsule Take 4 capsules by mouth 4 times daily for 180 days. Intended supply: 90 days 480 capsule 1    Magnesium Gluconate 500 (27 Mg) MG TABS tablet Take 500 mg by mouth daily      Levomefolate Glucosamine (METHYLFOLATE) 400 MCG CAPS Take 400 mcg by mouth every evening      gabapentin (NEURONTIN) 300 MG capsule Take 300 mg by mouth in the morning and at bedtime. 1200 and 1600      ondansetron (ZOFRAN ODT) 4 MG disintegrating tablet Take 1 tablet by mouth every 8 hours as needed for Nausea or Vomiting 30 tablet 1    tolterodine (DETROL LA) 4 MG extended release capsule Take 1 capsule by mouth daily (Patient not taking: Reported on 10/7/2022) 30 capsule 2    calcitonin (MIACALCIN) 200 UNIT/ACT nasal spray 1 spray in one nostril daily, alternating nostril side daily 1 each 0    tiotropium (SPIRIVA RESPIMAT) 2.5 MCG/ACT AERS inhaler Inhale 2 puffs into the lungs in the morning.  1 each 5    levocetirizine (XYZAL) 5 MG tablet TAKE 1 TABLET BY MOUTH EVERY EVENING. (Patient taking differently: Take 5 mg by mouth nightly) 30 tablet 5    pantoprazole (PROTONIX) 40 MG tablet TAKE 1 TABLET BY MOUTH TWICE DAILY (Patient taking differently: Take 40 mg by mouth in the morning and at bedtime) 60 tablet 5    mometasone-formoterol (DULERA) 200-5 MCG/ACT inhaler INHALE 2 PUFFS INTO THE LUNGS 2 TIMES DAILY RINSE MOUTH AFTER USING. 13 g 5    albuterol sulfate HFA (PROVENTIL;VENTOLIN;PROAIR) 108 (90 Base) MCG/ACT inhaler INHALE 2 PUFFS INTO THE LUNGS FOUR TIMES DAILY AS NEEDED FOR WHEEZING 8.5 g 2    vitamin D (ERGOCALCIFEROL) 1.25 MG (77383 UT) CAPS capsule TAKE 1 CAPSULE BY MOUTH WEEKLY (Patient taking differently: Take 50,000 Units by mouth once a week On Sundays) 4 capsule 2    Spacer/Aero-Holding Chambers (COMPACT SPACE CHAMBER/SM MASK) AZAEL AS DIRECTED w/ inhalers 1 each 0    ipratropium (ATROVENT) 0.06 % nasal spray TAKE 1-2 SPRAYS BY NASAL ROUTE 4 TIMES DAILY AS NEEDED FOR RHINITIS 15 mL 5    albuterol (PROVENTIL) (2.5 MG/3ML) 0.083% nebulizer solution Take 3 mLs by nebulization every 6 hours as needed for Wheezing 75 mL 2     No current facility-administered medications for this visit. Allergies   Allergen Reactions    Codeine      Upset stomach     Hydrocodone Nausea And Vomiting       Review of Systems    Objective: There were no vitals taken for this visit. BP Readings from Last 3 Encounters:   10/07/22 120/68   09/29/22 127/65   09/15/22 (!) 166/98       Pulse Readings from Last 3 Encounters:   10/07/22 92   09/29/22 80   09/15/22 94       Wt Readings from Last 3 Encounters:   10/07/22 168 lb (76.2 kg)   09/29/22 157 lb 3 oz (71.3 kg)   08/23/22 166 lb (75.3 kg)       Physical Exam  Vitals and nursing note reviewed. Constitutional:       Appearance: He is well-developed. HENT:      Head: Normocephalic and atraumatic. Eyes:      General: No scleral icterus.      Conjunctiva/sclera: Conjunctivae normal.   Pulmonary:      Effort: Pulmonary effort is normal.   Musculoskeletal:         General: Swelling (2+ legs swelling) present. Comments: No mass palpable or swelling above right knee   Skin:     General: Skin is warm and dry. Neurological:      Mental Status: He is alert and oriented to person, place, and time. Assessment/Plan:      Diagnosis Orders   1. Closed compression fracture of L4 lumbar vertebra with delayed healing, subsequent encounter        2. Edema of both legs        3.  Lumbar paraspinal muscle spasm            F/u dr. Kingsley Barrow, not dr. Clifford Ramos regarding reflux study and tx for venous disease  Reviewed report w/ pt  Continue compression/ elevation/ low salt - lasix not helpful  Reducing gabapentin  F/u neurosurgery  Tramadol 1-2 every 6 hours as needed w/ tylenol - got #120 on 10/17 - can increase to 6/ day  Robaxin 750 qid prn for spasm w/ heat  oarrs reviewed and results c/w rx'd meds  Cont rollator  Doing home PT/ OT  Olivia Deshpande MD, MD  10/21/2022  9:01 AM

## 2022-10-21 NOTE — TELEPHONE ENCOUNTER
Pt was in earlier today and thought about the visit after he got home and decided he did want  a refill on his lasix 20 mg . Martin Galindo      .

## 2022-10-26 ENCOUNTER — PATIENT MESSAGE (OUTPATIENT)
Dept: FAMILY MEDICINE CLINIC | Age: 72
End: 2022-10-26

## 2022-10-26 DIAGNOSIS — M46.46 DISCITIS OF LUMBAR REGION: ICD-10-CM

## 2022-10-26 RX ORDER — SOLIFENACIN SUCCINATE 10 MG/1
10 TABLET, FILM COATED ORAL DAILY
Qty: 30 TABLET | Refills: 5 | Status: SHIPPED | OUTPATIENT
Start: 2022-10-26 | End: 2022-11-25

## 2022-10-26 RX ORDER — TRAMADOL HYDROCHLORIDE 50 MG/1
50 TABLET ORAL EVERY 4 HOURS PRN
Qty: 180 TABLET | Refills: 0 | Status: SHIPPED | OUTPATIENT
Start: 2022-11-07 | End: 2022-12-07

## 2022-10-26 NOTE — TELEPHONE ENCOUNTER
From: Vivek Stout  To: Dr. Feldman Minus: 10/26/2022 10:22 AM EDT  Subject: Tramadol    Hello Dr Jenise King current tramadol prescription is for 4 in 24 hours. Yuly Dumont is still struggling with pain especially in his legs. He is taking 5 tramadol in 24 hours. Are you able to change the directions with [facility]? We will be out six days earlier than the prescription allows about November 9th. He is also out of the bladder medication solifenacin with no refills. We have an appointment with vascular surgeon on October 27 but this is an initial visit so no procedures for veins in legs have been scheduled yet.     Thank you Lucio Roberson

## 2022-10-27 ENCOUNTER — INITIAL CONSULT (OUTPATIENT)
Dept: VASCULAR SURGERY | Age: 72
End: 2022-10-27
Payer: COMMERCIAL

## 2022-10-27 VITALS — BODY MASS INDEX: 20.8 KG/M2 | HEIGHT: 74 IN

## 2022-10-27 DIAGNOSIS — M79.89 LEG SWELLING: Primary | ICD-10-CM

## 2022-10-27 DIAGNOSIS — I87.2 VENOUS INSUFFICIENCY OF BOTH LOWER EXTREMITIES: ICD-10-CM

## 2022-10-27 PROBLEM — Z01.818 PRE-OPERATIVE EXAMINATION: Status: RESOLVED | Noted: 2022-09-27 | Resolved: 2022-10-27

## 2022-10-27 PROCEDURE — 99203 OFFICE O/P NEW LOW 30 MIN: CPT | Performed by: STUDENT IN AN ORGANIZED HEALTH CARE EDUCATION/TRAINING PROGRAM

## 2022-10-27 PROCEDURE — 1123F ACP DISCUSS/DSCN MKR DOCD: CPT | Performed by: STUDENT IN AN ORGANIZED HEALTH CARE EDUCATION/TRAINING PROGRAM

## 2022-10-27 NOTE — PROGRESS NOTES
Faustina Olivares (:  1950) is a 73 y.o. male,Established patient, here for evaluation of the following chief complaint(s):  Consultation and Circulatory Problem         ASSESSMENT/PLAN:  1. Leg swelling  2. Venous insufficiency of both lower extremities    This is a 73year old male patient who presents for evaluation and management of leg swelling. He has no significant treatable reflux in his legs other than combating the significant fluid accumulation in his legs. He has no lower extremity DVT in the veins. Would recommend unna boot placement today to help reduce the swelling acutely. If he cannot elevate his legs and exercise then would recommend only compression for now. No need for antibiotics at this time. Ace wraps given to the patient as well. Would recommend CT venogram to rule out intra-abdominal pathology as well although suspicion for this very low. Follow up on Monday to discuss further and assess for improvement. All questions answered. Subjective   SUBJECTIVE/OBJECTIVE:  This is a 73year old male patient who presents today to discuss bilateral lower extremity swelling. The patient recently underwent a kypoplasty and ever since then he has had progressive pain and swelling in both legs. He has not been ambulating much. He walks with a walker. He cannot elevate his legs due to pain. He underwent an Echo which was grossly normal and bilateral lower extremity reflux study which demonstrates clinically significant venous reflux in the right common femoral and left sapheno-femoral junction only. The patient denies shortness of breath. His leg swelling has been progressive. He cannot wear stockings due to fluid accumulation above the swelling. His skin is tender and taut. He admits to weeping. He is only lasix daily. No fever or chills. Objective   Physical Exam  Constitutional:       Appearance: Normal appearance.    Cardiovascular:      Rate and Rhythm: Normal rate and regular rhythm. Pulses: Normal pulses. Pulmonary:      Effort: Pulmonary effort is normal. No respiratory distress. Musculoskeletal:      Right lower leg: Edema (2+) present. Left lower leg: Edema (2+) present. Skin:     General: Skin is warm and dry. Capillary Refill: Capillary refill takes less than 2 seconds. Findings: Erythema present. Comments: Blue purple hue to toes   Neurological:      Mental Status: He is alert.           Juanita Reina DO, FACS, FSVS, [address] [facility]

## 2022-10-28 ENCOUNTER — CARE COORDINATION (OUTPATIENT)
Dept: CASE MANAGEMENT | Age: 72
End: 2022-10-28

## 2022-10-28 NOTE — CARE COORDINATION
[facility] Transitions Follow Up Call    10/28/2022    Patient: Jan Hines  Patient : 1950   MRN: [id number]  Reason for Admission: back pain  Discharge Date: 22 RARS: Readmission Risk Score: 14         Spoke with: NA    Attempted to reach patient via phone for transition call. VM left stating purpose of call along with my contact information requesting a return call. Shanel Berkowitz LPN [address] [place] [address]  [phone]    Care Transitions Subsequent and Final Call    Subsequent and Final Calls  Are you currently active with any services?:  [facility] Transitions Interventions   Home Care Waiver: Completed     Other Interventions:              Follow Up  Future Appointments   Date Time Provider Starr Noriega   10/31/2022  3:45 PM CELESTINO Ricardo CNP/JAZMYNE [facility]   2022  8:30 AM Doug Sifuentes MD SNEHA GUZMÁN [facility]   2022  8:00 AM [facility] 2 [facility] [address] [place] [address]

## 2022-10-31 ENCOUNTER — CARE COORDINATION (OUTPATIENT)
Dept: CASE MANAGEMENT | Age: 72
End: 2022-10-31

## 2022-10-31 ENCOUNTER — OFFICE VISIT (OUTPATIENT)
Dept: VASCULAR SURGERY | Age: 72
End: 2022-10-31
Payer: COMMERCIAL

## 2022-10-31 VITALS
DIASTOLIC BLOOD PRESSURE: 70 MMHG | WEIGHT: 164 LBS | HEIGHT: 74 IN | SYSTOLIC BLOOD PRESSURE: 140 MMHG | BODY MASS INDEX: 21.05 KG/M2

## 2022-10-31 DIAGNOSIS — M79.89 LEG SWELLING: ICD-10-CM

## 2022-10-31 PROCEDURE — 29580 STRAPPING UNNA BOOT: CPT | Performed by: NURSE PRACTITIONER

## 2022-10-31 NOTE — CARE COORDINATION
[facility] Transitions Follow Up Call    10/31/2022    Patient: Megan Bui  Patient : 1950   MRN: [id number]  Reason for Admission: back pain  Discharge Date: 22 RARS: Readmission Risk Score: 14         Spoke with: porsha    [facility] 2nd attempted outreach. Left message and contact information for call back. No further outreach will be attempted. Marshall Silver LPN  Care Coordinator     Care Transitions Subsequent and Final Call    Subsequent and Final Calls  Care Transitions Interventions  Other Interventions:              Follow Up  Future Appointments   Date Time Provider Starr Noriega   2022  8:30 AM MD LENY Contreras [facility]   2022  1:15 PM CELESTINO Atwood - CNP [facility]/JAZMYNE [facility]   2022  8:00 AM ARIELLE Louis LPN

## 2022-10-31 NOTE — PROGRESS NOTES
Christopher Fontanez (:  1950) is a 73 y.o. male,Established patient, here for evaluation of the following chief complaint(s):  Leg Swelling (FU on bilateral LE swelling and to have Unna boots changed)    Pain Assessment  The patient is currently not experiencing any pain at this time. Edema  The edema is located in the bilateral lower extremities. The patient reports partial improvement. The patient has not experienced any new symptoms since last visit. The patient states the edema started after he underwent a kypoplasty. After that he has had progressive pain and swelling in both legs. He has not been ambulating much. He walks with a walker. He cannot elevate his legs due to pain. He underwent a bilateral lower extremity reflux study which demonstrates clinically significant venous reflux in the right common femoral and left sapheno-femoral junction only. The patient denies shortness of breath. He is taking lasix 20 mg daily. Treatment so far includes: bilateral Unna boots. Allergies   Allergen Reactions    Codeine      Upset stomach     Hydrocodone Nausea And Vomiting    Hydrocodone-Acetaminophen Nausea Only and Nausea And Vomiting       Prior to Visit Medications    Medication Sig Taking? Authorizing Provider   traMADol (ULTRAM) 50 MG tablet Take 1 tablet by mouth every 4 hours as needed for Pain for up to 30 days.  Fill 22 or after Yes Marissa Aguillon MD   solifenacin (VESICARE) 10 MG tablet Take 1 tablet by mouth daily Yes Marissa Aguillon MD   mirtazapine (REMERON) 7.5 MG tablet Take 1 tablet by mouth nightly Yes Marissa Aguillon MD   furosemide (LASIX) 20 MG tablet Take 1 tablet by mouth daily Yes Jasper Thorpe DO   potassium chloride (KLOR-CON M) 10 MEQ extended release tablet Take 1 tablet by mouth daily Yes Jasper Thorpe DO   topiramate (TOPAMAX) 25 MG tablet TAKE 3 TABLETS BY MOUTH EVERY MORNING AND 5 TABLETS EVERY EVENING Yes CELESTINO Rosado - CNP   rizatriptan (MAXALT-MLT) 10 MG disintegrating tablet May repeat in 2 hours if needed Yes CELESTINO [place] CNP   gabapentin (NEURONTIN) 100 MG capsule Take 1 capsule by mouth 2 times daily for 180 days. Intended supply: 90 days Yes CELESTINO [place] CNP   gabapentin (NEURONTIN) 100 MG capsule Take 4 capsules by mouth 4 times daily for 180 days. Intended supply: 90 days Yes CELESTINO [place] CNP   Magnesium Gluconate 500 (27 Mg) MG TABS tablet Take 500 mg by mouth daily Yes Historical Provider, MD   Levomefolate Glucosamine (METHYLFOLATE) 400 MCG CAPS Take 400 mcg by mouth every evening Yes Historical Provider, MD   gabapentin (NEURONTIN) 300 MG capsule Take 300 mg by mouth in the morning and at bedtime. 1200 and 1600 Yes Historical Provider, MD   ondansetron (ZOFRAN ODT) 4 MG disintegrating tablet Take 1 tablet by mouth every 8 hours as needed for Nausea or Vomiting Yes Turner Ferrari MD   tiotropium (SPIRIVA RESPIMAT) 2.5 MCG/ACT AERS inhaler Inhale 2 puffs into the lungs in the morning. Yes Turner Ferrari MD   levocetirizine (XYZAL) 5 MG tablet TAKE 1 TABLET BY MOUTH EVERY EVENING. Patient taking differently: Take 5 mg by mouth nightly Yes CELESTINO Siddiqui CNP   pantoprazole (PROTONIX) 40 MG tablet TAKE 1 TABLET BY MOUTH TWICE DAILY  Patient taking differently: Take 40 mg by mouth in the morning and at bedtime Yes Turner Ferrari MD   mometasone-formoterol (DULERA) 200-5 MCG/ACT inhaler INHALE 2 PUFFS INTO THE LUNGS 2 TIMES DAILY RINSE MOUTH AFTER USING.  Yes CELESTINO Ortiz CNP   albuterol sulfate HFA (PROVENTIL;VENTOLIN;PROAIR) 108 (90 Base) MCG/ACT inhaler INHALE 2 PUFFS INTO THE LUNGS FOUR TIMES DAILY AS NEEDED FOR WHEEZING Yes CELESTINO Richard CNP   vitamin D (ERGOCALCIFEROL) 1.25 MG (84931 UT) CAPS capsule TAKE 1 CAPSULE BY MOUTH WEEKLY  Patient taking differently: Take 50,000 Units by mouth once a week On Sundays Yes Turner Ferrari MD   Spacer/Aero-Holding Chambers (COMPACT SPACE CHAMBER/SM MASK) AZAEL AS DIRECTED w/ inhalers Yes Melanie Waddell MD   ipratropium (ATROVENT) 0.06 % nasal spray TAKE 1-2 SPRAYS BY NASAL ROUTE 4 TIMES DAILY AS NEEDED FOR RHINITIS Yes Melanie Waddell MD   albuterol (PROVENTIL) (2.5 MG/3ML) 0.083% nebulizer solution Take 3 mLs by nebulization every 6 hours as needed for Wheezing Yes Melanie Waddell MD   methocarbamol (ROBAXIN-750) 750 MG tablet Take 1 tablet by mouth 4 times daily  Patient not taking: Reported on 10/27/2022  Melanie Waddell MD   calcitonin (MIACALCIN) 200 UNIT/ACT nasal spray 1 spray in one nostril daily, alternating nostril side daily  Melanie Waddell MD       History reviewed. Physical Exam  Constitutional:       Appearance: Normal appearance. Cardiovascular:      Rate and Rhythm: Normal rate and regular rhythm. Pulses: Normal pulses. Comments:     LEG MEASUREMENTS:    10/31/2022  Right ankle:  27.3 cm  Right calf:  42.1 cm    Left ankle:  27.2 cm  Left calf:  40 cm  Pulmonary:      Effort: Pulmonary effort is normal. No respiratory distress. Musculoskeletal:      Right lower leg: Edema (nonpitting edema; right ankle measures 27.3 cm, calf 42.1 cm) present. Left lower leg: Edema (nonpitting edema; left ankle measures 27.2 cm, calf 40 cm) present. Skin:     General: Skin is warm and dry. Capillary Refill: Capillary refill takes less than 2 seconds. Findings: Erythema present. Comments: Blue purple hue to toes   Neurological:      Mental Status: He is alert. ASSESSMENT:     Diagnosis Orders   1. Leg swelling  OR APPLY OF PASTE BOOT          Bilateral Unna boots applied. PATIENT EDUCATION focused on need for continued Unna boot therapy for compression. They support vascular problems, help to heal leg ulcers and control leg swelling. The dressings can be worn up to a week before they need changed. Patient must keep the Unna boots dry.       PLAN:    Return in about 1 week (around 11/7/2022) for [facility] boots.

## 2022-11-01 ENCOUNTER — OFFICE VISIT (OUTPATIENT)
Dept: CARDIOLOGY CLINIC | Age: 72
End: 2022-11-01
Payer: COMMERCIAL

## 2022-11-01 VITALS
BODY MASS INDEX: 21.3 KG/M2 | HEIGHT: 74 IN | HEART RATE: 81 BPM | SYSTOLIC BLOOD PRESSURE: 110 MMHG | WEIGHT: 166 LBS | OXYGEN SATURATION: 99 % | DIASTOLIC BLOOD PRESSURE: 62 MMHG

## 2022-11-01 DIAGNOSIS — R60.0 BILATERAL LEG EDEMA: ICD-10-CM

## 2022-11-01 DIAGNOSIS — I87.2 VENOUS INSUFFICIENCY: Primary | ICD-10-CM

## 2022-11-01 DIAGNOSIS — I87.2 VENOUS REFLUX: ICD-10-CM

## 2022-11-01 PROCEDURE — 1123F ACP DISCUSS/DSCN MKR DOCD: CPT | Performed by: INTERNAL MEDICINE

## 2022-11-01 PROCEDURE — 99213 OFFICE O/P EST LOW 20 MIN: CPT | Performed by: INTERNAL MEDICINE

## 2022-11-01 NOTE — PROGRESS NOTES
Interventional Cardiology Consultation     [facility]  1950    PCP: Radene Lanes, MD    Referring Physician: Dr. Lisa Dickerson   Reason for Referral: BLE edema, venous insufficiency     Chief Complaint:   Chief Complaint   Patient presents with    Follow-Up from Hospital     Swelling in legs. Subjective:     History of Present Illness: The patient is 73 y.o. male with a past medical history  significant for coronary artery calcification, mixed HLD-managed per Dr. Karoline Brady,  anxiety, OCD, [place]-prior lower back injuries, chronic lower back pain, R hip pain with radiculopathy, neuropathy bilateral feet, severe osteoarthritis with 4 vertebral fx, L2, L4, L5 , s/p 9/28/22 Kyphoplasty per Dr. Joaquin Alvarenga, [facility]-dyspnea, worsening BLE edema who presents with per Dr. Lisa Dickerson. I arranged for BLE venous duplex prior to this consultation. Recent hospitalization 9/23-9/29/22 for above assess. Consultation with Dr. Eriberto Morris 10/27/22 for BLE edema, BLE venous insufficiency with plans unna boot placement, ACE wraps provided, scheduled for CT venogram 11/8/22 to rule out intra-abdominal pathology. Follow up with vascular surgery 10/31/22 and then 11/4/22 for unna boot/ACE wrap changes. He is accompanied by his wife today. Use of rolling seated walker. They report 3 months ago, he was walking and driving without assistance. He is having to lay down every 2 hours, not able to raise legs secondary to back pain/surgery. He has now been referred to pain management. He admits to medical therapy compliance and tolerating. They reports that Dr. Karoline Brady has concerns for gabapentin contributing to BLE edema. Patient denies exertional chest pain/pressure, dyspnea at rest, PAULINO, PND, orthopnea, palpitations, lightheadedness, weight changes, near syncope and syncope.       Past Medical History:   Diagnosis Date    Arthritis     Asthma     B12 deficiency 07/10/2013    Tooks injection every other week and B12 level 500. Recommended that he continue current therapy, as will likely drop with oral supplement. Closed wedge compression fracture of L1 vertebra ([address] 09/05/2018    Superior endplate fracture of L1 resulting 80% loss of vertebral body height    DDD (degenerative disc disease), lumbar 09/05/2018    L2-L3, L3-L4, L4-L5.   Disc bulge with narrowing of the neural foramen at these levels    Diverticulitis     Gallbladder problem     S/P lap martha    GERD (gastroesophageal reflux disease)     Hiatal hernia     Migraine     MRSA infection 04/03/2019    Left hand    Nausea & vomiting     Spondylosis of lumbar spine 11/02/2015    On x-ray multilevel     Past Surgical History:   Procedure Laterality Date    CHOLECYSTECTOMY      COLONOSCOPY  9/11    FOOT SURGERY      nerve removed from foot    SHOULDER SURGERY Right 2005    rotator cuff repair    SPINE SURGERY N/A 9/28/2022    L2, L4, L5 KYPHOPLASTY WITH L2, L4 BONE BIOPSY performed by Bethany Peña MD at [address]  9/11     Family History   Problem Relation Age of Onset    Hypertension Mother     High Cholesterol Mother     Hypertension Father     High Cholesterol Father     Diabetes Type 1  Sister         B 12    Diabetes Type 1  Sister         early peripheral neuropathy    Stroke Brother 79    Hypertension Brother     High Cholesterol Brother     Hypertension Brother     High Cholesterol Brother     Hypertension Brother     High Cholesterol Brother      Social History     Tobacco Use    Smoking status: Never    Smokeless tobacco: Never   Vaping Use    Vaping Use: Never used   Substance Use Topics    Alcohol use: Not Currently     Comment: glass of wine at night    Drug use: No       Allergies   Allergen Reactions    Codeine      Upset stomach     Hydrocodone Nausea And Vomiting    Hydrocodone-Acetaminophen Nausea Only and Nausea And Vomiting       Current Outpatient Medications   Medication Sig Dispense Refill    [START ON 11/7/2022] traMADol (ULTRAM) 50 MG tablet Take 1 tablet by mouth every 4 hours as needed for Pain for up to 30 days. Fill 11/7/22 or after 180 tablet 0    solifenacin (VESICARE) 10 MG tablet Take 1 tablet by mouth daily 30 tablet 5    mirtazapine (REMERON) 7.5 MG tablet Take 1 tablet by mouth nightly 30 tablet 5    furosemide (LASIX) 20 MG tablet Take 1 tablet by mouth daily 30 tablet 0    potassium chloride (KLOR-CON M) 10 MEQ extended release tablet Take 1 tablet by mouth daily 30 tablet 0    topiramate (TOPAMAX) 25 MG tablet TAKE 3 TABLETS BY MOUTH EVERY MORNING AND 5 TABLETS EVERY EVENING 240 tablet 2    rizatriptan (MAXALT-MLT) 10 MG disintegrating tablet May repeat in 2 hours if needed 18 tablet 5    gabapentin (NEURONTIN) 100 MG capsule Take 1 capsule by mouth 2 times daily for 180 days. Intended supply: 90 days 180 capsule 1    gabapentin (NEURONTIN) 100 MG capsule Take 4 capsules by mouth 4 times daily for 180 days. Intended supply: 90 days 480 capsule 1    Magnesium Gluconate 500 (27 Mg) MG TABS tablet Take 500 mg by mouth daily      Levomefolate Glucosamine (METHYLFOLATE) 400 MCG CAPS Take 400 mcg by mouth every evening      gabapentin (NEURONTIN) 300 MG capsule Take 300 mg by mouth in the morning and at bedtime. 1200 and 1600      ondansetron (ZOFRAN ODT) 4 MG disintegrating tablet Take 1 tablet by mouth every 8 hours as needed for Nausea or Vomiting 30 tablet 1    tiotropium (SPIRIVA RESPIMAT) 2.5 MCG/ACT AERS inhaler Inhale 2 puffs into the lungs in the morning. 1 each 5    levocetirizine (XYZAL) 5 MG tablet TAKE 1 TABLET BY MOUTH EVERY EVENING. (Patient taking differently: Take 5 mg by mouth nightly) 30 tablet 5    pantoprazole (PROTONIX) 40 MG tablet TAKE 1 TABLET BY MOUTH TWICE DAILY (Patient taking differently: Take 40 mg by mouth in the morning and at bedtime) 60 tablet 5    mometasone-formoterol (DULERA) 200-5 MCG/ACT inhaler INHALE 2 PUFFS INTO THE LUNGS 2 TIMES DAILY RINSE MOUTH AFTER USING.  15 g 5    albuterol sulfate HFA (PROVENTIL;VENTOLIN;PROAIR) 108 (90 Base) MCG/ACT inhaler INHALE 2 PUFFS INTO THE LUNGS FOUR TIMES DAILY AS NEEDED FOR WHEEZING 8.5 g 2    vitamin D (ERGOCALCIFEROL) 1.25 MG (59192 UT) CAPS capsule TAKE 1 CAPSULE BY MOUTH WEEKLY (Patient taking differently: Take 50,000 Units by mouth once a week On Sundays) 4 capsule 2    Spacer/Aero-Holding Chambers (COMPACT SPACE CHAMBER/SM MASK) AZAEL AS DIRECTED w/ inhalers 1 each 0    ipratropium (ATROVENT) 0.06 % nasal spray TAKE 1-2 SPRAYS BY NASAL ROUTE 4 TIMES DAILY AS NEEDED FOR RHINITIS 15 mL 5    albuterol (PROVENTIL) (2.5 MG/3ML) 0.083% nebulizer solution Take 3 mLs by nebulization every 6 hours as needed for Wheezing 75 mL 2    methocarbamol (ROBAXIN-750) 750 MG tablet Take 1 tablet by mouth 4 times daily (Patient not taking: Reported on 10/27/2022) 120 tablet 1    calcitonin (MIACALCIN) 200 UNIT/ACT nasal spray 1 spray in one nostril daily, alternating nostril side daily 1 each 0     No current facility-administered medications for this visit. Review of Systems:  Constitutional: No unanticipated weight loss. There's been no change in energy level, sleep pattern, or activity level. No fevers, chills. Eyes: No visual changes or diplopia. No scleral icterus. ENT: No Headaches, hearing loss or vertigo. No mouth sores or sore throat. Cardiovascular: as reviewed in HPI  Respiratory: No cough or wheezing, no sputum production. No hemoptysis. Gastrointestinal: No abdominal pain, appetite loss, blood in stools. No change in bowel or bladder habits. Genitourinary: No dysuria, trouble voiding, or hematuria. Musculoskeletal:  No gait disturbance, no joint complaints. Integumentary: No rash or pruritis. Neurological: No headache, diplopia, change in muscle strength, numbness or tingling. Psychiatric: No anxiety or depression. Endocrine: No temperature intolerance. No excessive thirst, fluid intake, or urination.  No tremor. Hematologic/Lymphatic: No abnormal bruising or bleeding, blood clots or swollen lymph nodes. Allergic/Immunologic: No nasal congestion or hives. Physical Exam:   /62 (Site: Left Upper Arm, Position: Sitting)   Pulse 81   Ht 6' 2\" (1.88 m)   Wt 166 lb (75.3 kg)   SpO2 99%   BMI 21.31 kg/m²   Wt Readings from Last 3 Encounters:   11/01/22 166 lb (75.3 kg)   10/31/22 164 lb (74.4 kg)   10/21/22 162 lb (73.5 kg)     Constitutional: He is oriented to person, place, and time. He appears well-developed and well-nourished. In no acute distress. Head: Normocephalic and atraumatic. Pupils equal and round. Neck: Neck supple. No JVP or carotid bruit appreciated. No mass and no thyromegaly present. No lymphadenopathy present. Cardiovascular: Normal rate. Normal heart sounds. Exam reveals no gallop and no friction rub. No murmur heard. Pulmonary/Chest: Effort normal and breath sounds normal. No respiratory distress. He has no wheezes, rhonchi or rales. Abdominal: Soft, non-tender. Bowel sounds are normal. He exhibits no organomegaly, mass or bruit. Extremities: No edema. No cyanosis or clubbing. Pulses are 2+ radial/carotid bilaterally. Neurological: No gross cranial nerve deficit. Coordination normal.   Skin: Skin is warm and dry. There is no rash or diaphoresis. Psychiatric: He has a normal mood and affect.  His speech is normal and behavior is normal.     Lab Review:   Lab Results   Component Value Date/Time    TRIG 29 09/14/2021 09:30 AM    HDL 56 09/14/2021 09:30 AM    LDLCALC see below 09/14/2021 09:30 AM    LDLDIRECT 82 09/14/2021 09:30 AM    LABVLDL see below 09/14/2021 09:30 AM     Lab Results   Component Value Date/Time    BUN 13 09/29/2022 05:00 AM    CREATININE 0.5 09/29/2022 05:00 AM   No results found for: PTINR, TROPONINI, LABA1C   No results found for: CBCAUTODIF    EKG Interpretation: 11/1/22: not completed     Echo: 9/26/22   Normal left ventricle size, wall thickness and systolic function with an   estimated ejection fraction of 65 -70 %. No regional wall motion   abnormalities are seen. normal diastolic function   Normal right ventricular size and function. Stress Test: 9/5/2003 [facility]  OPINION:   NO EVIDENCE OF STRESS-INDUCED REVERSIBLE ISCHEMIA WAS SEEN. SLIGHT INFERIOR WALL SOFT TISSUE ARTIFACT. THE   COMPUTER-ESTIMATED EJECTION FRACTION OF THE LEFT VENTRICLE AT REST WAS APPROXIMATELY 61% AND NO SIGNIFICANT   WALL MOTION ABNORMALITY WAS SEEN    Cath:     Carotid duplex, bilateral: 1/2016      The right internal carotid artery appears to have a 1-15% diameter reducing    stenosis based on velocity criteria. The right vertebral artery demonstrates normal antegrade flow. Elevated velocities noted in the Right CCA    The left internal carotid artery appears to have a 1-15% diameter reducing    stenosis based on velocity criteria. The left vertebral artery demonstrates normal antegrade flow. Elevated velocities noted in the Left CCA       CT: cardiac calcium scor 8/2021  The observed calcium score of 148.1 is at percentile 55, which means the subjects of the same    age, gender, and race/ethnicity who are free of clinical cardiovascular disease and treated    diabetes will have lower calcium score. BLE venous Doppler: 10/18/22      No evidence of acute deep or superficial venous thrombosis of the bilateral    examined veins. Deep venous reflux of the right common femoral vein. No superficial venous reflux is noted in the right great or small saphenous    vein . Chronic phlebitic changes of the right small saphenous vein in the proximal    calf. No deep venous reflux of the left lower extremity. Superficial venous reflux at the left saphenofemoral junction only. No superficial venous reflux in the right small saphenous vein. There is no previous exam for comparison.          BLE venous duplex: 9/2022      No evidence of deep vein or been prepared under my direction and personally reviewed by me in its entirety. I confirm the note above accurately reflects all work, treatment, procedures, and medical decision making performed by me.     Electronically signed by Chelita Robin MD on 11/14/2022 at 6:11 PM

## 2022-11-03 RX ORDER — ERGOCALCIFEROL 1.25 MG/1
CAPSULE ORAL
Qty: 4 CAPSULE | Refills: 2 | Status: SHIPPED | OUTPATIENT
Start: 2022-11-03

## 2022-11-04 ENCOUNTER — OFFICE VISIT (OUTPATIENT)
Dept: VASCULAR SURGERY | Age: 72
End: 2022-11-04
Payer: COMMERCIAL

## 2022-11-04 VITALS — WEIGHT: 166 LBS | BODY MASS INDEX: 21.31 KG/M2

## 2022-11-04 DIAGNOSIS — G62.9 PERIPHERAL POLYNEUROPATHY: ICD-10-CM

## 2022-11-04 DIAGNOSIS — M54.50 CHRONIC MIDLINE LOW BACK PAIN, UNSPECIFIED WHETHER SCIATICA PRESENT: Primary | ICD-10-CM

## 2022-11-04 DIAGNOSIS — R60.0 BILATERAL LEG EDEMA: ICD-10-CM

## 2022-11-04 DIAGNOSIS — M79.89 LEG SWELLING: ICD-10-CM

## 2022-11-04 DIAGNOSIS — I80.01 CHRONIC SUPERFICIAL PHLEBITIS OF RIGHT LOWER EXTREMITY: ICD-10-CM

## 2022-11-04 DIAGNOSIS — G89.29 CHRONIC MIDLINE LOW BACK PAIN, UNSPECIFIED WHETHER SCIATICA PRESENT: Primary | ICD-10-CM

## 2022-11-04 DIAGNOSIS — S32.020S COMPRESSION FRACTURE OF L2 VERTEBRA, SEQUELA: ICD-10-CM

## 2022-11-04 PROCEDURE — 1123F ACP DISCUSS/DSCN MKR DOCD: CPT | Performed by: SURGERY

## 2022-11-04 PROCEDURE — 99213 OFFICE O/P EST LOW 20 MIN: CPT | Performed by: SURGERY

## 2022-11-04 NOTE — PROGRESS NOTES
Daily Progress Note   Jaspal Chowdhury MD      11/4/2022    Chief Complaint   Patient presents with    Leg Swelling     Pt states his right leg is really aggravated but otherwise they look ok         HISTORY OF PRESENT ILLNESS:                The patient is a 73 y.o. male who presents with a follow up for leg swelling and unna boots. Mr. Christian Meyer says his thighs swell and hurt, then he feels pressure in his abdomen. He has been wearing unna boots which have been helping with swelling. Past Medical History:   Diagnosis Date    Arthritis     Asthma     B12 deficiency 07/10/2013    Tooks injection every other week and B12 level 500. Recommended that he continue current therapy, as will likely drop with oral supplement. Closed wedge compression fracture of L1 vertebra ([address] 09/05/2018    Superior endplate fracture of L1 resulting 80% loss of vertebral body height    DDD (degenerative disc disease), lumbar 09/05/2018    L2-L3, L3-L4, L4-L5.   Disc bulge with narrowing of the neural foramen at these levels    Diverticulitis     Gallbladder problem     S/P lap martha    GERD (gastroesophageal reflux disease)     Hiatal hernia     Migraine     MRSA infection 04/03/2019    Left hand    Nausea & vomiting     Spondylosis of lumbar spine 11/02/2015    On x-ray multilevel       Past Surgical History:   Procedure Laterality Date    CHOLECYSTECTOMY      COLONOSCOPY  9/11    FOOT SURGERY      nerve removed from foot    SHOULDER SURGERY Right 2005    rotator cuff repair    SPINE SURGERY N/A 9/28/2022    L2, L4, L5 KYPHOPLASTY WITH L2, L4 BONE BIOPSY performed by Bassam Glynn MD at [address] [place] [address]  9/11       Social History     Socioeconomic History    Marital status:      Spouse name: Melissa Sloan    Number of children: 5    Years of education: 12    Highest education level: Not on file   Occupational History    Occupation: CONSTRUCTION - retired 2017   Tobacco Use    Smoking status: Never    Smokeless tobacco: Never   Vaping Use    Vaping Use: Never used   Substance and Sexual Activity    Alcohol use: Not Currently     Comment: glass of wine at night    Drug use: No    Sexual activity: Not on file   Other Topics Concern    Not on file   Social History Narrative    2 hours 1x/wk exercise. Exercise 25 min 5x/wk. 8/8/22     Social Determinants of Health     Financial Resource Strain: Low Risk     Difficulty of Paying Living Expenses: Not hard at all   Food Insecurity: No Food Insecurity    Worried About Running Out of Food in the Last Year: Never true    Ran Out of Food in the Last Year: Never true   Transportation Needs: No Transportation Needs    Lack of Transportation (Medical): No    Lack of Transportation (Non-Medical): No   Physical Activity: Not on file   Stress: Not on file   Social Connections: Not on file   Intimate Partner Violence: Not on file   Housing Stability: Not on file       Family History   Problem Relation Age of Onset    Hypertension Mother     High Cholesterol Mother     Hypertension Father     High Cholesterol Father     Diabetes Type 1  Sister         B 12    Diabetes Type 1  Sister         early peripheral neuropathy    Stroke Brother 79    Hypertension Brother     High Cholesterol Brother     Hypertension Brother     High Cholesterol Brother     Hypertension Brother     High Cholesterol Brother          Current Outpatient Medications:     vitamin D (ERGOCALCIFEROL) 1.25 MG (78314 UT) CAPS capsule, TAKE 1 CAPSULE BY MOUTH WEEKLY, Disp: 4 capsule, Rfl: 2    [START ON 11/7/2022] traMADol (ULTRAM) 50 MG tablet, Take 1 tablet by mouth every 4 hours as needed for Pain for up to 30 days.  Fill 11/7/22 or after, Disp: 180 tablet, Rfl: 0    solifenacin (VESICARE) 10 MG tablet, Take 1 tablet by mouth daily, Disp: 30 tablet, Rfl: 5    mirtazapine (REMERON) 7.5 MG tablet, Take 1 tablet by mouth nightly, Disp: 30 tablet, Rfl: 5    furosemide (LASIX) 20 MG tablet, Take 1 tablet by mouth daily, Disp: 30 tablet, Rfl: 0    potassium chloride (KLOR-CON M) 10 MEQ extended release tablet, Take 1 tablet by mouth daily, Disp: 30 tablet, Rfl: 0    topiramate (TOPAMAX) 25 MG tablet, TAKE 3 TABLETS BY MOUTH EVERY MORNING AND 5 TABLETS EVERY EVENING, Disp: 240 tablet, Rfl: 2    rizatriptan (MAXALT-MLT) 10 MG disintegrating tablet, May repeat in 2 hours if needed, Disp: 18 tablet, Rfl: 5    gabapentin (NEURONTIN) 100 MG capsule, Take 1 capsule by mouth 2 times daily for 180 days. Intended supply: 90 days, Disp: 180 capsule, Rfl: 1    gabapentin (NEURONTIN) 100 MG capsule, Take 4 capsules by mouth 4 times daily for 180 days. Intended supply: 90 days, Disp: 480 capsule, Rfl: 1    Magnesium Gluconate 500 (27 Mg) MG TABS tablet, Take 500 mg by mouth daily, Disp: , Rfl:     Levomefolate Glucosamine (METHYLFOLATE) 400 MCG CAPS, Take 400 mcg by mouth every evening, Disp: , Rfl:     gabapentin (NEURONTIN) 300 MG capsule, Take 300 mg by mouth in the morning and at bedtime. 1200 and 1600, Disp: , Rfl:     ondansetron (ZOFRAN ODT) 4 MG disintegrating tablet, Take 1 tablet by mouth every 8 hours as needed for Nausea or Vomiting, Disp: 30 tablet, Rfl: 1    tiotropium (SPIRIVA RESPIMAT) 2.5 MCG/ACT AERS inhaler, Inhale 2 puffs into the lungs in the morning., Disp: 1 each, Rfl: 5    levocetirizine (XYZAL) 5 MG tablet, TAKE 1 TABLET BY MOUTH EVERY EVENING.  (Patient taking differently: Take 5 mg by mouth nightly), Disp: 30 tablet, Rfl: 5    pantoprazole (PROTONIX) 40 MG tablet, TAKE 1 TABLET BY MOUTH TWICE DAILY (Patient taking differently: Take 40 mg by mouth in the morning and at bedtime), Disp: 60 tablet, Rfl: 5    mometasone-formoterol (DULERA) 200-5 MCG/ACT inhaler, INHALE 2 PUFFS INTO THE LUNGS 2 TIMES DAILY RINSE MOUTH AFTER USING., Disp: 13 g, Rfl: 5    albuterol sulfate HFA (PROVENTIL;VENTOLIN;PROAIR) 108 (90 Base) MCG/ACT inhaler, INHALE 2 PUFFS INTO THE LUNGS FOUR TIMES DAILY AS NEEDED FOR WHEEZING, Disp: 8.5 g, Rfl: 2    Spacer/Aero-Holding Chambers (COMPACT SPACE CHAMBER/SM MASK) AZAEL, AS DIRECTED w/ inhalers, Disp: 1 each, Rfl: 0    ipratropium (ATROVENT) 0.06 % nasal spray, TAKE 1-2 SPRAYS BY NASAL ROUTE 4 TIMES DAILY AS NEEDED FOR RHINITIS, Disp: 15 mL, Rfl: 5    albuterol (PROVENTIL) (2.5 MG/3ML) 0.083% nebulizer solution, Take 3 mLs by nebulization every 6 hours as needed for Wheezing, Disp: 75 mL, Rfl: 2    methocarbamol (ROBAXIN-750) 750 MG tablet, Take 1 tablet by mouth 4 times daily (Patient not taking: Reported on 10/27/2022), Disp: 120 tablet, Rfl: 1    calcitonin (MIACALCIN) 200 UNIT/ACT nasal spray, 1 spray in one nostril daily, alternating nostril side daily, Disp: 1 each, Rfl: 0    Codeine, Hydrocodone, and Hydrocodone-acetaminophen    Vitals:    22 1322   Weight: 166 lb (75.3 kg)       No results displayed because visit has over 200 results. Review of Systems    Physical Exam  Constitutional:       Appearance: Normal appearance. Cardiovascular:      Rate and Rhythm: Normal rate and regular rhythm. Pulses: Normal pulses. Dorsalis pedis pulses are 2+ on the right side and 2+ on the left side. Posterior tibial pulses are 2+ on the right side and 2+ on the left side. Comments:       LEG MEASUREMENTS 10/31/22:    10/31/2022  Right ankle:  27.3 cm  Right calf:  42.1 cm    Left ankle:  27.2 cm  Left calf:  40 cm  Pulmonary:      Effort: Pulmonary effort is normal. No respiratory distress. Musculoskeletal:      Right lower le+ Pitting Edema (nonpitting edema; right ankle measures 27.3 cm, calf 42.1 cm) present. Left lower leg: Edema (nonpitting edema; left ankle measures 27.2 cm, calf 40 cm) present. Skin:     General: Skin is warm and dry. Capillary Refill: Capillary refill takes less than 2 seconds. Findings: Erythema present. Neurological:      Mental Status: He is alert. Mr. Imani Benson has had his gallbladder removed.  He has had back surgery in June, 2022. ASSESSMENT:    Problem List Items Addressed This Visit          Medium    Peripheral polyneuropathy    Back pain - Primary    Compression fracture of L2 lumbar vertebra (HCC)    Leg swelling    Bilateral leg edema    Chronic superficial phlebitis of right lower extremity       PLAN:  His legs are much improved on the Unna boot regimen put on 6 inch Ace wraps today and gave a prescription for Velcro wraps or going over to [facility] today hopefully to get measured and get the wraps. Has a lot of back pain just leaning back I doubt he will be able to get the stockings on properly because of his recent back surgery. I think he should go ahead and get the CT venous phase of his abdomen to rule out central occlusion as a cause of his edema. But it may be lack of moving around after his surgery that is contributing. His total protein is on the low side says he has been losing weight not eating much this may contribute to his edema as well. Reviewing his venous reflux scan I see no reason to intervene on his saphenous veins with ablation. Does have chronic superficial phlebitis of the right lesser saphenous vein  We are giving you a prescription for velcro compression stockings, 30-40 mmHg. You will need to be measured for these. Schedule to see Nba Morton CNP after you get your stockings. Emerita Wallis MA am scribing for and in the presence of Dionisio Cabrera MD on this date of 11/04/22    I Samson Friedman MD personally performed the services described in this documentation as scribed by the Medical Assistant Roma Carcamo in my presence and it is both accurate and complete.       Electronically signed by Dionisio Cabrera MD on 11/4/2022 at 3:16 PM

## 2022-11-04 NOTE — TELEPHONE ENCOUNTER
A script for 7.5 mg mirtazepine was sent to the pharmacy on 10/15 w/ refills, so let me know if problems getting refills or if needs higher dose

## 2022-11-04 NOTE — PATIENT INSTRUCTIONS
We are giving you a prescription for velcro compression stockings, 30-40 mmHg. You will need to be measured for these. Schedule to see Herman Raya CNP after you get your stockings. Dolly Painting

## 2022-11-08 ENCOUNTER — TELEPHONE (OUTPATIENT)
Dept: FAMILY MEDICINE CLINIC | Age: 72
End: 2022-11-08

## 2022-11-08 ENCOUNTER — HOSPITAL ENCOUNTER (OUTPATIENT)
Dept: CT IMAGING | Age: 72
Discharge: HOME | End: 2022-11-08
Payer: COMMERCIAL

## 2022-11-08 DIAGNOSIS — M79.89 LEG SWELLING: ICD-10-CM

## 2022-11-08 LAB
GFR SERPL CREATININE-BSD FRML MDRD: >60 ML/MIN/{1.73_M2}
PERFORMED ON: ABNORMAL
POC CREATININE: 0.5 MG/DL (ref 0.8–1.3)
POC SAMPLE TYPE: ABNORMAL

## 2022-11-08 PROCEDURE — 82565 ASSAY OF CREATININE: CPT

## 2022-11-08 PROCEDURE — 6360000004 HC RX CONTRAST MEDICATION: Performed by: FAMILY MEDICINE

## 2022-11-08 PROCEDURE — 74174 CTA ABD&PLVS W/CONTRAST: CPT

## 2022-11-08 RX ADMIN — IOPAMIDOL 75 ML: 755 INJECTION, SOLUTION INTRAVENOUS at 07:42

## 2022-11-08 NOTE — TELEPHONE ENCOUNTER
Patient would like to speak with  concerning his medication:    Lasix - it is making him pee every hour  Tramadol - he does not like being on this medication because it makes him tired. He does not think it is working. He is still having back pain. Gabapentin - he was told at the hospital that the medication can cause his legs to swell and both are swollen. He has more pain at night. Can he do something other than the gabapentin. Please give him a call back. [facility]. Phone no. [phone]    Muscle relaxer he cannot take because it makes him pee more.

## 2022-11-08 NOTE — TELEPHONE ENCOUNTER
RELAYED INFORMATION TO PT. HE WOULD LIKE TO KNOW WHAT ELSE CAN BE DONE FOR HIS  PAIN CONTROL.  WOULD LIKE TO TRY TO GET IN TO SEE DR QUINONEZ IF POSSIBLE BEFORE December

## 2022-11-08 NOTE — TELEPHONE ENCOUNTER
----- Message from Mckenzie Llamas sent at 11/8/2022 11:19 AM EST -----  Subject: Medication Problem    Medication: furosemide (LASIX) 20 MG tablet  Dosage: 100 MG  Ordering Provider: chauncey    Question/Problem: patient would like to know if he should still take the   lasix  Additional Information for Provider:     Pharmacy: [address] [place] [address] [phone]    ---------------------------------------------------------------------------  --------------  Tammy HAWTHORNE  [phone]; OK to leave message on voicemail  ---------------------------------------------------------------------------  --------------    SCRIPT ANSWERS  Relationship to Patient: Self

## 2022-11-08 NOTE — TELEPHONE ENCOUNTER
Would only take lasix if needed for swelling. Results of test aren't back yet. The specialist should be calling to go over results with you.

## 2022-11-09 NOTE — TELEPHONE ENCOUNTER
IT IS NOT POSSIBLE FOR ME TO WORK HIM BEFORE DEC UNLESS I CONTINUE TO TAKE AWAY YOUR SAME DAY APPOINTMENTS. Nuria Avilez

## 2022-11-09 NOTE — TELEPHONE ENCOUNTER
I SENT THEM A [employer] MESSAGE TO SEE IF THEY WANT TO DO A VIRTUAL VISIT WITH YOU. I CANNOT ADD SOMEONE EXTRA ON A Monday. VIRTUAL OR IN OFFICE. WE WILL HAVE TO JUST USE A SAME DAY. Maya Mendes

## 2022-11-09 NOTE — TELEPHONE ENCOUNTER
oarrs reviewed and results c/w rx'd meds  Please call pharmacy and ask them to fill today - this is appropriate and consistent w/ oarrs and directions!   If problems, can call to different pharmacy

## 2022-11-11 RX ORDER — GABAPENTIN 100 MG/1
CAPSULE ORAL
Qty: 150 CAPSULE | Refills: 1 | Status: SHIPPED | OUTPATIENT
Start: 2022-11-11 | End: 2022-12-10

## 2022-11-16 ENCOUNTER — TELEMEDICINE (OUTPATIENT)
Dept: FAMILY MEDICINE CLINIC | Age: 72
End: 2022-11-16
Payer: COMMERCIAL

## 2022-11-16 DIAGNOSIS — R09.89 CHEST CONGESTION: ICD-10-CM

## 2022-11-16 DIAGNOSIS — H92.03 ACUTE OTALGIA, BILATERAL: Primary | ICD-10-CM

## 2022-11-16 DIAGNOSIS — R09.82 POST-NASAL DRIP: ICD-10-CM

## 2022-11-16 PROCEDURE — 1123F ACP DISCUSS/DSCN MKR DOCD: CPT | Performed by: NURSE PRACTITIONER

## 2022-11-16 PROCEDURE — 99213 OFFICE O/P EST LOW 20 MIN: CPT | Performed by: NURSE PRACTITIONER

## 2022-11-16 RX ORDER — AMOXICILLIN AND CLAVULANATE POTASSIUM 875; 125 MG/1; MG/1
1 TABLET, FILM COATED ORAL 2 TIMES DAILY
Qty: 20 TABLET | Refills: 0 | Status: SHIPPED | OUTPATIENT
Start: 2022-11-16 | End: 2022-11-26

## 2022-11-16 ASSESSMENT — ENCOUNTER SYMPTOMS
DIARRHEA: 0
COUGH: 1
TROUBLE SWALLOWING: 1
CHEST TIGHTNESS: 0
SINUS PRESSURE: 0
SORE THROAT: 1
SINUS PAIN: 0
BACK PAIN: 0
EYE DISCHARGE: 0
ABDOMINAL PAIN: 0
COLOR CHANGE: 0
RHINORRHEA: 1
SHORTNESS OF BREATH: 1
NAUSEA: 0
CONSTIPATION: 0
ABDOMINAL DISTENTION: 0

## 2022-11-16 NOTE — PROGRESS NOTES
Steven Lazo (:  1950) is a 73 y.o. male,Established patient, here for evaluation of the following chief complaint(s): Cough (COUGH AND COLD )      Steven Lazo, was evaluated through a synchronous (real-time) audio-video encounter. The patient (or guardian if applicable) is aware that this is a billable service, which includes applicable co-pays. This Virtual Visit was conducted with patient's (and/or legal guardian's) consent. The visit was conducted pursuant to the emergency declaration under the [facility], [address] [place] [address] authority and the [employer] and [employer] General Act. Patient identification was verified, and a caregiver was present when appropriate. The patient was located at home in a state where the provider was licensed to provide care. Patient identification was verified at the start of the visit: Yes    ASSESSMENT/PLAN:  1. Acute otalgia, bilateral  -     amoxicillin-clavulanate (AUGMENTIN) 875-125 MG per tablet; Take 1 tablet by mouth 2 times daily for 10 days, Disp-20 tablet, R-0Normal   Take medication in its entirety even if feeling better to avoid a resistance to antibiotics   Chose augmentin since pt recently on keflex and because complaining of ear pain   Discussed probiotic use but do not take at same time of day as antibiotic as this can decrease efficacy of antibiotic, or eat yogurt daily while on antibiotic to try and decrease risk of GI upset/diarrhea/CDiff  2. Post-nasal drip  -     amoxicillin-clavulanate (AUGMENTIN) 875-125 MG per tablet;  Take 1 tablet by mouth 2 times daily for 10 days, Disp-20 tablet, R-0Normal   Mucinex twice daily   Hot showers   Vicks vapo rub to chest, under nares   Humidifier or vaporizer near bed where sleeping   Sinus rinse as needed/Netti pot   Push fluids, increase protein intake   Cough and deep breathe   OTC (over the counter) Cough suppressant at night to aid with sleep- robitussin, delsym   If breathing issues, cough and deep breathe, lay on belly (prone) to aid with oxygenating lungs if possibly COVID   Vitamin C, zinc, and vitamin D supplements to boost immune system   Cepacol/throat lozenges for sore throat and cough   Warm tea/tea with honey for cough and sore throat   Warm water or salt water gargle for sore throat   If given antibiotic, take in its entirety until completion to decrease antibiotic resistance developing   Tylenol and ibuprofen for aches and pains and fever >100 F  3. Chest congestion  -     amoxicillin-clavulanate (AUGMENTIN) 875-125 MG per tablet; Take 1 tablet by mouth 2 times daily for 10 days, Disp-20 tablet, R-0Normal   On nebulizer, helps some   Encouraged mucinex BID   Has refill already on nebulizer per pt   Wife also sick- on amoxicllin, helping her some    Return in about 3 weeks (around 12/7/2022) for med check up. SUBJECTIVE/OBJECTIVE:  HPI    Chief Complaint   Patient presents with    Cough     COUGH AND COLD      Upper Respiratory Infection  Patient complains of symptoms of a URI. Symptoms include  post nasal drip, cough, nasal voice, nasal dripping, neck tenderness, ear pain- right side and now left ear hurting as well, some SOB- started using breathing machine . Onset of symptoms was 2 weeks ago, gradually worsening since that time. He also c/o non productive cough for the past 2 weeks . He is drinking plenty of fluids. Evaluation to date: none. Treatment to date:Tried ear drops. Started with breathing machine- nebulizer. Wife has also been sick lately - started on amoxicillin about 5 days ago after initially trying allergy treatment for her symptoms. Was on keflex about 1.5 months ago for BLE. Review of Systems   Constitutional:  Negative for activity change, appetite change, fatigue, fever and unexpected weight change. HENT:  Positive for congestion, ear pain, postnasal drip, rhinorrhea, sore throat and trouble swallowing. Negative for sinus pressure and sinus pain. Eyes:  Negative for discharge and visual disturbance. Respiratory:  Positive for cough and shortness of breath. Negative for chest tightness. Cardiovascular:  Negative for chest pain, palpitations and leg swelling. Gastrointestinal:  Negative for abdominal distention, abdominal pain, constipation, diarrhea and nausea. Endocrine: Negative for cold intolerance, heat intolerance, polydipsia, polyphagia and polyuria. Genitourinary:  Negative for decreased urine volume, difficulty urinating, dysuria, flank pain, frequency and urgency. Musculoskeletal:  Negative for arthralgias, back pain, gait problem, joint swelling, myalgias and neck pain. Skin:  Negative for color change, rash and wound. Allergic/Immunologic: Negative for food allergies and immunocompromised state. Neurological:  Negative for dizziness, tremors, speech difficulty, weakness, light-headedness, numbness and headaches. Hematological:  Negative for adenopathy. Does not bruise/bleed easily. Psychiatric/Behavioral:  Negative for confusion, decreased concentration, self-injury, sleep disturbance and suicidal ideas. The patient is not nervous/anxious.       Patient-Reported Vitals 9/9/2021   Patient-Reported Weight 165 LB   Patient-Reported Height 73\"            [INSTRUCTIONS:  \"[x]\" Indicates a positive item  \"[]\" Indicates a negative item  -- DELETE ALL ITEMS NOT EXAMINED]    Constitutional: [x] Appears well-developed and well-nourished [x] No apparent distress      [] Abnormal -     Mental status: [x] Alert and awake  [x] Oriented to person/place/time [x] Able to follow commands    [] Abnormal -     Eyes:   EOM    [x]  Normal    [] Abnormal -   Sclera  [x]  Normal    [] Abnormal -          Discharge [x]  None visible   [] Abnormal -     HENT: [x] Normocephalic, atraumatic  [x] Abnormal - hoarse voice  [x] Mouth/Throat: Mucous membranes are moist    External Ears [x] Normal  [] Abnormal -    Neck: [x] No visualized mass [] Abnormal -     Pulmonary/Chest: [x] Respiratory effort normal   [x] No visualized signs of difficulty breathing or respiratory distress        [] Abnormal -      Musculoskeletal:   [x] Normal gait with no signs of ataxia         [x] Normal range of motion of neck        [] Abnormal -     Neurological:        [x] No Facial Asymmetry (Cranial nerve 7 motor function) (limited exam due to video visit)          [x] No gaze palsy        [] Abnormal -          Skin:        [x] No significant exanthematous lesions or discoloration noted on facial skin         [] Abnormal -            Psychiatric:       [x] Normal Affect [] Abnormal -        [x] No Hallucinations    Other pertinent observable physical exam findings:-      On this date 11/16/2022 I have spent 22 minutes reviewing previous notes, test results and face to face (virtual) with the patient discussing the diagnosis and importance of compliance with the treatment plan as well as documenting on the day of the visit. Care Gaps Addressed  Hep C screen recommended with next blood draw   COVID booster recommended  Flu vaccine recommended       I have reviewed patient's pertinent medical history, relevant laboratory and imaging studies, and past/future health maintenance. Discussed with the patient the importance of adhering to their current medication regimen as directed. Advised the patient that they should continue to work on eating a healthy balanced diet and staying active by exercising within their personal limits. Orders as listed above. Patient was advised to keep future appointments with their respective specialty care team(s). Patient had the opportunity to ask questions, all of which were answered to the best of my ability and with patient satisfaction. Patient understands and is agreeable with the care plan following today's visit. Patient is to schedule an appointment for any new or worsening symptoms.  Go to ER for significant shortness of breath, chest pain, or uncontrolled pain or fever. I discussed with patient the risk and benefits of any medications that were prescribed today. I verified that the patient understands their medications, labs, and/or procedures. The patient is doing well with current medication regimen and does not have any barriers to adherence. The patient's self-management abilities are good. Follow Up in 1 Months for December- Dr Schulz Spotted visit medication follow up    Christopher Fontanez is a 73 y.o. male being evaluated by a Virtual Visit (video visit) encounter to address concerns as mentioned above. A caregiver was present when appropriate. Due to this being a TeleHealth encounter ([facility]), evaluation of the following organ systems was limited: Vitals/Constitutional/EENT/Resp/CV/GI//MS/Neuro/Skin/Heme-Lymph-Imm. Pursuant to the emergency declaration under the [address] [place] [address], [address] [place] [address] authority and the [employer] Act, this Virtual Visit was conducted with patient's (and/or legal guardian's) consent, to reduce the patient's risk of exposure to COVID-19 and provide necessary medical care. The patient (and/or legal guardian) has also been advised to contact this office for worsening conditions or problems, and seek emergency medical treatment and/or call 911 if deemed necessary. Services were provided through a video synchronous discussion virtually to substitute for in-person clinic visit. Patient was located at home and provider was located in office or at home. An electronic signature was used to authenticate this note.     --CELESTINO David - CNP

## 2022-11-16 NOTE — PATIENT INSTRUCTIONS
Patient Education        Sinusitis: Care Instructions  Your Care Instructions     Sinusitis is an infection of the lining of the sinus cavities in your head. Sinusitis often follows a cold. It causes pain and pressure in your head and face. In most cases, sinusitis gets better on its own in 1 to 2 weeks. But some mild symptoms may last for several weeks. Sometimes antibiotics are needed. Follow-up care is a key part of your treatment and safety. Be sure to make and go to all appointments, and call your doctor if you are having problems. It's also a good idea to know your test results and keep a list of the medicines you take. How can you care for yourself at home? Take an over-the-counter pain medicine, such as acetaminophen (Tylenol), ibuprofen (Advil, Motrin), or naproxen (Aleve). Read and follow all instructions on the label. If the doctor prescribed antibiotics, take them as directed. Do not stop taking them just because you feel better. You need to take the full course of antibiotics. Be careful when taking over-the-counter cold or flu medicines and Tylenol at the same time. Many of these medicines have acetaminophen, which is Tylenol. Read the labels to make sure that you are not taking more than the recommended dose. Too much acetaminophen (Tylenol) can be harmful. Breathe warm, moist air from a steamy shower, a hot bath, or a sink filled with hot water. Avoid cold, dry air. Using a humidifier in your home may help. Follow the directions for cleaning the machine. Use saline (saltwater) nasal washes. This can help keep your nasal passages open and wash out mucus and bacteria. You can buy saline nose drops at a grocery store or drugstore. Or you can make your own at home by adding 1 teaspoon of salt and 1 teaspoon of baking soda to 2 cups of distilled water. If you make your own, fill a bulb syringe with the solution, insert the tip into your nostril, and squeeze gently. [place] your nose.   Put a hot, wet towel or a warm gel pack on your face 3 or 4 times a day for 5 to 10 minutes each time. Try a decongestant nasal spray like oxymetazoline (Afrin). Do not use it for more than 3 days in a row. Using it for more than 3 days can make your congestion worse. When should you call for help? Call your doctor now or seek immediate medical care if:    You have new or worse swelling or redness in your face or around your eyes. You have a new or higher fever. Watch closely for changes in your health, and be sure to contact your doctor if:    You have new or worse facial pain. The mucus from your nose becomes thicker (like pus) or has new blood in it. You are not getting better as expected. Where can you learn more? Go to [email or web address]://[employer]. org and sign in to your [employer] account. Enter C901 in the [employer] box to learn more about \"Sinusitis: Care Instructions. \"     If you do not have an account, please click on the \"Sign Up Now\" link. Current as of: May 4, 2022               Content Version: 13.4  © 0568-1553 Healthwise, Incorporated. Care instructions adapted under license by [facility] ([facility]). If you have questions about a medical condition or this instruction, always ask your healthcare professional. [address] any warranty or liability for your use of this information.

## 2022-11-17 ENCOUNTER — OFFICE VISIT (OUTPATIENT)
Dept: VASCULAR SURGERY | Age: 72
End: 2022-11-17
Payer: COMMERCIAL

## 2022-11-17 DIAGNOSIS — M79.89 LEG SWELLING: Primary | ICD-10-CM

## 2022-11-17 DIAGNOSIS — R60.0 BILATERAL LEG EDEMA: ICD-10-CM

## 2022-11-17 PROCEDURE — 1123F ACP DISCUSS/DSCN MKR DOCD: CPT | Performed by: NURSE PRACTITIONER

## 2022-11-17 PROCEDURE — 99212 OFFICE O/P EST SF 10 MIN: CPT | Performed by: NURSE PRACTITIONER

## 2022-11-17 RX ORDER — POTASSIUM CHLORIDE 750 MG/1
10 TABLET, EXTENDED RELEASE ORAL DAILY
Qty: 30 TABLET | Refills: 0 | Status: SHIPPED | OUTPATIENT
Start: 2022-11-17

## 2022-11-17 RX ORDER — FUROSEMIDE 20 MG/1
20 TABLET ORAL DAILY
Qty: 30 TABLET | Refills: 0 | Status: SHIPPED | OUTPATIENT
Start: 2022-11-17 | End: 2022-12-17

## 2022-11-17 NOTE — PATIENT INSTRUCTIONS
Return in 2 months (on 1/17/2023) for bilateral lower leg swelling. PATIENT EDUCATION focused on the need for compression on a daily basis. Patient has a new CompresFlex Lite Velcro Compression System. The Circ-Aids are specially designed to improve blood flow in the legs, prevent blood clotting and inhibit the progression of a variety of venous disorders. They are designed to be tighter around the ankles with gradually less pressure moving up the lower limbs toward the knees. Working with the calf muscle, the velcro compression system is designed to help squeeze the venous blood back up toward the heart, to enhance circulation.

## 2022-11-17 NOTE — PROGRESS NOTES
Norma Sequeira (:  1950) is a 73 y.o. male,Established patient, here for evaluation of the following chief complaint(s):  Leg Swelling (Patient presents today for bilateral leg swelling. Wear Circ-Aids)    Pain Assessment  The patient is currently not experiencing any pain at this time. Edema  The edema is located in the bilateral lower extremities. The patient reports improvement overall. The patient has not experienced any new symptoms since last visit. After a recent surgery, the patient had progressive pain and swelling in both legs. He has not been ambulating much; he walks with a walker. He cannot elevate his legs due to pain. He underwent a bilateral lower extremity reflux study which demonstrates clinically significant venous reflux in the right common femoral and left sapheno-femoral junction only. The patient denies shortness of breath. He is taking lasix 20 mg daily. He was treated with bilateral Unna boots and presents today wearing his new Compreflex Lite velcro compression system. .    Review of Systems:  Musculoskeletal:  Positive for Back pain  Leg swelling:  bilateral (Controlled with velcro compression system)  No wound  No rash  Gait:  slow and cautious; use of walker  All other systems negative. Allergies   Allergen Reactions    Codeine      Upset stomach     Hydrocodone Nausea And Vomiting    Hydrocodone-Acetaminophen Nausea Only and Nausea And Vomiting       Prior to Visit Medications    Medication Sig Taking?  Authorizing Provider   furosemide (LASIX) 20 MG tablet TAKE 1 TABLET BY MOUTH DAILY  Aster Longo MD   potassium chloride (KLOR-CON M) 10 MEQ extended release tablet TAKE 1 TABLET BY MOUTH DAILY  Aster Longo MD   amoxicillin-clavulanate (AUGMENTIN) 875-125 MG per tablet Take 1 tablet by mouth 2 times daily for 10 days  Wes French APRN - CNP   gabapentin (NEURONTIN) 100 MG capsule 1 po 5x/ day  Aster Longo MD   vitamin D (ERGOCALCIFEROL) 1.25 MG (76087 UT) CAPS capsule TAKE 1 CAPSULE BY MOUTH WEEKLY  Robert Rush MD   traMADol (ULTRAM) 50 MG tablet Take 1 tablet by mouth every 4 hours as needed for Pain for up to 30 days. Fill 11/7/22 or after  Robert Rush MD   solifenacin (VESICARE) 10 MG tablet Take 1 tablet by mouth daily  Robert Rush MD   mirtazapine (REMERON) 7.5 MG tablet Take 1 tablet by mouth nightly  Robert Rush MD   topiramate (TOPAMAX) 25 MG tablet TAKE 3 TABLETS BY MOUTH EVERY MORNING AND 5 TABLETS EVERY EVENING  CELESTINO Moss CNP   rizatriptan (MAXALT-MLT) 10 MG disintegrating tablet May repeat in 2 hours if needed  CELESTINO Barahona CNP   Magnesium Gluconate 500 (27 Mg) MG TABS tablet Take 500 mg by mouth daily  Historical Provider, MD   Levomefolate Glucosamine (METHYLFOLATE) 400 MCG CAPS Take 400 mcg by mouth every evening  Historical Provider, MD   gabapentin (NEURONTIN) 300 MG capsule Take 300 mg by mouth in the morning and at bedtime. 1200 and 1600  Historical Provider, MD   ondansetron (ZOFRAN ODT) 4 MG disintegrating tablet Take 1 tablet by mouth every 8 hours as needed for Nausea or Vomiting  Robert Rush MD   tiotropium (SPIRIVA RESPIMAT) 2.5 MCG/ACT AERS inhaler Inhale 2 puffs into the lungs in the morning. Robert Rush MD   levocetirizine (XYZAL) 5 MG tablet TAKE 1 TABLET BY MOUTH EVERY EVENING. Patient taking differently: Take 5 mg by mouth nightly  CELESTINO Moreland CNP   pantoprazole (PROTONIX) 40 MG tablet TAKE 1 TABLET BY MOUTH TWICE DAILY  Patient taking differently: Take 40 mg by mouth in the morning and at bedtime  Robert Rush MD   mometasone-formoterol (DULERA) 200-5 MCG/ACT inhaler INHALE 2 PUFFS INTO THE LUNGS 2 TIMES DAILY RINSE MOUTH AFTER USING.   CELESTINO Lou CNP   albuterol sulfate HFA (PROVENTIL;VENTOLIN;PROAIR) 108 (90 Base) MCG/ACT inhaler INHALE 2 PUFFS INTO THE LUNGS FOUR TIMES DAILY AS NEEDED FOR WHEEZING  Hannah Godwin APRN - CNP Spacer/Aero-Holding Chambers (COMPACT SPACE CHAMBER/SM MASK) AZAEL AS DIRECTED w/ inhalers  Carlos Weinberg MD   ipratropium (ATROVENT) 0.06 % nasal spray TAKE 1-2 SPRAYS BY NASAL ROUTE 4 TIMES DAILY AS NEEDED FOR RHINITIS  Carlos Weinberg MD   albuterol (PROVENTIL) (2.5 MG/3ML) 0.083% nebulizer solution Take 3 mLs by nebulization every 6 hours as needed for Wheezing  Carlos Weinberg MD       History reviewed. Physical Exam  Constitutional:       Appearance: Normal appearance. Cardiovascular:      Rate and Rhythm: Normal rate and regular rhythm. Pulses: Normal pulses. Dorsalis pedis pulses are 2+ on the right side and 2+ on the left side. Posterior tibial pulses are 2+ on the right side and 2+ on the left side. Heart sounds: Normal heart sounds. Comments:     LEG MEASUREMENTS:    11/17/2022  Right ankle:  26.3 cm  Right calf:  36.2 cm    Left ankle:  27.2 cm  Left calf:  40 cm      10/31/2022  Right ankle:  27.3 cm  Right calf:  42.1 cm    Left ankle:  27.2 cm  Left calf:  40 cm  Pulmonary:      Effort: Pulmonary effort is normal. No respiratory distress. Breath sounds: Normal breath sounds. Musculoskeletal:      Right lower leg: Edema (nonpitting edema; right ankle measures 26.3 cm, calf 36.2 cm) present. Left lower leg: Edema (nonpitting edema; left ankle measures 26.1 cm, calf 38.8 cm) present. Skin:     General: Skin is warm and dry. Capillary Refill: Capillary refill takes less than 2 seconds. Comments: Blue purple hue to toes   Neurological:      Mental Status: He is alert. ASSESSMENT:    Leg swelling     Bilateral CompreFlex Lite Velcro Compression System. PATIENT EDUCATION focused on the need for compression on a daily basis. Patient has a new CompresFlex Lite Velcro Compression System. The Circ-Aids are specially designed to improve blood flow in the legs, prevent blood clotting and inhibit the progression of a variety of venous disorders. They are designed to be tighter around the ankles with gradually less pressure moving up the lower limbs toward the knees. Working with the calf muscle, the velcro compression system is designed to help squeeze the venous blood back up toward the heart, to enhance circulation. PLAN:    Return in 2 months (on 1/17/2023) for bilateral lower leg swelling. Per Dr. Rolan Santo last note: If swelling persists, plan for CT Venous phase of abdomen to R/O central occlusion as a cause of his edema. It may be lack of moving around after his recent back surgery that is contributing. His total protein is on the low side, says he has been losing weight and not eating much; this may contribute to his leg swelling as well.

## 2022-11-20 VITALS — BODY MASS INDEX: 20.53 KG/M2 | HEIGHT: 74 IN | WEIGHT: 160 LBS

## 2022-12-06 NOTE — TELEPHONE ENCOUNTER
Last ov    11/16/2022 CELESTINO Varela - CNP Family Medicine Telemedicine Acute otalgia, bilateral     Next ov    12/7/2022  2:00 PM OFFICE VISIT [address] MD    Appointment Notes:    Appt Reason: Routine Existing Condition Follow Upmedication follow up- screened greenBooking Code: [id number]

## 2022-12-07 ENCOUNTER — OFFICE VISIT (OUTPATIENT)
Dept: FAMILY MEDICINE CLINIC | Age: 72
End: 2022-12-07
Payer: COMMERCIAL

## 2022-12-07 VITALS
DIASTOLIC BLOOD PRESSURE: 86 MMHG | WEIGHT: 157 LBS | SYSTOLIC BLOOD PRESSURE: 138 MMHG | OXYGEN SATURATION: 99 % | HEART RATE: 60 BPM | BODY MASS INDEX: 20.16 KG/M2

## 2022-12-07 DIAGNOSIS — M46.46 DISCITIS OF LUMBAR REGION: ICD-10-CM

## 2022-12-07 DIAGNOSIS — I87.2 VENOUS INSUFFICIENCY: ICD-10-CM

## 2022-12-07 DIAGNOSIS — R60.9 PERIPHERAL EDEMA: ICD-10-CM

## 2022-12-07 DIAGNOSIS — G57.93 NEUROPATHY OF BOTH FEET: ICD-10-CM

## 2022-12-07 DIAGNOSIS — N32.81 OAB (OVERACTIVE BLADDER): ICD-10-CM

## 2022-12-07 DIAGNOSIS — G89.29 CHRONIC BILATERAL LOW BACK PAIN, UNSPECIFIED WHETHER SCIATICA PRESENT: Primary | ICD-10-CM

## 2022-12-07 DIAGNOSIS — M81.0 AGE RELATED OSTEOPOROSIS, UNSPECIFIED PATHOLOGICAL FRACTURE PRESENCE: ICD-10-CM

## 2022-12-07 DIAGNOSIS — S32.000D COMPRESSION FRACTURE OF LUMBAR VERTEBRA WITH ROUTINE HEALING, UNSPECIFIED LUMBAR VERTEBRAL LEVEL, SUBSEQUENT ENCOUNTER: ICD-10-CM

## 2022-12-07 DIAGNOSIS — D64.9 MILD ANEMIA: ICD-10-CM

## 2022-12-07 DIAGNOSIS — M54.50 CHRONIC BILATERAL LOW BACK PAIN, UNSPECIFIED WHETHER SCIATICA PRESENT: Primary | ICD-10-CM

## 2022-12-07 PROCEDURE — 1123F ACP DISCUSS/DSCN MKR DOCD: CPT | Performed by: FAMILY MEDICINE

## 2022-12-07 PROCEDURE — 99214 OFFICE O/P EST MOD 30 MIN: CPT | Performed by: FAMILY MEDICINE

## 2022-12-07 RX ORDER — ONDANSETRON 4 MG/1
4 TABLET, ORALLY DISINTEGRATING ORAL EVERY 8 HOURS PRN
Qty: 30 TABLET | Refills: 2 | Status: SHIPPED | OUTPATIENT
Start: 2022-12-07

## 2022-12-07 RX ORDER — TRAMADOL HYDROCHLORIDE 50 MG/1
50 TABLET ORAL EVERY 4 HOURS PRN
Qty: 60 TABLET | Refills: 0 | Status: SHIPPED | OUTPATIENT
Start: 2022-12-09 | End: 2022-12-21

## 2022-12-07 RX ORDER — PREGABALIN 75 MG/1
CAPSULE ORAL
Qty: 90 CAPSULE | Refills: 0 | Status: SHIPPED | OUTPATIENT
Start: 2022-12-07 | End: 2023-01-06

## 2022-12-07 NOTE — LETTER
[address] [address]  Phone: [phone]  Fax: [phone]    Alfonzo Denver, MD         December 7, 2022     Patient: Joshua Loja   YOB: 1950   Date of Visit: 12/7/2022       To Whom It May Concern: It is my medical opinion that Nitin Mariano requires a disability parking placard for the following reasons:  He cannot walk without assistance from another person or the use of an assistance device (cane, crutch, prosthetic device, wheelchair, etc.). Duration of need: 1 year    If you have any questions or concerns, please don't hesitate to call.     Sincerely,        Alfonzo Denver, MD

## 2022-12-07 NOTE — PROGRESS NOTES
[facility] Medicine  Clinic Note    Date: 12/7/2022                                               Subjective:     Chief Complaint   Patient presents with    Other     FOLLOW UP FROM ON GOING ISSUES      HPI  11/1 - cardio note - dr. Adalberto Trujillo reviewed - decision being made regarding venous ablation procedure  Vascular note 11/17/22 reviewed - given rishabh flex lite velcro compression system for improving blood flow/ return in legs. STARTED THIS - PAIN TO USE - feet compressed but too tight on feet - helps calves but still w/ swelling in feet  Had been tx'd w/ unna boot prior   S/p kyphoplasty/ vertebroplasty  Dexa 10/22 -osteoporosis - fosamax rx'd but seen by specialist - dr. Olguin [facility] -   Up and down through night - sleeps during day some as a result  Some nights better than others - pain in back is main factor but also nocturia - urinates a lot every hour through night  Down to 1/2 lasix tab daily - knees swell at night  Has been doing PT - ran out of sessions.   Needs to walk - not enough in house - tried walking at large store/ mall -using rollator - doing HEP from PT  Seems to be getting more atrophy in muscles  Skin issues w/ higher dose gabapentin - tolerates 100mg 5x/ day - higher doses problematic   Uses 1/2 zofran w/ travelling - induced nausea       Patient Active Problem List    Diagnosis Date Noted    Bilateral leg edema 11/04/2022    Chronic superficial phlebitis of right lower extremity 11/04/2022    Leg swelling 10/31/2022    Mixed obsessional thoughts and acts 09/28/2022    Localized edema 09/27/2022    Coronary artery calcification 09/27/2022    Mixed hyperlipidemia 09/27/2022    Compression fracture of L2 lumbar vertebra ([address] 09/25/2022    Degenerative lumbar spinal stenosis 09/25/2022    Urinary hesitancy 09/25/2022    Back pain 09/23/2022    Right sided sciatica 08/08/2022    Peripheral polyneuropathy 04/21/2022    DDD (degenerative disc disease), lumbar 09/05/2018    Closed wedge compression fracture of L1 vertebra (HCC) 09/05/2018    Spondylosis of lumbar spine 11/02/2015    Cerebral concussion 01/19/2016    Gall bladder stones 07/10/2013    B12 deficiency 07/10/2013    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 Recommended that he continue current therapy, as will likely drop with oral supplement. Closed wedge compression fracture of L1 vertebra ([address] 09/05/2018    Superior endplate fracture of L1 resulting 80% loss of vertebral body height    DDD (degenerative disc disease), lumbar 09/05/2018    L2-L3, L3-L4, L4-L5. Disc bulge with narrowing of the neural foramen at these levels    Diverticulitis     Gallbladder problem     S/P lap martha    GERD (gastroesophageal reflux disease)     Hiatal hernia     Migraine     MRSA infection 04/03/2019    Left hand    Nausea & vomiting     Spondylosis of lumbar spine 11/02/2015    On x-ray multilevel     Past Surgical History:   Procedure Laterality Date    CHOLECYSTECTOMY      COLONOSCOPY  9/11    FOOT SURGERY      nerve removed from foot    SHOULDER SURGERY Right 2005    rotator cuff repair    SPINE SURGERY N/A 9/28/2022    L2, L4, L5 KYPHOPLASTY WITH L2, L4 BONE BIOPSY performed by Sean Ramírez MD at [address] [facility]  9/11     Hospital Outpatient Visit on 11/08/2022   Component Date Value Ref Range Status    POC Creatinine 11/08/2022 0.5 (A)  0.8 - 1.3 mg/dL Final    Est, Glom Filt Rate 11/08/2022 >60  >60 Final    Comment: Pediatric calculator link  [email or web address]/gfr_[email or web address]  Effective Oct 3, 2022  These results are not intended for use in patients  <25years of age. eGFR results are calculated without  a race factor using the 2021 CKD-EPI equation. Careful  clinical correlation is recommended, particularly when  comparing to results calculated using previous equations. The CKD-EPI equation is less accurate in patients with  extremes of muscle mass, extra-renal metabolism of  creatinine, excessive creatinine ingestion, or following  therapy that affects renal tubular secretion. Sample Type 11/08/2022 NOEL   Final    Performed on 11/08/2022 SEE BELOW   Final    Performed on POC     Family History   Problem Relation Age of Onset    Hypertension Mother     High Cholesterol Mother     Hypertension Father     High Cholesterol Father     Diabetes Type 1  Sister         B 12    Diabetes Type 1  Sister         early peripheral neuropathy    Stroke Brother 79    Hypertension Brother     High Cholesterol Brother     Hypertension Brother     High Cholesterol Brother     Hypertension Brother     High Cholesterol Brother      Current Outpatient Medications   Medication Sig Dispense Refill    mometasone-formoterol (DULERA) 200-5 MCG/ACT inhaler INHALE 2 PUFFS INTO THE LUNGS TWICE DAILY. RINSE MOUTH AFTER USE 13 g 0    furosemide (LASIX) 20 MG tablet TAKE 1 TABLET BY MOUTH DAILY 30 tablet 0    potassium chloride (KLOR-CON M) 10 MEQ extended release tablet TAKE 1 TABLET BY MOUTH DAILY 30 tablet 0    gabapentin (NEURONTIN) 100 MG capsule 1 po 5x/ day 150 capsule 1    vitamin D (ERGOCALCIFEROL) 1.25 MG (49952 UT) CAPS capsule TAKE 1 CAPSULE BY MOUTH WEEKLY 4 capsule 2    traMADol (ULTRAM) 50 MG tablet Take 1 tablet by mouth every 4 hours as needed for Pain for up to 30 days.  Fill 11/7/22 or after 180 tablet 0    solifenacin (VESICARE) 10 MG tablet Take 1 tablet by mouth daily 30 tablet 5    mirtazapine (REMERON) 7.5 MG tablet Take 1 tablet by mouth nightly 30 tablet 5    topiramate (TOPAMAX) 25 MG tablet TAKE 3 TABLETS BY MOUTH EVERY MORNING AND 5 TABLETS EVERY EVENING 240 tablet 2    rizatriptan (MAXALT-MLT) 10 MG disintegrating tablet May repeat in 2 hours if needed 18 tablet 5    Magnesium Gluconate 500 (27 Mg) MG TABS tablet Take 500 mg by mouth daily      Levomefolate Glucosamine (METHYLFOLATE) 400 MCG CAPS Take 400 mcg by mouth every evening      gabapentin (NEURONTIN) 300 MG capsule Take 300 mg by mouth in the morning and at bedtime. 1200 and 1600      ondansetron (ZOFRAN ODT) 4 MG disintegrating tablet Take 1 tablet by mouth every 8 hours as needed for Nausea or Vomiting 30 tablet 1    tiotropium (SPIRIVA RESPIMAT) 2.5 MCG/ACT AERS inhaler Inhale 2 puffs into the lungs in the morning. 1 each 5    levocetirizine (XYZAL) 5 MG tablet TAKE 1 TABLET BY MOUTH EVERY EVENING. (Patient taking differently: Take 5 mg by mouth nightly) 30 tablet 5    pantoprazole (PROTONIX) 40 MG tablet TAKE 1 TABLET BY MOUTH TWICE DAILY (Patient taking differently: Take 40 mg by mouth in the morning and at bedtime) 60 tablet 5    albuterol sulfate HFA (PROVENTIL;VENTOLIN;PROAIR) 108 (90 Base) MCG/ACT inhaler INHALE 2 PUFFS INTO THE LUNGS FOUR TIMES DAILY AS NEEDED FOR WHEEZING 8.5 g 2    Spacer/Aero-Holding Chambers (COMPACT SPACE CHAMBER/SM MASK) AZAEL AS DIRECTED w/ inhalers 1 each 0    ipratropium (ATROVENT) 0.06 % nasal spray TAKE 1-2 SPRAYS BY NASAL ROUTE 4 TIMES DAILY AS NEEDED FOR RHINITIS 15 mL 5    albuterol (PROVENTIL) (2.5 MG/3ML) 0.083% nebulizer solution Take 3 mLs by nebulization every 6 hours as needed for Wheezing 75 mL 2     No current facility-administered medications for this visit. Allergies   Allergen Reactions    Codeine      Upset stomach     Hydrocodone Nausea And Vomiting    Hydrocodone-Acetaminophen Nausea Only and Nausea And Vomiting       Review of Systems    Objective:   Wt 157 lb (71.2 kg)   BMI 20.16 kg/m²     BP Readings from Last 3 Encounters:   11/01/22 110/62   10/31/22 (!) 140/70   10/21/22 132/70       Pulse Readings from Last 3 Encounters:   11/01/22 81   10/21/22 94   10/07/22 92       Wt Readings from Last 3 Encounters:   12/07/22 157 lb (71.2 kg)   11/17/22 160 lb (72.6 kg)   11/04/22 166 lb (75.3 kg)       Physical Exam  Vitals and nursing note reviewed. Constitutional:       Appearance: He is well-developed. HENT:      Head: Normocephalic and atraumatic. Eyes:      General: No scleral icterus. Conjunctiva/sclera: Conjunctivae normal.   Pulmonary:      Effort: Pulmonary effort is normal.   Musculoskeletal:      Comments: Compression wraps on - swelling looks to be down in legs  Walking w/ rollator   Skin:     General: Skin is warm and dry. Neurological:      Mental Status: He is alert and oriented to person, place, and time. Assessment/Plan:      Diagnosis Orders   1. Chronic bilateral low back pain, unspecified whether sciatica present        2. Compression fracture of lumbar vertebra with routine healing, unspecified lumbar vertebral level, subsequent encounter        3. Peripheral edema        4. Venous insufficiency        5. Age related osteoporosis, unspecified pathological fracture presence        6. Mild anemia        F/u vascular soon - using compression system presently- ? surgery  Started w/ mental health counseling  Handicap placard for 1 year - letter given  Working to get forteo injection daily  F/u pulm 2 weeks - using nebulizer  Zofran for nausea - takes 1/2 tab 4x/ week - effective  Seeing pain specialist 12/21 - in [facility] - dr. Lowe Lapping to do nerve block 2/2 effects of steroids  Still taking 5-6 tramadol/ day - using gabapentin 100 5x/day - helps w/ sciatic pain/ jumping in legs - consider change to lyrica   oarrs reviewed and results c/w rx'd meds  Gabapentin 11/15 - #150 of 100mg caps rx'd  Tramadol #180 filled 11/9  [facility] for now for urgency - ?  Helping some  Consider urology  Reviewed labs  Flu shot today  Nusrat South MD, MD  12/7/2022  1:57 PM

## 2022-12-13 ENCOUNTER — OFFICE VISIT (OUTPATIENT)
Dept: PULMONOLOGY | Age: 72
End: 2022-12-13
Payer: COMMERCIAL

## 2022-12-13 VITALS
RESPIRATION RATE: 18 BRPM | HEART RATE: 77 BPM | HEIGHT: 73 IN | SYSTOLIC BLOOD PRESSURE: 140 MMHG | OXYGEN SATURATION: 96 % | TEMPERATURE: 97.1 F | WEIGHT: 154 LBS | BODY MASS INDEX: 20.41 KG/M2 | DIASTOLIC BLOOD PRESSURE: 76 MMHG

## 2022-12-13 DIAGNOSIS — G62.9 PERIPHERAL POLYNEUROPATHY: ICD-10-CM

## 2022-12-13 DIAGNOSIS — R06.02 SOB (SHORTNESS OF BREATH): ICD-10-CM

## 2022-12-13 DIAGNOSIS — R06.02 SOB (SHORTNESS OF BREATH): Primary | ICD-10-CM

## 2022-12-13 PROCEDURE — 1123F ACP DISCUSS/DSCN MKR DOCD: CPT | Performed by: INTERNAL MEDICINE

## 2022-12-13 PROCEDURE — 99204 OFFICE O/P NEW MOD 45 MIN: CPT | Performed by: INTERNAL MEDICINE

## 2022-12-13 NOTE — PROGRESS NOTES
Pulmonary Critical Care Consult           REASON FOR CONSULTATION:  Chief Complaint   Patient presents with    Establish Care    Asthma    Wheezing        Consult at request of Jay Bean MD     PCP: Jay Bean MD        Assessment and Plan:   Diagnosis Orders   1. SOB (shortness of breath)  Allergen, Respiratory, Region 5 Panel    Hypersensitivity Pneumonitis Extended Panel    IgE    Full PFT Study With Bronchodilator    Lung Function Test MIP & MEP    XR CHEST STANDARD (2 VW)      2. Peripheral polyneuropathy            Plan: Will obtain PFT with MIP and MEP  Suspect restrictive lung disease secondary to weakness. Chest x-ray today. Allergen levels and hypersensitivity panel. HISTORY OF PRESENT ILLNESS: Kevin Greenfield is very pleasant 81y.o. year old gentleman with medical history stated below significant for history of coronary artery calcifications, hyperlipidemia, chronic back pain, was referred to us for shortness of breath on exertion,    Chest x-ray with no acute cardiopulmonary pathology. Has significant venous insufficiency with lower extremity edema on diuretics. Has a history of lower back pain with L1-L2 compression fracture status post kyphoplasty  \  Past Medical History:   Diagnosis Date    Arthritis     Asthma     B12 deficiency 07/10/2013    Tooks injection every other week and B12 level 500. Recommended that he continue current therapy, as will likely drop with oral supplement. Closed wedge compression fracture of L1 vertebra ([facility] 09/05/2018    Superior endplate fracture of L1 resulting 80% loss of vertebral body height    DDD (degenerative disc disease), lumbar 09/05/2018    L2-L3, L3-L4, L4-L5.   Disc bulge with narrowing of the neural foramen at these levels    Diverticulitis     Gallbladder problem     S/P lap martha    GERD (gastroesophageal reflux disease) Hiatal hernia     Migraine     MRSA infection 04/03/2019    Left hand    Nausea & vomiting     Spondylosis of lumbar spine 11/02/2015    On x-ray multilevel       Past Surgical History:   Procedure Laterality Date    CHOLECYSTECTOMY      COLONOSCOPY  9/11    FOOT SURGERY      nerve removed from foot    SHOULDER SURGERY Right 2005    rotator cuff repair    SPINE SURGERY N/A 9/28/2022    L2, L4, L5 KYPHOPLASTY WITH L2, L4 BONE BIOPSY performed by Marvel Knowles MD at [address] [place] [address]  9/11       family history includes Diabetes Type 1  in his sister and sister; High Cholesterol in his brother, brother, brother, father, and mother; Hypertension in his brother, brother, brother, father, and mother; Stroke (age of onset: 79) in his brother. SOCIAL HISTORY:   reports that he has never smoked. He has been exposed to tobacco smoke. He has never used smokeless tobacco.      ALLERGIES:  Patient is allergic to codeine, hydrocodone, and hydrocodone-acetaminophen. REVIEW OF SYSTEMS:  Constitutional: Negative for fever, no wt loss, no night sweats   HENT: Negative for sore throat, difficulty swallowing,   Eyes: Negative for redness, no discharge   Respiratory: Shortness of breath with exertion  Cardiovascular: Negative for chest pain, no palpitations   Gastrointestinal: Negative for vomiting, diarrhea   Genitourinary: Negative for hematuria, no dysuria    Musculoskeletal: Negative for arthralgias, no joint swelling   Skin: Negative for rash  LE: Lower extremity edema  Neurological: Negative for syncope, no tremor, no focal weakness or dysarthria   Hematological: Negative for adenopathy, or bleeding   Psychiatric/Behavorial: Negative for anxiety,    Objective:   PHYSICAL EXAM:  Blood pressure (!) 140/76, pulse 77, temperature 97.1 °F (36.2 °C), resp. rate 18, height 6' 1\" (1.854 m), weight 154 lb (69.9 kg), SpO2 96 %.'  Gen: No acute distress  Eyes: PERRL. No sclera icterus.  No conjunctival injection. ENT: No discharge. Pharynx clear. External appearance of ears and nose normal.  Neck: Trachea midline. No obvious mass. Resp: No accessory muscle use. No crackles. No wheezes. No rhonchi. CV: Regular rate. Regular rhythm. No murmur or rub. +++edema. GI: Non-tender. Non-distended. No hernia. Skin: Warm, dry, normal texture and turgor. No nodule on exposed extremities. Lymph: No cervical LAD. M/S: No cyanosis. No clubbing. No joint deformity. LE:  no edema   Neuro: no tremor, no focal deficit, awake and alert   Psych: ntact judgement and insight. Current Outpatient Medications   Medication Sig Dispense Refill    ondansetron (ZOFRAN ODT) 4 MG disintegrating tablet Take 1 tablet by mouth every 8 hours as needed for Nausea or Vomiting 30 tablet 2    mometasone-formoterol (DULERA) 200-5 MCG/ACT inhaler INHALE 2 PUFFS INTO THE LUNGS TWICE DAILY. RINSE MOUTH AFTER USE 13 g 0    furosemide (LASIX) 20 MG tablet TAKE 1 TABLET BY MOUTH DAILY 30 tablet 0    potassium chloride (KLOR-CON M) 10 MEQ extended release tablet TAKE 1 TABLET BY MOUTH DAILY 30 tablet 0    vitamin D (ERGOCALCIFEROL) 1.25 MG (30471 UT) CAPS capsule TAKE 1 CAPSULE BY MOUTH WEEKLY 4 capsule 2    rizatriptan (MAXALT-MLT) 10 MG disintegrating tablet May repeat in 2 hours if needed 18 tablet 5    Magnesium Gluconate 500 (27 Mg) MG TABS tablet Take 500 mg by mouth daily      Levomefolate Glucosamine (METHYLFOLATE) 400 MCG CAPS Take 400 mcg by mouth every evening      tiotropium (SPIRIVA RESPIMAT) 2.5 MCG/ACT AERS inhaler Inhale 2 puffs into the lungs in the morning. 1 each 5    levocetirizine (XYZAL) 5 MG tablet TAKE 1 TABLET BY MOUTH EVERY EVENING.  (Patient taking differently: Take 5 mg by mouth nightly) 30 tablet 5    albuterol sulfate HFA (PROVENTIL;VENTOLIN;PROAIR) 108 (90 Base) MCG/ACT inhaler INHALE 2 PUFFS INTO THE LUNGS FOUR TIMES DAILY AS NEEDED FOR WHEEZING 8.5 g 2    Spacer/Aero-Holding Chambers (COMPACT SPACE CHAMBER/SM MASK) AZAEL AS DIRECTED w/ inhalers 1 each 0    ipratropium (ATROVENT) 0.06 % nasal spray TAKE 1-2 SPRAYS BY NASAL ROUTE 4 TIMES DAILY AS NEEDED FOR RHINITIS 15 mL 5    albuterol (PROVENTIL) (2.5 MG/3ML) 0.083% nebulizer solution Take 3 mLs by nebulization every 6 hours as needed for Wheezing 75 mL 2    pregabalin (LYRICA) 150 MG capsule 1 po bid 60 capsule 2    topiramate (TOPAMAX) 25 MG tablet TAKE 3 TABLETS BY MOUTH EVERY MORNING AND 5 TABLETS EVERY EVENING 240 tablet 2    pantoprazole (PROTONIX) 40 MG tablet TAKE 1 TABLET BY MOUTH TWICE DAILY 60 tablet 2    traMADol (ULTRAM) 50 MG tablet Take 1 tablet by mouth every 4 hours for 30 days. Max Daily Amount: 300 mg 180 tablet 0    HYDROcodone-acetaminophen (NORCO) 5-325 MG per tablet Take 1 tablet by mouth 2 times daily as needed for Pain for up to 30 days. Max Daily Amount: 2 tablets 60 tablet 0    gabapentin (NEURONTIN) 100 MG capsule 1 po 5x/ day 150 capsule 1    solifenacin (VESICARE) 10 MG tablet Take 1 tablet by mouth daily 30 tablet 5    mirtazapine (REMERON) 7.5 MG tablet Take 1 tablet by mouth nightly 30 tablet 5    gabapentin (NEURONTIN) 300 MG capsule Take 300 mg by mouth in the morning and at bedtime. 1200 and 1600       No current facility-administered medications for this visit.        Data Reviewed:   CBC and Renal reviewed  Last CBC  Lab Results   Component Value Date/Time    WBC 9.5 09/29/2022 05:00 AM    RBC 3.74 09/29/2022 05:00 AM    HGB 12.2 09/29/2022 05:00 AM    MCV 93.2 09/29/2022 05:00 AM     09/29/2022 05:00 AM     Last Renal  Lab Results   Component Value Date/Time     09/29/2022 05:00 AM    K 3.8 09/29/2022 05:00 AM     09/29/2022 05:00 AM    CO2 27 09/29/2022 05:00 AM    CO2 24 09/28/2022 04:31 AM    CO2 23 09/27/2022 04:44 AM    BUN 13 09/29/2022 05:00 AM    CREATININE 0.5 11/08/2022 07:43 AM    CREATININE 0.5 09/29/2022 05:00 AM    GLUCOSE 106 09/29/2022 05:00 AM    CALCIUM 8.9 09/29/2022 05:00 AM Last ABG  POC Blood Gas: No results found for: POCPH, POCPCO2, POCPO2, POCHCO3, NBEA, AAIQ2HGB  No results for input(s): PH, PCO2, PO2, HCO3, BE, O2SAT in the last 72 hours. Radiology Review:  Pertinent images / reports were reviewed as a part of this visit. CT Chest w/ contrast: No results found for this or any previous visit. CT Chest w/o contrast: No results found for this or any previous visit. CTPA: No results found for this or any previous visit. CXR PA/LAT: Results for orders placed during the hospital encounter of 08/23/16    XR Chest Standard TWO VW    Narrative  EXAMINATION:  TWO VIEWS OF THE CHEST    8/23/2016 3:05 pm    COMPARISON:  Chest x-ray November 2, 2015    HISTORY:  ORDERING SYSTEM PROVIDED HISTORY: SOB (shortness of breath)  TECHNOLOGIST PROVIDED HISTORY:  Ordering Physician Provided Reason for Exam: asthma worse increased sob  Acuity: Chronic  Type of Exam: Ongoing    FINDINGS:  The cardiomediastinal contours are within normal limits. The lungs appear  clear bilaterally. There is no evidence of focal consolidation, pulmonary  edema, pleural effusion or pneumothorax. Impression  Negative chest x-ray. No evidence of acute cardiopulmonary disease. Pulmonary function testing  None on file      This note was transcribed using [id number] [place]. Please disregard any translational errors.     Kayleigh Cano MD  [facility] Pulmonary, Sleep and Critical Care

## 2022-12-15 LAB — IGE: 52 KU/L

## 2022-12-16 LAB
2000687N OAK TREE IGE: <0.1 KU/L (ref 0–0.34)
ALLERGEN BERMUDA GRASS IGE: <0.1 KU/L (ref 0–0.34)
ALLERGEN BIRCH IGE: <0.1 KU/L (ref 0–0.34)
ALLERGEN DOG DANDER IGE: <0.1 KU/L (ref 0–0.34)
ALLERGEN GERMAN COCKROACH IGE: <0.1 KU/L (ref 0–0.34)
ALLERGEN HORMODENDRUM IGE: <0.1 KUL/L (ref 0–0.34)
ALLERGEN MOUSE EPITHELIA IGE: <0.1 KU/L (ref 0–0.34)
ALLERGEN PECAN TREE IGE: <0.1 KU/L (ref 0–0.34)
ALLERGEN PIGWEED ROUGH IGE: <0.1 KU/L (ref 0–0.34)
ALLERGEN SHEEP SORREL (W18) IGE: <0.1 KU/L (ref 0–0.34)
ALLERGEN TREE SYCAMORE: <0.1 KU/L (ref 0–0.34)
ALLERGEN WALNUT TREE IGE: <0.1 KU/L (ref 0–0.34)
ALLERGEN WHITE MULBERRY TREE, IGE: <0.1 KU/L (ref 0–0.34)
ALLERGEN, TREE, WHITE ASH IGE: <0.1 KU/L (ref 0–0.34)
ALTERNARIA ALTERNATA: <0.1 KU/L (ref 0–0.34)
ASPERGILLUS FUMIGATUS: <0.1 KU/L (ref 0–0.34)
CAT DANDER ANTIBODY: <0.1 KU/L (ref 0–0.34)
COTTONWOOD TREE: <0.1 KU/L (ref 0–0.34)
D. FARINAE: <0.1 KU/L (ref 0–0.34)
D. PTERONYSSINUS: <0.1 KU/L (ref 0–0.34)
ELM TREE: <0.1 KU/L (ref 0–0.34)
IGE: 44 IU/ML
MAPLE/BOXELDER TREE: <0.1 KU/L (ref 0–0.34)
MOUNTAIN CEDAR TREE: <0.1 KU/L (ref 0–0.34)
MUCOR RACEMOSUS: <0.1 KU/L (ref 0–0.34)
P. NOTATUM: <0.1 KU/L (ref 0–0.34)
RUSSIAN THISTLE: <0.1 KU/L (ref 0–0.34)
SHORT RAGWD(A ARTEMIS.) IGE: <0.1 KU/L (ref 0–0.34)
TIMOTHY GRASS: <0.1 KU/L (ref 0–0.34)

## 2022-12-18 LAB
ALLERGEN BEEF: <0.1 KU/L
ALLERGEN PHOMA BETAE: <0.1 KU/L
ALLERGEN PORK: <0.1 KU/L
ALLERGEN SEE NOTE: NORMAL
ALLERGEN, FEATHER MIX: NEGATIVE KU/L
ASPERGILLUS FLAVUS ANTIBODIES: NORMAL
ASPERGILLUS FUMIGATUS #1: NORMAL
ASPERGILLUS FUMIGATUS #2: NORMAL
ASPERGILLUS FUMIGATUS #3: NORMAL
ASPERGILLUS FUMIGATUS #6: NORMAL
AUREOBASIDIUM PULLULANS: NORMAL
MICROPOLYSPORA FAENI: NORMAL
PIGEON SERUM ABS: NORMAL
SACCHAROMONOSPORA VIRIDIS: NORMAL
THERMOACTINOMYCES CANDIDUS AB: NORMAL
THERMOACTINOMYCES VULGARIS #1: NORMAL

## 2022-12-19 ENCOUNTER — HOSPITAL ENCOUNTER (OUTPATIENT)
Dept: GENERAL RADIOLOGY | Age: 72
Discharge: HOME | End: 2022-12-19
Payer: COMMERCIAL

## 2022-12-19 ENCOUNTER — HOSPITAL ENCOUNTER (OUTPATIENT)
Age: 72
Discharge: HOME | End: 2022-12-19
Payer: COMMERCIAL

## 2022-12-19 DIAGNOSIS — R06.02 SOB (SHORTNESS OF BREATH): ICD-10-CM

## 2022-12-19 PROCEDURE — 71046 X-RAY EXAM CHEST 2 VIEWS: CPT

## 2022-12-20 ENCOUNTER — PATIENT MESSAGE (OUTPATIENT)
Dept: FAMILY MEDICINE CLINIC | Age: 72
End: 2022-12-20

## 2022-12-20 NOTE — TELEPHONE ENCOUNTER
From: [facility]  To: Dr. Bouchra Chanel: 12/20/2022 1:32 PM EST  Subject: Refills for [address]. Tom is requesting a pre-authorization form for the lyrical prescription. We are trying to switch his medications to their mail pharmacy which is [facility] until Jan 1 then name changes to [facility]. Could someone send the authorization form for lyrics either to fax number [phone] or through [employer]? We had to refill gabapentin due to the delay in getting lyrica. Also, [facility] will be requesting to transfer dullera, spiriva, and pantoprazole.     Thank you

## 2022-12-28 ENCOUNTER — HOSPITAL ENCOUNTER (OUTPATIENT)
Dept: PULMONOLOGY | Age: 72
Discharge: HOME | End: 2022-12-28
Payer: COMMERCIAL

## 2022-12-28 LAB
DLCO %PRED: 102 %
DLCO PRED: NORMAL
DLCO/VA %PRED: NORMAL
DLCO/VA PRED: NORMAL
DLCO/VA: NORMAL
DLCO: NORMAL
EXPIRATORY TIME-POST: NORMAL
EXPIRATORY TIME: NORMAL
FEF 25-75% %CHNG: NORMAL
FEF 25-75% %PRED-POST: NORMAL
FEF 25-75% %PRED-PRE: NORMAL
FEF 25-75% PRED: NORMAL
FEF 25-75%-POST: NORMAL
FEF 25-75%-PRE: NORMAL
FEV1 %PRED-POST: 128 %
FEV1 %PRED-PRE: 128 %
FEV1 PRED: NORMAL
FEV1-POST: NORMAL
FEV1-PRE: NORMAL
FEV1/FVC %PRED-POST: NORMAL
FEV1/FVC %PRED-PRE: NORMAL
FEV1/FVC PRED: NORMAL
FEV1/FVC-POST: NORMAL
FEV1/FVC-PRE: NORMAL
FVC %PRED-POST: 106 L
FVC %PRED-PRE: 106 %
FVC PRED: NORMAL
FVC-POST: NORMAL
FVC-PRE: NORMAL
GAW %PRED: NORMAL
GAW PRED: NORMAL
GAW: NORMAL
IC %PRED: NORMAL
IC PRED: NORMAL
IC: NORMAL
MEP: NORMAL
MEP: NORMAL
MIP: NORMAL
MIP: NORMAL
MVV %PRED-PRE: NORMAL
MVV PRED: NORMAL
MVV-PRE: NORMAL
PEF %PRED-POST: NORMAL
PEF %PRED-PRE: NORMAL
PEF PRED: NORMAL
PEF%CHNG: NORMAL
PEF-POST: NORMAL
PEF-PRE: NORMAL
RAW %PRED: NORMAL
RAW PRED: NORMAL
RAW: NORMAL
RV %PRED: NORMAL
RV PRED: NORMAL
RV: NORMAL
SVC %PRED: NORMAL
SVC PRED: NORMAL
SVC: NORMAL
TLC %PRED: 113 %
TLC PRED: NORMAL
TLC: NORMAL
VA %PRED: NORMAL
VA PRED: NORMAL
VA: NORMAL
VTG %PRED: NORMAL
VTG PRED: NORMAL
VTG: NORMAL

## 2022-12-28 PROCEDURE — 94729 DIFFUSING CAPACITY: CPT | Performed by: INTERNAL MEDICINE

## 2022-12-28 PROCEDURE — 94726 PLETHYSMOGRAPHY LUNG VOLUMES: CPT | Performed by: INTERNAL MEDICINE

## 2022-12-28 PROCEDURE — 94726 PLETHYSMOGRAPHY LUNG VOLUMES: CPT

## 2022-12-28 PROCEDURE — 6370000000 HC RX 637 (ALT 250 FOR IP): Performed by: INTERNAL MEDICINE

## 2022-12-28 PROCEDURE — 94640 AIRWAY INHALATION TREATMENT: CPT

## 2022-12-28 PROCEDURE — 94060 EVALUATION OF WHEEZING: CPT | Performed by: INTERNAL MEDICINE

## 2022-12-28 PROCEDURE — 94060 EVALUATION OF WHEEZING: CPT

## 2022-12-28 PROCEDURE — 94729 DIFFUSING CAPACITY: CPT

## 2022-12-28 RX ORDER — ALBUTEROL SULFATE 90 UG/1
4 AEROSOL, METERED RESPIRATORY (INHALATION) ONCE
Status: COMPLETED | OUTPATIENT
Start: 2022-12-28 | End: 2022-12-28

## 2022-12-28 RX ADMIN — ALBUTEROL SULFATE 4 PUFF: 90 AEROSOL, METERED RESPIRATORY (INHALATION) at 13:34

## 2022-12-28 ASSESSMENT — PULMONARY FUNCTION TESTS
FEV1_PERCENT_PREDICTED_POST: 128
FVC_PERCENT_PREDICTED_POST: 106
FVC_PERCENT_PREDICTED_PRE: 106
FEV1_PERCENT_PREDICTED_PRE: 128

## 2022-12-28 NOTE — PROCEDURES
Spirometry was acceptable and reproducible by ATS standards    Spirometry  Spirometry is normal.    Bronchodilator  There is no response to bronchodilator demonstrated. [Increase in FEV1 and/or FVC => 12% of control and => 200 ml]    Lung Volumes  Lung volumes are normal.    Diffusing Capacity  Diffusing capacity is normal.      Maximal expiratory pressure reduced to 44% predicted. Maximum inspiratory pressure reduced to 30% predicted    Overall Interpretation  Normal pulmonary function test with an FEV1 of 128% without bronchodilator response. Total lung capacity 113% with diffusion capacity 102%. Only abnormalities seen within maximum expiratory pressure 44% and maximal inspiratory pressure 30%.     Pulmonary Function Testing Results:    FEV1 %Pred-Pre   Date Value Ref Range Status   12/28/2022 128 % Final     FEV1 %Pred-Post   Date Value Ref Range Status   12/28/2022 128 % Final     TLC %Pred   Date Value Ref Range Status   12/28/2022 113 % Final     DLCO %Pred   Date Value Ref Range Status   12/28/2022 102 % Final             OBSTRUCTION % Predicted FEV1   MILD >70%   MODERATE 60-69%   MODERATELY-SEVERE 50-59%   SEVERE 35-49%   VERY SEVERE <35%         RESTRICTION % Predicted TLC   MILD Less than LLN but > than 70%   MODERATE 60-69%   MODERATELY-SEVERE <60%                 DIFFUSION CAPACITY DL,CO % Pred   MILD >60% AND < LLN   MODERATE 40-60%   SEVERE <40%       PFT data will be scanned into the procedure tab in epic under this encounter    Tatiana Alfred MD  [facility]monology

## 2023-01-05 RX ORDER — PANTOPRAZOLE SODIUM 40 MG/1
TABLET, DELAYED RELEASE ORAL
Qty: 60 TABLET | Refills: 2 | Status: SHIPPED | OUTPATIENT
Start: 2023-01-05

## 2023-01-06 DIAGNOSIS — G57.93 NEUROPATHY OF BOTH FEET: ICD-10-CM

## 2023-01-06 RX ORDER — TOPIRAMATE 25 MG/1
TABLET ORAL
Qty: 240 TABLET | Refills: 2 | Status: SHIPPED | OUTPATIENT
Start: 2023-01-06

## 2023-01-08 RX ORDER — PREGABALIN 150 MG/1
CAPSULE ORAL
Qty: 60 CAPSULE | Refills: 2 | Status: SHIPPED | OUTPATIENT
Start: 2023-01-08 | End: 2023-02-05

## 2023-01-11 ENCOUNTER — OFFICE VISIT (OUTPATIENT)
Dept: PULMONOLOGY | Age: 73
End: 2023-01-11
Payer: COMMERCIAL

## 2023-01-11 VITALS
BODY MASS INDEX: 21.4 KG/M2 | RESPIRATION RATE: 18 BRPM | OXYGEN SATURATION: 100 % | DIASTOLIC BLOOD PRESSURE: 80 MMHG | WEIGHT: 158 LBS | HEART RATE: 79 BPM | TEMPERATURE: 97.5 F | HEIGHT: 72 IN | SYSTOLIC BLOOD PRESSURE: 122 MMHG

## 2023-01-11 DIAGNOSIS — M62.81 MUSCLE WEAKNESS: Primary | ICD-10-CM

## 2023-01-11 DIAGNOSIS — R06.02 SOB (SHORTNESS OF BREATH): ICD-10-CM

## 2023-01-11 DIAGNOSIS — G62.9 PERIPHERAL POLYNEUROPATHY: ICD-10-CM

## 2023-01-11 PROCEDURE — 1123F ACP DISCUSS/DSCN MKR DOCD: CPT | Performed by: INTERNAL MEDICINE

## 2023-01-11 PROCEDURE — 99214 OFFICE O/P EST MOD 30 MIN: CPT | Performed by: INTERNAL MEDICINE

## 2023-01-11 NOTE — PROGRESS NOTES
Pulmonary Progress note           REASON FOR CONSULTATION:  Chief Complaint   Patient presents with    Follow-up        Consult at request of Radene Lanes, MD     PCP: Radene Lanes, MD        Assessment and Plan:   Diagnosis Orders   1. Muscle weakness  AFL - Theresa Carver MD, Neurology, [facility]      2. SOB (shortness of breath)        3. Peripheral polyneuropathy              Plan:  Severe muscle weakness, seems to be out of proportion to be explained just by deconditioning   Will refer to neurology to further evaluate and r/o muscle disease  Will consider CPET               HISTORY OF PRESENT ILLNESS: Sveta Truong is very pleasant 81y.o. year old gentleman with medical history stated below significant for history of coronary artery calcifications, hyperlipidemia, chronic back pain, was referred to us for shortness of breath on exertion,    Chest x-ray with no acute cardiopulmonary pathology. Has significant venous insufficiency with lower extremity edema on diuretics. Has a history of lower back pain with L1-L2 compression fracture status post kyphoplasty    Here for follow up:  His PFT showed no active airflow obstruction  There was significant decrease in MIP MEP    Past Medical History:   Diagnosis Date    Arthritis     Asthma     B12 deficiency 07/10/2013    Tooks injection every other week and B12 level 500. Recommended that he continue current therapy, as will likely drop with oral supplement. Closed wedge compression fracture of L1 vertebra ([address] 09/05/2018    Superior endplate fracture of L1 resulting 80% loss of vertebral body height    DDD (degenerative disc disease), lumbar 09/05/2018    L2-L3, L3-L4, L4-L5.   Disc bulge with narrowing of the neural foramen at these levels    Diverticulitis     Gallbladder problem     S/P lap martha    GERD (gastroesophageal reflux disease) Hiatal hernia     Migraine     MRSA infection 04/03/2019    Left hand    Nausea & vomiting     Spondylosis of lumbar spine 11/02/2015    On x-ray multilevel       Past Surgical History:   Procedure Laterality Date    CHOLECYSTECTOMY      COLONOSCOPY  9/11    FOOT SURGERY      nerve removed from foot    SHOULDER SURGERY Right 2005    rotator cuff repair    SPINE SURGERY N/A 9/28/2022    L2, L4, L5 KYPHOPLASTY WITH L2, L4 BONE BIOPSY performed by Nichole Schilder, MD at [address] [place] [address]  9/11       family history includes Diabetes Type 1  in his sister and sister; High Cholesterol in his brother, brother, brother, father, and mother; Hypertension in his brother, brother, brother, father, and mother; Stroke (age of onset: 79) in his brother. SOCIAL HISTORY:   reports that he has never smoked. He has been exposed to tobacco smoke. He has never used smokeless tobacco.      ALLERGIES:  Patient is allergic to codeine, hydrocodone, and hydrocodone-acetaminophen. REVIEW OF SYSTEMS:  Constitutional: Negative for fever, no wt loss, no night sweats   HENT: Negative for sore throat, difficulty swallowing,   Eyes: Negative for redness, no discharge   Respiratory: Shortness of breath with exertion  Cardiovascular: Negative for chest pain, no palpitations   Gastrointestinal: Negative for vomiting, diarrhea   Genitourinary: Negative for hematuria, no dysuria    Musculoskeletal: Negative for arthralgias, no joint swelling   Skin: Negative for rash  LE: Lower extremity edema  Neurological: Negative for syncope, no tremor, no focal weakness or dysarthria   Hematological: Negative for adenopathy, or bleeding   Psychiatric/Behavorial: Negative for anxiety,    Objective:   PHYSICAL EXAM:  Blood pressure 122/80, pulse 79, temperature 97.5 °F (36.4 °C), resp. rate 18, height 6' (1.829 m), weight 158 lb (71.7 kg), SpO2 100 %.'  Gen: No acute distress  Eyes: PERRL. No sclera icterus.  No conjunctival injection. ENT: No discharge. Pharynx clear. External appearance of ears and nose normal.  Neck: Trachea midline. No obvious mass. Resp: No accessory muscle use. No crackles. No wheezes. No rhonchi. CV: Regular rate. Regular rhythm. No murmur or rub. +++edema. GI: Non-tender. Non-distended. No hernia. Skin: Warm, dry, normal texture and turgor. No nodule on exposed extremities. Lymph: No cervical LAD. M/S: No cyanosis. No clubbing. No joint deformity. LE:  no edema   Neuro: no tremor, no focal deficit, awake and alert   Psych: ntact judgement and insight. Current Outpatient Medications   Medication Sig Dispense Refill    pregabalin (LYRICA) 150 MG capsule 1 po bid 60 capsule 2    topiramate (TOPAMAX) 25 MG tablet TAKE 3 TABLETS BY MOUTH EVERY MORNING AND 5 TABLETS EVERY EVENING 240 tablet 2    pantoprazole (PROTONIX) 40 MG tablet TAKE 1 TABLET BY MOUTH TWICE DAILY 60 tablet 2    traMADol (ULTRAM) 50 MG tablet Take 1 tablet by mouth every 4 hours for 30 days. Max Daily Amount: 300 mg 180 tablet 0    HYDROcodone-acetaminophen (NORCO) 5-325 MG per tablet Take 1 tablet by mouth 2 times daily as needed for Pain for up to 30 days. Max Daily Amount: 2 tablets 60 tablet 0    ondansetron (ZOFRAN ODT) 4 MG disintegrating tablet Take 1 tablet by mouth every 8 hours as needed for Nausea or Vomiting 30 tablet 2    mometasone-formoterol (DULERA) 200-5 MCG/ACT inhaler INHALE 2 PUFFS INTO THE LUNGS TWICE DAILY.  RINSE MOUTH AFTER USE 13 g 0    potassium chloride (KLOR-CON M) 10 MEQ extended release tablet TAKE 1 TABLET BY MOUTH DAILY 30 tablet 0    vitamin D (ERGOCALCIFEROL) 1.25 MG (25724 UT) CAPS capsule TAKE 1 CAPSULE BY MOUTH WEEKLY 4 capsule 2    rizatriptan (MAXALT-MLT) 10 MG disintegrating tablet May repeat in 2 hours if needed 18 tablet 5    Magnesium Gluconate 500 (27 Mg) MG TABS tablet Take 500 mg by mouth daily      Levomefolate Glucosamine (METHYLFOLATE) 400 MCG CAPS Take 400 mcg by mouth every evening      tiotropium (SPIRIVA RESPIMAT) 2.5 MCG/ACT AERS inhaler Inhale 2 puffs into the lungs in the morning. 1 each 5    albuterol sulfate HFA (PROVENTIL;VENTOLIN;PROAIR) 108 (90 Base) MCG/ACT inhaler INHALE 2 PUFFS INTO THE LUNGS FOUR TIMES DAILY AS NEEDED FOR WHEEZING 8.5 g 2    Spacer/Aero-Holding Chambers (COMPACT SPACE CHAMBER/SM MASK) AZAEL AS DIRECTED w/ inhalers 1 each 0    ipratropium (ATROVENT) 0.06 % nasal spray TAKE 1-2 SPRAYS BY NASAL ROUTE 4 TIMES DAILY AS NEEDED FOR RHINITIS 15 mL 5    albuterol (PROVENTIL) (2.5 MG/3ML) 0.083% nebulizer solution Take 3 mLs by nebulization every 6 hours as needed for Wheezing 75 mL 2    furosemide (LASIX) 20 MG tablet TAKE 1 TABLET BY MOUTH DAILY 30 tablet 0    gabapentin (NEURONTIN) 100 MG capsule 1 po 5x/ day 150 capsule 1    solifenacin (VESICARE) 10 MG tablet Take 1 tablet by mouth daily 30 tablet 5    gabapentin (NEURONTIN) 300 MG capsule Take 300 mg by mouth in the morning and at bedtime. 1200 and 1600       No current facility-administered medications for this visit. Data Reviewed:   CBC and Renal reviewed  Last CBC  Lab Results   Component Value Date/Time    WBC 9.5 09/29/2022 05:00 AM    RBC 3.74 09/29/2022 05:00 AM    HGB 12.2 09/29/2022 05:00 AM    MCV 93.2 09/29/2022 05:00 AM     09/29/2022 05:00 AM     Last Renal  Lab Results   Component Value Date/Time     09/29/2022 05:00 AM    K 3.8 09/29/2022 05:00 AM     09/29/2022 05:00 AM    CO2 27 09/29/2022 05:00 AM    CO2 24 09/28/2022 04:31 AM    CO2 23 09/27/2022 04:44 AM    BUN 13 09/29/2022 05:00 AM    CREATININE 0.5 11/08/2022 07:43 AM    CREATININE 0.5 09/29/2022 05:00 AM    GLUCOSE 106 09/29/2022 05:00 AM    CALCIUM 8.9 09/29/2022 05:00 AM       Last ABG  POC Blood Gas: No results found for: POCPH, POCPCO2, POCPO2, POCHCO3, NBEA, [id number]  No results for input(s): PH, PCO2, PO2, HCO3, BE, O2SAT in the last 72 hours.       Radiology Review:  Pertinent images / reports were reviewed as a part of this visit. CT Chest w/ contrast: No results found for this or any previous visit. CT Chest w/o contrast: No results found for this or any previous visit. CTPA: No results found for this or any previous visit. CXR PA/LAT: Results for orders placed during the hospital encounter of 08/23/16    XR Chest Standard TWO VW    Narrative  EXAMINATION:  TWO VIEWS OF THE CHEST    8/23/2016 3:05 pm    COMPARISON:  Chest x-ray November 2, 2015    HISTORY:  ORDERING SYSTEM PROVIDED HISTORY: SOB (shortness of breath)  TECHNOLOGIST PROVIDED HISTORY:  Ordering Physician Provided Reason for Exam: asthma worse increased sob  Acuity: Chronic  Type of Exam: Ongoing    FINDINGS:  The cardiomediastinal contours are within normal limits. The lungs appear  clear bilaterally. There is no evidence of focal consolidation, pulmonary  edema, pleural effusion or pneumothorax. Impression  Negative chest x-ray. No evidence of acute cardiopulmonary disease. Pulmonary function testing  None on file      This note was transcribed using [id number] [employer]. Please disregard any translational errors.     Gabino Hooks MD  [facility] Pulmonary, Sleep and Critical Care

## 2023-01-13 ENCOUNTER — HOSPITAL ENCOUNTER (OUTPATIENT)
Dept: VASCULAR LAB | Age: 73
Discharge: HOME | End: 2023-01-13
Payer: COMMERCIAL

## 2023-01-13 DIAGNOSIS — I87.2 VENOUS INSUFFICIENCY: ICD-10-CM

## 2023-01-13 DIAGNOSIS — R60.0 BILATERAL LEG EDEMA: ICD-10-CM

## 2023-01-13 DIAGNOSIS — I87.2 VENOUS REFLUX: ICD-10-CM

## 2023-01-13 PROCEDURE — 93970 EXTREMITY STUDY: CPT

## 2023-01-16 RX ORDER — GABAPENTIN 100 MG/1
CAPSULE ORAL
Qty: 150 CAPSULE | Refills: 0 | Status: SHIPPED | OUTPATIENT
Start: 2023-01-16 | End: 2023-02-14

## 2023-01-30 ENCOUNTER — PATIENT MESSAGE (OUTPATIENT)
Dept: FAMILY MEDICINE CLINIC | Age: 73
End: 2023-01-30

## 2023-01-31 RX ORDER — ERGOCALCIFEROL 1.25 MG/1
CAPSULE ORAL
Qty: 4 CAPSULE | Refills: 0 | Status: SHIPPED | OUTPATIENT
Start: 2023-01-31

## 2023-01-31 RX ORDER — LEVOCETIRIZINE DIHYDROCHLORIDE 5 MG/1
TABLET, FILM COATED ORAL
Qty: 30 TABLET | Refills: 0 | Status: SHIPPED | OUTPATIENT
Start: 2023-01-31

## 2023-01-31 NOTE — TELEPHONE ENCOUNTER
From: Nonnie Josseline  To: Dr. Filiberto Jameson: 1/30/2023 8:10 PM EST  Subject: EMG    Kim Osorio had an EMG test of his legs for the nerve pain on 01/19/23. It was done by Dr. Wes Up at [facility] at [address] [place] [address]. Their phone is [phone]. Their fax is [phone]. Could your staff ask for these results please? Kim Osorio was sent for this test by Dr Merissa Maldonado. Dr Nela Abarca office did not get the results, and we are not supposed to go back to them until late March.

## 2023-02-06 ENCOUNTER — OFFICE VISIT (OUTPATIENT)
Dept: CARDIOLOGY CLINIC | Age: 73
End: 2023-02-06
Payer: COMMERCIAL

## 2023-02-06 VITALS
SYSTOLIC BLOOD PRESSURE: 122 MMHG | BODY MASS INDEX: 22.11 KG/M2 | WEIGHT: 163 LBS | DIASTOLIC BLOOD PRESSURE: 60 MMHG | OXYGEN SATURATION: 98 % | HEART RATE: 68 BPM

## 2023-02-06 DIAGNOSIS — R60.0 BILATERAL LEG EDEMA: ICD-10-CM

## 2023-02-06 DIAGNOSIS — I89.0 LYMPHEDEMA: ICD-10-CM

## 2023-02-06 DIAGNOSIS — I87.2 VENOUS INSUFFICIENCY: Primary | ICD-10-CM

## 2023-02-06 PROCEDURE — 99214 OFFICE O/P EST MOD 30 MIN: CPT | Performed by: INTERNAL MEDICINE

## 2023-02-06 PROCEDURE — 1123F ACP DISCUSS/DSCN MKR DOCD: CPT | Performed by: INTERNAL MEDICINE

## 2023-02-06 NOTE — PROGRESS NOTES
Interventional Cardiology Consultation     Nubia Gautam  1950    PCP: Robinson [place] MD    Referring Physician: Dr. Michael Ugarte   Reason for Referral: BLE edema, venous insufficiency     Chief Complaint:   Chief Complaint   Patient presents with    Follow-up     No cc     Subjective:     History of Present Illness: The patient is 81 y.o. male with a past medical history  significant for coronary artery calcification, hyperlipidemia, severe osteoarthritis with 4 vertebral fx, L2, L4, L5 , s/p 9/28/22 kyphoplasty per Dr. Roberto Waddell, who presented as a referred from Dr. Michael Ugarte for evaluation of venous insufficiency. He completed an echocardiogram that showed a normal EF and venous reflux studies that showed mild venous insufficiency. Today, he is accompanied by his wife and reports continued LE edema. He denies any chest pains or worsening shortness of breath. He reports compliance with his medications and tolerating. He denies any abnormal bleeding or bruising. Patient denies exertional chest pain/pressure, dyspnea at rest, PAULINO, PND, orthopnea, palpitations, lightheadedness, weight changes, and syncope. Past Medical History:   Diagnosis Date    Arthritis     Asthma     B12 deficiency 07/10/2013    Tooks injection every other week and B12 level 500. Recommended that he continue current therapy, as will likely drop with oral supplement. Closed wedge compression fracture of L1 vertebra ([address] 09/05/2018    Superior endplate fracture of L1 resulting 80% loss of vertebral body height    DDD (degenerative disc disease), lumbar 09/05/2018    L2-L3, L3-L4, L4-L5.   Disc bulge with narrowing of the neural foramen at these levels    Diverticulitis     Gallbladder problem     S/P lap martha    GERD (gastroesophageal reflux disease)     Hiatal hernia     Migraine     MRSA infection 04/03/2019    Left hand    Nausea & vomiting     Spondylosis of lumbar spine 11/02/2015    On x-ray multilevel Past Surgical History:   Procedure Laterality Date    CHOLECYSTECTOMY      COLONOSCOPY  9/11    FOOT SURGERY      nerve removed from foot    SHOULDER SURGERY Right 2005    rotator cuff repair    SPINE SURGERY N/A 9/28/2022    L2, L4, L5 KYPHOPLASTY WITH L2, L4 BONE BIOPSY performed by Leti Flanagan MD at [address]   9/11     Family History   Problem Relation Age of Onset    Hypertension Mother     High Cholesterol Mother     Hypertension Father     High Cholesterol Father     Diabetes Type 1  Sister         B 12    Diabetes Type 1  Sister         early peripheral neuropathy    Stroke Brother 79    Hypertension Brother     High Cholesterol Brother     Hypertension Brother     High Cholesterol Brother     Hypertension Brother     High Cholesterol Brother      Social History     Tobacco Use    Smoking status: Never     Passive exposure: Past    Smokeless tobacco: Never   Vaping Use    Vaping Use: Never used   Substance Use Topics    Alcohol use: Not Currently     Comment: glass of wine at night    Drug use: No       Allergies   Allergen Reactions    Codeine      Upset stomach     Hydrocodone Nausea And Vomiting    Hydrocodone-Acetaminophen Nausea Only and Nausea And Vomiting       Current Outpatient Medications   Medication Sig Dispense Refill    mometasone-formoterol (DULERA) 200-5 MCG/ACT inhaler INHALE 2 PUFFS INTO THE LUNGS TWICE DAILY. RINSE MOUTH AFTER USE 13 g 0    levocetirizine (XYZAL) 5 MG tablet TAKE 1 TABLET BY MOUTH EVERY EVENING 30 tablet 0    vitamin D (ERGOCALCIFEROL) 1.25 MG (01701 UT) CAPS capsule TAKE 1 CAPSULE BY MOUTH WEEKLY 4 capsule 0    traMADol (ULTRAM) 50 MG tablet Take 1 tablet by mouth every 4 hours for 30 days.  Max Daily Amount: 300 mg 180 tablet 1    gabapentin (NEURONTIN) 100 MG capsule 1 po 5x/ day 150 capsule 0    topiramate (TOPAMAX) 25 MG tablet TAKE 3 TABLETS BY MOUTH EVERY MORNING AND 5 TABLETS EVERY EVENING 240 tablet 2    pantoprazole (PROTONIX) 40 MG tablet TAKE 1 TABLET BY MOUTH TWICE DAILY 60 tablet 2    ondansetron (ZOFRAN ODT) 4 MG disintegrating tablet Take 1 tablet by mouth every 8 hours as needed for Nausea or Vomiting 30 tablet 2    potassium chloride (KLOR-CON M) 10 MEQ extended release tablet TAKE 1 TABLET BY MOUTH DAILY 30 tablet 0    rizatriptan (MAXALT-MLT) 10 MG disintegrating tablet May repeat in 2 hours if needed 18 tablet 5    Magnesium Gluconate 500 (27 Mg) MG TABS tablet Take 500 mg by mouth daily      Levomefolate Glucosamine (METHYLFOLATE) 400 MCG CAPS Take 400 mcg by mouth every evening      tiotropium (SPIRIVA RESPIMAT) 2.5 MCG/ACT AERS inhaler Inhale 2 puffs into the lungs in the morning. 1 each 5    albuterol sulfate HFA (PROVENTIL;VENTOLIN;PROAIR) 108 (90 Base) MCG/ACT inhaler INHALE 2 PUFFS INTO THE LUNGS FOUR TIMES DAILY AS NEEDED FOR WHEEZING 8.5 g 2    Spacer/Aero-Holding Chambers (COMPACT SPACE CHAMBER/SM MASK) AZAEL AS DIRECTED w/ inhalers 1 each 0    ipratropium (ATROVENT) 0.06 % nasal spray TAKE 1-2 SPRAYS BY NASAL ROUTE 4 TIMES DAILY AS NEEDED FOR RHINITIS 15 mL 5    albuterol (PROVENTIL) (2.5 MG/3ML) 0.083% nebulizer solution Take 3 mLs by nebulization every 6 hours as needed for Wheezing 75 mL 2    pregabalin (LYRICA) 150 MG capsule 1 po bid 60 capsule 2    furosemide (LASIX) 20 MG tablet TAKE 1 TABLET BY MOUTH DAILY 30 tablet 0    solifenacin (VESICARE) 10 MG tablet Take 1 tablet by mouth daily 30 tablet 5    gabapentin (NEURONTIN) 300 MG capsule Take 300 mg by mouth in the morning and at bedtime. 1200 and 1600       No current facility-administered medications for this visit. Review of Systems:  Constitutional: No unanticipated weight loss. There's been no change in energy level, sleep pattern, or activity level. No fevers, chills. Eyes: No visual changes or diplopia. No scleral icterus. ENT: No Headaches, hearing loss or vertigo. No mouth sores or sore throat.   Cardiovascular: as reviewed in HPI  Respiratory: No cough or wheezing, no sputum production. No hemoptysis. Gastrointestinal: No abdominal pain, appetite loss, blood in stools. No change in bowel or bladder habits. Genitourinary: No dysuria, trouble voiding, or hematuria. Musculoskeletal:  No gait disturbance, no joint complaints. Integumentary: No rash or pruritis. Neurological: No headache, diplopia, change in muscle strength, numbness or tingling. Psychiatric: No anxiety or depression. Endocrine: No temperature intolerance. No excessive thirst, fluid intake, or urination. No tremor. Hematologic/Lymphatic: No abnormal bruising or bleeding, blood clots or swollen lymph nodes. Allergic/Immunologic: No nasal congestion or hives. Physical Exam:   /60   Pulse 68   Wt 163 lb (73.9 kg)   SpO2 98%   BMI 22.11 kg/m²   Wt Readings from Last 3 Encounters:   02/06/23 163 lb (73.9 kg)   01/11/23 158 lb (71.7 kg)   12/13/22 154 lb (69.9 kg)     Constitutional: He is oriented to person, place, and time. He appears well-developed and well-nourished. In no acute distress. Head: Normocephalic and atraumatic. Pupils equal and round. Neck: Neck supple. No JVP or carotid bruit appreciated. No mass and no thyromegaly present. No lymphadenopathy present. Cardiovascular: Normal rate. Normal heart sounds. Exam reveals no gallop and no friction rub. No murmur heard. Pulmonary/Chest: Effort normal and breath sounds normal. No respiratory distress. He has no wheezes, rhonchi or rales. Abdominal: Soft, non-tender. Bowel sounds are normal. He exhibits no organomegaly, mass or bruit. Extremities: 3+ BLE edema. No cyanosis or clubbing. Pulses are 2+ radial/carotid bilaterally. Neurological: No gross cranial nerve deficit. Coordination normal.   Skin: Skin is warm and dry. There is no rash or diaphoresis. Psychiatric: He has a normal mood and affect.  His speech is normal and behavior is normal.     DIAGNOSIS ASSESSMENT:  Lymphedema is caused by:  Lymphedema, not elsewhere m classified [I89.0] (includes CVI-related lymphedema) [x] Yes [] No     SWELLING SEVERITY:   Patient exhibits the following swelling severity (International Society of Lymphology clinical classification):  [] Stage 0: Latent or subclinical condition where swelling is not yet evident despite impaired lymph transport, subtle alterations in tissue fluid/composition and changes in subjective symptoms. It may exist months or years before overt edema occurs (Stages I - III). [] Stage I: Early accumulation of fluid relatively high in protein content (e.g., in comparison with ?enous edema) which subsides with limb elevation. Pitting may occur. An increase in various types of proliferating cells may also be seen. [x] Stage II: Limb elevation alone rarely reduces the tissue swelling and pitting is manifest.  Later in Stage II, the limb may not pit as excess subcutaneous fat and fibrosis develop. [] Stage III: Lymphostatic elephantiasis where pitting can be absent and trophic skin changes such as acanthosis, alterations in skin character and thickness, further deposition of fat and fibrosis, and warty overgrowths have developed.     SYMPTOMS DESPITE CONSERVATIVE THERAPY:  [] Hyperkeratosis  [] Hyperplasia  [] Hyperpigmentation  [x] Skin breakdown with lymphorrhea (skin weeping)  [] Papillomatosis  [] Recurrent cellulitis  [] Fibrosis   [] Elephantiasis  [x] Progressive edema  [] Truncal/abdominal swelling  [] Chest/axillary swelling  [] Genital swelling  [x] Unable to control swelling  [x] Impaired ROM  [x] Impaired mobility  [x] Pain    CONSERVATIVE TREATMENT:  Patient has tried conservative treatments (compression/exercise/elevation):  [x] Yes [] No  Instructed on appropriate skin/nail care practices: [] Yes [] No  Compression garments of at least 20-30mmHg: [] <4 weeks   [] 1-5 months   [] 6-12 months  [x] >1 year  Bandaging: [] <4 weeks   [] 1-5 months   [] 6-12 months  [x] >1 year  Elevation:   [] <4 weeks   [] 1-5 months   [] 6-12 months  [x] >1 year  Exercise:     [] <4 weeks   [] 1-5 months   [] 6-12 months  [x] >1 year    Lab Review:   Lab Results   Component Value Date/Time    TRIG 29 09/14/2021 09:30 AM    HDL 56 09/14/2021 09:30 AM    LDLCALC see below 09/14/2021 09:30 AM    LDLDIRECT 82 09/14/2021 09:30 AM    LABVLDL see below 09/14/2021 09:30 AM     Lab Results   Component Value Date/Time    BUN 13 09/29/2022 05:00 AM    CREATININE 0.5 11/08/2022 07:43 AM    CREATININE 0.5 09/29/2022 05:00 AM   No results found for: PTINR, TROPONINI, LABA1C   No results found for: CBCAUTODIF    EKG Interpretation: 9/23/22 sinus rhythm. Echo: 9/26/22  Normal left ventricle size, wall thickness and systolic function with an  estimated ejection fraction of 65 -70 %. No regional wall motion  abnormalities are seen. normal diastolic function  Normal right ventricular size and function. Stress Test: 9/5/2003 [facility]  No evidence of ischemia     Carotid duplex, bilateral: 1/2016   The right internal carotid artery appears to have a 1-15% diameter reducing    stenosis based on velocity criteria. The right vertebral artery demonstrates normal antegrade flow. Elevated velocities noted in the Right CCA    The left internal carotid artery appears to have a 1-15% diameter reducing    stenosis based on velocity criteria. The left vertebral artery demonstrates normal antegrade flow. Elevated velocities noted in the Left CCA     CT: cardiac calcium scor 8/2021  The observed calcium score of 148.1 is at percentile 55, which means the subjects of the same    age, gender, and race/ethnicity who are free of clinical cardiovascular disease and treated    diabetes will have lower calcium score. BLE venous Doppler: 10/18/22      No evidence of acute deep or superficial venous thrombosis of the bilateral    examined veins. Deep venous reflux of the right common femoral vein.     No superficial venous reflux is noted in the right great or small saphenous    vein . Chronic phlebitic changes of the right small saphenous vein in the proximal    calf. No deep venous reflux of the left lower extremity. Superficial venous reflux at the left saphenofemoral junction only. No superficial venous reflux in the right small saphenous vein. There is no previous exam for comparison. BLE venous duplex: 9/2022      No evidence of deep vein or superficial vein thrombosis involving the right    lower extremity . Calf and ankle edema noted. No evidence of deep vein or superficial vein thrombosis involving the left    lower extremity. Calf and ankle edema noted. Echo 9/23/22   Normal left ventricle size, wall thickness and systolic function with an  estimated ejection fraction of 65 -70 %. No regional wall motion  abnormalities are seen. normal diastolic function  Normal right ventricular size and function. All above diagnostic testing and laboratory data was independently visualized and reviewed by me (not simply review of report)       Assessment and Plan   1) Lymphedema/LE edema   -symptomatic with reports of chronic edema, pain, immobility   -reflux studies showed mild disease, normal echo   -encouraged support stockings, leg elevation, and walking program   -recommend bilateral pneumatic compression boots   -will send for insurance approval     I will see him back in follow up in 6 months     Thank you very much for allowing me to participate in the care of your patient. Please do not hesitate to contact me if you have any questions. Pamela Rodríguez MD [address] [place] [address], Interventional Cardiology, and Peripheral Vascular Disease   [facility]   Ph: [phone]  Fax: [phone]    This note was scribed in the presence of Dr Netta Mays MD by Sydnee Rios RN.      Physician Attestation:  The scribes documentation has been prepared under my direction and personally reviewed by me in its entirety. I confirm the note above accurately reflects all work, treatment, procedures, and medical decision making performed by me.     Electronically signed by Sharona Eaton MD on 2/6/2023 at 6:43 PM

## 2023-02-07 ENCOUNTER — OFFICE VISIT (OUTPATIENT)
Dept: FAMILY MEDICINE CLINIC | Age: 73
End: 2023-02-07
Payer: COMMERCIAL

## 2023-02-07 VITALS
HEIGHT: 73 IN | SYSTOLIC BLOOD PRESSURE: 124 MMHG | DIASTOLIC BLOOD PRESSURE: 68 MMHG | BODY MASS INDEX: 21.74 KG/M2 | HEART RATE: 86 BPM | WEIGHT: 164 LBS | OXYGEN SATURATION: 99 %

## 2023-02-07 DIAGNOSIS — I87.2 VENOUS INSUFFICIENCY OF BOTH LOWER EXTREMITIES: Primary | ICD-10-CM

## 2023-02-07 DIAGNOSIS — Z87.81 HISTORY OF VERTEBRAL COMPRESSION FRACTURE: ICD-10-CM

## 2023-02-07 DIAGNOSIS — M81.8 OTHER OSTEOPOROSIS WITHOUT CURRENT PATHOLOGICAL FRACTURE: ICD-10-CM

## 2023-02-07 DIAGNOSIS — M48.061 SPINAL STENOSIS OF LUMBAR REGION WITHOUT NEUROGENIC CLAUDICATION: ICD-10-CM

## 2023-02-07 DIAGNOSIS — N39.43 DRIBBLING OF URINE: ICD-10-CM

## 2023-02-07 DIAGNOSIS — G62.89 OTHER POLYNEUROPATHY: ICD-10-CM

## 2023-02-07 DIAGNOSIS — G89.29 CHRONIC BACK PAIN, UNSPECIFIED BACK LOCATION, UNSPECIFIED BACK PAIN LATERALITY: ICD-10-CM

## 2023-02-07 DIAGNOSIS — R60.0 BILATERAL LEG EDEMA: ICD-10-CM

## 2023-02-07 DIAGNOSIS — M54.9 CHRONIC BACK PAIN, UNSPECIFIED BACK LOCATION, UNSPECIFIED BACK PAIN LATERALITY: ICD-10-CM

## 2023-02-07 LAB
BILIRUBIN, POC: NORMAL
BLOOD URINE, POC: NORMAL
CLARITY, POC: CLEAR
COLOR, POC: YELLOW
GLUCOSE URINE, POC: NORMAL
KETONES, POC: NORMAL
LEUKOCYTE EST, POC: NORMAL
NITRITE, POC: NORMAL
PH, POC: 7
PROTEIN, POC: NORMAL
SPECIFIC GRAVITY, POC: 1.02
UROBILINOGEN, POC: 0.2

## 2023-02-07 PROCEDURE — 99214 OFFICE O/P EST MOD 30 MIN: CPT | Performed by: FAMILY MEDICINE

## 2023-02-07 PROCEDURE — 1123F ACP DISCUSS/DSCN MKR DOCD: CPT | Performed by: FAMILY MEDICINE

## 2023-02-07 PROCEDURE — 81002 URINALYSIS NONAUTO W/O SCOPE: CPT | Performed by: FAMILY MEDICINE

## 2023-02-07 SDOH — ECONOMIC STABILITY: FOOD INSECURITY: WITHIN THE PAST 12 MONTHS, YOU WORRIED THAT YOUR FOOD WOULD RUN OUT BEFORE YOU GOT MONEY TO BUY MORE.: NEVER TRUE

## 2023-02-07 SDOH — ECONOMIC STABILITY: INCOME INSECURITY: HOW HARD IS IT FOR YOU TO PAY FOR THE VERY BASICS LIKE FOOD, HOUSING, MEDICAL CARE, AND HEATING?: NOT HARD AT ALL

## 2023-02-07 SDOH — ECONOMIC STABILITY: HOUSING INSECURITY
IN THE LAST 12 MONTHS, WAS THERE A TIME WHEN YOU DID NOT HAVE A STEADY PLACE TO SLEEP OR SLEPT IN A SHELTER (INCLUDING NOW)?: NO

## 2023-02-07 SDOH — ECONOMIC STABILITY: FOOD INSECURITY: WITHIN THE PAST 12 MONTHS, THE FOOD YOU BOUGHT JUST DIDN'T LAST AND YOU DIDN'T HAVE MONEY TO GET MORE.: NEVER TRUE

## 2023-02-07 ASSESSMENT — PATIENT HEALTH QUESTIONNAIRE - PHQ9
SUM OF ALL RESPONSES TO PHQ QUESTIONS 1-9: 0
SUM OF ALL RESPONSES TO PHQ9 QUESTIONS 1 & 2: 0
SUM OF ALL RESPONSES TO PHQ QUESTIONS 1-9: 0
2. FEELING DOWN, DEPRESSED OR HOPELESS: 0
1. LITTLE INTEREST OR PLEASURE IN DOING THINGS: 0

## 2023-02-07 NOTE — PROGRESS NOTES
[facility] Medicine  Clinic Note    Date: 2/7/2023                                               Subjective:     Chief Complaint   Patient presents with    Edema     LYMPHEDEMA IN LEGS PER CARDIOLOGIST, WANTS REFERRAL     Results     DISCUSS EMG RESULTS     Pain     DOES NOT LIKE PAIN DOCTOR WOULD LIKE TO CHANGE OR HAVE [address] MANAGE TRAMADOL        HPI  Reviewed recent emg  Seen by vascular cardio - dr. Robinson Adam yesterday - has mild venous insufficiency - recommended bilateral pneumatic compression boots  Reviewed pft 12/28  Emg 3/20 showed moderate peripheral neuropathy in legs  Seen by pain mgmt - dr. Corby Pinedo 1/27/23  Note reviewed - started on opioids  oarrs reviewed and results c/w rx'd meds  Reviewed dexa - alendronate started for osteoporosis  Seen by pulm - referred to neuro 2/2 weakness- ? Emg updated pending  Started with mental health counselor  Fernando Satchel BOOTS helped but still swollen from waist to toes - skin really tight  Lymphedema clinic -at [address] [place] [address] - fax  Swelling helped by lying down somewhat - right side worse - feet keep getting bigger. Urine dribbles once swelling gets worse - goes down leg  ?  Weeping  Better when more active  Occ urgency =not sure vesicare  No swelling in genitalia  Gets sleepy w/? Tramadol - not w/ gabapentin  Pulmonologist conerned about muscle weakness  Didn't tolerate lyrica well    BP Readings from Last 3 Encounters:   02/07/23 124/68   02/06/23 122/60   01/11/23 122/80     Pulse Readings from Last 3 Encounters:   02/07/23 86   02/06/23 68   01/11/23 79     Wt Readings from Last 3 Encounters:   02/07/23 164 lb (74.4 kg)   02/06/23 163 lb (73.9 kg)   01/11/23 158 lb (71.7 kg)            Patient Active Problem List    Diagnosis Date Noted    Bilateral leg edema 11/04/2022    Chronic superficial phlebitis of right lower extremity 11/04/2022    Leg swelling 10/31/2022    Mixed obsessional thoughts and acts 09/28/2022    Localized edema 09/27/2022    Coronary artery calcification 09/27/2022    Mixed hyperlipidemia 09/27/2022    Compression fracture of L2 lumbar vertebra (HCC) 09/25/2022    Degenerative lumbar spinal stenosis 09/25/2022    Urinary hesitancy 09/25/2022    Back pain 09/23/2022    Right sided sciatica 08/08/2022    Peripheral polyneuropathy 04/21/2022    DDD (degenerative disc disease), lumbar 09/05/2018    Closed wedge compression fracture of L1 vertebra ([address] 09/05/2018    Spondylosis of lumbar spine 11/02/2015    Cerebral concussion 01/19/2016    Gall bladder stones 07/10/2013    B12 deficiency 07/10/2013    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 Recommended that he continue current therapy, as will likely drop with oral supplement. Closed wedge compression fracture of L1 vertebra ([address] 09/05/2018    Superior endplate fracture of L1 resulting 80% loss of vertebral body height    DDD (degenerative disc disease), lumbar 09/05/2018    L2-L3, L3-L4, L4-L5.   Disc bulge with narrowing of the neural foramen at these levels    Diverticulitis     Gallbladder problem     S/P lap martha    GERD (gastroesophageal reflux disease)     Hiatal hernia     Migraine     MRSA infection 04/03/2019    Left hand    Nausea & vomiting     Spondylosis of lumbar spine 11/02/2015    On x-ray multilevel     Past Surgical History:   Procedure Laterality Date    CHOLECYSTECTOMY      COLONOSCOPY  9/11    FOOT SURGERY      nerve removed from foot    SHOULDER SURGERY Right 2005    rotator cuff repair    SPINE SURGERY N/A 9/28/2022    L2, L4, L5 KYPHOPLASTY WITH L2, L4 BONE BIOPSY performed by Óscar Jacobs MD at [place] [address] GASTROINTESTINAL ENDOSCOPY  9/11     Orders Only on 12/13/2022   Component Date Value Ref Range Status    IgE 12/13/2022 52  <=214 kU/L Final    Comment: REFERENCE INTERVAL: Immunoglobulin E, Serum  Access complete set of age- and/or gender-specific reference intervals  for  this test in the [employer] [facility] Test Directory ([email or web address]). Performed By: [facility] [address]  [place], [address] [facility]  : Harmony Jimenez MD, PhD      A fumigatus #1 12/13/2022 None Detected  None Detected Final    A fumigatus #6 12/13/2022 None Detected  None Detected Final    A pullulans 12/13/2022 None Detected  None Detected Final    [place] Serum Abs 12/13/2022 None Detected  None Detected Final    M faeni 12/13/2022 None Detected  None Detected Final    T Vulgaris #1 12/13/2022 See Note  None Detected Final    Comment: Thermoactinomyces vulgaris #1 testing not performed due to  unsatisfactory  reagent performance. A credit will be issued for this component. Aspergillus Flavus Antibodies 12/13/2022 None Detected  None Detected Final    A fumigatus #2 12/13/2022 None Detected  None Detected Final    A fumigatus #3 12/13/2022 None Detected  None Detected Final    S viridis 12/13/2022 None Detected  None Detected Final    Thermoactinomyces Candidus Ab 12/13/2022 None Detected  None Detected Final    Comment: Testing includes antibodies directed at Aureobasidium pullulans,  Aspergillus  flavus, Aspergillus fumigatus #1, Aspergillus fumigatus #2, Aspergillus  fumigatus #3, Aspergillus fumigatus #6, Micropolyspora faeni, [place]  Serum,  Saccharomonospora viridis, Thermoactinomyces candidus, and  Thermoactinomyces  vulgaris #1.       Phoma betae 12/13/2022 <0.10  <=0.34 kU/L Final    Beef 12/13/2022 <0.10  <=0.34 kU/L Final    Allergen, Pork 12/13/2022 <0.10  <=0.34 kU/L Final    Feather mix 12/13/2022 Negative  Negative kU/L Final    ALLERGEN SEE NOTE 12/13/2022 See Note   Final    Comment: REFERENCE INTERVAL: Allergen, Interpretation   Less than 0.10 kU/L. ... [place] Class 0... [place] No significant level detected   0.10-0.34 kU/L. ......... [place] Class 0/1. [place] Clinical relevance undetermined   0.35-0.70 kU/L. ......... [place] Class 1... [place] Low   0.71-3.50 kU/L. ......... [place] Class 2. .. [place] Moderate   3.51-17.50 kU/L. ........ [place] Class 3... [place] High   17.51-50.00 kU/L. ....... [place] Class 4. .. [place] Very High   50..00 kU/L. ...... [place] Class 5. .. [place] Very High   Greater than 100.00kU/L. [place] Me[place] Class 6. .. [place] Very High  Allergen results of 0.10-0.34 kU/L are intended for specialist use as  the  clinical relevance is undetermined. Even though increasing ranges are  reflective of increasing concentrations of allergen-specific IgE, these  concentrations may not correlate with the degree of clinical response  or skin  testing results when challenged with a specific allergen. The  correlation of  allergy laboratory results with clinical history and in vivo reactivity  to  specific allergens is essential. A negative test may not rule out  clinical                             allergy or even anaphylaxis. Performed By: [address], [address] [facility]  : Christina Thorpe MD, PhD      Alternaria Alternata 12/13/2022 <0.10  0.00 - 0.34 kU/L Final    Comment:   ALLERGEN, INTERP, IMMUNOCAP SCORE IGE  <0.10            Class 0                      No significant                      level detected  0.10-0.34        Class 0/1                      Clinical relevance                      undertermined  0.35 to 0.70     Class 1  Low  0.71 to 3.50     Class 2  Moderate  3.51 to 17.50    Class 3  High  17.51 to 50.00   Class 4  Very High  50.01 to 100.00  Class 5  Very High  >100.00          Class 6  Very High  units: kU/L    Increasing ranges are reflective of increasing concentrations of  allergen specific IgE.   These concentrations may not correlate with the  degree of clinical response or skin testing results when challenged  with a specific allergen. The correlation of allergy laboratory results with the clinical history  and in vivo reactivity to specific allergens is essential.  A negative  test may not rule out clinical allergy or even anaphylaxis. Maple/[place]el[place] Tree 12/13/2022 <0.10  0.00 - 0.34 kU/L Final    Cat Dander Antibody 12/13/2022 <0.10  0.00 - 0.34 kU/L Final    [place] 12/13/2022 <0.10  0.00 - 0.34 kU/L Final    [place] Tree 12/13/2022 <0.10  0.00 - 0.34 kU/L Final    ALLERGEN PIGWEED ROUGH IGE 12/13/2022 <0.10  0.00 - 0.34 kU/L Final    Russian Thistle 12/13/2022 <0.10  0.00 - 0.34 kU/L Final    [place] Grass 12/13/2022 <0.10  0.00 - 0.34 kU/L Final    Allergen Hormodendrum IgE 12/13/2022 <0.10  0.00 - 0.34 kUL/L Final    [place]m Tree 12/13/2022 <0.10  0.00 - 0.34 kU/L Final    [place] Tree IgE 12/13/2022 <0.10  0.00 - 0.34 kU/L Final    Allergen Birch IgE 12/13/2022 <0.10  0.00 - 0.34 kU/L Final    Aspergillus Fumigatus 12/13/2022 <0.10  0.00 - 0.34 kU/L Final    D. pteronyssinus 12/13/2022 <0.10  0.00 - 0.34 kU/L Final    D. Farinae 12/13/2022 <0.10  0.00 - 0.34 kU/L Final    [place] Grass IgE 12/13/2022 <0.10  0.00 - 0.34 kU/L Final    Allergen, Tree, White Frank IgE 12/13/2022 <0.10  0.00 - 0.34 kU/L Final    P.  Notatum 12/13/2022 <0.10  0.00 - 0.34 kU/L Final    Short Ragwd(A cong.) IgE 12/13/2022 <0.10  0.00 - 0.34 kU/L Final    Cockroach IgE 12/13/2022 <0.10  0.00 - 0.34 kU/L Final    Allergen Tree Ringle 12/13/2022 <0.10  0.00 - 0.34 kU/L Final    [place] Tree IgE 12/13/2022 <0.10  0.00 - 0.34 kU/L Final    Pecan Tree IgE 12/13/2022 <0.10  0.00 - 0.34 kU/L Final    Allergen Mouse Epithelial 12/13/2022 <0.10  0.00 - 0.34 kU/L Final    Mucor Racemosus 12/13/2022 <0.10  0.00 - 0.34 kU/L Final    Allergen [place] Tree, IGE 12/13/2022 <0.10  0.00 - 0.34 kU/L Final    Dog Dander IgE 12/13/2022 <0.10  0.00 - 0.34 kU/L Final    [place]ep [place] IgE 12/13/2022 <0.10  0.00 - 0.34 kU/L Final    [employer] [address] [phone]    IgE 12/13/2022 44  <101 IU/mL Final    [facility] [address] [address] [phone]     Family History   Problem Relation Age of Onset    Hypertension Mother     High Cholesterol Mother     Hypertension Father     High Cholesterol Father     Diabetes Type 1  Sister         B 12    Diabetes Type 1  Sister         early peripheral neuropathy    Stroke Brother 79    Hypertension Brother     High Cholesterol Brother     Hypertension Brother     High Cholesterol Brother     Hypertension Brother     High Cholesterol Brother      Current Outpatient Medications   Medication Sig Dispense Refill    mometasone-formoterol (DULERA) 200-5 MCG/ACT inhaler INHALE 2 PUFFS INTO THE LUNGS TWICE DAILY. RINSE MOUTH AFTER USE 13 g 0    levocetirizine (XYZAL) 5 MG tablet TAKE 1 TABLET BY MOUTH EVERY EVENING 30 tablet 0    vitamin D (ERGOCALCIFEROL) 1.25 MG (07892 UT) CAPS capsule TAKE 1 CAPSULE BY MOUTH WEEKLY 4 capsule 0    traMADol (ULTRAM) 50 MG tablet Take 1 tablet by mouth every 4 hours for 30 days.  Max Daily Amount: 300 mg 180 tablet 1    gabapentin (NEURONTIN) 100 MG capsule 1 po 5x/ day 150 capsule 0    pregabalin (LYRICA) 150 MG capsule 1 po bid 60 capsule 2    topiramate (TOPAMAX) 25 MG tablet TAKE 3 TABLETS BY MOUTH EVERY MORNING AND 5 TABLETS EVERY EVENING 240 tablet 2    pantoprazole (PROTONIX) 40 MG tablet TAKE 1 TABLET BY MOUTH TWICE DAILY 60 tablet 2    ondansetron (ZOFRAN ODT) 4 MG disintegrating tablet Take 1 tablet by mouth every 8 hours as needed for Nausea or Vomiting 30 tablet 2    furosemide (LASIX) 20 MG tablet TAKE 1 TABLET BY MOUTH DAILY 30 tablet 0    potassium chloride (KLOR-CON M) 10 MEQ extended release tablet TAKE 1 TABLET BY MOUTH DAILY 30 tablet 0    solifenacin (VESICARE) 10 MG tablet Take 1 tablet by mouth daily 30 tablet 5    rizatriptan (MAXALT-MLT) 10 MG disintegrating tablet May repeat in 2 hours if needed 18 tablet 5    Magnesium Gluconate 500 (27 Mg) MG TABS tablet Take 500 mg by mouth daily      Levomefolate Glucosamine (METHYLFOLATE) 400 MCG CAPS Take 400 mcg by mouth every evening      gabapentin (NEURONTIN) 300 MG capsule Take 300 mg by mouth in the morning and at bedtime. 1200 and 1600      tiotropium (SPIRIVA RESPIMAT) 2.5 MCG/ACT AERS inhaler Inhale 2 puffs into the lungs in the morning. 1 each 5    albuterol sulfate HFA (PROVENTIL;VENTOLIN;PROAIR) 108 (90 Base) MCG/ACT inhaler INHALE 2 PUFFS INTO THE LUNGS FOUR TIMES DAILY AS NEEDED FOR WHEEZING 8.5 g 2    Spacer/Aero-Holding Chambers (COMPACT SPACE CHAMBER/SM MASK) AZAEL AS DIRECTED w/ inhalers 1 each 0    ipratropium (ATROVENT) 0.06 % nasal spray TAKE 1-2 SPRAYS BY NASAL ROUTE 4 TIMES DAILY AS NEEDED FOR RHINITIS 15 mL 5    albuterol (PROVENTIL) (2.5 MG/3ML) 0.083% nebulizer solution Take 3 mLs by nebulization every 6 hours as needed for Wheezing 75 mL 2     No current facility-administered medications for this visit. Allergies   Allergen Reactions    Codeine      Upset stomach     Hydrocodone Nausea And Vomiting    Hydrocodone-Acetaminophen Nausea Only and Nausea And Vomiting       Review of Systems    Objective: There were no vitals taken for this visit. BP Readings from Last 3 Encounters:   02/06/23 122/60   01/11/23 122/80   12/13/22 (!) 140/76       Pulse Readings from Last 3 Encounters:   02/06/23 68   01/11/23 79   12/13/22 77       Wt Readings from Last 3 Encounters:   02/06/23 163 lb (73.9 kg)   01/11/23 158 lb (71.7 kg)   12/13/22 154 lb (69.9 kg)       Physical Exam  Vitals and nursing note reviewed. Constitutional:       Appearance: He is well-developed. HENT:      Head: Normocephalic and atraumatic. Eyes:      General: No scleral icterus. Conjunctiva/sclera: Conjunctivae normal.   Pulmonary:      Effort: Pulmonary effort is normal.   Musculoskeletal:         General: Swelling (diffuse swelling low exts) present.    Skin:     General: Skin is warm and dry.   Neurological:      Mental Status: He is alert and oriented to person, place, and time. Assessment/Plan:      Diagnosis Orders   1. Venous insufficiency of both lower extremities        2. Other polyneuropathy        3. Spinal stenosis of lumbar region without neurogenic claudication        4. Bilateral leg edema        5. Chronic back pain, unspecified back location, unspecified back pain laterality        6. Other osteoporosis without current pathological fracture        7. History of vertebral compression fracture          F/u emg reviewed - neurology 2/2 persistent leg weakness  Continue compression/ elevation/ low salt  Osteoporosis tx d/w pt - has forteo but holding for now until dentist deals w/ tooth issue, but encouraged to stop asap as low dental risk w/ ca/ vit d.   F/u pain management - reviewed notes - but pt does not want to go back to pain specialist as doesn't want RFA/ spine stimulator - continue 5.5 to 6 tramadol / day for now  Will continue tramadol refills here at this time  Possible bph vs. Oab - will hold vesicare to see if this is truly helping  Consider urology or trial of flomax  Check ua  oarrs reviewed and results c/w rx'd meds - continuing tramadol for now w/ gabapentin as providing some relief  Davi Nava MD, MD  2/7/2023  1:39 PM

## 2023-02-08 RX ORDER — ERGOCALCIFEROL 1.25 MG/1
CAPSULE ORAL
Qty: 4 CAPSULE | Refills: 0 | OUTPATIENT
Start: 2023-02-08

## 2023-02-08 NOTE — TELEPHONE ENCOUNTER
Signed 1 week ago (1/31/2023):   vitamin D (ERGOCALCIFEROL) 1.25 MG (97716 UT) CAPS capsule   Sig: TAKE 1 CAPSULE BY MOUTH WEEKLY   Disp:  4 capsule    Refills:  0   Signed by: Thompson Orozco MD   Encounter Details     Duplicate request, closing encounter. mv

## 2023-02-13 RX ORDER — TOPIRAMATE 25 MG/1
TABLET ORAL
Qty: 240 TABLET | Refills: 2 | Status: SHIPPED | OUTPATIENT
Start: 2023-02-13

## 2023-02-13 RX ORDER — GABAPENTIN 100 MG/1
CAPSULE ORAL
Qty: 150 CAPSULE | Refills: 0 | Status: SHIPPED | OUTPATIENT
Start: 2023-02-13 | End: 2023-03-13

## 2023-02-13 RX ORDER — PANTOPRAZOLE SODIUM 40 MG/1
TABLET, DELAYED RELEASE ORAL
Qty: 60 TABLET | Refills: 2 | Status: SHIPPED | OUTPATIENT
Start: 2023-02-13

## 2023-02-27 RX ORDER — RIZATRIPTAN BENZOATE 10 MG/1
TABLET, ORALLY DISINTEGRATING ORAL
Qty: 18 TABLET | Refills: 1 | Status: SHIPPED | OUTPATIENT
Start: 2023-02-27

## 2023-03-01 ENCOUNTER — OFFICE VISIT (OUTPATIENT)
Dept: FAMILY MEDICINE CLINIC | Age: 73
End: 2023-03-01
Payer: COMMERCIAL

## 2023-03-01 VITALS
OXYGEN SATURATION: 97 % | BODY MASS INDEX: 20.94 KG/M2 | DIASTOLIC BLOOD PRESSURE: 70 MMHG | HEART RATE: 84 BPM | WEIGHT: 158 LBS | SYSTOLIC BLOOD PRESSURE: 112 MMHG | HEIGHT: 73 IN | RESPIRATION RATE: 18 BRPM

## 2023-03-01 DIAGNOSIS — R73.01 IMPAIRED FASTING GLUCOSE: ICD-10-CM

## 2023-03-01 DIAGNOSIS — Z11.59 ENCOUNTER FOR HEPATITIS C SCREENING TEST FOR LOW RISK PATIENT: ICD-10-CM

## 2023-03-01 DIAGNOSIS — R35.89 POLYURIA: ICD-10-CM

## 2023-03-01 DIAGNOSIS — N39.490 OVERFLOW INCONTINENCE OF URINE: ICD-10-CM

## 2023-03-01 DIAGNOSIS — L03.019 CHRONIC PARONYCHIA OF FINGER, UNSPECIFIED LATERALITY: ICD-10-CM

## 2023-03-01 DIAGNOSIS — Z12.5 PROSTATE CANCER SCREENING: ICD-10-CM

## 2023-03-01 DIAGNOSIS — R35.0 URINARY FREQUENCY: Primary | ICD-10-CM

## 2023-03-01 PROCEDURE — 1123F ACP DISCUSS/DSCN MKR DOCD: CPT | Performed by: NURSE PRACTITIONER

## 2023-03-01 PROCEDURE — 99214 OFFICE O/P EST MOD 30 MIN: CPT | Performed by: NURSE PRACTITIONER

## 2023-03-01 RX ORDER — TAMSULOSIN HYDROCHLORIDE 0.4 MG/1
0.4 CAPSULE ORAL DAILY
Qty: 90 CAPSULE | Refills: 0 | Status: SHIPPED | OUTPATIENT
Start: 2023-03-01 | End: 2023-03-03 | Stop reason: SINTOL

## 2023-03-01 ASSESSMENT — ENCOUNTER SYMPTOMS
CONSTIPATION: 0
EYE DISCHARGE: 0
SHORTNESS OF BREATH: 0
SINUS PRESSURE: 0
BACK PAIN: 0
ABDOMINAL DISTENTION: 0
CHEST TIGHTNESS: 0
NAUSEA: 0
COLOR CHANGE: 0
ABDOMINAL PAIN: 0
DIARRHEA: 0
SINUS PAIN: 0
COUGH: 0

## 2023-03-01 NOTE — PROGRESS NOTES
Date of Service:  3/1/2023    Cande Patel (:  1950) is a 81 y.o. male, here for evaluation of the following medical concerns:    Chief Complaint   Patient presents with    Urinary Frequency     Pt c/o \"dribbling\" while urinating for awhile, saw [facility] normal POCT UA, urgency and cannot hold it        HPI    Started with massage therapy today. Plan to order pneumatic pump. Pt looks good today compared to past visits I have seen him. Urinary Frequency  Patient has some control of his bladder and always dribbles and wears disposable underwear/depends. Says in the morning has dark urine but as the day progresses, uses the urinal and the restroom and can soak through his depends. Patient says by 5pm he will start soaking through his depends even like 3 depends within an hour or two and will have urine dripping down his leg. Saw Dr Sherine Gtz last month and I spoke to Dr Sherine Gtz prior to this appt since pt has had urinary issues prior to this and had a work up already and UA was negative. Last PSA was  and was normal. Patient does not feel it is his prostate is the issue but Dr Sherine Gtz discussed this as a possibility. Therapist today discussed with pt lymphedema could be weeping that is seeping down his legs and not necessarily urine. Patient says he pees on average every 30 minutes, especially when legs are very swollen. Review of Systems   Constitutional:  Negative for activity change, appetite change, fatigue, fever and unexpected weight change. HENT:  Negative for congestion, ear pain, sinus pressure and sinus pain. Eyes:  Negative for discharge and visual disturbance. Respiratory:  Negative for cough, chest tightness and shortness of breath. Cardiovascular:  Negative for chest pain, palpitations and leg swelling. Gastrointestinal:  Negative for abdominal distention, abdominal pain, constipation, diarrhea and nausea.    Endocrine: Negative for cold intolerance, heat intolerance, polydipsia, polyphagia and polyuria. Genitourinary:  Positive for frequency and urgency. Negative for decreased urine volume, difficulty urinating, dysuria and flank pain. Musculoskeletal:  Negative for arthralgias, back pain, gait problem, joint swelling, myalgias and neck pain. Skin:  Negative for color change, rash and wound. Allergic/Immunologic: Negative for food allergies and immunocompromised state. Neurological:  Negative for dizziness, tremors, speech difficulty, weakness, light-headedness, numbness and headaches. Hematological:  Negative for adenopathy. Does not bruise/bleed easily. Psychiatric/Behavioral:  Negative for confusion, decreased concentration, self-injury, sleep disturbance and suicidal ideas. The patient is not nervous/anxious. Prior to Visit Medications    Medication Sig Taking? Authorizing Provider   tamsulosin (FLOMAX) 0.4 MG capsule Take 1 capsule by mouth daily Yes CELESTINO Medrano - CNP   rizatriptan (MAXALT-MLT) 10 MG disintegrating tablet DISSOLVE 1 TABLET BY MOUTH AS DIRECTED; MAY REPEAT IN 2 HOURS IF NEEDED Yes Ale Ortiz MD   pantoprazole (PROTONIX) 40 MG tablet TAKE 1 TABLET BY MOUTH TWICE DAILY Yes Ale Ortiz MD   topiramate (TOPAMAX) 25 MG tablet TAKE 3 TABLETS BY MOUTH EVERY MORNING AND 5 TABLETS EVERY EVENING Yes Ale Ortiz MD   gabapentin (NEURONTIN) 100 MG capsule 1 po 5x/ day Yes Ale Ortiz MD   mometasone-formoterol (DULERA) 200-5 MCG/ACT inhaler INHALE 2 PUFFS INTO THE LUNGS TWICE DAILY.  RINSE MOUTH AFTER USE Yes Ale Ortiz MD   levocetirizine (XYZAL) 5 MG tablet TAKE 1 TABLET BY MOUTH EVERY EVENING Yes Ale Ortiz MD   vitamin D (ERGOCALCIFEROL) 1.25 MG (54427 UT) CAPS capsule TAKE 1 CAPSULE BY MOUTH WEEKLY Yes Ale Ortiz MD   ondansetron (ZOFRAN ODT) 4 MG disintegrating tablet Take 1 tablet by mouth every 8 hours as needed for Nausea or Vomiting Yes Ale Ortiz MD   potassium chloride (KLOR-CON M) 10 MEQ extended release tablet TAKE 1 TABLET BY MOUTH DAILY Yes Kraig Dandy, MD   Magnesium Gluconate 500 (27 Mg) MG TABS tablet Take 500 mg by mouth daily Yes Historical Provider, MD   Levomefolate Glucosamine (METHYLFOLATE) 400 MCG CAPS Take 400 mcg by mouth every evening Yes Historical Provider, MD   tiotropium (SPIRIVA RESPIMAT) 2.5 MCG/ACT AERS inhaler Inhale 2 puffs into the lungs in the morning. Yes Kraig Dandy, MD   albuterol sulfate HFA (PROVENTIL;VENTOLIN;PROAIR) 108 (90 Base) MCG/ACT inhaler INHALE 2 PUFFS INTO THE LUNGS FOUR TIMES DAILY AS NEEDED FOR WHEEZING Yes CELESTINO Gupta CNP   Spacer/Aero-Holding Chambers (COMPACT SPACE CHAMBER/SM MASK) AZAEL AS DIRECTED w/ inhalers Yes Kraig Dandy, MD   ipratropium (ATROVENT) 0.06 % nasal spray TAKE 1-2 SPRAYS BY NASAL ROUTE 4 TIMES DAILY AS NEEDED FOR RHINITIS Yes Kraig Dandy, MD   albuterol (PROVENTIL) (2.5 MG/3ML) 0.083% nebulizer solution Take 3 mLs by nebulization every 6 hours as needed for Wheezing Yes Kraig Dandy, MD        Social History     Tobacco Use    Smoking status: Never     Passive exposure: Past    Smokeless tobacco: Never   Substance Use Topics    Alcohol use: Not Currently     Comment: glass of wine at night        Vitals:    03/01/23 1557   BP: 112/70   Site: Right Upper Arm   Position: Sitting   Cuff Size: Medium Adult   Pulse: 84   Resp: 18   SpO2: 97%   Weight: 158 lb (71.7 kg)   Height: 6' 1\" (1.854 m)     Estimated body mass index is 20.85 kg/m² as calculated from the following:    Height as of this encounter: 6' 1\" (1.854 m). Weight as of this encounter: 158 lb (71.7 kg). Physical Exam  Vitals reviewed. Constitutional:       General: He is awake. Appearance: Normal appearance. He is well-developed, well-groomed and normal weight. He is not ill-appearing or toxic-appearing. HENT:      Head: Normocephalic and atraumatic.       Right Ear: Hearing, tympanic membrane, ear canal and external ear normal.      Left Ear: Hearing, tympanic membrane, ear canal and external ear normal.      Nose: Nose normal.      Mouth/Throat:      Lips: Pink. Mouth: Mucous membranes are moist.      Pharynx: Oropharynx is clear. Eyes:      General: Lids are normal.      Extraocular Movements: Extraocular movements intact. Conjunctiva/sclera: Conjunctivae normal.      Pupils: Pupils are equal, round, and reactive to light. Neck:      Thyroid: No thyromegaly. Vascular: No carotid bruit. Cardiovascular:      Rate and Rhythm: Normal rate. Pulses: Normal pulses. Carotid pulses are 2+ on the right side and 2+ on the left side. Radial pulses are 2+ on the right side and 2+ on the left side. Posterior tibial pulses are 2+ on the right side and 2+ on the left side. Heart sounds: Normal heart sounds, S1 normal and S2 normal. Heart sounds not distant. No murmur heard. Pulmonary:      Effort: Pulmonary effort is normal.      Breath sounds: Normal breath sounds. Abdominal:      General: Bowel sounds are normal. There is no abdominal bruit. Palpations: Abdomen is soft. Tenderness: There is no abdominal tenderness. Genitourinary:     Comments: Deferred  Musculoskeletal:         General: Normal range of motion. Cervical back: Full passive range of motion without pain, normal range of motion and neck supple. Right lower leg: No edema. Left lower leg: No edema. Lymphadenopathy:      Head:      Right side of head: No submental, submandibular, tonsillar, preauricular, posterior auricular or occipital adenopathy. Left side of head: No submental, submandibular, tonsillar, preauricular, posterior auricular or occipital adenopathy. Cervical: No cervical adenopathy. Right cervical: No superficial, deep or posterior cervical adenopathy. Left cervical: No superficial, deep or posterior cervical adenopathy. Upper Body:      Right upper body: No supraclavicular adenopathy. Left upper body: No supraclavicular adenopathy. Skin:     General: Skin is warm and dry. Capillary Refill: Capillary refill takes less than 2 seconds. Neurological:      General: No focal deficit present. Mental Status: He is alert and oriented to person, place, and time. Mental status is at baseline. Motor: Motor function is intact. No weakness. Coordination: Coordination is intact. Gait: Gait is intact. Psychiatric:         Attention and Perception: Attention and perception normal.         Mood and Affect: Mood and affect normal.         Speech: Speech normal.         Behavior: Behavior normal. Behavior is cooperative. Thought Content: Thought content normal.         Cognition and Memory: Cognition and memory normal.         Judgment: Judgment normal.       ASSESSMENT/PLAN:  1. Urinary frequency  -     PSA Screening; Future  -     Hemoglobin A1C; Future  -     tamsulosin (FLOMAX) 0.4 MG capsule; Take 1 capsule by mouth daily, Disp-90 capsule, R-0Normal  -     Comprehensive Metabolic Panel; Future  Discussed trial of Flomax, patient had discussed this with Dr. Mohsen Aponte in the past but had not started this, discussed urology referral.  This was discussed as well with Dr. Mohsen Aponte last month. No signs of urinary tract infection on urine specimen, no need to repeat 1 again today. Discussed that this could be lymphedema. Do not want to treat for overactive bladder and have potential urinary retention as patient cannot afford to retain fluid as he already struggles with severe pain and fluid retention issues. 2. Prostate cancer screening  -     PSA Screening; Future  Last checked in 2017, too late in the day to draw blood, aware they need to schedule blood draw  3. Polyuria  -     Hemoglobin A1C; Future  -     Comprehensive Metabolic Panel; Future  Rule out diabetes this patient urinary this frequently, mild IFG recently  4.  Impaired fasting glucose  -     Hemoglobin A1C; Future  - Comprehensive Metabolic Panel; Future  Rule out diabetes, low concern, but did have IFG on last blood work  5. Overflow incontinence of urine  We will try ordering incontinence pads and depends for patient to see if we can get this covered by insurance since patient has been paying for all of this out-of-pocket and uses multiple per day all day every day and even more overnight with his significant incontinence. Incontinence is worsened by his significant low back pain. He has had surgery for compression fractures of his lumbar vertebrae. He has osteoporosis. Patient uses size XL depends [place] brand  Bed pads  6. Encounter for hepatitis C screening test for low risk patient  -     Hepatitis C Antibody; Future  7. Chronic paronychia of finger, unspecified laterality   Discussed he has already had treatment twice he says with Dr. Aubree Foster for the nails and it is not improving and continues to worsen, discussed he may need nail removed in order for her to heal and grow back healthy, he is not interested at this time. It is present on both the right and left and on one finger each    Patient uses size XL depends [place] brand  Bed pads    Please note that all or part of this note was generated utilizing dragon dictation speech recognition software. Although every effort was made to ensure the accuracy of this automated transcription, some errors in transcription may have occurred. Occasionally, words are mistranscribed and despite editing, the text may contain inaccuracies due to incorrect word recognition. If further clarification is needed please contact the office at [phone]. Care Gaps Addressed  Hep C screen recommended with next blood draw   Flu vaccine recommended   COVID booster recommended  [place] 2015    I have reviewed patient's pertinent medical history, relevant laboratory and imaging studies, and past/future health maintenance.  Discussed with the patient the importance of adhering to their current medication regimen as directed. Advised the patient that they should continue to work on eating a healthy balanced diet and staying active by exercising within their personal limits. Orders as listed above. Patient was advised to keep future appointments with their respective specialty care team(s). Patient had the opportunity to ask questions, all of which were answered to the best of my ability and with patient satisfaction. Patient understands and is agreeable with the care plan following today's visit. Patient is to schedule an appointment for any new or worsening symptoms. Go to ER for significant shortness of breath, chest pain, or uncontrolled pain or fever. I discussed with patient the risk and benefits of any medications that were prescribed today. I verified that the patient understands their medications, labs, and/or procedures. The patient is doing well with current medication regimen and does not have any barriers to adherence. The patient's self-management abilities are good. Return in about 3 months (around 6/1/2023) for bladder issues. An electronic signature was used to authenticate this note.     --CELESTINO Victor CNP on 3/1/2023 at 5:54 PM

## 2023-03-03 ENCOUNTER — NURSE ONLY (OUTPATIENT)
Dept: FAMILY MEDICINE CLINIC | Age: 73
End: 2023-03-03

## 2023-03-03 ENCOUNTER — HOSPITAL ENCOUNTER (EMERGENCY)
Age: 73
Discharge: HOME | End: 2023-03-03
Attending: EMERGENCY MEDICINE
Payer: COMMERCIAL

## 2023-03-03 ENCOUNTER — APPOINTMENT (OUTPATIENT)
Dept: GENERAL RADIOLOGY | Age: 73
End: 2023-03-03
Payer: COMMERCIAL

## 2023-03-03 ENCOUNTER — APPOINTMENT (OUTPATIENT)
Dept: CT IMAGING | Age: 73
End: 2023-03-03
Payer: COMMERCIAL

## 2023-03-03 VITALS
HEART RATE: 106 BPM | BODY MASS INDEX: 22.24 KG/M2 | DIASTOLIC BLOOD PRESSURE: 94 MMHG | TEMPERATURE: 98.2 F | WEIGHT: 167.77 LBS | SYSTOLIC BLOOD PRESSURE: 148 MMHG | RESPIRATION RATE: 15 BRPM | OXYGEN SATURATION: 100 % | HEIGHT: 73 IN

## 2023-03-03 DIAGNOSIS — R35.0 URINARY FREQUENCY: ICD-10-CM

## 2023-03-03 DIAGNOSIS — R35.89 POLYURIA: ICD-10-CM

## 2023-03-03 DIAGNOSIS — Z12.5 PROSTATE CANCER SCREENING: ICD-10-CM

## 2023-03-03 DIAGNOSIS — T07.XXXA MULTIPLE CONTUSIONS: ICD-10-CM

## 2023-03-03 DIAGNOSIS — R73.01 IMPAIRED FASTING GLUCOSE: ICD-10-CM

## 2023-03-03 DIAGNOSIS — R55 SYNCOPE AND COLLAPSE: Primary | ICD-10-CM

## 2023-03-03 DIAGNOSIS — Z11.59 ENCOUNTER FOR HEPATITIS C SCREENING TEST FOR LOW RISK PATIENT: ICD-10-CM

## 2023-03-03 LAB
A/G RATIO: 1.9 (ref 1.1–2.2)
ALBUMIN SERPL-MCNC: 4 G/DL (ref 3.4–5)
ALP BLD-CCNC: 146 U/L (ref 40–129)
ALT SERPL-CCNC: 17 U/L (ref 10–40)
AMORPHOUS: ABNORMAL /HPF
ANION GAP SERPL CALCULATED.3IONS-SCNC: 11 MMOL/L (ref 3–16)
ANION GAP SERPL CALCULATED.3IONS-SCNC: 9 MMOL/L (ref 3–16)
AST SERPL-CCNC: 24 U/L (ref 15–37)
BACTERIA: ABNORMAL /HPF
BASOPHILS ABSOLUTE: 0 K/UL (ref 0–0.2)
BASOPHILS RELATIVE PERCENT: 0.4 %
BILIRUB SERPL-MCNC: 0.4 MG/DL (ref 0–1)
BILIRUBIN URINE: NEGATIVE
BLOOD, URINE: NEGATIVE
BUN BLDV-MCNC: 13 MG/DL (ref 7–20)
BUN BLDV-MCNC: 13 MG/DL (ref 7–20)
CALCIUM SERPL-MCNC: 8.9 MG/DL (ref 8.3–10.6)
CALCIUM SERPL-MCNC: 9 MG/DL (ref 8.3–10.6)
CHLORIDE BLD-SCNC: 98 MMOL/L (ref 99–110)
CHLORIDE BLD-SCNC: 98 MMOL/L (ref 99–110)
CLARITY: ABNORMAL
CO2: 26 MMOL/L (ref 21–32)
CO2: 28 MMOL/L (ref 21–32)
COLOR: YELLOW
COMMENT UA: ABNORMAL
CREAT SERPL-MCNC: 0.5 MG/DL (ref 0.8–1.3)
CREAT SERPL-MCNC: 0.6 MG/DL (ref 0.8–1.3)
CREATININE URINE: 11 MG/DL (ref 39–259)
EOSINOPHILS ABSOLUTE: 0.2 K/UL (ref 0–0.6)
EOSINOPHILS RELATIVE PERCENT: 3.6 %
EPITHELIAL CELLS, UA: 0 /HPF (ref 0–5)
GFR SERPL CREATININE-BSD FRML MDRD: >60 ML/MIN/{1.73_M2}
GFR SERPL CREATININE-BSD FRML MDRD: >60 ML/MIN/{1.73_M2}
GLUCOSE BLD-MCNC: 106 MG/DL (ref 70–99)
GLUCOSE BLD-MCNC: 98 MG/DL (ref 70–99)
GLUCOSE URINE: NEGATIVE MG/DL
HCT VFR BLD CALC: 37.9 % (ref 40.5–52.5)
HEMOGLOBIN: 12.8 G/DL (ref 13.5–17.5)
HEPATITIS C ANTIBODY INTERPRETATION: NORMAL
HYALINE CASTS: 1 /LPF (ref 0–8)
KETONES, URINE: NEGATIVE MG/DL
LEUKOCYTE ESTERASE, URINE: NEGATIVE
LYMPHOCYTES ABSOLUTE: 1.6 K/UL (ref 1–5.1)
LYMPHOCYTES RELATIVE PERCENT: 25.9 %
MAGNESIUM: 2.1 MG/DL (ref 1.8–2.4)
MCH RBC QN AUTO: 31 PG (ref 26–34)
MCHC RBC AUTO-ENTMCNC: 33.7 G/DL (ref 31–36)
MCV RBC AUTO: 91.7 FL (ref 80–100)
MICROSCOPIC EXAMINATION: YES
MONOCYTES ABSOLUTE: 0.7 K/UL (ref 0–1.3)
MONOCYTES RELATIVE PERCENT: 11.4 %
NEUTROPHILS ABSOLUTE: 3.6 K/UL (ref 1.7–7.7)
NEUTROPHILS RELATIVE PERCENT: 58.7 %
NITRITE, URINE: NEGATIVE
OSMOLALITY URINE: 198 MOSM/KG (ref 390–1070)
OTHER OBSERVATIONS UA: ABNORMAL
PDW BLD-RTO: 13.3 % (ref 12.4–15.4)
PH UA: 8 (ref 5–8)
PLATELET # BLD: 217 K/UL (ref 135–450)
PMV BLD AUTO: 7.1 FL (ref 5–10.5)
POTASSIUM REFLEX MAGNESIUM: 3.5 MMOL/L (ref 3.5–5.1)
POTASSIUM SERPL-SCNC: 3.8 MMOL/L (ref 3.5–5.1)
PROSTATE SPECIFIC ANTIGEN: 0.7 NG/ML (ref 0–4)
PROTEIN UA: NEGATIVE MG/DL
RBC # BLD: 4.13 M/UL (ref 4.2–5.9)
RBC UA: 0 /HPF (ref 0–4)
SODIUM BLD-SCNC: 135 MMOL/L (ref 136–145)
SODIUM BLD-SCNC: 135 MMOL/L (ref 136–145)
SODIUM URINE: 65 MMOL/L
SPECIFIC GRAVITY UA: 1.01 (ref 1–1.03)
TOTAL PROTEIN: 6.1 G/DL (ref 6.4–8.2)
TROPONIN: <0.01 NG/ML
URINE REFLEX TO CULTURE: ABNORMAL
URINE TYPE: ABNORMAL
UROBILINOGEN, URINE: 0.2 E.U./DL
WBC # BLD: 6.1 K/UL (ref 4–11)
WBC UA: 0 /HPF (ref 0–5)

## 2023-03-03 PROCEDURE — 80048 BASIC METABOLIC PNL TOTAL CA: CPT

## 2023-03-03 PROCEDURE — 93005 ELECTROCARDIOGRAM TRACING: CPT | Performed by: EMERGENCY MEDICINE

## 2023-03-03 PROCEDURE — 82570 ASSAY OF URINE CREATININE: CPT

## 2023-03-03 PROCEDURE — 6370000000 HC RX 637 (ALT 250 FOR IP): Performed by: EMERGENCY MEDICINE

## 2023-03-03 PROCEDURE — 84300 ASSAY OF URINE SODIUM: CPT

## 2023-03-03 PROCEDURE — 74176 CT ABD & PELVIS W/O CONTRAST: CPT

## 2023-03-03 PROCEDURE — 70450 CT HEAD/BRAIN W/O DYE: CPT

## 2023-03-03 PROCEDURE — 84484 ASSAY OF TROPONIN QUANT: CPT

## 2023-03-03 PROCEDURE — 73080 X-RAY EXAM OF ELBOW: CPT

## 2023-03-03 PROCEDURE — 3209999900 CT LUMBAR SPINE TRAUMA RECONSTRUCTION

## 2023-03-03 PROCEDURE — 2580000003 HC RX 258: Performed by: EMERGENCY MEDICINE

## 2023-03-03 PROCEDURE — 72125 CT NECK SPINE W/O DYE: CPT

## 2023-03-03 PROCEDURE — 83735 ASSAY OF MAGNESIUM: CPT

## 2023-03-03 PROCEDURE — 99285 EMERGENCY DEPT VISIT HI MDM: CPT

## 2023-03-03 PROCEDURE — 83935 ASSAY OF URINE OSMOLALITY: CPT

## 2023-03-03 PROCEDURE — 81001 URINALYSIS AUTO W/SCOPE: CPT

## 2023-03-03 PROCEDURE — 85025 COMPLETE CBC W/AUTO DIFF WBC: CPT

## 2023-03-03 PROCEDURE — 3209999900 CT THORACIC SPINE TRAUMA RECONSTRUCTION

## 2023-03-03 RX ORDER — TAMSULOSIN HYDROCHLORIDE 0.4 MG/1
0.4 CAPSULE ORAL DAILY
Qty: 90 CAPSULE | Refills: 0 | OUTPATIENT
Start: 2023-03-03

## 2023-03-03 RX ORDER — TRAMADOL HYDROCHLORIDE 50 MG/1
50 TABLET ORAL ONCE
Status: COMPLETED | OUTPATIENT
Start: 2023-03-03 | End: 2023-03-03

## 2023-03-03 RX ORDER — GABAPENTIN 100 MG/1
100 CAPSULE ORAL 3 TIMES DAILY
Status: DISCONTINUED | OUTPATIENT
Start: 2023-03-03 | End: 2023-03-03 | Stop reason: HOSPADM

## 2023-03-03 RX ORDER — TRAMADOL HYDROCHLORIDE 50 MG/1
50 TABLET ORAL EVERY 6 HOURS PRN
Qty: 20 TABLET | Refills: 0 | Status: SHIPPED | OUTPATIENT
Start: 2023-03-03 | End: 2023-03-07 | Stop reason: SDUPTHER

## 2023-03-03 RX ORDER — SUMATRIPTAN 50 MG/1
100 TABLET, FILM COATED ORAL ONCE
Status: COMPLETED | OUTPATIENT
Start: 2023-03-03 | End: 2023-03-03

## 2023-03-03 RX ORDER — 0.9 % SODIUM CHLORIDE 0.9 %
1000 INTRAVENOUS SOLUTION INTRAVENOUS ONCE
Status: COMPLETED | OUTPATIENT
Start: 2023-03-03 | End: 2023-03-03

## 2023-03-03 RX ADMIN — SODIUM CHLORIDE 1000 ML: 9 INJECTION, SOLUTION INTRAVENOUS at 19:42

## 2023-03-03 RX ADMIN — TRAMADOL HYDROCHLORIDE 50 MG: 50 TABLET, FILM COATED ORAL at 20:29

## 2023-03-03 RX ADMIN — SUMATRIPTAN SUCCINATE 100 MG: 50 TABLET ORAL at 20:28

## 2023-03-03 RX ADMIN — GABAPENTIN 100 MG: 100 CAPSULE ORAL at 20:27

## 2023-03-03 ASSESSMENT — ENCOUNTER SYMPTOMS
SHORTNESS OF BREATH: 0
DIARRHEA: 1
NAUSEA: 1
EYE DISCHARGE: 0
SORE THROAT: 0
EYE REDNESS: 0
ABDOMINAL PAIN: 0
BACK PAIN: 1
COUGH: 0
VOMITING: 0
WHEEZING: 0
RHINORRHEA: 0
EYE PAIN: 0

## 2023-03-03 ASSESSMENT — PAIN SCALES - GENERAL: PAINLEVEL_OUTOF10: 5

## 2023-03-03 NOTE — ED PROVIDER NOTES
[address] [facility]        Pt Name: Stacy Enriquez  MRN: [id number]  [place] 1950  Date of evaluation: 3/3/2023  Provider: Janessa Adam MD  PCP: Supriya Joyce MD  Note Started: 6:24 PM EST 3/3/23    CHIEF COMPLAINT       Chief Complaint   Patient presents with    Fall     Pt states that he passed out about an hour ago, pt complaining of neck pain, pt in collar        HISTORY OF PRESENT ILLNESS: 1 or more Elements     History from : Patient        Stacy Enriquez is a 81 y.o. male with a history of  has a past medical history of Arthritis, Asthma, B12 deficiency, Closed wedge compression fracture of L1 vertebra ([address] DDD (degenerative disc disease), lumbar, Diverticulitis, Gallbladder problem, GERD (gastroesophageal reflux disease), Hiatal hernia, Migraine, MRSA infection, Nausea & vomiting, and Spondylosis of lumbar spine. He presents with a syncopal episode. Patient normally has difficulty walking. He has chronic lymphedema. He has had multiple back problems. He has had previous kyphoplasty of L2 L4 and L5. He started his first dose of Flomax this morning. He went to his doctor and had some labs drawn. Got home around 1:00. His wife went out. He started feeling dizzy. He describes dizzy as lightheadedness and not as vertigo. He did pass out. Had to crawl to the bathroom. He had profuse diarrhea and incontinence with that episode. He woke up with headache neck pain back pain. Nauseated but not throwing up. Nursing Notes were all reviewed and agreed with or any disagreements were addressed in the HPI. REVIEW OF SYSTEMS :      Review of Systems   Constitutional:  Positive for activity change. Negative for chills, fatigue and fever. HENT:  Negative for ear pain, rhinorrhea and sore throat. Eyes:  Negative for pain, discharge, redness and visual disturbance.    Respiratory:  Negative for cough, shortness of breath and wheezing. Cardiovascular:  Positive for leg swelling. Negative for chest pain and palpitations. Gastrointestinal:  Positive for diarrhea and nausea. Negative for abdominal pain and vomiting. Genitourinary:  Positive for frequency. Negative for difficulty urinating and dysuria. Patient has had this lymphedema now for about 6 months. No clear-cut etiology has been found. He also has been having significant polyuria over the past 6 months. The blood test he had today was for a PSA which is still pending. However he is having urinary frequency with large volumes and not just weak urinary stream.  At the time that he was here he put out about a liter and a half. Musculoskeletal:  Positive for arthralgias, back pain and neck pain. Negative for myalgias. Skin:  Negative for rash. Allergic/Immunologic: Negative for environmental allergies. Neurological:  Positive for syncope and light-headedness. Negative for dizziness, seizures and headaches. Hematological:  Negative for adenopathy. Psychiatric/Behavioral:  Negative for suicidal ideas. The patient is not nervous/anxious. Positives and Pertinent negatives as per HPI.      SURGICAL HISTORY     Past Surgical History:   Procedure Laterality Date    CHOLECYSTECTOMY      COLONOSCOPY  9/11    FOOT SURGERY      nerve removed from foot    SHOULDER SURGERY Right 2005    rotator cuff repair    SPINE SURGERY N/A 9/28/2022    L2, L4, L5 KYPHOPLASTY WITH L2, L4 BONE BIOPSY performed by Lupe Huddleston MD at [address] [place] [address]  9/11       [address]       Discharge Medication List as of 3/3/2023  9:41 PM        CONTINUE these medications which have NOT CHANGED    Details   rizatriptan (MAXALT-MLT) 10 MG disintegrating tablet DISSOLVE 1 TABLET BY MOUTH AS DIRECTED; MAY REPEAT IN 2 HOURS IF NEEDED, Disp-18 tablet, R-1Normal      pantoprazole (PROTONIX) 40 MG tablet TAKE 1 TABLET BY MOUTH TWICE DAILY, Disp-60 tablet, R-2ZERO refills remain on this prescription. Your patient is requesting advance approval of refills for this medication to PREVENT ANY MISSED DOSESNormal      topiramate (TOPAMAX) 25 MG tablet TAKE 3 TABLETS BY MOUTH EVERY MORNING AND 5 TABLETS EVERY EVENING, Disp-240 tablet, R-2ZERO refills remain on this prescription. Your patient is requesting advance approval of refills for this medication to [facility]      gabapentin (NEURONTIN) 100 MG capsule 1 po 5x/ day, Disp-150 capsule, R-0Normal      mometasone-formoterol (DULERA) 200-5 MCG/ACT inhaler INHALE 2 PUFFS INTO THE LUNGS TWICE DAILY. RINSE MOUTH AFTER USE, Disp-13 g, R-0Normal      levocetirizine (XYZAL) 5 MG tablet TAKE 1 TABLET BY MOUTH EVERY EVENING, Disp-30 tablet, R-0ZERO refills remain on this prescription. Your patient is requesting advance approval of refills for this medication to [facility]      vitamin D (ERGOCALCIFEROL) 1.25 MG (35607 UT) CAPS capsule TAKE 1 CAPSULE BY MOUTH WEEKLY, Disp-4 capsule, R-0ZERO refills remain on this prescription.  Your patient is requesting advance approval of refills for this medication to [facility]      ondansetron (ZOFRAN ODT) 4 MG disintegrating tablet Take 1 tablet by mouth every 8 hours as needed for Nausea or Vomiting, Disp-30 tablet, R-2Normal      potassium chloride (KLOR-CON M) 10 MEQ extended release tablet TAKE 1 TABLET BY MOUTH DAILY, Disp-30 tablet, R-0Normal      Magnesium Gluconate 500 (27 Mg) MG TABS tablet Take 500 mg by mouth dailyHistorical Med      Levomefolate Glucosamine (METHYLFOLATE) 400 MCG CAPS Take 400 mcg by mouth every eveningHistorical Med      tiotropium (SPIRIVA RESPIMAT) 2.5 MCG/ACT AERS inhaler Inhale 2 puffs into the lungs in the morning., Disp-1 each, R-5Normal      albuterol sulfate HFA (PROVENTIL;VENTOLIN;PROAIR) 108 (90 Base) MCG/ACT inhaler INHALE 2 PUFFS INTO THE LUNGS FOUR TIMES DAILY AS NEEDED FOR WHEEZING, Disp-8.5 g, R-2Normal      Spacer/Aero-Holding Chambers (COMPACT SPACE CHAMBER/SM MASK) AZAEL AS DIRECTED w/ inhalers, Disp-1 each, R-0Print      ipratropium (ATROVENT) 0.06 % nasal spray TAKE 1-2 SPRAYS BY NASAL ROUTE 4 TIMES DAILY AS NEEDED FOR RHINITIS, Disp-15 mL, R-5Normal      albuterol (PROVENTIL) (2.5 MG/3ML) 0.083% nebulizer solution Take 3 mLs by nebulization every 6 hours as needed for Wheezing, Disp-75 mL, R-2This prescription was filled on 12/6/2021. Any refills authorized will be placed on file. Normal             ALLERGIES     Codeine, Hydrocodone, and Hydrocodone-acetaminophen    FAMILYHISTORY       Family History   Problem Relation Age of Onset    Hypertension Mother     High Cholesterol Mother     Hypertension Father     High Cholesterol Father     Diabetes Type 1  Sister         B 12    Diabetes Type 1  Sister         early peripheral neuropathy    Stroke Brother 79    Hypertension Brother     High Cholesterol Brother     Hypertension Brother     High Cholesterol Brother     Hypertension Brother     High Cholesterol Brother         SOCIAL HISTORY       Social History     Tobacco Use    Smoking status: Never     Passive exposure: Past    Smokeless tobacco: Never   Vaping Use    Vaping Use: Never used   Substance Use Topics    Alcohol use: Not Currently     Comment: glass of wine at night    Drug use: No       SCREENINGS        [place] Coma Scale  Eye Opening: Spontaneous  Best Verbal Response: Oriented  Best Motor Response: Obeys commands  [place] Coma Scale Score: 15                CIWA Assessment  BP: (!) 148/94  Heart Rate: (!) 106           PHYSICAL EXAM  1 or more Elements     ED Triage Vitals [03/03/23 1735]   BP Temp Temp Source Heart Rate Resp SpO2 Height Weight   (!) 140/57 98.1 °F (36.7 °C) Oral 94 16 99 % 6' 1\" (1.854 m) 167 lb 12.3 oz (76.1 kg)       Physical Exam  Constitutional:       Appearance: He is well-developed. He is not diaphoretic.       Comments: Patient appears uncomfortable. He is lying in a left lateral decubitus position on a flat stretcher. No respiratory distress. HENT:      Head: Normocephalic and atraumatic. Right Ear: External ear normal.      Left Ear: External ear normal.   Eyes:      General: No scleral icterus. Right eye: No discharge. Left eye: No discharge. Neck:      Thyroid: No thyromegaly. Vascular: No JVD. Trachea: No tracheal deviation. Cardiovascular:      Rate and Rhythm: Normal rate and regular rhythm. Heart sounds: No murmur heard. No friction rub. No gallop. Pulmonary:      Effort: Pulmonary effort is normal. No respiratory distress. Breath sounds: Normal breath sounds. No stridor. No wheezing or rales. Abdominal:      General: There is no distension. Palpations: Abdomen is soft. Tenderness: There is no abdominal tenderness. There is no guarding or rebound. Musculoskeletal:         General: No tenderness. Cervical back: Normal range of motion. Comments: He does have neck pain with vertical compression. Has pain primarily along his thoracolumbar junction and the entire lumbar spine. He also has an a skin tear on his right elbow with some pain in that distribution along the medial aspect of his right elbow. Skin:     General: Skin is warm and dry. Findings: No rash (On exposed body surfaces). Neurological:      Mental Status: He is alert and oriented to person, place, and time. Coordination: Coordination normal.   Psychiatric:         Behavior: Behavior normal.         Thought Content:  Thought content normal.         DIAGNOSTIC RESULTS   LABS:    Results for orders placed or performed during the hospital encounter of 03/03/23   CBC with Auto Differential   Result Value Ref Range    WBC 6.1 4.0 - 11.0 K/uL    RBC 4.13 (L) 4.20 - 5.90 M/uL    Hemoglobin 12.8 (L) 13.5 - 17.5 g/dL    Hematocrit 37.9 (L) 40.5 - 52.5 %    MCV 91.7 80.0 - 100.0 fL    MCH 31.0 26.0 - 34.0 pg    MCHC 33.7 31.0 - 36.0 g/dL    RDW 13.3 12.4 - 15.4 %    Platelets 285 980 - 072 K/uL    MPV 7.1 5.0 - 10.5 fL    Neutrophils % 58.7 %    Lymphocytes % 25.9 %    Monocytes % 11.4 %    Eosinophils % 3.6 %    Basophils % 0.4 %    Neutrophils Absolute 3.6 1.7 - 7.7 K/uL    Lymphocytes Absolute 1.6 1.0 - 5.1 K/uL    Monocytes Absolute 0.7 0.0 - 1.3 K/uL    Eosinophils Absolute 0.2 0.0 - 0.6 K/uL    Basophils Absolute 0.0 0.0 - 0.2 K/uL   Basic Metabolic Panel w/ Reflex to MG   Result Value Ref Range    Sodium 135 (L) 136 - 145 mmol/L    Potassium reflex Magnesium 3.5 3.5 - 5.1 mmol/L    Chloride 98 (L) 99 - 110 mmol/L    CO2 28 21 - 32 mmol/L    Anion Gap 9 3 - 16    Glucose 106 (H) 70 - 99 mg/dL    BUN 13 7 - 20 mg/dL    Creatinine 0.5 (L) 0.8 - 1.3 mg/dL    Est, Glom Filt Rate >60 >60    Calcium 9.0 8.3 - 10.6 mg/dL   Troponin   Result Value Ref Range    Troponin <0.01 <0.01 ng/mL   Urinalysis with Reflex to Culture    Specimen: Urine   Result Value Ref Range    Color, UA Yellow Straw/Yellow    Clarity, UA CLOUDY (A) Clear    Glucose, Ur Negative Negative mg/dL    Bilirubin Urine Negative Negative    Ketones, Urine Negative Negative mg/dL    Specific Gravity, UA 1.007 1.005 - 1.030    Blood, Urine Negative Negative    pH, UA 8.0 5.0 - 8.0    Protein, UA Negative Negative mg/dL    Urobilinogen, Urine 0.2 <2.0 E.U./dL    Nitrite, Urine Negative Negative    Leukocyte Esterase, Urine Negative Negative    Microscopic Examination YES     Urine Type Cleancatch     Urine Reflex to Culture Not Indicated    Microscopic Urinalysis   Result Value Ref Range    Bacteria, UA Rare (A) None Seen /HPF    Amorphous, UA 2+ /HPF    Other Observations UA See Below (A)     Urinalysis Comments see below     Hyaline Casts, UA 1 0 - 8 /LPF    WBC, UA 0 0 - 5 /HPF    RBC, UA 0 0 - 4 /HPF    Epithelial Cells, UA 0 0 - 5 /HPF   Magnesium   Result Value Ref Range    Magnesium 2.10 1.80 - 2.40 mg/dL   Sodium, Urine, Random   Result Value Ref Range    Sodium, Ur 65 Not Established mmol/L   Creatinine, Random Urine   Result Value Ref Range    Creatinine, Ur 11.0 (L) 39.0 - 259.0 mg/dL   Osmolality, Urine   Result Value Ref Range    Osmolality, Ur 198 (L) 390 - 1070 mOsm/kg       When ordered only abnormal lab results are displayed. All other labs were within normal range or not returned as of this dictation. EKG: EKG visualized preliminarily interpreted by myself. Rhythm is sinus at a rate of 96. Right axis deviation of 117. Diffuse nonspecific ST findings. I do not appreciate an acute ST elevation event. He does have some minimal ST elevation in lead V3 but it is more concave and convex. More consistent with J-point elevation. RADIOLOGY:   Non-plain film images such as CT, Ultrasound and MRI are read by the radiologist. Plain radiographic images are visualized and preliminarily interpreted by the ED Provider with the below findings:    XR ELBOW RIGHT (MIN 3 VIEWS)    Result Date: 3/3/2023  EXAMINATION: THREE XRAY VIEWS OF THE RIGHT ELBOW 3/3/2023 6:47 pm COMPARISON: None. HISTORY: ORDERING SYSTEM PROVIDED HISTORY: syncope fall injury TECHNOLOGIST PROVIDED HISTORY: Reason for exam:->syncope fall injury Reason for Exam: syncope fall injury FINDINGS: No fracture or dislocation. No fracture or dislocation     CT Head W/O Contrast    Result Date: 3/3/2023  EXAMINATION: CT OF THE HEAD WITHOUT CONTRAST  3/3/2023 6:50 pm TECHNIQUE: CT of the head was performed without the administration of intravenous contrast. Automated exposure control, iterative reconstruction, and/or weight based adjustment of the mA/kV was utilized to reduce the radiation dose to as low as reasonably achievable. COMPARISON: 02/11/2022 HISTORY: ORDERING SYSTEM PROVIDED HISTORY: syncope, fall, headache TECHNOLOGIST PROVIDED HISTORY: Reason for exam:->syncope, fall, headache Has a \"code stroke\" or \"stroke alert\" been called? ->No Decision Support Exception - unselect if not a suspected or confirmed emergency medical condition->Emergency Medical Condition (MA) Reason for Exam: syncope fall, previous vertebroplasty L Spine. Having diffuse back pain FINDINGS: BRAIN/VENTRICLES: There is mild periventricular and subcortical white matter low attenuation that is nonspecific but most consistent with chronic small vessel ischemia. There is no acute intracranial hemorrhage, mass effect or midline shift. No abnormal extra-axial fluid collection. The gray-white differentiation is maintained without evidence of an acute infarct. There is no evidence of hydrocephalus. ORBITS: The visualized portion of the orbits demonstrate no acute abnormality. SINUSES: The visualized paranasal sinuses and mastoid air cells demonstrate no acute abnormality. SOFT TISSUES/SKULL:  No acute abnormality of the visualized skull or soft tissues. No acute intracranial abnormality. CT C-Spine W/O Contrast    Result Date: 3/3/2023  EXAMINATION: CT OF THE CERVICAL SPINE WITHOUT CONTRAST 3/3/2023 6:50 pm TECHNIQUE: CT of the cervical spine was performed without the administration of intravenous contrast. Multiplanar reformatted images are provided for review. Automated exposure control, iterative reconstruction, and/or weight based adjustment of the mA/kV was utilized to reduce the radiation dose to as low as reasonably achievable. COMPARISON: None. HISTORY: ORDERING SYSTEM PROVIDED HISTORY: syncope fall, neck pain TECHNOLOGIST PROVIDED HISTORY: Reason for exam:->syncope fall, neck pain Decision Support Exception - unselect if not a suspected or confirmed emergency medical condition->Emergency Medical Condition (MA) Reason for Exam: syncope fall, previous vertebroplasty L Spine. Having diffuse back pain FINDINGS: BONES/ALIGNMENT: Cervical vertebral body heights, vertebral body alignment and disc spaces are normal.  Facet joints are normally aligned. There is no acute fracture or traumatic malalignment. DEGENERATIVE CHANGES: No significant degenerative changes. SOFT TISSUES: There is no prevertebral soft tissue swelling. No acute abnormality of the cervical spine. CT CHEST ABDOMEN PELVIS WO CONTRAST Additional Contrast? None    Result Date: 3/3/2023  EXAMINATION: CT OF THE CHEST, ABDOMEN, AND PELVIS WITHOUT CONTRAST 3/3/2023 6:50 pm TECHNIQUE: CT of the chest, abdomen and pelvis was performed without the administration of intravenous contrast. Multiplanar reformatted images are provided for review. Automated exposure control, iterative reconstruction, and/or weight based adjustment of the mA/kV was utilized to reduce the radiation dose to as low as reasonably achievable. COMPARISON: CT abdomen and pelvis 11/08/2022 HISTORY: ORDERING SYSTEM PROVIDED HISTORY: syncope/fall TECHNOLOGIST PROVIDED HISTORY: Reason for exam:->syncope/fall Additional Contrast?->None Decision Support Exception - unselect if not a suspected or confirmed emergency medical condition->Emergency Medical Condition (MA) Reason for Exam: syncope fall, previous vertebroplasty L Spine. Having diffuse back pain FINDINGS: CT CHEST: Mediastinum no enlarged lymphadenopathy. Aorta is unremarkable. Heart is normal. no effusion. Lungs/Pleura subpleural thickening in the right upper lobe anteriorly. Minimal apical pleural thickening bilaterally. The area in the anterior aspect of the right upper lobe is ill-defined. It is approximately 1.9 cm anterior to posterior. Negative for pneumothorax. Trachea and bronchi are patent. Negative for subcutaneous emphysema. Bones ribs are unremarkable. Negative for sternal fracture. The spine will be discussed on a separate report. Subpleural thickening in the right upper lobe. Follow-up chest CT in 6 months is recommended. CT ABDOMEN AND PELVIS Organs Liver, spleen, adrenals are unremarkable in appearance. No hydronephrosis. GI No small bowel dilation. No colonic wall thickening.   Stomach is unremarkable. Aorta No aneurysm. Atherosclerotic changes. Peritoneum/retroperitoneum No free fluid. No free gas. No enlarged lymphadenopathy. Other prostate is mildly prominent and has calcifications. It measures 5.8 cm transverse by 4.5 cm anterior to posterior. Bones kyphoplasty at multiple levels. Vertebra plana of L1. Vacuum discs at every level. Diffuse osteopenia. Osteoarthritic changes of the SI joints. Osteoarthritic change of the hips. IMPRESSION: No free fluid in the abdomen or pelvis. CT LUMBAR SPINE TRAUMA RECONSTRUCTION    Result Date: 3/3/2023  EXAMINATION: CT OF THE LUMBAR SPINE WITHOUT CONTRAST; CT OF THE THORACIC SPINE WITHOUT CONTRAST  3/3/2023 TECHNIQUE: CT of the lumbar spine was performed without the administration of intravenous contrast. Multiplanar reformatted images are provided for review. Adjustment of mA and/or kV according to patient size was utilized. Automated exposure control, iterative reconstruction, and/or weight based adjustment of the mA/kV was utilized to reduce the radiation dose to as low as reasonably achievable.; CT of the thoracic spine was performed without the administration of intravenous contrast. Multiplanar reformatted images are provided for review. Automated exposure control, iterative reconstruction, and/or weight based adjustment of the mA/kV was utilized to reduce the radiation dose to as low as reasonably achievable. COMPARISON: None HISTORY: ORDERING SYSTEM PROVIDED HISTORY: syncope fall, previous vertebroplasty L Spine. Having diffuse back pain TECHNOLOGIST PROVIDED HISTORY: Reason for exam:->syncope fall, previous vertebroplasty L Spine. Having diffuse back pain Reason for Exam: syncope fall, previous vertebroplasty L Spine. Having diffuse back pain CT THORACIC SPINE BONES/ALIGNMENT: There is normal alignment of the spine. The vertebral body heights are maintained. No osseous destructive lesion is seen.  DEGENERATIVE CHANGES: Lateral spurs at multiple levels. Diffuse osteopenia. Bridging anterior syndesmophytes which is consistent with dish. Hypertrophy of the spinous processes. SOFT TISSUES/RETROPERITONEUM: No paraspinal mass is seen. CT LUMBAR SPINE 5 non rib bearing vertebral bodies. Compression at every level. The patient has kyphoplasties at L2, L4, and L5. Chronic appearing compression of L1 and L3. Vertebral plana of L1. .  Facets are normally aligned. Pedicles and transverse processes are intact. Spinous processes are intact. L1-2 vacuum disc. Facet arthropathy. Bowing of the L2 vertebral body. L2-3 vacuum disc. Facet arthropathy. Concentric disc bulge. Ligamentum flavum hypertrophy. L3-4 vacuum disc. Ligament flavum hypertrophy. Facet arthropathy. Canal stenosis. Mild foraminal narrowing L4-5 ligament flavum hypertrophy. Large concentric disc bulge. Facet arthropathy. Canal stenosis. Mild foraminal narrowing. Large anterior spurs. L5-S1 facet arthropathy Si joints are intact. Spurring at the SI joints. Visualized kidneys are unremarkablevisualized aorta is  atherosclerotic changes but no aneurysm. 1.  Chronic appearing compressions at every lumbar level. No acute fracture. 2.  No acute thoracic spur fracture. CT THORACIC SPINE TRAUMA RECONSTRUCTION    Result Date: 3/3/2023  EXAMINATION: CT OF THE LUMBAR SPINE WITHOUT CONTRAST; CT OF THE THORACIC SPINE WITHOUT CONTRAST  3/3/2023 TECHNIQUE: CT of the lumbar spine was performed without the administration of intravenous contrast. Multiplanar reformatted images are provided for review. Adjustment of mA and/or kV according to patient size was utilized. Automated exposure control, iterative reconstruction, and/or weight based adjustment of the mA/kV was utilized to reduce the radiation dose to as low as reasonably achievable.; CT of the thoracic spine was performed without the administration of intravenous contrast. Multiplanar reformatted images are provided for review. Automated exposure control, iterative reconstruction, and/or weight based adjustment of the mA/kV was utilized to reduce the radiation dose to as low as reasonably achievable. COMPARISON: None HISTORY: ORDERING SYSTEM PROVIDED HISTORY: syncope fall, previous vertebroplasty L Spine. Having diffuse back pain TECHNOLOGIST PROVIDED HISTORY: Reason for exam:->syncope fall, previous vertebroplasty L Spine. Having diffuse back pain Reason for Exam: syncope fall, previous vertebroplasty L Spine. Having diffuse back pain CT THORACIC SPINE BONES/ALIGNMENT: There is normal alignment of the spine. The vertebral body heights are maintained. No osseous destructive lesion is seen. DEGENERATIVE CHANGES: Lateral spurs at multiple levels. Diffuse osteopenia. Bridging anterior syndesmophytes which is consistent with dish. Hypertrophy of the spinous processes. SOFT TISSUES/RETROPERITONEUM: No paraspinal mass is seen. CT LUMBAR SPINE 5 non rib bearing vertebral bodies. Compression at every level. The patient has kyphoplasties at L2, L4, and L5. Chronic appearing compression of L1 and L3. Vertebral plana of L1. .  Facets are normally aligned. Pedicles and transverse processes are intact. Spinous processes are intact. L1-2 vacuum disc. Facet arthropathy. Bowing of the L2 vertebral body. L2-3 vacuum disc. Facet arthropathy. Concentric disc bulge. Ligamentum flavum hypertrophy. L3-4 vacuum disc. Ligament flavum hypertrophy. Facet arthropathy. Canal stenosis. Mild foraminal narrowing L4-5 ligament flavum hypertrophy. Large concentric disc bulge. Facet arthropathy. Canal stenosis. Mild foraminal narrowing. Large anterior spurs. L5-S1 facet arthropathy Si joints are intact. Spurring at the SI joints. Visualized kidneys are unremarkablevisualized aorta is  atherosclerotic changes but no aneurysm. 1.  Chronic appearing compressions at every lumbar level. No acute fracture. 2.  No acute thoracic spur fracture. Interpretation per the Radiologist below, if available at the time of this note:    CT Head W/O Contrast   Final Result   No acute intracranial abnormality. CT C-Spine W/O Contrast   Final Result   No acute abnormality of the cervical spine. CT CHEST ABDOMEN PELVIS WO CONTRAST Additional Contrast? None   Final Result      Subpleural thickening in the right upper lobe. Follow-up chest CT in 6   months is recommended. CT ABDOMEN AND PELVIS      Organs Liver, spleen, adrenals are unremarkable in appearance. No   hydronephrosis. GI No small bowel dilation. No colonic wall thickening. Stomach is   unremarkable. Aorta No aneurysm. Atherosclerotic changes. Peritoneum/retroperitoneum No free fluid. No free gas. No enlarged   lymphadenopathy. Other prostate is mildly prominent and has calcifications. It measures 5.8   cm transverse by 4.5 cm anterior to posterior. Bones kyphoplasty at multiple levels. Vertebra plana of L1. Vacuum discs at   every level. Diffuse osteopenia. Osteoarthritic changes of the SI joints. Osteoarthritic change of the hips. IMPRESSION:   No free fluid in the abdomen or pelvis. CT LUMBAR SPINE TRAUMA RECONSTRUCTION   Final Result   1. Chronic appearing compressions at every lumbar level. No acute fracture. 2.  No acute thoracic spur fracture. CT THORACIC SPINE TRAUMA RECONSTRUCTION   Final Result   1. Chronic appearing compressions at every lumbar level. No acute fracture. 2.  No acute thoracic spur fracture. XR ELBOW RIGHT (MIN 3 VIEWS)   Final Result   No fracture or dislocation           No results found. No results found.     PROCEDURES   Unless otherwise noted below, none     Procedures    CRITICAL CARE TIME   None    PAST MEDICAL HISTORY      has a past medical history of Arthritis, Asthma, B12 deficiency (07/10/2013), Closed wedge compression fracture of L1 vertebra ([address] (09/05/2018), DDD (degenerative disc disease), lumbar (09/05/2018), Diverticulitis, Gallbladder problem, GERD (gastroesophageal reflux disease), Hiatal hernia, Migraine, MRSA infection (04/03/2019), Nausea & vomiting, and Spondylosis of lumbar spine (11/02/2015). EMERGENCY DEPARTMENT COURSE and DIFFERENTIAL DIAGNOSIS/MDM:   Vitals:    Vitals:    03/03/23 1815 03/03/23 1845 03/03/23 2030 03/03/23 2115   BP: 132/69 (!) 155/68 132/70 (!) 148/94   Pulse: 94 (!) 108 100 (!) 106   Resp: 19 25 16 15   Temp:    98.2 °F (36.8 °C)   TempSrc:    Oral   SpO2: 99% 95% 99% 100%   Weight:       Height:           ED Triage Vitals [03/03/23 1735]   BP Temp Temp Source Heart Rate Resp SpO2 Height Weight   (!) 140/57 98.1 °F (36.7 °C) Oral 94 16 99 % 6' 1\" (1.854 m) 167 lb 12.3 oz (76.1 kg)      height is 6' 1\" (1.854 m) and weight is 167 lb 12.3 oz (76.1 kg). His oral temperature is 98.2 °F (36.8 °C). His blood pressure is 148/94 (abnormal) and his pulse is 106 (abnormal). His respiration is 15 and oxygen saturation is 100%. Patient was given the following medications:  Medications   gabapentin (NEURONTIN) capsule 100 mg (100 mg Oral Given 3/3/23 2027)   0.9 % sodium chloride bolus (0 mLs IntraVENous Stopped 3/3/23 2132)   SUMAtriptan (IMITREX) tablet 100 mg (100 mg Oral Given 3/3/23 2028)   traMADol (ULTRAM) tablet 50 mg (50 mg Oral Given 3/3/23 2029)             Is this patient to be included in the SEP-1 Core Measure due to severe sepsis or septic shock? No   Exclusion criteria - the patient is NOT to be included for SEP-1 Core Measure due to: Infection is not suspected    Chronic Conditions: As above has been having these urinary symptoms and pedal edema for about 6 months. He complains of urinary frequency but he also has large volumes with urinary frequency. He did have an echocardiogram that was normal.  He has seen cardiology. The blood test he had today was for a PSA which is pending.   He has not yet seen urology or nephrology. CONSULTS: (Who and What was discussed)  None                CC/HPI Summary, DDx, ED Course, and Reassessment: He has been stable. I gave him a tramadol and the gabapentin. He has taken this in the past for his back pain. He also does get the headaches and it does not seem that the headache is necessarily related to the event today. He was given Imitrex. All of these medications did help. As mentioned above though he is demonstrating large volumes of urine output but his electrolytes have been normal.  He states that his leg swelling goes down when he gets in bed at night. The Flomax that he was started on today was really a clinical trial to see if it would help. Naturally, I did give him a liter of fluid here tonight based on the presentation and history. For now I am going to recommend he hold off on taking the Flomax as this was just a trial attempt. He probably should see nephrology and urology based on the PSA result. Since we had enough urine I did send off urine osmolarity urine sodium and urine creatinine random specimens. Disposition Considerations (tests considered but not done, Shared Decision Making, Pt Expectation of Test or Tx.): We will ambulate the patient now to see if he is stable for discharge home. I did give him a liter of fluid while he was here. I am the Primary Clinician of Record. FINAL IMPRESSION      1. Syncope and collapse    2. Multiple contusions    3.  Polyuria          DISPOSITION/PLAN     DISPOSITION Decision To Discharge 03/03/2023 09:36:19 PM      PATIENT REFERRED TO:  Aubree Gonzalez MD  [id number] [facility] [id number] [id number], [place]  [address] [phone]      This is one of our kidney specialists    DISCHARGE MEDICATIONS:  Discharge Medication List as of 3/3/2023  9:41 PM        START taking these medications    Details   traMADol (ULTRAM) 50 MG tablet Take 1 tablet by mouth every 6 hours as needed for Pain for up to 5 days. Intended supply: 3 days.  Take lowest dose possible to manage pain Max Daily Amount: 200 mg, Disp-20 tablet, R-0Normal             DISCONTINUED MEDICATIONS:  Discharge Medication List as of 3/3/2023  9:41 PM                 (Please note that portions of this note were completed with a voice recognition program.  Efforts were made to edit the dictations but occasionally words are mis-transcribed.)    Konstantin Chauhan MD (electronically signed)           Konstantin Chauhan MD  03/03/23 1364

## 2023-03-03 NOTE — TELEPHONE ENCOUNTER
tamsulosin (FLOMAX) 0.4 MG capsule 90 capsule 0 3/1/2023     Sig - Route:  Take 1 capsule by mouth daily - Oral    Sent to pharmacy as: Tamsulosin HCl 0.4 MG Oral Capsule [facility])    E-Prescribing Status: Receipt confirmed by pharmacy (3/1/2023  5:50 PM EST)    DUPLICATE REQUEST

## 2023-03-04 LAB
EKG ATRIAL RATE: 96 BPM
EKG DIAGNOSIS: NORMAL
EKG P-R INTERVAL: 240 MS
EKG Q-T INTERVAL: 388 MS
EKG QRS DURATION: 104 MS
EKG QTC CALCULATION (BAZETT): 490 MS
EKG R AXIS: 117 DEGREES
EKG T AXIS: 133 DEGREES
EKG VENTRICULAR RATE: 96 BPM
ESTIMATED AVERAGE GLUCOSE: 99.7 MG/DL
HBA1C MFR BLD: 5.1 %

## 2023-03-04 PROCEDURE — 93010 ELECTROCARDIOGRAM REPORT: CPT | Performed by: INTERNAL MEDICINE

## 2023-03-04 NOTE — ED NOTES
.Pt discharged at this time. Discharge instructions and medications reviewed,  Questions were answered. PT verbalized understanding. .  Follow up appointments were discussed.          [facility], [facility]  03/03/23 1043

## 2023-03-06 RX ORDER — GABAPENTIN 100 MG/1
CAPSULE ORAL
Qty: 150 CAPSULE | Refills: 0 | OUTPATIENT
Start: 2023-03-06 | End: 2023-04-02

## 2023-03-06 RX ORDER — ERGOCALCIFEROL 1.25 MG/1
CAPSULE ORAL
Qty: 4 CAPSULE | Refills: 0 | Status: SHIPPED | OUTPATIENT
Start: 2023-03-06

## 2023-03-08 RX ORDER — ERGOCALCIFEROL 1.25 MG/1
CAPSULE ORAL
Qty: 4 CAPSULE | Refills: 0 | OUTPATIENT
Start: 2023-03-08

## 2023-03-08 NOTE — TELEPHONE ENCOUNTER
vitamin D (ERGOCALCIFEROL) 1.25 MG (75264 UT) CAPS capsule 4 capsule 0 3/6/2023     Sig: TAKE 1 CAPSULE BY MOUTH WEEKLY    Sent to pharmacy as: Vitamin D (Ergocalciferol) 1.25 MG (83413 UT) Oral Capsule (ERGOCALCIFEROL)    Notes to Pharmacy: ZERO refills remain on this prescription.  Your patient is requesting advance approval of refills for this medication to [facility] for Ordering: Accepted by Marnie Dickson MD on 3/6/2023  8:53 PM    E-Prescribing Status: Receipt confirmed by pharmacy (3/6/2023  6:54 AM EST)    DUPLICATE REQUEST

## 2023-03-08 NOTE — PATIENT INSTRUCTIONS
GENERAL OFFICE POLICIES      Telephone Calls: Messages will be answered within 1-2 business days, unless the provider is out of the office. If it is urgent a covering provider will answer. (this does not include Medication refills). MyChart: We recommend all patients sign up for [employer]. Through this portal you can see your lab results, request refills, schedule appointments, pay your bill and send messages to the office. [employer] messages will be answered within 1-2 business days unless the provider is out of the office. For urgent matters, please call the office. Appointments:  All appointments must be scheduled. We ask all patients to schedule their next follow up appointment before they leave the office to make sure you will be able to be seen before you run out of medications. 24 hours notice is required to cancel or reschedule an appointment to avoid being marked as a no show. You may be dismissed from the practice after 3 no shows. LATE for Appointment: If you are 15 or more minutes late for your appointment, you may be asked to reschedule. MA/LAB APPTS: Must be scheduled, cannot accept walk in lab visits. We only draw labs for patients established in our office. We only do injections for medications ordered by our office. Acute Sick Visits:  Nothing other than acute complaint will be addressed at this visit. TRADITIONAL MEDICARE  DOES NOT COVER PHYSICALS  MEDICARE WELLNESS VISITS: These are NOT physicals but the free annual visit offered by Medicare to discuss wellness issues. Medication refills, checkups, etc. will not be addressed during this visit. Medication Refills: Refills are handled electronically so please contact your pharmacy for medication refills even if current refills have been exhausted. If you are on a controlled medication you will be referred to a specialist (pain specialist, psychiatry, etc). Forms:  There is a $35 fee to fill out FMLA/Disability paperwork, payable at time of . Instead of the fee, you can choose to have the paperwork filled out during a separate office visit that is for filling out the paperwork only. Medication Samples: This office does not carry medication samples. If you need assistance in getting your medications, then please let the medical assistant know so they can help you sign up for a drug assistance program that can help get medications at a reduced cost or even free (if you qualify). Workman's Comp Claims: We do not handle workman's comp cases or claims. You will need to go to an urgent care to be seen or to whomever your employer uses. General - Any abusive/rude behavior toward staff/providers may be cause for dismissal.  Zara Dukes may receive a survey regarding the care you received during your visit. Your input is valuable to us. We encourage you to complete and return your survey. We hope you will choose us in the future for your healthcare needs.

## 2023-03-09 ENCOUNTER — OFFICE VISIT (OUTPATIENT)
Dept: FAMILY MEDICINE CLINIC | Age: 73
End: 2023-03-09
Payer: COMMERCIAL

## 2023-03-09 VITALS
HEIGHT: 73 IN | HEART RATE: 80 BPM | DIASTOLIC BLOOD PRESSURE: 60 MMHG | WEIGHT: 164 LBS | SYSTOLIC BLOOD PRESSURE: 112 MMHG | BODY MASS INDEX: 21.74 KG/M2 | OXYGEN SATURATION: 99 % | RESPIRATION RATE: 20 BRPM

## 2023-03-09 DIAGNOSIS — R60.0 LOCALIZED EDEMA: Primary | ICD-10-CM

## 2023-03-09 DIAGNOSIS — T80.1XXA PHLEBITIS AFTER INFUSION, INITIAL ENCOUNTER: ICD-10-CM

## 2023-03-09 DIAGNOSIS — I80.9 PHLEBITIS AFTER INFUSION, INITIAL ENCOUNTER: ICD-10-CM

## 2023-03-09 PROCEDURE — 1123F ACP DISCUSS/DSCN MKR DOCD: CPT | Performed by: NURSE PRACTITIONER

## 2023-03-09 PROCEDURE — 99213 OFFICE O/P EST LOW 20 MIN: CPT | Performed by: NURSE PRACTITIONER

## 2023-03-09 RX ORDER — IPRATROPIUM BROMIDE 42 UG/1
SPRAY, METERED NASAL
Qty: 15 ML | Refills: 5 | Status: SHIPPED | OUTPATIENT
Start: 2023-03-09

## 2023-03-09 RX ORDER — TERIPARATIDE 250 UG/ML
INJECTION, SOLUTION SUBCUTANEOUS
COMMUNITY
Start: 2022-12-22

## 2023-03-09 RX ORDER — CEPHALEXIN 500 MG/1
500 CAPSULE ORAL 2 TIMES DAILY
Qty: 10 CAPSULE | Refills: 0 | Status: SHIPPED | OUTPATIENT
Start: 2023-03-09 | End: 2023-03-14

## 2023-03-09 NOTE — PROGRESS NOTES
Loretta Aguilar (:  1950) is a 81 y.o. male,Established patient, here for evaluation of the following chief complaint(s):    Rash (Swelling on right arm from IV )      SUBJECTIVE/OBJECTIVE:  HPI    ER 3/3/23      He was standing in his room felt like he was going to pass  feels like a black shade is going down over his eyes- out he denies dizziness but passed out  he is not sure how long he was out  but he hit the back of his head had bruises all over  his body when he woke up- he also had accident defecated  everywhere  While in the er had an iv in his right arm - he noticed a big blister and redness where it was placed - no swelling noted at that time The area  just below his ac fossa burns and itches - there is clear yellow drainage noted at times  he is using neosporin not sure if it helps just makes the skin red-  he  now has swelling by his wrist which is not getting  any better - has not tried anything else  He does have lymphedema  Review of Systems   Skin:         Swelling in right arm     Physical Exam  Vitals reviewed. Constitutional:       General: He is awake. He is not in acute distress. Appearance: Normal appearance. He is well-developed, well-groomed and normal weight. He is not ill-appearing, toxic-appearing or diaphoretic. Cardiovascular:      Rate and Rhythm: Normal rate and regular rhythm. Heart sounds: Normal heart sounds, S1 normal and S2 normal.   Pulmonary:      Effort: Pulmonary effort is normal.      Breath sounds: Normal air entry. Musculoskeletal:        Arms:       Comments: Swelling noted ( soft swelling noted at lateral aspect of right wrist- right hand slightly swollen )   Neurological:      Mental Status: He is alert and oriented to person, place, and time. Psychiatric:         Attention and Perception: Attention and perception normal.         Mood and Affect: Mood and affect normal.         Behavior: Behavior is cooperative.          Cognition and Memory: Cognition and memory normal.           Ronda Casye was seen today for rash. Diagnoses and all orders for this visit:    Localized edema  Phlebitis after infusion, initial encounter  cephALEXin (KEFLEX) 500 MG capsule;  Take 1 capsule by mouth 2 times daily for 5 days  Diuretic declined  Right arm wrapped with ace bandage  Lymphedema dw pt - differential diagnoses  Follow up with vascular provider as scheduled            -     ipratropium (ATROVENT) 0.06 % nasal spray; TAKE 1-2 SPRAYS BY NASAL ROUTE 4 TIMES DAILY AS NEEDED FOR RHINITIS       --Nadia Lanes, APRN - CNP

## 2023-03-14 RX ORDER — GABAPENTIN 100 MG/1
CAPSULE ORAL
Qty: 150 CAPSULE | Refills: 2 | Status: SHIPPED | OUTPATIENT
Start: 2023-03-14 | End: 2023-06-12

## 2023-03-17 RX ORDER — LEVOCETIRIZINE DIHYDROCHLORIDE 5 MG/1
TABLET, FILM COATED ORAL
Qty: 90 TABLET | Refills: 0 | Status: SHIPPED | OUTPATIENT
Start: 2023-03-17

## 2023-03-23 ENCOUNTER — TELEPHONE (OUTPATIENT)
Dept: FAMILY MEDICINE CLINIC | Age: 73
End: 2023-03-23

## 2023-03-23 RX ORDER — DOXYCYCLINE HYCLATE 100 MG
100 TABLET ORAL 2 TIMES DAILY
Qty: 14 TABLET | Refills: 0 | Status: SHIPPED | OUTPATIENT
Start: 2023-03-23 | End: 2023-03-30

## 2023-03-23 NOTE — TELEPHONE ENCOUNTER
Patient called and said he was given an antibiotic for his arm that he seen honey for recently.   He stated it was working good and then all a sudden got worse and his sister who is an RN said it looks like cellulitis and may need a different antibiotic        [facility] [address], [address] [facility] -  [phone]

## 2023-03-23 NOTE — TELEPHONE ENCOUNTER
Doxy sent to pharmacy -take bid for 1 week w/ food. Please eat yogurt daily or take probiotic supplement while on this antibiotic and for additional week after finishing the antibiotic to protect your GI tract.   Will need appointment if this isn't clearing

## 2023-03-30 ENCOUNTER — OFFICE VISIT (OUTPATIENT)
Dept: FAMILY MEDICINE CLINIC | Age: 73
End: 2023-03-30
Payer: COMMERCIAL

## 2023-03-30 VITALS
OXYGEN SATURATION: 99 % | HEART RATE: 72 BPM | HEIGHT: 73 IN | BODY MASS INDEX: 23.99 KG/M2 | SYSTOLIC BLOOD PRESSURE: 124 MMHG | DIASTOLIC BLOOD PRESSURE: 82 MMHG | WEIGHT: 181 LBS

## 2023-03-30 DIAGNOSIS — B35.4 TINEA CORPORIS: ICD-10-CM

## 2023-03-30 DIAGNOSIS — I89.0 LYMPH EDEMA: ICD-10-CM

## 2023-03-30 DIAGNOSIS — L03.113 CELLULITIS OF RIGHT UPPER EXTREMITY: Primary | ICD-10-CM

## 2023-03-30 DIAGNOSIS — R35.0 FREQUENCY OF URINATION AND POLYURIA: ICD-10-CM

## 2023-03-30 DIAGNOSIS — R35.89 FREQUENCY OF URINATION AND POLYURIA: ICD-10-CM

## 2023-03-30 DIAGNOSIS — R35.0 URINARY FREQUENCY: ICD-10-CM

## 2023-03-30 DIAGNOSIS — E77.8 HYPOPROTEINEMIA (HCC): ICD-10-CM

## 2023-03-30 PROBLEM — R60.0 BILATERAL LEG EDEMA: Chronic | Status: ACTIVE | Noted: 2022-11-04

## 2023-03-30 LAB
CREAT UR-MCNC: 53.5 MG/DL (ref 39–259)
MICROALBUMIN UR DL<=1MG/L-MCNC: <1.2 MG/DL
MICROALBUMIN/CREAT UR: NORMAL MG/G (ref 0–30)

## 2023-03-30 PROCEDURE — 1123F ACP DISCUSS/DSCN MKR DOCD: CPT | Performed by: FAMILY MEDICINE

## 2023-03-30 PROCEDURE — 99215 OFFICE O/P EST HI 40 MIN: CPT | Performed by: FAMILY MEDICINE

## 2023-03-30 RX ORDER — SULFAMETHOXAZOLE AND TRIMETHOPRIM 800; 160 MG/1; MG/1
1 TABLET ORAL 2 TIMES DAILY
Qty: 14 TABLET | Refills: 0 | Status: SHIPPED | OUTPATIENT
Start: 2023-03-30 | End: 2023-04-06

## 2023-03-30 NOTE — PATIENT INSTRUCTIONS
Microalbumin / Creatinine Urine Ratio; Future  You can increase your protein using egg substitutes, eating small portions of red meat and using more skinless poultry and fish to meet your protein needs. Cottage cheese and greek yogurt are also good sources of protein. Try to get some of your proteins from plant sources such as beans, nuts, peas and / or soy products such as tofu or soy milk. Whey protein powders 1 scoop daily can add 15-25% of your daily need when mixed in with food or as a smoothie blended with vegetable or fruit juice, yogurt or milk. You can also substitute soy or alfalfa powder which is cholesterol free. Lymph edema? Keep leg elevated when sitting. Wear compression stockings as much as possible. If feet are down while sitting press toes and front of foot down then lift them firmly with heel on the ground. When standing gently rock from heels to standing on toe tips. These are calf pumps that move fluid out of the calf and back to the central circulation and prevent worsened swelling in the foot and calf.   Avoid adding salt at the table in the cooking and eating salty foods (such as pork, canned soups, fast food and salty snacks.)       Urinary frequency  -     AFL - Amber Montes DO, Urology, [facility]    Tinea corporis  Antifungal.

## 2023-03-30 NOTE — PROGRESS NOTES
Azucena Lazo (:  1950) is a 81 y.o. male,Established patient, here for evaluation of the following chief complaint(s):  Swelling (PT C/O SWELLING IN HIS LEGS, PENIS, AND ARM THIS HAS BEEN GOING ON FOR A LITTLE WHILE NOW)       ASSESSMENT/PLAN:  235    Patient Instructions     Mike Workman was seen today for swelling. Diagnoses and all orders for this visit:    Cellulitis of right upper extremity  -     CBC with Auto Differential; Future  -     C-Reactive Protein; Future  -     Sedimentation Rate; Future  -     sulfamethoxazole-trimethoprim (BACTRIM DS;SEPTRA DS) 800-160 MG per tablet; Take 1 tablet by mouth 2 times daily for 7 days  Multiple vitamins often contain vitamin K which increases the risk of blood clots and iron which can increase blood pressures if it is not needed. Six vitamins without vitamin K are:  Centrum Silver Chewables (the non chewable version has vitamin K), Spectravite Senior Chewables ([facility] brand), and Simply Right Super Strength Multi Iron Free (requires 2 tabs per day but is NOT a chewable) ([employer] brand), Yemilyleyla Math Made Adult Gummy Multiple Vitamin, Clotamin ([facility]  One A Day Proactive + for men and women (requires 2 tabs per day but is NOT a chewable). Read labels as changes may occur. Hypoproteinemia (HCC)  -     Microalbumin / Creatinine Urine Ratio; Future  You can increase your protein using egg substitutes, eating small portions of red meat and using more skinless poultry and fish to meet your protein needs. Cottage cheese and greek yogurt are also good sources of protein. Try to get some of your proteins from plant sources such as beans, nuts, peas and / or soy products such as tofu or soy milk. Whey protein powders 1 scoop daily can add 15-25% of your daily need when mixed in with food or as a smoothie blended with vegetable or fruit juice, yogurt or milk. You can also substitute soy or alfalfa powder which is cholesterol free. Lymph edema?   Keep leg

## 2023-04-03 ENCOUNTER — PATIENT MESSAGE (OUTPATIENT)
Dept: FAMILY MEDICINE CLINIC | Age: 73
End: 2023-04-03

## 2023-04-05 ENCOUNTER — NURSE ONLY (OUTPATIENT)
Dept: FAMILY MEDICINE CLINIC | Age: 73
End: 2023-04-05
Payer: COMMERCIAL

## 2023-04-05 DIAGNOSIS — R35.89 FREQUENCY OF URINATION AND POLYURIA: ICD-10-CM

## 2023-04-05 DIAGNOSIS — L03.113 CELLULITIS OF RIGHT UPPER EXTREMITY: ICD-10-CM

## 2023-04-05 DIAGNOSIS — R35.0 FREQUENCY OF URINATION AND POLYURIA: ICD-10-CM

## 2023-04-05 DIAGNOSIS — R35.0 URINARY FREQUENCY: ICD-10-CM

## 2023-04-05 LAB
ANION GAP SERPL CALCULATED.3IONS-SCNC: 9 MMOL/L (ref 3–16)
BASOPHILS # BLD: 0 K/UL (ref 0–0.2)
BASOPHILS NFR BLD: 0.4 %
BUN SERPL-MCNC: 10 MG/DL (ref 7–20)
CALCIUM SERPL-MCNC: 8.4 MG/DL (ref 8.3–10.6)
CHLORIDE SERPL-SCNC: 100 MMOL/L (ref 99–110)
CO2 SERPL-SCNC: 24 MMOL/L (ref 21–32)
CREAT SERPL-MCNC: 0.6 MG/DL (ref 0.8–1.3)
CRP SERPL-MCNC: 14.4 MG/L (ref 0–5.1)
DEPRECATED RDW RBC AUTO: 13.3 % (ref 12.4–15.4)
EOSINOPHIL # BLD: 0.2 K/UL (ref 0–0.6)
EOSINOPHIL NFR BLD: 4.4 %
ERYTHROCYTE [SEDIMENTATION RATE] IN BLOOD BY WESTERGREN METHOD: 12 MM/HR (ref 0–20)
GFR SERPLBLD CREATININE-BSD FMLA CKD-EPI: >60 ML/MIN/{1.73_M2}
GLUCOSE SERPL-MCNC: 103 MG/DL (ref 70–99)
HCT VFR BLD AUTO: 33 % (ref 40.5–52.5)
HGB BLD-MCNC: 10.9 G/DL (ref 13.5–17.5)
LYMPHOCYTES # BLD: 1.1 K/UL (ref 1–5.1)
LYMPHOCYTES NFR BLD: 27.4 %
MCH RBC QN AUTO: 30.2 PG (ref 26–34)
MCHC RBC AUTO-ENTMCNC: 32.9 G/DL (ref 31–36)
MCV RBC AUTO: 91.7 FL (ref 80–100)
MONOCYTES # BLD: 0.5 K/UL (ref 0–1.3)
MONOCYTES NFR BLD: 11.7 %
NEUTROPHILS # BLD: 2.3 K/UL (ref 1.7–7.7)
NEUTROPHILS NFR BLD: 56.1 %
OSMOLALITY SERPL: 287 MOSM/KG (ref 280–301)
PLATELET # BLD AUTO: 258 K/UL (ref 135–450)
PMV BLD AUTO: 7.1 FL (ref 5–10.5)
POTASSIUM SERPL-SCNC: 4.3 MMOL/L (ref 3.5–5.1)
RBC # BLD AUTO: 3.6 M/UL (ref 4.2–5.9)
SODIUM SERPL-SCNC: 133 MMOL/L (ref 136–145)
WBC # BLD AUTO: 4.1 K/UL (ref 4–11)

## 2023-04-05 PROCEDURE — 36415 COLL VENOUS BLD VENIPUNCTURE: CPT | Performed by: FAMILY MEDICINE

## 2023-04-05 NOTE — TELEPHONE ENCOUNTER
From: Alyce Cho  To: Dr. Checo Samson: 4/3/2023 12:52 PM EDT  Subject: antibiotic prescription    Hello. I started the Bactrim antibiotic and had severe pain and diarrhea afterwards. So I stopped it and symptoms stopped. Is it possible to get another type of antibiotic?     Thank you
almost always acquired with chronic lithium use and hypercalcemia being the most common causes of a defect severe enough to produce polyuria. Spoke with spouse. Explained that he needed more labs. He has gotten diarrhea from taking 3 courses of antibiotics in a row. Now he views this is due to the Bactrim DS. He needs the blood tests to confirm that what he has is actually an infection on his arm. It could simply be a fungal infection in skin that is being stretched by edema. Will hold off on antibiotics until we get the labs back as he was afebrile in the office. I discussed the possible diagnosis of diabetes insipidus and  deficiency. Labs in the chart ordered. She agrees to come in with him and have those done today. Explained diagnosis can be hard to prove. He may need a 24-hour urine before and after  administration or referral to endocrinology for evaluation.

## 2023-04-08 ENCOUNTER — TELEPHONE (OUTPATIENT)
Dept: FAMILY MEDICINE CLINIC | Age: 73
End: 2023-04-08

## 2023-04-16 PROBLEM — M79.605 LEG PAIN, BILATERAL: Status: ACTIVE | Noted: 2023-04-16

## 2023-04-16 PROBLEM — M79.604 LEG PAIN, BILATERAL: Status: ACTIVE | Noted: 2023-04-16

## 2023-04-20 RX ORDER — ERGOCALCIFEROL 1.25 MG/1
CAPSULE ORAL
Qty: 4 CAPSULE | Refills: 0 | Status: SHIPPED | OUTPATIENT
Start: 2023-04-20

## 2023-04-24 ENCOUNTER — PATIENT MESSAGE (OUTPATIENT)
Dept: FAMILY MEDICINE CLINIC | Age: 73
End: 2023-04-24

## 2023-04-24 NOTE — TELEPHONE ENCOUNTER
From: Selene Martinr  To: Dr. Jarvis Mealin2023 4:06 PM EDT  Subject: letter about Ardyth Pilsner condition    Blaze Dr Winnie Mansfield. I just received a summons for jury duty beginning May 22 and was wondering if you would feel comfortable writing a letter about Anand's medical condition. I am currently working night shift which is allowing me to take Aparna Conner to his appointment during the day. The neurologist from [address] [place] [address] [id number] never came up with a diagnosis but wrote a script for six weeks of physical therapy twice a week for six weeks because of muscle weakness. That is supposed to begin next week because of his recovery from dental surgery. He continues to use a walker and the swelling is moving up into his arms. We have a nephrologist appointment tomorrow but I really don't have much hope that she will come up with any ideas about his swelling of legs, arms and torso. We saw Dr. Adriana Cavazos who thought about diabetes insipidus but it will take at least three months to get in to an endocrinologist.    We went back last week to the CNP from the vascular surgeon's office since she does wound care swelling. She has no ideas about helping the swelling of the legs and abdomen. We got a pneumatic pump for his legs and abdomen and used it for four weeks before stopping with the dental surgery. It did not help the swelling. He continued to urinate at least every hour when he lays down and is producing large volumes of urine. I am hoping to start the forteo medication for the osteoporosis in about a week but I will have to see how it affects him.

## 2023-04-26 NOTE — TELEPHONE ENCOUNTER
Pt wife is calling back to say the letter is for pt wife.  Pt wife is Villanueva Clifton    Group Number:[id number]

## 2023-04-26 NOTE — TELEPHONE ENCOUNTER
Is jury letter for [employer] or his wife?   I'm okay w/ doing note for [employer] or his wife presently due to Anand's present condition for next 6 months

## 2023-05-08 NOTE — TELEPHONE ENCOUNTER
Patient wants to know if we can add about 6 more pills onto this order. He only has 2 pills left. Please give him a call back.

## 2023-05-09 RX ORDER — RIZATRIPTAN BENZOATE 10 MG/1
TABLET, ORALLY DISINTEGRATING ORAL
Qty: 18 TABLET | Refills: 1 | Status: SHIPPED | OUTPATIENT
Start: 2023-05-09

## 2023-05-10 ENCOUNTER — TELEPHONE (OUTPATIENT)
Dept: FAMILY MEDICINE CLINIC | Age: 73
End: 2023-05-10

## 2023-05-10 NOTE — TELEPHONE ENCOUNTER
Px says pain improves and worsens throughout the day. Px says he will continue wit his appt tomorrow with CHITRA URBINA

## 2023-05-10 NOTE — TELEPHONE ENCOUNTER
PT @  [phone]      SCHEDULED AN APPT FOR Thursday WITH TERA - HE IS HAVING PAIN IN THE GROIN AREA - WOULD LIKE TO BE SEEN SOONER -- PLEASE CALL

## 2023-05-11 ENCOUNTER — OFFICE VISIT (OUTPATIENT)
Dept: FAMILY MEDICINE CLINIC | Age: 73
End: 2023-05-11
Payer: COMMERCIAL

## 2023-05-11 VITALS
BODY MASS INDEX: 20.54 KG/M2 | OXYGEN SATURATION: 98 % | HEIGHT: 73 IN | WEIGHT: 155 LBS | SYSTOLIC BLOOD PRESSURE: 136 MMHG | HEART RATE: 78 BPM | DIASTOLIC BLOOD PRESSURE: 78 MMHG

## 2023-05-11 DIAGNOSIS — R19.09 MASS OF RIGHT INGUINAL REGION: Primary | ICD-10-CM

## 2023-05-11 DIAGNOSIS — Z87.81 HISTORY OF WRIST FRACTURE: ICD-10-CM

## 2023-05-11 DIAGNOSIS — L60.0 INGROWN FINGERNAIL: ICD-10-CM

## 2023-05-11 DIAGNOSIS — B35.1 ONYCHOMYCOSIS: ICD-10-CM

## 2023-05-11 PROCEDURE — 99213 OFFICE O/P EST LOW 20 MIN: CPT

## 2023-05-11 PROCEDURE — 1123F ACP DISCUSS/DSCN MKR DOCD: CPT

## 2023-05-11 RX ORDER — TRAMADOL HYDROCHLORIDE 50 MG/1
TABLET ORAL
COMMUNITY
Start: 2023-04-13

## 2023-05-11 ASSESSMENT — ENCOUNTER SYMPTOMS
EYE PAIN: 0
PHOTOPHOBIA: 0
ABDOMINAL PAIN: 1
CHEST TIGHTNESS: 0
DIARRHEA: 0
BLOOD IN STOOL: 0
VOMITING: 0
EYE ITCHING: 0
SHORTNESS OF BREATH: 0
NAUSEA: 0
WHEEZING: 0
BACK PAIN: 0
SORE THROAT: 0
RECTAL PAIN: 0
ANAL BLEEDING: 0
TROUBLE SWALLOWING: 0
CONSTIPATION: 1
COLOR CHANGE: 0
ABDOMINAL DISTENTION: 1
COUGH: 0

## 2023-05-11 NOTE — PATIENT INSTRUCTIONS
You can call [phone] to schedule this. GENERAL OFFICE POLICIES      Telephone Calls: Messages will be answered within 1-2 business days, unless the provider is out of the office. If it is urgent a covering provider will answer. (this does not include Medication refills). MyChart: We recommend all patients sign up for MyChart. Through this portal you can see your lab results, request refills, schedule appointments, pay your bill and send messages to the office. MyChart messages will be answered within 1-2 business days unless the provider is out of the office. For urgent matters, please call the office. Appointments:  All appointments must be scheduled. We ask all patients to schedule their next follow up appointment before they leave the office to make sure you will be able to be seen before you run out of medications. 24 hours notice is required to cancel or reschedule an appointment to avoid being marked as a no show. You may be dismissed from the practice after 3 no shows. LATE for Appointment: If you are 15 or more minutes late for your appointment, you may be asked to reschedule. MA/LAB APPTS: Must be scheduled, cannot accept walk in lab visits. We only draw labs for patients established in our office. We only do injections for medications ordered by our office. Acute Sick Visits:  Nothing other than acute complaint will be addressed at this visit. TRADITIONAL MEDICARE  DOES NOT COVER PHYSICALS  MEDICARE WELLNESS VISITS: These are NOT physicals but the free annual visit offered by Medicare to discuss wellness issues. Medication refills, checkups, etc. will not be addressed during this visit. Medication Refills: Refills are handled electronically so please contact your pharmacy for medication refills even if current refills have been exhausted. If you are on a controlled medication you will be referred to a specialist (pain specialist, psychiatry, etc). Forms:  There is a $35

## 2023-05-11 NOTE — PROGRESS NOTES
Jonathon Lazo (:  1950) is a 81 y.o. male,Established patient, here for evaluation of the following chief complaint(s):  Groin Pain (GROIN PAIN, STARTED A COUPLE WEEKS AGO, LAST 4-5 DAYS HAS BEEN WORSE )         ASSESSMENT/PLAN:  1. Mass of right inguinal region  -Right inguinal hernia versus lymphadenopathy versus lymphedema.  -We will evaluate with ultrasound of the pelvis. -Based on results, consider referral to general surgeon.  -Discussed incarcerated hernia, when to go to the ER.  -Follow-up as needed. -     US PELVIS COMPLETE; Future  2. Ingrown fingernail  -Ingrown fingernail secondary to onychomycosis. -Referral to hand specialist placed. Information provided to the patient. Educated it is their responsibility to follow-up on this. The patient verbalized understanding.  -Consider following up with Dr. Miky Cordero as well for possible ingrown fingernail removal.  -     Amina Pineda MD, Hand Surgery (Hand, Wrist, Upper Extremity), [facility]  3. History of wrist fracture  -     Barbara Tapia MD, Hand Surgery (Hand, Wrist, Upper Extremity), [facility]  4. Onychomycosis  -Continue OTC Lamisil.  -Follow-up as needed. Return if symptoms worsen or fail to improve. Subjective   SUBJECTIVE/OBJECTIVE:  GALO Galeana presents today for concerns of a groin mass with pain. He has had over a month of right groin pain with bulging, goes up to his waist line. In the morning it is not bulged out, but at night it can be bad. It is about the size of a dime. He states it feels firm at night, soft in the morning. It is much more painful at night, with walking or at rest. Walking throughout the day can be painful as well, but not as bad as night time. He has a history of lymphedema that affects his legs and stomach. His suprapubic area is usually soft, but feels swollen sometimes with lymphedema. Rubbing it and laying down can help resolve the pain.  He feels there is not much

## 2023-05-12 ENCOUNTER — TELEPHONE (OUTPATIENT)
Dept: FAMILY MEDICINE CLINIC | Age: 73
End: 2023-05-12

## 2023-05-12 NOTE — TELEPHONE ENCOUNTER
CALLED [facility] SCH[facility]. GOT HIM SCHEDULED FOR RIGHT AFTER HIS SCROTUM U/S FOR THE ABDOMEN. CALLED NEIL AND ADVISED.  SHE IS GOING  TO CALL ON Monday AND GET THIS SCHEDULED FOR A DIFFERENT TIME AND DAY SINCE HE HAS TO FAST AND THAT WILL BE DIFFICULT FOR HIM. Erika Leonardo

## 2023-05-12 NOTE — TELEPHONE ENCOUNTER
Patient saw Coty Horan yesterday and he said [facility] is giving him the run around on the orders for his [facility]. Please give him a call back.

## 2023-05-15 ENCOUNTER — APPOINTMENT (OUTPATIENT)
Dept: CT IMAGING | Age: 73
End: 2023-05-15
Payer: COMMERCIAL

## 2023-05-15 ENCOUNTER — HOSPITAL ENCOUNTER (EMERGENCY)
Age: 73
Discharge: HOME | End: 2023-05-15
Attending: EMERGENCY MEDICINE
Payer: COMMERCIAL

## 2023-05-15 VITALS
RESPIRATION RATE: 15 BRPM | WEIGHT: 156.09 LBS | HEIGHT: 73 IN | OXYGEN SATURATION: 100 % | TEMPERATURE: 97.9 F | SYSTOLIC BLOOD PRESSURE: 155 MMHG | BODY MASS INDEX: 20.69 KG/M2 | DIASTOLIC BLOOD PRESSURE: 78 MMHG | HEART RATE: 67 BPM

## 2023-05-15 DIAGNOSIS — K40.90 RIGHT INGUINAL HERNIA: Primary | ICD-10-CM

## 2023-05-15 LAB
ALBUMIN SERPL-MCNC: 4.6 G/DL (ref 3.4–5)
ALBUMIN/GLOB SERPL: 2.1 {RATIO} (ref 1.1–2.2)
ALP SERPL-CCNC: 135 U/L (ref 40–129)
ALT SERPL-CCNC: 23 U/L (ref 10–40)
ANION GAP SERPL CALCULATED.3IONS-SCNC: 8 MMOL/L (ref 3–16)
AST SERPL-CCNC: 24 U/L (ref 15–37)
BACTERIA URNS QL MICRO: NORMAL /HPF
BASOPHILS # BLD: 0 K/UL (ref 0–0.2)
BASOPHILS NFR BLD: 0.4 %
BILIRUB SERPL-MCNC: 0.4 MG/DL (ref 0–1)
BILIRUB UR QL STRIP.AUTO: NEGATIVE
BUN SERPL-MCNC: 12 MG/DL (ref 7–20)
CALCIUM SERPL-MCNC: 9.2 MG/DL (ref 8.3–10.6)
CHLORIDE SERPL-SCNC: 100 MMOL/L (ref 99–110)
CLARITY UR: ABNORMAL
CO2 SERPL-SCNC: 28 MMOL/L (ref 21–32)
COLOR UR: YELLOW
CREAT SERPL-MCNC: 0.6 MG/DL (ref 0.8–1.3)
DEPRECATED RDW RBC AUTO: 15.6 % (ref 12.4–15.4)
EOSINOPHIL # BLD: 0.1 K/UL (ref 0–0.6)
EOSINOPHIL NFR BLD: 2.7 %
EPI CELLS #/AREA URNS AUTO: 0 /HPF (ref 0–5)
GFR SERPLBLD CREATININE-BSD FMLA CKD-EPI: >60 ML/MIN/{1.73_M2}
GLUCOSE SERPL-MCNC: 117 MG/DL (ref 70–99)
GLUCOSE UR STRIP.AUTO-MCNC: NEGATIVE MG/DL
HCT VFR BLD AUTO: 38.1 % (ref 40.5–52.5)
HGB BLD-MCNC: 12.9 G/DL (ref 13.5–17.5)
HGB UR QL STRIP.AUTO: NEGATIVE
HYALINE CASTS #/AREA URNS AUTO: 0 /LPF (ref 0–8)
KETONES UR STRIP.AUTO-MCNC: NEGATIVE MG/DL
LACTATE BLDV-SCNC: 0.9 MMOL/L (ref 0.4–2)
LEUKOCYTE ESTERASE UR QL STRIP.AUTO: NEGATIVE
LYMPHOCYTES # BLD: 1.1 K/UL (ref 1–5.1)
LYMPHOCYTES NFR BLD: 28.8 %
MCH RBC QN AUTO: 32.4 PG (ref 26–34)
MCHC RBC AUTO-ENTMCNC: 33.9 G/DL (ref 31–36)
MCV RBC AUTO: 95.4 FL (ref 80–100)
MONOCYTES # BLD: 0.3 K/UL (ref 0–1.3)
MONOCYTES NFR BLD: 8.5 %
NEUTROPHILS # BLD: 2.4 K/UL (ref 1.7–7.7)
NEUTROPHILS NFR BLD: 59.6 %
NITRITE UR QL STRIP.AUTO: NEGATIVE
PH UR STRIP.AUTO: 7 [PH] (ref 5–8)
PLATELET # BLD AUTO: 188 K/UL (ref 135–450)
PMV BLD AUTO: 6.5 FL (ref 5–10.5)
POTASSIUM SERPL-SCNC: 4.4 MMOL/L (ref 3.5–5.1)
PROT SERPL-MCNC: 6.8 G/DL (ref 6.4–8.2)
PROT UR STRIP.AUTO-MCNC: NEGATIVE MG/DL
RBC # BLD AUTO: 4 M/UL (ref 4.2–5.9)
RBC CLUMPS #/AREA URNS AUTO: 0 /HPF (ref 0–4)
SODIUM SERPL-SCNC: 136 MMOL/L (ref 136–145)
SP GR UR STRIP.AUTO: 1.01 (ref 1–1.03)
UA COMPLETE W REFLEX CULTURE PNL UR: ABNORMAL
UA DIPSTICK W REFLEX MICRO PNL UR: YES
URN SPEC COLLECT METH UR: ABNORMAL
UROBILINOGEN UR STRIP-ACNC: 0.2 E.U./DL
WBC # BLD AUTO: 4 K/UL (ref 4–11)
WBC #/AREA URNS AUTO: 0 /HPF (ref 0–5)

## 2023-05-15 PROCEDURE — 83605 ASSAY OF LACTIC ACID: CPT

## 2023-05-15 PROCEDURE — 80053 COMPREHEN METABOLIC PANEL: CPT

## 2023-05-15 PROCEDURE — 6370000000 HC RX 637 (ALT 250 FOR IP): Performed by: EMERGENCY MEDICINE

## 2023-05-15 PROCEDURE — 6360000004 HC RX CONTRAST MEDICATION: Performed by: EMERGENCY MEDICINE

## 2023-05-15 PROCEDURE — 81001 URINALYSIS AUTO W/SCOPE: CPT

## 2023-05-15 PROCEDURE — 85025 COMPLETE CBC W/AUTO DIFF WBC: CPT

## 2023-05-15 PROCEDURE — 74177 CT ABD & PELVIS W/CONTRAST: CPT

## 2023-05-15 PROCEDURE — 99285 EMERGENCY DEPT VISIT HI MDM: CPT

## 2023-05-15 PROCEDURE — 96374 THER/PROPH/DIAG INJ IV PUSH: CPT

## 2023-05-15 PROCEDURE — 2580000003 HC RX 258: Performed by: EMERGENCY MEDICINE

## 2023-05-15 PROCEDURE — 6360000002 HC RX W HCPCS: Performed by: EMERGENCY MEDICINE

## 2023-05-15 RX ORDER — TRAMADOL HYDROCHLORIDE 50 MG/1
50 TABLET ORAL ONCE
Status: COMPLETED | OUTPATIENT
Start: 2023-05-15 | End: 2023-05-15

## 2023-05-15 RX ORDER — ONDANSETRON 2 MG/ML
4 INJECTION INTRAMUSCULAR; INTRAVENOUS ONCE
Status: COMPLETED | OUTPATIENT
Start: 2023-05-15 | End: 2023-05-15

## 2023-05-15 RX ORDER — TRAMADOL HYDROCHLORIDE 50 MG/1
50 TABLET ORAL EVERY 4 HOURS PRN
Qty: 18 TABLET | Refills: 0 | Status: SHIPPED | OUTPATIENT
Start: 2023-05-15 | End: 2023-05-18

## 2023-05-15 RX ORDER — SODIUM CHLORIDE, SODIUM LACTATE, POTASSIUM CHLORIDE, AND CALCIUM CHLORIDE .6; .31; .03; .02 G/100ML; G/100ML; G/100ML; G/100ML
500 INJECTION, SOLUTION INTRAVENOUS ONCE
Status: COMPLETED | OUTPATIENT
Start: 2023-05-15 | End: 2023-05-15

## 2023-05-15 RX ORDER — MORPHINE SULFATE 4 MG/ML
4 INJECTION, SOLUTION INTRAMUSCULAR; INTRAVENOUS ONCE
Status: DISCONTINUED | OUTPATIENT
Start: 2023-05-15 | End: 2023-05-15

## 2023-05-15 RX ADMIN — SODIUM CHLORIDE, POTASSIUM CHLORIDE, SODIUM LACTATE AND CALCIUM CHLORIDE 500 ML: 600; 310; 30; 20 INJECTION, SOLUTION INTRAVENOUS at 15:09

## 2023-05-15 RX ADMIN — TRAMADOL HYDROCHLORIDE 50 MG: 50 TABLET, COATED ORAL at 15:28

## 2023-05-15 RX ADMIN — ONDANSETRON 4 MG: 2 INJECTION INTRAMUSCULAR; INTRAVENOUS at 15:06

## 2023-05-15 RX ADMIN — IOPAMIDOL 75 ML: 755 INJECTION, SOLUTION INTRAVENOUS at 14:57

## 2023-05-15 ASSESSMENT — PAIN SCALES - GENERAL
PAINLEVEL_OUTOF10: 9
PAINLEVEL_OUTOF10: 6

## 2023-05-15 ASSESSMENT — PAIN DESCRIPTION - LOCATION: LOCATION: GROIN

## 2023-05-15 ASSESSMENT — PAIN DESCRIPTION - PAIN TYPE: TYPE: ACUTE PAIN

## 2023-05-15 ASSESSMENT — PAIN DESCRIPTION - ORIENTATION: ORIENTATION: RIGHT

## 2023-05-15 ASSESSMENT — PAIN - FUNCTIONAL ASSESSMENT: PAIN_FUNCTIONAL_ASSESSMENT: 0-10

## 2023-05-15 NOTE — DISCHARGE INSTRUCTIONS
Seek medical attention if you are unable to push bulge back in, have significant vomiting or lack of bowel movements. Follow up with general surgery Weds or Thurs.

## 2023-05-15 NOTE — ED PROVIDER NOTES
[address] EMERGENCY DEPARTMENT    CHIEF COMPLAINT  Groin Pain and Groin Swelling (Pt reports rt side groin pain with testicular swelling worsening since last night. It is causing him trouble walking. )       HISTORY OF PRESENT ILLNESS  Herman Clements is a 81 y.o. male who presents to the ED w/ 2 days of right-sided groin and scrotal pain and swelling. Pain is exacerbated by palpation. He reports continued nausea. He has associated abdominal pain. He feels his abdomen seems firm after he liquids. He saw a nurse petitioner and was concerned about a hernia but he does not have a follow-up appointment for imaging for several days so he came to the ED. Denies dysuria, hematuria, fever. Pain is severe. No acute injury or trauma. No scrotal changes. I have reviewed the following from the nursing documentation:    Past Medical History:   Diagnosis Date    Arthritis     Asthma     B12 deficiency 07/10/2013    Tooks injection every other week and B12 level 500. Recommended that he continue current therapy, as will likely drop with oral supplement. Bilateral leg edema 11/4/2022    Closed wedge compression fracture of L1 vertebra ([address] 09/05/2018    Superior endplate fracture of L1 resulting 80% loss of vertebral body height    DDD (degenerative disc disease), lumbar 09/05/2018    L2-L3, L3-L4, L4-L5.   Disc bulge with narrowing of the neural foramen at these levels    Diverticulitis     Gallbladder problem     S/P lap martha    GERD (gastroesophageal reflux disease)     Hiatal hernia     Migraine     MRSA infection 04/03/2019    Left hand    Nausea & vomiting     Spondylosis of lumbar spine 11/02/2015    On x-ray multilevel     Past Surgical History:   Procedure Laterality Date    CHOLECYSTECTOMY      COLONOSCOPY  9/11    FOOT SURGERY      nerve removed from foot    SHOULDER SURGERY Right 2005    rotator cuff repair    SPINE SURGERY N/A 9/28/2022    L2, L4, L5 KYPHOPLASTY WITH L2, L4 BONE

## 2023-05-15 NOTE — ED NOTES
Discharge and education instructions reviewed. Patient verbalized understanding, teach-back successful. Patient denied questions at this time. No acute distress noted. Patient instructed to follow-up as noted - return to emergency department if symptoms worsen. Patient verbalized understanding. Discharged per EDMD with discharged instructions.       Rossana Lopez RN  05/15/23 [id number]

## 2023-05-17 ENCOUNTER — OFFICE VISIT (OUTPATIENT)
Dept: SURGERY | Age: 73
End: 2023-05-17
Payer: COMMERCIAL

## 2023-05-17 VITALS — WEIGHT: 156 LBS | BODY MASS INDEX: 20.58 KG/M2

## 2023-05-17 DIAGNOSIS — R10.31 RIGHT GROIN PAIN: Primary | ICD-10-CM

## 2023-05-17 PROCEDURE — 1123F ACP DISCUSS/DSCN MKR DOCD: CPT | Performed by: SURGERY

## 2023-05-17 PROCEDURE — 99213 OFFICE O/P EST LOW 20 MIN: CPT | Performed by: SURGERY

## 2023-05-18 ASSESSMENT — ENCOUNTER SYMPTOMS: GASTROINTESTINAL NEGATIVE: 1

## 2023-05-22 RX ORDER — PANTOPRAZOLE SODIUM 40 MG/1
TABLET, DELAYED RELEASE ORAL
Qty: 60 TABLET | Refills: 2 | Status: SHIPPED | OUTPATIENT
Start: 2023-05-22

## 2023-05-24 ENCOUNTER — PATIENT MESSAGE (OUTPATIENT)
Dept: FAMILY MEDICINE CLINIC | Age: 73
End: 2023-05-24

## 2023-05-25 ENCOUNTER — TELEPHONE (OUTPATIENT)
Dept: FAMILY MEDICINE CLINIC | Age: 73
End: 2023-05-25

## 2023-05-25 NOTE — TELEPHONE ENCOUNTER
From: John Chowdhury  To: Dr. Dheeraj Cheatham: 5/24/2023 8:20 PM EDT  Subject: Tramadol    Hi Dr Amparo Ward. [facility] pharmacy needs a prior authorization form for Carlos in order to fill my tramadol prescription that you already sent to [facility]. Could someone please send the authorization form?     Thank you Nati Montgomery and Maurizio

## 2023-05-25 NOTE — TELEPHONE ENCOUNTER
PLEASE SUBMIT A PA FOR TRAMADOL:       traMADol (ULTRAM) 50 MG tablet [[id number]]      DX: Right inguinal hernia (K40.90)    THIS IS A 30 DAY SUPPLY WITH QUANTITY  TABLETS. THANKS!  MICHELL

## 2023-05-25 NOTE — TELEPHONE ENCOUNTER
Submitted PA for TRAMADOL  Via CMM Key: [id number] STATUS: APPROVED. LETTER ATTACHED. Follow up done daily; if no response in three days we will refax for status check. If another three days goes by with no response we will call the insurance for status. If this requires a response please respond to the pool. [address]). Please advise patient thank you.

## 2023-06-01 RX ORDER — IPRATROPIUM BROMIDE 42 UG/1
SPRAY, METERED NASAL
Qty: 15 ML | Refills: 5 | Status: SHIPPED | OUTPATIENT
Start: 2023-06-01

## 2023-06-20 ENCOUNTER — OFFICE VISIT (OUTPATIENT)
Dept: FAMILY MEDICINE CLINIC | Age: 73
End: 2023-06-20
Payer: COMMERCIAL

## 2023-06-20 VITALS
SYSTOLIC BLOOD PRESSURE: 120 MMHG | HEIGHT: 73 IN | DIASTOLIC BLOOD PRESSURE: 70 MMHG | BODY MASS INDEX: 20.28 KG/M2 | WEIGHT: 153 LBS

## 2023-06-20 DIAGNOSIS — K59.00 CONSTIPATION, UNSPECIFIED CONSTIPATION TYPE: ICD-10-CM

## 2023-06-20 DIAGNOSIS — S32.020D COMPRESSION FRACTURE OF L2 VERTEBRA WITH ROUTINE HEALING, SUBSEQUENT ENCOUNTER: ICD-10-CM

## 2023-06-20 DIAGNOSIS — G89.29 CHRONIC MIDLINE LOW BACK PAIN, UNSPECIFIED WHETHER SCIATICA PRESENT: ICD-10-CM

## 2023-06-20 DIAGNOSIS — I87.2 VENOUS INSUFFICIENCY OF BOTH LOWER EXTREMITIES: ICD-10-CM

## 2023-06-20 DIAGNOSIS — G43.709 CHRONIC MIGRAINE WITHOUT AURA WITHOUT STATUS MIGRAINOSUS, NOT INTRACTABLE: ICD-10-CM

## 2023-06-20 DIAGNOSIS — R11.0 NAUSEA: ICD-10-CM

## 2023-06-20 DIAGNOSIS — J45.40 MODERATE PERSISTENT ASTHMA WITHOUT COMPLICATION: Primary | ICD-10-CM

## 2023-06-20 DIAGNOSIS — M54.50 CHRONIC MIDLINE LOW BACK PAIN, UNSPECIFIED WHETHER SCIATICA PRESENT: ICD-10-CM

## 2023-06-20 PROCEDURE — 99214 OFFICE O/P EST MOD 30 MIN: CPT | Performed by: FAMILY MEDICINE

## 2023-06-20 PROCEDURE — 1123F ACP DISCUSS/DSCN MKR DOCD: CPT | Performed by: FAMILY MEDICINE

## 2023-06-20 RX ORDER — RIMEGEPANT SULFATE 75 MG/75MG
TABLET, ORALLY DISINTEGRATING ORAL
Qty: 15 TABLET | Refills: 5 | Status: SHIPPED | OUTPATIENT
Start: 2023-06-20

## 2023-06-20 RX ORDER — ONDANSETRON 4 MG/1
4 TABLET, ORALLY DISINTEGRATING ORAL EVERY 8 HOURS PRN
Qty: 30 TABLET | Refills: 2 | Status: SHIPPED | OUTPATIENT
Start: 2023-06-20

## 2023-06-20 NOTE — PROGRESS NOTES
Coronary artery calcification 09/27/2022    Mixed hyperlipidemia 09/27/2022    Compression fracture of L2 lumbar vertebra ([address] 09/25/2022    Spinal stenosis of lumbar region without neurogenic claudication 09/25/2022    Urinary hesitancy 09/25/2022    Chronic back pain 09/23/2022    Right sided sciatica 08/08/2022    Peripheral neuropathy 04/21/2022    DDD (degenerative disc disease), lumbar 09/05/2018    Closed wedge compression fracture of L1 vertebra ([address] 09/05/2018    Spondylosis of lumbar spine 11/02/2015    Leg pain, bilateral 04/16/2023    Lymph edema? 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 Recommended that he continue current therapy, as will likely drop with oral supplement. Bilateral leg edema 11/4/2022    Closed wedge compression fracture of L1 vertebra ([address] 09/05/2018    Superior endplate fracture of L1 resulting 80% loss of vertebral body height    DDD (degenerative disc disease), lumbar 09/05/2018    L2-L3, L3-L4, L4-L5.   Disc bulge with narrowing of the neural foramen at these levels    Diverticulitis     Gallbladder problem     S/P lap martha    GERD (gastroesophageal reflux disease)     Hiatal hernia     Migraine     MRSA infection 04/03/2019    Left hand    Nausea & vomiting     Spondylosis of lumbar spine 11/02/2015    On x-ray multilevel     Past Surgical History:   Procedure Laterality Date    CHOLECYSTECTOMY      COLONOSCOPY  9/11    FOOT SURGERY      nerve removed from foot    SHOULDER SURGERY Right 2005    rotator cuff repair    SPINE SURGERY N/A

## 2023-06-22 ENCOUNTER — TELEPHONE (OUTPATIENT)
Dept: PRIMARY CARE CLINIC | Age: 73
End: 2023-06-22

## 2023-06-26 ENCOUNTER — PATIENT MESSAGE (OUTPATIENT)
Dept: FAMILY MEDICINE CLINIC | Age: 73
End: 2023-06-26

## 2023-06-30 ENCOUNTER — PATIENT MESSAGE (OUTPATIENT)
Dept: FAMILY MEDICINE CLINIC | Age: 73
End: 2023-06-30

## 2023-07-11 RX ORDER — RIZATRIPTAN BENZOATE 10 MG/1
TABLET, ORALLY DISINTEGRATING ORAL
Qty: 18 TABLET | Refills: 1 | Status: SHIPPED | OUTPATIENT
Start: 2023-07-11

## 2023-07-11 NOTE — TELEPHONE ENCOUNTER
Patient needs a refill on his medication RIZATRIPTAN 10 MG TABLET - 1 TABLET PER DAY AND THEY GET 18 TABLETS FOR THE MONTH. PLEASE GIVE HER A CALL BACK. [address] [facility].  PHONE NO. [phone]

## 2023-07-18 NOTE — ED TRIAGE NOTES
Rt side groin and testicular pain/swelling x 1 month worsening over week. Scan of abd and groin ordered for Thursday, but pain has become worse.
You can access the FollowMyHealth Patient Portal offered by [facility] by registering at the following website: [email or web address]. By joining [employer]’s FollowMyHealth portal, you will also be able to view your health information using other applications (apps) compatible with our system.

## 2023-07-19 ENCOUNTER — OFFICE VISIT (OUTPATIENT)
Dept: PULMONOLOGY | Age: 73
End: 2023-07-19
Payer: COMMERCIAL

## 2023-07-19 VITALS
HEART RATE: 72 BPM | WEIGHT: 151.4 LBS | DIASTOLIC BLOOD PRESSURE: 72 MMHG | RESPIRATION RATE: 18 BRPM | TEMPERATURE: 98.4 F | OXYGEN SATURATION: 99 % | BODY MASS INDEX: 20.06 KG/M2 | SYSTOLIC BLOOD PRESSURE: 120 MMHG | HEIGHT: 73 IN

## 2023-07-19 DIAGNOSIS — G62.9 PERIPHERAL POLYNEUROPATHY: ICD-10-CM

## 2023-07-19 DIAGNOSIS — M62.81 MUSCLE WEAKNESS: Primary | ICD-10-CM

## 2023-07-19 PROCEDURE — 99213 OFFICE O/P EST LOW 20 MIN: CPT | Performed by: INTERNAL MEDICINE

## 2023-07-19 PROCEDURE — 1123F ACP DISCUSS/DSCN MKR DOCD: CPT | Performed by: INTERNAL MEDICINE

## 2023-07-19 NOTE — PROGRESS NOTES
Pulmonary Progress note           REASON FOR CONSULTATION:  Chief Complaint   Patient presents with    Follow-up    Shortness of Breath     Muscle weakness        Consult at request of Kaden Plaza MD     PCP: Kaden Plaza MD        Assessment and Plan:   Diagnosis Orders   1. Muscle weakness        2. Peripheral polyneuropathy                Plan:  Severe muscle weakness/deconditioning with significant restrictive lung disease secondary to musculoskeletal weakness with reduced MIP and MEP, advised to continue to exercise regularly we will repeat CT scan in 6 months. HISTORY OF PRESENT ILLNESS: Alden Awan is very pleasant 81y.o. year old gentleman with medical history stated below significant for history of coronary artery calcifications, hyperlipidemia, chronic back pain, was referred to us for shortness of breath on exertion,    Chest x-ray with no acute cardiopulmonary pathology. Has significant venous insufficiency with lower extremity edema on diuretics. Has a history of lower back pain with L1-L2 compression fracture status post kyphoplasty    His PFT showed no active airflow obstruction  There was significant decrease in MIP MEP    7/19/2023:  Here for follow-up was evaluated by neurology nothing specific. Complaining of polyuria  Has been doing physical therapy and improving shortness of breath and muscle strength    Past Medical History:   Diagnosis Date    Arthritis     Asthma     B12 deficiency 07/10/2013    Tooks injection every other week and B12 level 500. Recommended that he continue current therapy, as will likely drop with oral supplement.     Bilateral leg edema 11/4/2022    Closed wedge compression fracture of L1 vertebra ([address]) 09/05/2018    Superior endplate fracture of L1 resulting 80% loss of vertebral body height    DDD (degenerative disc disease), lumbar 09/05/2018

## 2023-08-03 ENCOUNTER — OFFICE VISIT (OUTPATIENT)
Dept: SURGERY | Age: 73
End: 2023-08-03
Payer: COMMERCIAL

## 2023-08-03 VITALS — BODY MASS INDEX: 19.92 KG/M2 | WEIGHT: 151 LBS

## 2023-08-03 DIAGNOSIS — K40.90 RIGHT INGUINAL HERNIA: Primary | ICD-10-CM

## 2023-08-03 PROCEDURE — 1123F ACP DISCUSS/DSCN MKR DOCD: CPT | Performed by: SURGERY

## 2023-08-03 PROCEDURE — 99214 OFFICE O/P EST MOD 30 MIN: CPT | Performed by: SURGERY

## 2023-08-03 ASSESSMENT — ENCOUNTER SYMPTOMS
ABDOMINAL PAIN: 1
BACK PAIN: 1

## 2023-08-03 NOTE — PROGRESS NOTES
Subjective:      Patient ID: Mati Recinos is a 81 y.o. male. HPI  CC: groin pain  Evaluated in May for right groin pain. No hernia appreciated on exam or CT. Recommend stretching with PO for MSK strain. Reports bulge over last 3 weeks. C/o pain with walking. Planning for TURP 9/11        Past Medical History:   Diagnosis Date    Arthritis     Asthma     B12 deficiency 07/10/2013    Tooks injection every other week and B12 level 500. Recommended that he continue current therapy, as will likely drop with oral supplement. Bilateral leg edema 11/4/2022    Closed wedge compression fracture of L1 vertebra ([address]) 09/05/2018    Superior endplate fracture of L1 resulting 80% loss of vertebral body height    DDD (degenerative disc disease), lumbar 09/05/2018    L2-L3, L3-L4, L4-L5. Disc bulge with narrowing of the neural foramen at these levels    Diverticulitis     Gallbladder problem     S/P lap martha    GERD (gastroesophageal reflux disease)     Hiatal hernia     Migraine     MRSA infection 04/03/2019    Left hand    Nausea & vomiting     Spondylosis of lumbar spine 11/02/2015    On x-ray multilevel       Past Surgical History:   Procedure Laterality Date    CHOLECYSTECTOMY      COLONOSCOPY  9/11    FOOT SURGERY      nerve removed from foot    SHOULDER SURGERY Right 2005    rotator cuff repair    SPINE SURGERY N/A 9/28/2022    L2, L4, L5 KYPHOPLASTY WITH L2, L4 BONE BIOPSY performed by [place] MD at [address] [place] [address]  9/11       Current Outpatient Medications   Medication Sig Dispense Refill    rizatriptan (MAXALT-MLT) 10 MG disintegrating tablet DISSOLVE ONE TABLET BY MOUTH AS DIRECTED, MAY REPEAT IN 2 HOURS IF NEEDED 18 tablet 1    mometasone-formoterol (DULERA) 200-5 MCG/ACT inhaler INHALE 2 PUFFS INTO THE LUNGS twice daily as directed.  RINSE MOUTH AFTER USE 13 g 5    ondansetron (ZOFRAN ODT) 4 MG disintegrating tablet Take 1 tablet by mouth every 8 hours as needed

## 2023-08-04 RX ORDER — MULTIVIT-MIN/IRON/FOLIC ACID/K 18-600-40
CAPSULE ORAL
COMMUNITY

## 2023-08-04 RX ORDER — TRAMADOL HYDROCHLORIDE 50 MG/1
50 TABLET ORAL
COMMUNITY
Start: 2023-07-01

## 2023-08-04 NOTE — PROGRESS NOTES
[address]  time____0745 ( see Dr Mak Payment surgery scheduling sheet arrival time)________        Surgery time____0950________    Take the following medications with a sip of water: Follow your MD/Surgeons pre-procedure instructions regarding your medications     Do not eat or drink anything after 12:00 midnight prior to your surgery. This includes water chewing gum, mints and ice chips. You may brush your teeth and gargle the morning of your surgery, but do not swallow the water     Please see your family doctor/pediatrician for a history and physical and/or concerning medications. Bring any test results/reports from your physicians office. If you are under the care of a heart doctor or specialist doctor, please be aware that you may be asked to them for clearance    You may be asked to stop blood thinners such as Coumadin, Plavix, Fragmin, Lovenox, etc., or any anti-inflammatories such as:  Aspirin, Ibuprofen, Advil, Naproxen prior to your surgery. We also ask that you stop any OTC medications such as fish oil, vitamin E, glucosamine, garlic, Multivitamins, COQ 10, etc.    We ask that you do not smoke 24 hours prior to surgery  We ask that you do not  drink any alcoholic beverages 24 hours prior to surgery     You must make arrangements for a responsible adult to take you home after your surgery. For your safety you will not be allowed to leave alone or drive yourself home. Your surgery will be cancelled if you do not have a ride home. Also for your safety, it is strongly suggested that someone stay with you the first 24 hours after your surgery. A parent or legal guardian must accompany a child scheduled for surgery and plan to stay at the hospital until the child is discharged. Please do not bring other children with you. For your comfort, please wear simple loose fitting clothing to the hospital.  Please do not bring valuables.     Do not

## 2023-08-04 NOTE — PROGRESS NOTES
[facility] Pre-Admission Testing Electronic Communication Worksheet for OR/ENDO Procedures        Patient: Tammi King    DOS: 8/11    Arrival Time: 0745- see Dr Claire Calle scheduling sheet  arrival time    Surgery Time:0950    Meds to Bed:  [] YES    []  NO maybe    Transportation Confirmed: [x] YES    []  NO    History and Physical:  [x] YES    []  NO  [] N/A  If yes, please list doctor or Urgent Care and date of H&P:   Per surgeon  Additional Clearance(Cardiac, Pulmonary, etc):  [] YES    [x]  NO    Pre-Admission Testing Visit:  [] YES    [x]  NO If no, do labs/testing need to be done DOS?   [] YES    [x]  NO    Medication Reconciliation Complete:  [x] YES    []  NO        Additional Notes:      Walker /w/c          Interview Complete: [x] YES    []  Doug Johnson RN  11:24 AM

## 2023-08-10 ENCOUNTER — ANESTHESIA EVENT (OUTPATIENT)
Dept: OPERATING ROOM | Age: 73
End: 2023-08-10
Payer: COMMERCIAL

## 2023-08-11 ENCOUNTER — HOSPITAL ENCOUNTER (OUTPATIENT)
Age: 73
Setting detail: OUTPATIENT SURGERY
Discharge: HOME | End: 2023-08-11
Attending: SURGERY | Admitting: SURGERY
Payer: COMMERCIAL

## 2023-08-11 ENCOUNTER — ANESTHESIA (OUTPATIENT)
Dept: OPERATING ROOM | Age: 73
End: 2023-08-11
Payer: COMMERCIAL

## 2023-08-11 VITALS
WEIGHT: 153.3 LBS | OXYGEN SATURATION: 99 % | TEMPERATURE: 97.4 F | HEIGHT: 73 IN | DIASTOLIC BLOOD PRESSURE: 85 MMHG | RESPIRATION RATE: 18 BRPM | BODY MASS INDEX: 20.32 KG/M2 | HEART RATE: 75 BPM | SYSTOLIC BLOOD PRESSURE: 156 MMHG

## 2023-08-11 DIAGNOSIS — K40.90 RIGHT INGUINAL HERNIA: ICD-10-CM

## 2023-08-11 PROCEDURE — 6370000000 HC RX 637 (ALT 250 FOR IP): Performed by: ANESTHESIOLOGY

## 2023-08-11 PROCEDURE — 7100000010 HC PHASE II RECOVERY - FIRST 15 MIN: Performed by: SURGERY

## 2023-08-11 PROCEDURE — 6360000002 HC RX W HCPCS: Performed by: SURGERY

## 2023-08-11 PROCEDURE — 2580000003 HC RX 258: Performed by: NURSE ANESTHETIST, CERTIFIED REGISTERED

## 2023-08-11 PROCEDURE — 6360000002 HC RX W HCPCS: Performed by: ANESTHESIOLOGY

## 2023-08-11 PROCEDURE — 7100000001 HC PACU RECOVERY - ADDTL 15 MIN: Performed by: SURGERY

## 2023-08-11 PROCEDURE — 2500000003 HC RX 250 WO HCPCS: Performed by: NURSE ANESTHETIST, CERTIFIED REGISTERED

## 2023-08-11 PROCEDURE — 3700000000 HC ANESTHESIA ATTENDED CARE: Performed by: SURGERY

## 2023-08-11 PROCEDURE — 3600000003 HC SURGERY LEVEL 3 BASE: Performed by: SURGERY

## 2023-08-11 PROCEDURE — 7100000000 HC PACU RECOVERY - FIRST 15 MIN: Performed by: SURGERY

## 2023-08-11 PROCEDURE — 2580000003 HC RX 258: Performed by: ANESTHESIOLOGY

## 2023-08-11 PROCEDURE — 3600000013 HC SURGERY LEVEL 3 ADDTL 15MIN: Performed by: SURGERY

## 2023-08-11 PROCEDURE — C1781 MESH (IMPLANTABLE): HCPCS | Performed by: SURGERY

## 2023-08-11 PROCEDURE — A4217 STERILE WATER/SALINE, 500 ML: HCPCS | Performed by: SURGERY

## 2023-08-11 PROCEDURE — 7100000011 HC PHASE II RECOVERY - ADDTL 15 MIN: Performed by: SURGERY

## 2023-08-11 PROCEDURE — 6360000002 HC RX W HCPCS: Performed by: NURSE ANESTHETIST, CERTIFIED REGISTERED

## 2023-08-11 PROCEDURE — 3700000001 HC ADD 15 MINUTES (ANESTHESIA): Performed by: SURGERY

## 2023-08-11 PROCEDURE — 2580000003 HC RX 258: Performed by: SURGERY

## 2023-08-11 PROCEDURE — 2709999900 HC NON-CHARGEABLE SUPPLY: Performed by: SURGERY

## 2023-08-11 PROCEDURE — 2500000003 HC RX 250 WO HCPCS: Performed by: SURGERY

## 2023-08-11 DEVICE — MESH HERN W3XL6IN INGUINAL POLYPR MFIL RECTANG: Type: IMPLANTABLE DEVICE | Site: GROIN | Status: FUNCTIONAL

## 2023-08-11 RX ORDER — MEPERIDINE HYDROCHLORIDE 25 MG/ML
12.5 INJECTION INTRAMUSCULAR; INTRAVENOUS; SUBCUTANEOUS
Status: DISCONTINUED | OUTPATIENT
Start: 2023-08-11 | End: 2023-08-11 | Stop reason: HOSPADM

## 2023-08-11 RX ORDER — SODIUM CHLORIDE 9 MG/ML
INJECTION, SOLUTION INTRAVENOUS CONTINUOUS PRN
Status: DISCONTINUED | OUTPATIENT
Start: 2023-08-11 | End: 2023-08-11 | Stop reason: SDUPTHER

## 2023-08-11 RX ORDER — FENTANYL CITRATE 0.05 MG/ML
50 INJECTION, SOLUTION INTRAMUSCULAR; INTRAVENOUS EVERY 5 MIN PRN
Status: DISCONTINUED | OUTPATIENT
Start: 2023-08-11 | End: 2023-08-11 | Stop reason: HOSPADM

## 2023-08-11 RX ORDER — SODIUM CHLORIDE 0.9 % (FLUSH) 0.9 %
5-40 SYRINGE (ML) INJECTION EVERY 12 HOURS SCHEDULED
Status: DISCONTINUED | OUTPATIENT
Start: 2023-08-11 | End: 2023-08-11 | Stop reason: HOSPADM

## 2023-08-11 RX ORDER — DEXAMETHASONE SODIUM PHOSPHATE 4 MG/ML
INJECTION, SOLUTION INTRA-ARTICULAR; INTRALESIONAL; INTRAMUSCULAR; INTRAVENOUS; SOFT TISSUE PRN
Status: DISCONTINUED | OUTPATIENT
Start: 2023-08-11 | End: 2023-08-11 | Stop reason: SDUPTHER

## 2023-08-11 RX ORDER — MAGNESIUM HYDROXIDE 1200 MG/15ML
LIQUID ORAL CONTINUOUS PRN
Status: DISCONTINUED | OUTPATIENT
Start: 2023-08-11 | End: 2023-08-11 | Stop reason: HOSPADM

## 2023-08-11 RX ORDER — FENTANYL CITRATE 0.05 MG/ML
25 INJECTION, SOLUTION INTRAMUSCULAR; INTRAVENOUS EVERY 5 MIN PRN
Status: DISCONTINUED | OUTPATIENT
Start: 2023-08-11 | End: 2023-08-11 | Stop reason: HOSPADM

## 2023-08-11 RX ORDER — SODIUM CHLORIDE 9 MG/ML
INJECTION, SOLUTION INTRAVENOUS PRN
Status: DISCONTINUED | OUTPATIENT
Start: 2023-08-11 | End: 2023-08-11 | Stop reason: HOSPADM

## 2023-08-11 RX ORDER — ONDANSETRON 2 MG/ML
INJECTION INTRAMUSCULAR; INTRAVENOUS PRN
Status: DISCONTINUED | OUTPATIENT
Start: 2023-08-11 | End: 2023-08-11 | Stop reason: SDUPTHER

## 2023-08-11 RX ORDER — LIDOCAINE HYDROCHLORIDE 20 MG/ML
INJECTION, SOLUTION EPIDURAL; INFILTRATION; INTRACAUDAL; PERINEURAL PRN
Status: DISCONTINUED | OUTPATIENT
Start: 2023-08-11 | End: 2023-08-11 | Stop reason: SDUPTHER

## 2023-08-11 RX ORDER — TOPIRAMATE 25 MG/1
TABLET ORAL
Qty: 720 TABLET | Refills: 0 | Status: SHIPPED | OUTPATIENT
Start: 2023-08-11

## 2023-08-11 RX ORDER — ONDANSETRON 2 MG/ML
4 INJECTION INTRAMUSCULAR; INTRAVENOUS
Status: DISCONTINUED | OUTPATIENT
Start: 2023-08-11 | End: 2023-08-11 | Stop reason: HOSPADM

## 2023-08-11 RX ORDER — SODIUM CHLORIDE 0.9 % (FLUSH) 0.9 %
5-40 SYRINGE (ML) INJECTION PRN
Status: DISCONTINUED | OUTPATIENT
Start: 2023-08-11 | End: 2023-08-11 | Stop reason: HOSPADM

## 2023-08-11 RX ORDER — TRAMADOL HYDROCHLORIDE 50 MG/1
50 TABLET ORAL ONCE
Status: COMPLETED | OUTPATIENT
Start: 2023-08-11 | End: 2023-08-11

## 2023-08-11 RX ORDER — FENTANYL CITRATE 50 UG/ML
INJECTION, SOLUTION INTRAMUSCULAR; INTRAVENOUS PRN
Status: DISCONTINUED | OUTPATIENT
Start: 2023-08-11 | End: 2023-08-11 | Stop reason: SDUPTHER

## 2023-08-11 RX ORDER — BUPIVACAINE HYDROCHLORIDE 5 MG/ML
INJECTION, SOLUTION EPIDURAL; INTRACAUDAL
Status: COMPLETED | OUTPATIENT
Start: 2023-08-11 | End: 2023-08-11

## 2023-08-11 RX ORDER — LIDOCAINE HYDROCHLORIDE AND EPINEPHRINE 10; 10 MG/ML; UG/ML
INJECTION, SOLUTION INFILTRATION; PERINEURAL
Status: COMPLETED | OUTPATIENT
Start: 2023-08-11 | End: 2023-08-11

## 2023-08-11 RX ORDER — PROPOFOL 10 MG/ML
INJECTION, EMULSION INTRAVENOUS PRN
Status: DISCONTINUED | OUTPATIENT
Start: 2023-08-11 | End: 2023-08-11 | Stop reason: SDUPTHER

## 2023-08-11 RX ORDER — PROPOFOL 10 MG/ML
INJECTION, EMULSION INTRAVENOUS CONTINUOUS PRN
Status: DISCONTINUED | OUTPATIENT
Start: 2023-08-11 | End: 2023-08-11 | Stop reason: SDUPTHER

## 2023-08-11 RX ADMIN — ONDANSETRON 4 MG: 2 INJECTION INTRAMUSCULAR; INTRAVENOUS at 10:22

## 2023-08-11 RX ADMIN — SODIUM CHLORIDE: 9 INJECTION, SOLUTION INTRAVENOUS at 08:37

## 2023-08-11 RX ADMIN — TRAMADOL HYDROCHLORIDE 50 MG: 50 TABLET ORAL at 12:29

## 2023-08-11 RX ADMIN — DEXAMETHASONE SODIUM PHOSPHATE 8 MG: 4 INJECTION, SOLUTION INTRAMUSCULAR; INTRAVENOUS at 10:22

## 2023-08-11 RX ADMIN — PROPOFOL 125 MCG/KG/MIN: 10 INJECTION, EMULSION INTRAVENOUS at 10:06

## 2023-08-11 RX ADMIN — FENTANYL CITRATE 25 MCG: 50 INJECTION INTRAMUSCULAR; INTRAVENOUS at 10:10

## 2023-08-11 RX ADMIN — FENTANYL CITRATE 25 MCG: 50 INJECTION INTRAMUSCULAR; INTRAVENOUS at 10:26

## 2023-08-11 RX ADMIN — CEFAZOLIN 2000 MG: 2 INJECTION, POWDER, FOR SOLUTION INTRAMUSCULAR; INTRAVENOUS at 10:00

## 2023-08-11 RX ADMIN — SODIUM CHLORIDE: 9 INJECTION, SOLUTION INTRAVENOUS at 10:00

## 2023-08-11 RX ADMIN — FENTANYL CITRATE 25 MCG: 0.05 INJECTION, SOLUTION INTRAMUSCULAR; INTRAVENOUS at 11:23

## 2023-08-11 RX ADMIN — PROPOFOL 80 MG: 10 INJECTION, EMULSION INTRAVENOUS at 10:06

## 2023-08-11 RX ADMIN — LIDOCAINE HYDROCHLORIDE 80 MG: 20 INJECTION, SOLUTION EPIDURAL; INFILTRATION; INTRACAUDAL; PERINEURAL at 10:06

## 2023-08-11 ASSESSMENT — PAIN DESCRIPTION - PAIN TYPE
TYPE: SURGICAL PAIN
TYPE: SURGICAL PAIN

## 2023-08-11 ASSESSMENT — PAIN DESCRIPTION - ONSET
ONSET: ON-GOING
ONSET: ON-GOING

## 2023-08-11 ASSESSMENT — PAIN SCALES - GENERAL
PAINLEVEL_OUTOF10: 5
PAINLEVEL_OUTOF10: 0
PAINLEVEL_OUTOF10: 4
PAINLEVEL_OUTOF10: 5
PAINLEVEL_OUTOF10: 5
PAINLEVEL_OUTOF10: 6

## 2023-08-11 ASSESSMENT — PAIN DESCRIPTION - FREQUENCY
FREQUENCY: CONTINUOUS
FREQUENCY: CONTINUOUS

## 2023-08-11 ASSESSMENT — PAIN DESCRIPTION - ORIENTATION
ORIENTATION: RIGHT;LOWER
ORIENTATION: RIGHT

## 2023-08-11 ASSESSMENT — PAIN DESCRIPTION - LOCATION
LOCATION: GROIN
LOCATION: BACK;GENERALIZED
LOCATION: GROIN
LOCATION: ABDOMEN
LOCATION: GROIN

## 2023-08-11 ASSESSMENT — LIFESTYLE VARIABLES: SMOKING_STATUS: 0

## 2023-08-11 ASSESSMENT — PAIN - FUNCTIONAL ASSESSMENT
PAIN_FUNCTIONAL_ASSESSMENT: ACTIVITIES ARE NOT PREVENTED

## 2023-08-11 ASSESSMENT — PAIN DESCRIPTION - DESCRIPTORS
DESCRIPTORS: ACHING
DESCRIPTORS: DISCOMFORT

## 2023-08-11 NOTE — OP NOTE
Inguinal Hernia Operative Report      Patient: Leif Mcnulty MRN: [id number]     YOB: 1950  Age: 81 y.o. Sex: male        Primary Care Physician: Trip Edwards MD         DATE OF OPERATION: 8/11/2023     PREOPERATIVE DIAGNOSIS: right inguinal hernia    POSTOPERATIVE DIAGNOSIS: right inguinal hernia - indirect    PROCEDURE PERFORMED: right inguinal hernia repair with 3x6 inch Marlex mesh    SURGEON: Dipesh Shook MD, MD     ANESTHESIA: MAC with local anesthetic. ASA CLASS: 3    ANTIBIOTICS: Ancef 2 grams IV. DVT PROPHYLAXIS: Bilateral pneumatic compression boots. INDICATIONS:   The patient was seen in the office for an increasingly symptomatic, right inguinal hernia. As a result, the risks, benefits, and alternatives were discussed at length, including bleeding, infection, nerve pain, and recurrence. The patient's questions were answered and they were agreeable to proceed. DESCRIPTION OF PROCEDURE:   The patient was brought to the operating room suite and placed in the supine position on the operating room table. Anesthesia was induced which he tolerated well. The right groin was clipped free of hair and then prepped and draped in the usual sterile fashion. A timeout was performed, and all parties were in agreement. After injecting local anesthetic, a skin incision was made in the natural skin crease in the right groin. This was carried down sharply through Andrew's fascia. The external oblique fascia was cleared off and then additional local anesthetic was injected subfascially. The external oblique was opened in the direction of its fibers and flaps were raised. The ilioinguinal nerve was identified and preserved. The spermatic cord and its contents were encircled with a Penrose drain. A moderate indirect hernia was appreciated and dissected free from the spermatic cord. The hernia sac was highly ligated with a 2-0 silk suture. No other pathology was noted.  At that point, a

## 2023-08-11 NOTE — PROGRESS NOTES
Tolerating oral intake. Medicated for pain. Discharge instructions given to patient and wife. Verbalize understanding. States he is ready to go home.

## 2023-08-11 NOTE — H&P
Update History & Physical    The patient's History and Physical of August 3, 2023 was reviewed with the patient and I examined the patient. There was no change. Plan right inguinal hernia repair. Right groin marked. The surgical site was confirmed by the patient and me. Plan: The risks, benefits, expected outcome, and alternative to the recommended procedure have been discussed with the patient. Patient understands and wants to proceed with the procedure.      Electronically signed by Amy Barton MD on 8/11/2023 at 9:49 AM

## 2023-08-14 ENCOUNTER — PATIENT MESSAGE (OUTPATIENT)
Dept: FAMILY MEDICINE CLINIC | Age: 73
End: 2023-08-14

## 2023-08-14 DIAGNOSIS — G43.711 INTRACTABLE CHRONIC MIGRAINE WITHOUT AURA AND WITH STATUS MIGRAINOSUS: Primary | ICD-10-CM

## 2023-08-14 NOTE — TELEPHONE ENCOUNTER
From: Sandie Paniagua  To: Iraj Eli  Sent: 8/14/2023 1:22 PM EDT  Subject: request for prior authorization    I have a prescription for rizatriptan for migraines. Insurance will only allow 18 per 60 days but I have daily migraines. Can someone send a prior authorization form to request 18 per 30 days. The prescription has been set up this way in the past but insurance needs a new form. Thank you.

## 2023-08-15 RX ORDER — RIMEGEPANT SULFATE 75 MG/75MG
TABLET, ORALLY DISINTEGRATING ORAL
Qty: 15 TABLET | Refills: 5 | Status: SHIPPED | OUTPATIENT
Start: 2023-08-15

## 2023-08-15 NOTE — TELEPHONE ENCOUNTER
Chronic migraines uncontrolled with rizatriptan or sumatriptan. Uncontrolled with topiramate preventative. Also takes tramadol, which does not affect migraines. Based on significant frequent migraines, I believe it is medically necessary for the patient to have an effective preventative like Nurtec on board.     --Nayla Bowling, CELESTINO - CNP

## 2023-08-17 NOTE — TELEPHONE ENCOUNTER
Asuncion Flores MA routed conversation to [facility] Staff 1 hour ago (11:35 AM)     Asuncion Flores MA 1 hour ago (11:35 AM)     CD  They didn't receive my Appeal letter. So I received the appeal fax number that is different than the one on the  denial letter. Faxed there [phone]. I will follow up later .

## 2023-08-21 ENCOUNTER — TELEPHONE (OUTPATIENT)
Dept: FAMILY MEDICINE CLINIC | Age: 73
End: 2023-08-21

## 2023-08-23 RX ORDER — PANTOPRAZOLE SODIUM 40 MG/1
TABLET, DELAYED RELEASE ORAL
Qty: 60 TABLET | Refills: 2 | Status: SHIPPED | OUTPATIENT
Start: 2023-08-23 | End: 2023-08-24 | Stop reason: SDUPTHER

## 2023-08-23 NOTE — PROGRESS NOTES
C-diff Questionnaire:     * Admitted with diarrhea? [] YES    [x]  NO     *Prior history of C-Diff. In last 3 months? [] YES    [x]  NO     *Antibiotic use in the past 6-8 weeks? [x]  NO    []  YES      If yes, which: REASON_________________     *Prior hospitalization or nursing home in the last month? []  YES    [x]  NO     [facility]. .call before you fall    [address] time_____1130_______        Surgery time_____1300_______    Do not eat or drink anything after 12:00 midnight prior to your surgery. This includes water chewing gum, mints and ice chips- the Day of Surgery. You may brush your teeth and gargle the morning of your surgery, but do not swallow the water     Please see your family doctor/pediatrician for a history and physical and/or questions concerning medications. Bring any test results/reports from your physicians office. If you are under the care of a heart doctor or specialist doctor, please be aware that you may be asked to them for clearance    You may be asked to stop blood thinners such as Coumadin, Plavix, Fragmin, Lovenox, etc., or any anti-inflammatories such as:  Aspirin, Ibuprofen, Advil, Naproxen prior to your surgery. We also ask that you stop any OTC medications such as fish oil, vitamin E, glucosamine, garlic, Multivitamins, COQ 10, etc.    We ask that you do not smoke 24 hours prior to surgery  We ask that you do not  drink any alcoholic beverages 24 hours prior to surgery     You must make arrangements for a responsible adult to take you home after your surgery. For your safety you will not be allowed to leave alone or drive yourself home. Your surgery will be cancelled if you do not have a ride home. Also for your safety, it is strongly suggested that someone stay with you the first 24 hours after your surgery.      A parent or legal guardian must accompany a child scheduled for surgery and

## 2023-08-23 NOTE — PROGRESS NOTES
Follow Up Prior to Surgery    Day of PAT Visit: 23  :1950  DOS: 23      PAT Testing Appointment([facility] Desk Follow up): PAT nurse schedule PAT visit for 23 2 pm. Please follow up the day of the appointment that patient has arrived, and ALL testing has been completed.        Update: Patient arrived for PAT Visit appointment and ALL labs & EKG according to Pre-Admission Testing Order Set sent from Dr. Denice Sanchez office have been completed

## 2023-08-23 NOTE — PROGRESS NOTES
[facility] Pre-Admission Testing Electronic Communication Worksheet for OR/ENDO Procedures        Patient: Sharifa Kaiser    DOS: 9/11    Arrival Time: 1130    Surgery Time:1300    Meds to Bed:  [x] YES    []  NO    Transportation Confirmed: [x] YES    []  NO    History and Physical:  [] YES    []  NO  [] N/A  If yes, please list doctor or Urgent Care and date of H&P: see epic    Additional Clearance(Cardiac, Pulmonary, etc):  [] YES    [x]  NO    Pre-Admission Testing Visit:  [] YES    [x]  NO If no, do labs/testing need to be done DOS?   [] YES    []  NO    Medication Reconciliation Complete:  [x] YES    []  NO        Additional Notes:                Interview Complete: [x] YES    []  NO          Greta Cabot, RN  1:11 PM

## 2023-08-23 NOTE — PROGRESS NOTES
Follow Up Prior to Surgery    DOS: 23  :1950      History and Physical:  Luisa on 23 1010 for  [phone]    Update:  in epic for DOS

## 2023-08-23 NOTE — PROGRESS NOTES
Follow Up Prior to Surgery    DOS:   :1950      PAT Testing Appointment  Please follow up on : pt needs PAT appt for Lab work  (Orders not in epic at time of interview- PAT interview complete)        :

## 2023-08-24 ENCOUNTER — HOSPITAL ENCOUNTER (OUTPATIENT)
Dept: PREADMISSION TESTING | Age: 73
Discharge: HOME | End: 2023-08-24
Payer: COMMERCIAL

## 2023-08-24 ENCOUNTER — OFFICE VISIT (OUTPATIENT)
Dept: SURGERY | Age: 73
End: 2023-08-24

## 2023-08-24 ENCOUNTER — TELEPHONE (OUTPATIENT)
Dept: FAMILY MEDICINE CLINIC | Age: 73
End: 2023-08-24

## 2023-08-24 VITALS — WEIGHT: 153 LBS | BODY MASS INDEX: 20.19 KG/M2

## 2023-08-24 DIAGNOSIS — Z01.818 ENCOUNTER FOR PREADMISSION TESTING: ICD-10-CM

## 2023-08-24 DIAGNOSIS — K40.90 RIGHT INGUINAL HERNIA: Primary | ICD-10-CM

## 2023-08-24 LAB
ABO + RH BLD: NORMAL
ANION GAP SERPL CALCULATED.3IONS-SCNC: 7 MMOL/L (ref 3–16)
APTT BLD: 29.9 SEC (ref 22.7–35.9)
BLD GP AB SCN SERPL QL: NORMAL
BUN SERPL-MCNC: 11 MG/DL (ref 7–20)
CALCIUM SERPL-MCNC: 9.3 MG/DL (ref 8.3–10.6)
CHLORIDE SERPL-SCNC: 103 MMOL/L (ref 99–110)
CO2 SERPL-SCNC: 28 MMOL/L (ref 21–32)
CREAT SERPL-MCNC: 0.7 MG/DL (ref 0.8–1.3)
DEPRECATED RDW RBC AUTO: 13.7 % (ref 12.4–15.4)
EKG ATRIAL RATE: 71 BPM
EKG DIAGNOSIS: NORMAL
EKG P AXIS: 68 DEGREES
EKG P-R INTERVAL: 240 MS
EKG Q-T INTERVAL: 408 MS
EKG QRS DURATION: 92 MS
EKG QTC CALCULATION (BAZETT): 443 MS
EKG R AXIS: 58 DEGREES
EKG T AXIS: 71 DEGREES
EKG VENTRICULAR RATE: 71 BPM
GFR SERPLBLD CREATININE-BSD FMLA CKD-EPI: >60 ML/MIN/{1.73_M2}
GLUCOSE SERPL-MCNC: 105 MG/DL (ref 70–99)
HCT VFR BLD AUTO: 35.3 % (ref 40.5–52.5)
HGB BLD-MCNC: 12.2 G/DL (ref 13.5–17.5)
INR PPP: 0.99 (ref 0.84–1.16)
MCH RBC QN AUTO: 33 PG (ref 26–34)
MCHC RBC AUTO-ENTMCNC: 34.7 G/DL (ref 31–36)
MCV RBC AUTO: 95.3 FL (ref 80–100)
PLATELET # BLD AUTO: 261 K/UL (ref 135–450)
PMV BLD AUTO: 6.9 FL (ref 5–10.5)
POTASSIUM SERPL-SCNC: 4.4 MMOL/L (ref 3.5–5.1)
PROTHROMBIN TIME: 13.1 SEC (ref 11.5–14.8)
RBC # BLD AUTO: 3.7 M/UL (ref 4.2–5.9)
SODIUM SERPL-SCNC: 138 MMOL/L (ref 136–145)
WBC # BLD AUTO: 6 K/UL (ref 4–11)

## 2023-08-24 PROCEDURE — 93005 ELECTROCARDIOGRAM TRACING: CPT | Performed by: UROLOGY

## 2023-08-24 PROCEDURE — 86850 RBC ANTIBODY SCREEN: CPT

## 2023-08-24 PROCEDURE — 86901 BLOOD TYPING SEROLOGIC RH(D): CPT

## 2023-08-24 PROCEDURE — 99024 POSTOP FOLLOW-UP VISIT: CPT | Performed by: SURGERY

## 2023-08-24 PROCEDURE — 93010 ELECTROCARDIOGRAM REPORT: CPT | Performed by: INTERNAL MEDICINE

## 2023-08-24 PROCEDURE — 85730 THROMBOPLASTIN TIME PARTIAL: CPT

## 2023-08-24 PROCEDURE — 85610 PROTHROMBIN TIME: CPT

## 2023-08-24 PROCEDURE — 85027 COMPLETE CBC AUTOMATED: CPT

## 2023-08-24 PROCEDURE — 86900 BLOOD TYPING SEROLOGIC ABO: CPT

## 2023-08-24 PROCEDURE — 80048 BASIC METABOLIC PNL TOTAL CA: CPT

## 2023-08-24 RX ORDER — PANTOPRAZOLE SODIUM 40 MG/1
TABLET, DELAYED RELEASE ORAL
Qty: 180 TABLET | Refills: 2 | Status: SHIPPED | OUTPATIENT
Start: 2023-08-24

## 2023-08-24 NOTE — PROGRESS NOTES
Subjective:      Patient ID: Toni Aguillon is a 81 y.o. male. HPI  S/p RIH repair. Doing well. Still with soreness but has been fairly sedentary. Trying to get back into regular therapy. Scrotal swelling slowly improving. No apparent complications  Review of Systems    Objective:   Physical Exam  Wound healed  Repair intact  Assessment:       Diagnosis Orders   1.  Right inguinal hernia                Plan:      Recovering well  Increase activity, Continue light lifting another 4 weeks  Return PRN        Manoj Sheets MD
3 (mild pain)

## 2023-09-06 RX ORDER — GABAPENTIN 100 MG/1
CAPSULE ORAL
Qty: 150 CAPSULE | Refills: 2 | Status: SHIPPED | OUTPATIENT
Start: 2023-09-06 | End: 2023-12-05

## 2023-09-08 ENCOUNTER — ANESTHESIA EVENT (OUTPATIENT)
Dept: OPERATING ROOM | Age: 73
End: 2023-09-08
Payer: COMMERCIAL

## 2023-09-11 ENCOUNTER — ANESTHESIA (OUTPATIENT)
Dept: OPERATING ROOM | Age: 73
End: 2023-09-11
Payer: COMMERCIAL

## 2023-09-11 ENCOUNTER — HOSPITAL ENCOUNTER (OUTPATIENT)
Age: 73
Discharge: HOME | End: 2023-09-12
Attending: UROLOGY | Admitting: UROLOGY
Payer: COMMERCIAL

## 2023-09-11 DIAGNOSIS — N40.1 BPH WITH URINARY OBSTRUCTION: Primary | ICD-10-CM

## 2023-09-11 DIAGNOSIS — N40.1 BENIGN PROSTATIC HYPERPLASIA WITH LOWER URINARY TRACT SYMPTOMS, SYMPTOM DETAILS UNSPECIFIED: ICD-10-CM

## 2023-09-11 DIAGNOSIS — N13.8 BPH WITH URINARY OBSTRUCTION: Primary | ICD-10-CM

## 2023-09-11 DIAGNOSIS — Z01.818 ENCOUNTER FOR PREADMISSION TESTING: ICD-10-CM

## 2023-09-11 LAB
ABO + RH BLD: NORMAL
BLD GP AB SCN SERPL QL: NORMAL

## 2023-09-11 PROCEDURE — 86901 BLOOD TYPING SEROLOGIC RH(D): CPT

## 2023-09-11 PROCEDURE — 2720000010 HC SURG SUPPLY STERILE: Performed by: UROLOGY

## 2023-09-11 PROCEDURE — 2580000003 HC RX 258: Performed by: ANESTHESIOLOGY

## 2023-09-11 PROCEDURE — 86900 BLOOD TYPING SEROLOGIC ABO: CPT

## 2023-09-11 PROCEDURE — 2709999900 HC NON-CHARGEABLE SUPPLY: Performed by: UROLOGY

## 2023-09-11 PROCEDURE — 3700000001 HC ADD 15 MINUTES (ANESTHESIA): Performed by: UROLOGY

## 2023-09-11 PROCEDURE — 6360000002 HC RX W HCPCS: Performed by: UROLOGY

## 2023-09-11 PROCEDURE — 6370000000 HC RX 637 (ALT 250 FOR IP): Performed by: UROLOGY

## 2023-09-11 PROCEDURE — 3700000000 HC ANESTHESIA ATTENDED CARE: Performed by: UROLOGY

## 2023-09-11 PROCEDURE — 6360000002 HC RX W HCPCS

## 2023-09-11 PROCEDURE — 2500000003 HC RX 250 WO HCPCS

## 2023-09-11 PROCEDURE — 7100000000 HC PACU RECOVERY - FIRST 15 MIN: Performed by: UROLOGY

## 2023-09-11 PROCEDURE — 3600000004 HC SURGERY LEVEL 4 BASE: Performed by: UROLOGY

## 2023-09-11 PROCEDURE — 2580000003 HC RX 258: Performed by: UROLOGY

## 2023-09-11 PROCEDURE — 86850 RBC ANTIBODY SCREEN: CPT

## 2023-09-11 PROCEDURE — 88305 TISSUE EXAM BY PATHOLOGIST: CPT

## 2023-09-11 PROCEDURE — 7100000001 HC PACU RECOVERY - ADDTL 15 MIN: Performed by: UROLOGY

## 2023-09-11 PROCEDURE — 94760 N-INVAS EAR/PLS OXIMETRY 1: CPT

## 2023-09-11 PROCEDURE — 3600000014 HC SURGERY LEVEL 4 ADDTL 15MIN: Performed by: UROLOGY

## 2023-09-11 PROCEDURE — C9399 UNCLASSIFIED DRUGS OR BIOLOG: HCPCS

## 2023-09-11 RX ORDER — DEXAMETHASONE SODIUM PHOSPHATE 4 MG/ML
INJECTION, SOLUTION INTRA-ARTICULAR; INTRALESIONAL; INTRAMUSCULAR; INTRAVENOUS; SOFT TISSUE PRN
Status: DISCONTINUED | OUTPATIENT
Start: 2023-09-11 | End: 2023-09-11 | Stop reason: SDUPTHER

## 2023-09-11 RX ORDER — ALBUTEROL SULFATE 2.5 MG/3ML
2.5 SOLUTION RESPIRATORY (INHALATION) EVERY 6 HOURS PRN
Status: DISCONTINUED | OUTPATIENT
Start: 2023-09-11 | End: 2023-09-12 | Stop reason: HOSPADM

## 2023-09-11 RX ORDER — TRAMADOL HYDROCHLORIDE 50 MG/1
50 TABLET ORAL EVERY 6 HOURS PRN
Status: DISCONTINUED | OUTPATIENT
Start: 2023-09-11 | End: 2023-09-12 | Stop reason: HOSPADM

## 2023-09-11 RX ORDER — EPHEDRINE SULFATE/0.9% NACL/PF 50 MG/5 ML
SYRINGE (ML) INTRAVENOUS PRN
Status: DISCONTINUED | OUTPATIENT
Start: 2023-09-11 | End: 2023-09-11 | Stop reason: SDUPTHER

## 2023-09-11 RX ORDER — SODIUM CHLORIDE 9 MG/ML
INJECTION, SOLUTION INTRAVENOUS PRN
Status: DISCONTINUED | OUTPATIENT
Start: 2023-09-11 | End: 2023-09-11 | Stop reason: HOSPADM

## 2023-09-11 RX ORDER — ONDANSETRON 2 MG/ML
4 INJECTION INTRAMUSCULAR; INTRAVENOUS
Status: DISCONTINUED | OUTPATIENT
Start: 2023-09-11 | End: 2023-09-11 | Stop reason: HOSPADM

## 2023-09-11 RX ORDER — TAMSULOSIN HYDROCHLORIDE 0.4 MG/1
0.4 CAPSULE ORAL DAILY
Status: DISCONTINUED | OUTPATIENT
Start: 2023-09-11 | End: 2023-09-12 | Stop reason: HOSPADM

## 2023-09-11 RX ORDER — ONDANSETRON 2 MG/ML
INJECTION INTRAMUSCULAR; INTRAVENOUS PRN
Status: DISCONTINUED | OUTPATIENT
Start: 2023-09-11 | End: 2023-09-11 | Stop reason: SDUPTHER

## 2023-09-11 RX ORDER — ONDANSETRON 4 MG/1
4 TABLET, ORALLY DISINTEGRATING ORAL EVERY 8 HOURS PRN
Status: DISCONTINUED | OUTPATIENT
Start: 2023-09-11 | End: 2023-09-11 | Stop reason: SDUPTHER

## 2023-09-11 RX ORDER — PROPOFOL 10 MG/ML
INJECTION, EMULSION INTRAVENOUS PRN
Status: DISCONTINUED | OUTPATIENT
Start: 2023-09-11 | End: 2023-09-11 | Stop reason: SDUPTHER

## 2023-09-11 RX ORDER — MORPHINE SULFATE 2 MG/ML
2 INJECTION, SOLUTION INTRAMUSCULAR; INTRAVENOUS
Status: DISCONTINUED | OUTPATIENT
Start: 2023-09-11 | End: 2023-09-12 | Stop reason: HOSPADM

## 2023-09-11 RX ORDER — SODIUM CHLORIDE 0.9 % (FLUSH) 0.9 %
5-40 SYRINGE (ML) INJECTION EVERY 12 HOURS SCHEDULED
Status: DISCONTINUED | OUTPATIENT
Start: 2023-09-11 | End: 2023-09-12 | Stop reason: HOSPADM

## 2023-09-11 RX ORDER — SODIUM CHLORIDE 0.9 % (FLUSH) 0.9 %
5-40 SYRINGE (ML) INJECTION EVERY 12 HOURS SCHEDULED
Status: DISCONTINUED | OUTPATIENT
Start: 2023-09-11 | End: 2023-09-11 | Stop reason: HOSPADM

## 2023-09-11 RX ORDER — OXYCODONE HYDROCHLORIDE 10 MG/1
10 TABLET ORAL EVERY 4 HOURS PRN
Status: DISCONTINUED | OUTPATIENT
Start: 2023-09-11 | End: 2023-09-12 | Stop reason: HOSPADM

## 2023-09-11 RX ORDER — SODIUM CHLORIDE 9 MG/ML
INJECTION, SOLUTION INTRAVENOUS PRN
Status: DISCONTINUED | OUTPATIENT
Start: 2023-09-11 | End: 2023-09-12 | Stop reason: HOSPADM

## 2023-09-11 RX ORDER — ALBUTEROL SULFATE 90 UG/1
2 AEROSOL, METERED RESPIRATORY (INHALATION)
Status: DISCONTINUED | OUTPATIENT
Start: 2023-09-11 | End: 2023-09-12

## 2023-09-11 RX ORDER — UREA 10 %
500 LOTION (ML) TOPICAL DAILY
Status: DISCONTINUED | OUTPATIENT
Start: 2023-09-11 | End: 2023-09-11 | Stop reason: CLARIF

## 2023-09-11 RX ORDER — DROPERIDOL 2.5 MG/ML
0.62 INJECTION, SOLUTION INTRAMUSCULAR; INTRAVENOUS
Status: DISCONTINUED | OUTPATIENT
Start: 2023-09-11 | End: 2023-09-11 | Stop reason: HOSPADM

## 2023-09-11 RX ORDER — CETIRIZINE HYDROCHLORIDE 10 MG/1
10 TABLET ORAL DAILY
Status: DISCONTINUED | OUTPATIENT
Start: 2023-09-11 | End: 2023-09-12 | Stop reason: HOSPADM

## 2023-09-11 RX ORDER — SODIUM CHLORIDE 0.9 % (FLUSH) 0.9 %
5-40 SYRINGE (ML) INJECTION PRN
Status: DISCONTINUED | OUTPATIENT
Start: 2023-09-11 | End: 2023-09-12 | Stop reason: HOSPADM

## 2023-09-11 RX ORDER — GABAPENTIN 100 MG/1
100 CAPSULE ORAL 3 TIMES DAILY
Status: DISCONTINUED | OUTPATIENT
Start: 2023-09-11 | End: 2023-09-12 | Stop reason: HOSPADM

## 2023-09-11 RX ORDER — PHENAZOPYRIDINE HYDROCHLORIDE 100 MG/1
100 TABLET, FILM COATED ORAL EVERY 6 HOURS PRN
Status: DISCONTINUED | OUTPATIENT
Start: 2023-09-11 | End: 2023-09-12 | Stop reason: HOSPADM

## 2023-09-11 RX ORDER — TOPIRAMATE 100 MG/1
100 TABLET, FILM COATED ORAL 2 TIMES DAILY
Status: DISCONTINUED | OUTPATIENT
Start: 2023-09-11 | End: 2023-09-12 | Stop reason: HOSPADM

## 2023-09-11 RX ORDER — MORPHINE SULFATE 4 MG/ML
4 INJECTION, SOLUTION INTRAMUSCULAR; INTRAVENOUS
Status: DISCONTINUED | OUTPATIENT
Start: 2023-09-11 | End: 2023-09-12 | Stop reason: HOSPADM

## 2023-09-11 RX ORDER — PANTOPRAZOLE SODIUM 40 MG/1
40 TABLET, DELAYED RELEASE ORAL
Status: DISCONTINUED | OUTPATIENT
Start: 2023-09-12 | End: 2023-09-12 | Stop reason: HOSPADM

## 2023-09-11 RX ORDER — OXYCODONE HYDROCHLORIDE 5 MG/1
5 TABLET ORAL EVERY 4 HOURS PRN
Status: DISCONTINUED | OUTPATIENT
Start: 2023-09-11 | End: 2023-09-12 | Stop reason: HOSPADM

## 2023-09-11 RX ORDER — SODIUM CHLORIDE 9 MG/ML
INJECTION, SOLUTION INTRAVENOUS CONTINUOUS
Status: DISCONTINUED | OUTPATIENT
Start: 2023-09-11 | End: 2023-09-12 | Stop reason: HOSPADM

## 2023-09-11 RX ORDER — GLYCOPYRROLATE 0.2 MG/ML
INJECTION INTRAMUSCULAR; INTRAVENOUS PRN
Status: DISCONTINUED | OUTPATIENT
Start: 2023-09-11 | End: 2023-09-11 | Stop reason: SDUPTHER

## 2023-09-11 RX ORDER — SODIUM CHLORIDE 0.9 % (FLUSH) 0.9 %
5-40 SYRINGE (ML) INJECTION PRN
Status: DISCONTINUED | OUTPATIENT
Start: 2023-09-11 | End: 2023-09-11 | Stop reason: HOSPADM

## 2023-09-11 RX ORDER — IPRATROPIUM BROMIDE 42 UG/1
1 SPRAY, METERED NASAL 4 TIMES DAILY
Status: DISCONTINUED | OUTPATIENT
Start: 2023-09-11 | End: 2023-09-12 | Stop reason: HOSPADM

## 2023-09-11 RX ORDER — ACETAMINOPHEN 325 MG/1
650 TABLET ORAL EVERY 6 HOURS
Status: DISCONTINUED | OUTPATIENT
Start: 2023-09-11 | End: 2023-09-12 | Stop reason: HOSPADM

## 2023-09-11 RX ORDER — METHOCARBAMOL 100 MG/ML
INJECTION, SOLUTION INTRAMUSCULAR; INTRAVENOUS PRN
Status: DISCONTINUED | OUTPATIENT
Start: 2023-09-11 | End: 2023-09-11 | Stop reason: SDUPTHER

## 2023-09-11 RX ORDER — SUCCINYLCHOLINE/SOD CL,ISO/PF 200MG/10ML
SYRINGE (ML) INTRAVENOUS PRN
Status: DISCONTINUED | OUTPATIENT
Start: 2023-09-11 | End: 2023-09-11 | Stop reason: SDUPTHER

## 2023-09-11 RX ORDER — FENTANYL CITRATE 0.05 MG/ML
25 INJECTION, SOLUTION INTRAMUSCULAR; INTRAVENOUS EVERY 5 MIN PRN
Status: DISCONTINUED | OUTPATIENT
Start: 2023-09-11 | End: 2023-09-11 | Stop reason: HOSPADM

## 2023-09-11 RX ORDER — ROCURONIUM BROMIDE 10 MG/ML
INJECTION, SOLUTION INTRAVENOUS PRN
Status: DISCONTINUED | OUTPATIENT
Start: 2023-09-11 | End: 2023-09-11 | Stop reason: SDUPTHER

## 2023-09-11 RX ORDER — ONDANSETRON 2 MG/ML
4 INJECTION INTRAMUSCULAR; INTRAVENOUS EVERY 6 HOURS PRN
Status: DISCONTINUED | OUTPATIENT
Start: 2023-09-11 | End: 2023-09-12 | Stop reason: HOSPADM

## 2023-09-11 RX ORDER — OXYCODONE HYDROCHLORIDE 5 MG/1
5 TABLET ORAL
Status: DISCONTINUED | OUTPATIENT
Start: 2023-09-11 | End: 2023-09-11 | Stop reason: HOSPADM

## 2023-09-11 RX ORDER — FENTANYL CITRATE 50 UG/ML
INJECTION, SOLUTION INTRAMUSCULAR; INTRAVENOUS PRN
Status: DISCONTINUED | OUTPATIENT
Start: 2023-09-11 | End: 2023-09-11 | Stop reason: SDUPTHER

## 2023-09-11 RX ORDER — LIDOCAINE HYDROCHLORIDE 20 MG/ML
INJECTION, SOLUTION EPIDURAL; INFILTRATION; INTRACAUDAL; PERINEURAL PRN
Status: DISCONTINUED | OUTPATIENT
Start: 2023-09-11 | End: 2023-09-11 | Stop reason: SDUPTHER

## 2023-09-11 RX ORDER — PHENYLEPHRINE HCL IN 0.9% NACL 1 MG/10 ML
SYRINGE (ML) INTRAVENOUS PRN
Status: DISCONTINUED | OUTPATIENT
Start: 2023-09-11 | End: 2023-09-11 | Stop reason: SDUPTHER

## 2023-09-11 RX ORDER — ONDANSETRON 4 MG/1
4 TABLET, ORALLY DISINTEGRATING ORAL EVERY 8 HOURS PRN
Status: DISCONTINUED | OUTPATIENT
Start: 2023-09-11 | End: 2023-09-12 | Stop reason: HOSPADM

## 2023-09-11 RX ORDER — MEPERIDINE HYDROCHLORIDE 25 MG/ML
12.5 INJECTION INTRAMUSCULAR; INTRAVENOUS; SUBCUTANEOUS EVERY 5 MIN PRN
Status: DISCONTINUED | OUTPATIENT
Start: 2023-09-11 | End: 2023-09-11 | Stop reason: HOSPADM

## 2023-09-11 RX ORDER — IBUPROFEN 200 MG
TABLET ORAL 2 TIMES DAILY
Status: DISCONTINUED | OUTPATIENT
Start: 2023-09-11 | End: 2023-09-12 | Stop reason: HOSPADM

## 2023-09-11 RX ORDER — SUMATRIPTAN 50 MG/1
100 TABLET, FILM COATED ORAL PRN
Status: DISCONTINUED | OUTPATIENT
Start: 2023-09-11 | End: 2023-09-12 | Stop reason: HOSPADM

## 2023-09-11 RX ADMIN — ROCURONIUM BROMIDE 10 MG: 10 INJECTION INTRAVENOUS at 13:08

## 2023-09-11 RX ADMIN — Medication 10 ML: at 20:32

## 2023-09-11 RX ADMIN — Medication 5 MG: at 13:24

## 2023-09-11 RX ADMIN — FENTANYL CITRATE 50 MCG: 50 INJECTION INTRAMUSCULAR; INTRAVENOUS at 13:08

## 2023-09-11 RX ADMIN — ACETAMINOPHEN 650 MG: 325 TABLET ORAL at 20:37

## 2023-09-11 RX ADMIN — DEXAMETHASONE SODIUM PHOSPHATE 8 MG: 4 INJECTION, SOLUTION INTRAMUSCULAR; INTRAVENOUS at 13:18

## 2023-09-11 RX ADMIN — SODIUM CHLORIDE: 9 INJECTION, SOLUTION INTRAVENOUS at 13:02

## 2023-09-11 RX ADMIN — CEFTRIAXONE 2000 MG: 1 INJECTION, POWDER, FOR SOLUTION INTRAMUSCULAR; INTRAVENOUS at 13:05

## 2023-09-11 RX ADMIN — Medication 5 MG: at 14:02

## 2023-09-11 RX ADMIN — METHOCARBAMOL 100 MG: 100 INJECTION INTRAMUSCULAR; INTRAVENOUS at 13:28

## 2023-09-11 RX ADMIN — FENTANYL CITRATE 25 MCG: 50 INJECTION INTRAMUSCULAR; INTRAVENOUS at 13:27

## 2023-09-11 RX ADMIN — SUGAMMADEX 50 MG: 100 INJECTION, SOLUTION INTRAVENOUS at 14:03

## 2023-09-11 RX ADMIN — Medication 5 MG: at 13:33

## 2023-09-11 RX ADMIN — SUGAMMADEX 50 MG: 100 INJECTION, SOLUTION INTRAVENOUS at 14:00

## 2023-09-11 RX ADMIN — Medication 5 MG: at 13:37

## 2023-09-11 RX ADMIN — ACETAMINOPHEN 650 MG: 325 TABLET ORAL at 17:33

## 2023-09-11 RX ADMIN — OXYCODONE HYDROCHLORIDE 10 MG: 10 TABLET ORAL at 20:30

## 2023-09-11 RX ADMIN — Medication 50 MCG: at 13:18

## 2023-09-11 RX ADMIN — PROPOFOL 150 MG: 10 INJECTION, EMULSION INTRAVENOUS at 13:08

## 2023-09-11 RX ADMIN — GABAPENTIN 100 MG: 100 CAPSULE ORAL at 20:30

## 2023-09-11 RX ADMIN — MORPHINE SULFATE 4 MG: 4 INJECTION, SOLUTION INTRAMUSCULAR; INTRAVENOUS at 21:35

## 2023-09-11 RX ADMIN — MORPHINE SULFATE 4 MG: 4 INJECTION, SOLUTION INTRAMUSCULAR; INTRAVENOUS at 17:29

## 2023-09-11 RX ADMIN — CETIRIZINE HYDROCHLORIDE 10 MG: 10 TABLET, FILM COATED ORAL at 17:32

## 2023-09-11 RX ADMIN — BACITRACIN ZINC, NEOMYCIN, POLYMYXIN B SULFAT: 5000; 3.5; 4 OINTMENT TOPICAL at 20:30

## 2023-09-11 RX ADMIN — GABAPENTIN 100 MG: 100 CAPSULE ORAL at 17:31

## 2023-09-11 RX ADMIN — ONDANSETRON 4 MG: 2 INJECTION INTRAMUSCULAR; INTRAVENOUS at 13:52

## 2023-09-11 RX ADMIN — ROCURONIUM BROMIDE 20 MG: 10 INJECTION INTRAVENOUS at 13:44

## 2023-09-11 RX ADMIN — SUGAMMADEX 50 MG: 100 INJECTION, SOLUTION INTRAVENOUS at 14:01

## 2023-09-11 RX ADMIN — SUGAMMADEX 50 MG: 100 INJECTION, SOLUTION INTRAVENOUS at 14:02

## 2023-09-11 RX ADMIN — TOPIRAMATE 100 MG: 100 TABLET, FILM COATED ORAL at 20:30

## 2023-09-11 RX ADMIN — Medication 5 MG: at 13:42

## 2023-09-11 RX ADMIN — Medication 100 MG: at 13:08

## 2023-09-11 RX ADMIN — ROCURONIUM BROMIDE 30 MG: 10 INJECTION INTRAVENOUS at 13:15

## 2023-09-11 RX ADMIN — GLYCOPYRROLATE 0.2 MG: 0.2 INJECTION INTRAMUSCULAR; INTRAVENOUS at 13:17

## 2023-09-11 RX ADMIN — METHOCARBAMOL 100 MG: 100 INJECTION INTRAMUSCULAR; INTRAVENOUS at 13:53

## 2023-09-11 RX ADMIN — FENTANYL CITRATE 25 MCG: 50 INJECTION INTRAMUSCULAR; INTRAVENOUS at 13:52

## 2023-09-11 RX ADMIN — LIDOCAINE HYDROCHLORIDE 60 MG: 20 INJECTION, SOLUTION EPIDURAL; INFILTRATION; INTRACAUDAL; PERINEURAL at 13:08

## 2023-09-11 ASSESSMENT — PAIN DESCRIPTION - FREQUENCY: FREQUENCY: CONTINUOUS

## 2023-09-11 ASSESSMENT — PAIN DESCRIPTION - LOCATION
LOCATION: BACK
LOCATION: ABDOMEN;PENIS
LOCATION: ABDOMEN

## 2023-09-11 ASSESSMENT — PAIN - FUNCTIONAL ASSESSMENT
PAIN_FUNCTIONAL_ASSESSMENT: 0-10
PAIN_FUNCTIONAL_ASSESSMENT: ACTIVITIES ARE NOT PREVENTED

## 2023-09-11 ASSESSMENT — PAIN DESCRIPTION - DESCRIPTORS
DESCRIPTORS: ACHING
DESCRIPTORS: ACHING;DISCOMFORT
DESCRIPTORS: ACHING;CRAMPING

## 2023-09-11 ASSESSMENT — PAIN SCALES - GENERAL
PAINLEVEL_OUTOF10: 7
PAINLEVEL_OUTOF10: 2
PAINLEVEL_OUTOF10: 10
PAINLEVEL_OUTOF10: 7

## 2023-09-11 ASSESSMENT — PAIN DESCRIPTION - PAIN TYPE: TYPE: SURGICAL PAIN

## 2023-09-11 ASSESSMENT — LIFESTYLE VARIABLES: SMOKING_STATUS: 0

## 2023-09-11 ASSESSMENT — PAIN DESCRIPTION - ORIENTATION
ORIENTATION: MID
ORIENTATION: LOWER

## 2023-09-11 ASSESSMENT — PAIN DESCRIPTION - ONSET: ONSET: ON-GOING

## 2023-09-11 ASSESSMENT — PAIN SCALES - WONG BAKER: WONGBAKER_NUMERICALRESPONSE: 2

## 2023-09-11 NOTE — ANESTHESIA POSTPROCEDURE EVALUATION
Department of Anesthesiology  Postprocedure Note    Patient: Khari Canseco  MRN: [id number]  YOB: 1950  Date of evaluation: 9/11/2023      Procedure Summary     Date: 09/11/23 Room / Location: [facility]    Anesthesia Start: 5667 Anesthesia Stop: 3351    Procedure: CYSTOSCOPY TRANSURETHRAL RESECTION OF PROSTATE (Urethra) Diagnosis:       Benign prostatic hyperplasia with lower urinary tract symptoms, symptom details unspecified      (Benign prostatic hyperplasia with lower urinary tract symptoms, symptom details unspecified [N40.1])    Surgeons: Ivory Lemons MD Responsible Provider: Patricia Lozoya MD    Anesthesia Type: general ASA Status: 3          Anesthesia Type: No value filed. Nabil Phase I: Nabil Score: 10    Nabil Phase II:        Anesthesia Post Evaluation    Patient location during evaluation: PACU  Patient participation: complete - patient participated  Level of consciousness: awake  Airway patency: patent  Nausea & Vomiting: no nausea and no vomiting  Cardiovascular status: blood pressure returned to baseline  Respiratory status: acceptable  Hydration status: stable  Comments: Vital signs stable  OK to discharge from Stage I post anesthesia care.   Care transferred from Anesthesiology department on discharge from perioperative area   Multimodal analgesia pain management approach  Pain management: satisfactory to patient

## 2023-09-11 NOTE — H&P
Preoperative H&P Update    Bryanna Cho was seen, history and physical examination reviewed, and patient examined by me today. There have been no significant clinical changes since the completion of the previous history and physical.    The risk, benefits, and alternatives of the proposed procedure have been explained to the patient (or appropriate guardian) and understanding verbalized. All questions answered. Patient wishes to proceed.     Electronically signed by: Ann White MD,9/11/2023,12:46 PM

## 2023-09-11 NOTE — PROGRESS NOTES
Patient admitted to PACU # 8 from OR at 1415 post cystoscopy transurethral resection of prostate per Dr Manish Tuttle. Attached to PACU monitoring system and report received from anesthesia provider. Patient was reported to be hemodynamically stable during procedure. Patient with OPA in place upon arrival. CBI draining light pink.

## 2023-09-11 NOTE — ANESTHESIA PRE PROCEDURE
clear to auscultation  (+) asthma (dulera, albuterol PRN):     (-) COPD, sleep apnea and not a current smoker                           Cardiovascular:  Exercise tolerance: good (>4 METS),   (+) CAD:, hyperlipidemia    (-) hypertension, past MI and  angina        Rate: normal                 ROS comment: TTE 2020:  Conclusions      Summary   Normal left ventricle size, wall thickness and systolic function with an   estimated ejection fraction of 65 -70 %. No regional wall motion   abnormalities are seen. normal diastolic function   Normal right ventricular size and function. Neuro/Psych:   (+) neuromuscular disease:, headaches: migraine headaches,    (-) seizures, TIA and CVA           GI/Hepatic/Renal:   (+) hiatal hernia, GERD:,      (-) hepatitis and liver disease       Endo/Other:        (-) diabetes mellitus, hypothyroidism               Abdominal:             Vascular:     - DVT. Other Findings:             Anesthesia Plan      general     ASA 3     (72 yo with hx of HLD, CAD, hiatal hernia, GERD presents for cysto and TURP. I discussed with the patient the risks and benefits to general anesthesia including possible anesthetic complications but not limited to: PONV, damage to the airway and surrounding structures (teeth, lips, gums, tongue, etc.), adverse reactions to medications, cardiac complications (MI, CHF, arrhythmias, etc.), respiratory complications (post-op ventilation, respiratory failure, etc.), neurologic complications (nerve damage, stroke, seizure) and death. The patient was given the opportunity to ask questions and all questions were answered to the patient's satisfaction.  )  Induction: intravenous. MIPS: Postoperative opioids intended and Prophylactic antiemetics administered. Anesthetic plan and risks discussed with patient. Plan discussed with CRNA.                     This pre-anesthesia assessment may be used as a history and physical.    DOS STAFF ADDENDUM:    Pt

## 2023-09-11 NOTE — BRIEF OP NOTE
Brief Postoperative Note      Patient: Latasha Kuo  YOB: 1950  MRN: [id number]    Date of Procedure: 9/11/2023    Pre-Op Diagnosis Codes: * Benign prostatic hyperplasia with lower urinary tract symptoms, symptom details unspecified [N40.1]    Post-Op Diagnosis: Same       Procedure(s):  CYSTOSCOPY TRANSURETHRAL RESECTION OF PROSTATE    Surgeon(s):  Kyle Perez MD    Assistant:  * No surgical staff found *    Anesthesia: General    Estimated Blood Loss (mL): Minimal    Complications: None    Specimens:   ID Type Source Tests Collected by Time Destination   A : A.  Prostate Chips Tissue Prostate SURGICAL PATHOLOGY Kyle Perez MD 9/11/2023 1323        Implants:  * No implants in log *      Drains:   Urinary Catheter 09/11/23 3 Way (Active)   Catheter Indications Perioperative use for selected surgical procedures 09/11/23 1426   Site Assessment No urethral drainage 09/11/23 1426   Urine Color Pink 09/11/23 1426   Urine Appearance Clear 09/11/23 1426   Collection Container Standard 09/11/23 1426   Securement Method Securing device (Describe) 09/11/23 1426   Status Continuous bladder irrigation 09/11/23 1426       Findings: 50cc prostate, approx 15g removed      Electronically signed by Kyle Perez MD on 9/11/2023 at 2:47 PM

## 2023-09-12 VITALS
HEIGHT: 73 IN | HEART RATE: 87 BPM | OXYGEN SATURATION: 98 % | WEIGHT: 157.19 LBS | TEMPERATURE: 98.5 F | DIASTOLIC BLOOD PRESSURE: 71 MMHG | RESPIRATION RATE: 16 BRPM | BODY MASS INDEX: 20.83 KG/M2 | SYSTOLIC BLOOD PRESSURE: 148 MMHG

## 2023-09-12 LAB
ANION GAP SERPL CALCULATED.3IONS-SCNC: 6 MMOL/L (ref 3–16)
BUN SERPL-MCNC: 8 MG/DL (ref 7–20)
CALCIUM SERPL-MCNC: 8.6 MG/DL (ref 8.3–10.6)
CHLORIDE SERPL-SCNC: 109 MMOL/L (ref 99–110)
CO2 SERPL-SCNC: 25 MMOL/L (ref 21–32)
CREAT SERPL-MCNC: 0.5 MG/DL (ref 0.8–1.3)
DEPRECATED RDW RBC AUTO: 13.5 % (ref 12.4–15.4)
GFR SERPLBLD CREATININE-BSD FMLA CKD-EPI: >60 ML/MIN/{1.73_M2}
GLUCOSE SERPL-MCNC: 117 MG/DL (ref 70–99)
HCT VFR BLD AUTO: 29.3 % (ref 40.5–52.5)
HGB BLD-MCNC: 10.3 G/DL (ref 13.5–17.5)
MCH RBC QN AUTO: 33.6 PG (ref 26–34)
MCHC RBC AUTO-ENTMCNC: 35.1 G/DL (ref 31–36)
MCV RBC AUTO: 95.5 FL (ref 80–100)
PLATELET # BLD AUTO: 190 K/UL (ref 135–450)
PMV BLD AUTO: 6.8 FL (ref 5–10.5)
POTASSIUM SERPL-SCNC: 3.7 MMOL/L (ref 3.5–5.1)
RBC # BLD AUTO: 3.07 M/UL (ref 4.2–5.9)
SODIUM SERPL-SCNC: 140 MMOL/L (ref 136–145)
WBC # BLD AUTO: 8.2 K/UL (ref 4–11)

## 2023-09-12 PROCEDURE — 80048 BASIC METABOLIC PNL TOTAL CA: CPT

## 2023-09-12 PROCEDURE — 2580000003 HC RX 258: Performed by: UROLOGY

## 2023-09-12 PROCEDURE — 94760 N-INVAS EAR/PLS OXIMETRY 1: CPT

## 2023-09-12 PROCEDURE — 6370000000 HC RX 637 (ALT 250 FOR IP): Performed by: UROLOGY

## 2023-09-12 PROCEDURE — 6360000002 HC RX W HCPCS: Performed by: UROLOGY

## 2023-09-12 PROCEDURE — 85027 COMPLETE CBC AUTOMATED: CPT

## 2023-09-12 PROCEDURE — 36415 COLL VENOUS BLD VENIPUNCTURE: CPT

## 2023-09-12 RX ORDER — TRAMADOL HYDROCHLORIDE 50 MG/1
50 TABLET ORAL EVERY 6 HOURS PRN
Qty: 40 TABLET | Refills: 0 | Status: SHIPPED | OUTPATIENT
Start: 2023-09-12 | End: 2023-09-22

## 2023-09-12 RX ORDER — DULOXETIN HYDROCHLORIDE 30 MG/1
30 CAPSULE, DELAYED RELEASE ORAL DAILY
COMMUNITY

## 2023-09-12 RX ORDER — ALBUTEROL SULFATE 90 UG/1
2 AEROSOL, METERED RESPIRATORY (INHALATION) EVERY 4 HOURS PRN
Status: DISCONTINUED | OUTPATIENT
Start: 2023-09-12 | End: 2023-09-12 | Stop reason: HOSPADM

## 2023-09-12 RX ORDER — TRAMADOL HYDROCHLORIDE 50 MG/1
50 TABLET ORAL EVERY 6 HOURS PRN
Qty: 20 TABLET | Refills: 0 | Status: SHIPPED | OUTPATIENT
Start: 2023-09-12 | End: 2023-09-17

## 2023-09-12 RX ADMIN — SODIUM CHLORIDE: 9 INJECTION, SOLUTION INTRAVENOUS at 02:20

## 2023-09-12 RX ADMIN — MORPHINE SULFATE 2 MG: 2 INJECTION, SOLUTION INTRAMUSCULAR; INTRAVENOUS at 10:06

## 2023-09-12 RX ADMIN — IPRATROPIUM BROMIDE 1 SPRAY: 42 SPRAY NASAL at 02:21

## 2023-09-12 RX ADMIN — TRAMADOL HYDROCHLORIDE 50 MG: 50 TABLET ORAL at 06:38

## 2023-09-12 RX ADMIN — GABAPENTIN 100 MG: 100 CAPSULE ORAL at 08:29

## 2023-09-12 RX ADMIN — ONDANSETRON 4 MG: 2 INJECTION INTRAMUSCULAR; INTRAVENOUS at 11:14

## 2023-09-12 RX ADMIN — OXYCODONE HYDROCHLORIDE 10 MG: 10 TABLET ORAL at 08:31

## 2023-09-12 RX ADMIN — CETIRIZINE HYDROCHLORIDE 10 MG: 10 TABLET, FILM COATED ORAL at 08:29

## 2023-09-12 RX ADMIN — TOPIRAMATE 100 MG: 100 TABLET, FILM COATED ORAL at 08:28

## 2023-09-12 RX ADMIN — ACETAMINOPHEN 650 MG: 325 TABLET ORAL at 10:05

## 2023-09-12 RX ADMIN — ACETAMINOPHEN 650 MG: 325 TABLET ORAL at 04:29

## 2023-09-12 RX ADMIN — PANTOPRAZOLE SODIUM 40 MG: 40 TABLET, DELAYED RELEASE ORAL at 06:38

## 2023-09-12 RX ADMIN — OXYCODONE HYDROCHLORIDE 10 MG: 10 TABLET ORAL at 02:24

## 2023-09-12 ASSESSMENT — PAIN DESCRIPTION - LOCATION
LOCATION: PENIS
LOCATION: BACK
LOCATION: PENIS
LOCATION: BACK
LOCATION: PENIS

## 2023-09-12 ASSESSMENT — PAIN DESCRIPTION - DESCRIPTORS
DESCRIPTORS: ACHING;DISCOMFORT
DESCRIPTORS: ACHING
DESCRIPTORS: ACHING

## 2023-09-12 ASSESSMENT — PAIN DESCRIPTION - ORIENTATION
ORIENTATION: MID

## 2023-09-12 ASSESSMENT — PAIN DESCRIPTION - PAIN TYPE
TYPE: SURGICAL PAIN

## 2023-09-12 ASSESSMENT — PAIN SCALES - GENERAL
PAINLEVEL_OUTOF10: 8
PAINLEVEL_OUTOF10: 5
PAINLEVEL_OUTOF10: 6
PAINLEVEL_OUTOF10: 4
PAINLEVEL_OUTOF10: 5
PAINLEVEL_OUTOF10: 4
PAINLEVEL_OUTOF10: 2
PAINLEVEL_OUTOF10: 6
PAINLEVEL_OUTOF10: 8
PAINLEVEL_OUTOF10: 8

## 2023-09-12 ASSESSMENT — PAIN DESCRIPTION - FREQUENCY
FREQUENCY: CONTINUOUS

## 2023-09-12 ASSESSMENT — PAIN SCALES - WONG BAKER
WONGBAKER_NUMERICALRESPONSE: 0
WONGBAKER_NUMERICALRESPONSE: 2
WONGBAKER_NUMERICALRESPONSE: 0

## 2023-09-12 ASSESSMENT — PAIN - FUNCTIONAL ASSESSMENT
PAIN_FUNCTIONAL_ASSESSMENT: ACTIVITIES ARE NOT PREVENTED

## 2023-09-12 ASSESSMENT — PAIN DESCRIPTION - ONSET
ONSET: ON-GOING
ONSET: ON-GOING

## 2023-09-12 NOTE — PLAN OF CARE
Problem: Discharge Planning  Goal: Discharge to home or other facility with appropriate resources  9/12/2023 1254 by Maira Frances RN  Outcome: Completed  9/12/2023 1254 by Maira Frances RN  Outcome: Progressing  9/12/2023 0141 by Tiki Haddad RN  Outcome: Progressing  Flowsheets  Taken 9/11/2023 2030 by Tiki Haddad RN  Discharge to home or other facility with appropriate resources:   Identify barriers to discharge with patient and caregiver   Arrange for needed discharge resources and transportation as appropriate   Identify discharge learning needs (meds, wound care, etc)  Taken 9/11/2023 1734 by Maira Frances RN  Discharge to home or other facility with appropriate resources: Arrange for needed discharge resources and transportation as appropriate  Taken 9/11/2023 1607 by Maira Frances RN  Discharge to home or other facility with appropriate resources: Arrange for needed discharge resources and transportation as appropriate     Problem: Pain  Goal: Verbalizes/displays adequate comfort level or baseline comfort level  9/12/2023 1254 by Maira Frances RN  Outcome: Completed  9/12/2023 1254 by Maira Frances RN  Outcome: Progressing  9/12/2023 0141 by Tiki Haddad RN  Outcome: Progressing     Problem: Safety - Adult  Goal: Free from fall injury  9/12/2023 1254 by Maira Frances RN  Outcome: Completed  9/12/2023 1254 by Maira Frances RN  Outcome: Progressing  9/12/2023 0141 by Tiki Haddad RN  Outcome: Progressing  Flowsheets (Taken 9/12/2023 0000)  Free From Fall Injury:   Instruct family/caregiver on patient safety   Based on caregiver fall risk screen, instruct family/caregiver to ask for assistance with transferring infant if caregiver noted to have fall risk factors     Problem: ABCDS Injury Assessment  Goal: Absence of physical injury  9/12/2023 1254 by Maira Frances RN  Outcome: Completed  9/12/2023 1254 by Maira Frances RN  Outcome: Progressing  9/12/2023 0141 by Tiki Haddad RN  Outcome: Progressing  Flowsheets (Taken 9/12/2023 0000)  Absence of Physical Injury: Implement safety measures based on patient assessment

## 2023-09-12 NOTE — PLAN OF CARE
Problem: Discharge Planning  Goal: Discharge to home or other facility with appropriate resources  9/12/2023 1254 by Ronald Degroot RN  Outcome: Progressing  9/12/2023 0141 by Evelio Tripathi RN  Outcome: Progressing  Flowsheets  Taken 9/11/2023 2030 by Evelio Tripathi RN  Discharge to home or other facility with appropriate resources:   Identify barriers to discharge with patient and caregiver   Arrange for needed discharge resources and transportation as appropriate   Identify discharge learning needs (meds, wound care, etc)  Taken 9/11/2023 1734 by Ronald Degroot RN  Discharge to home or other facility with appropriate resources: Arrange for needed discharge resources and transportation as appropriate  Taken 9/11/2023 1607 by Ronald Degroot RN  Discharge to home or other facility with appropriate resources: Arrange for needed discharge resources and transportation as appropriate     Problem: Pain  Goal: Verbalizes/displays adequate comfort level or baseline comfort level  9/12/2023 1254 by Ronald Degroot RN  Outcome: Progressing  9/12/2023 0141 by Evelio Tripathi RN  Outcome: Progressing     Problem: Safety - Adult  Goal: Free from fall injury  9/12/2023 1254 by Ronald Degroot RN  Outcome: Progressing  9/12/2023 0141 by Evelio Tripathi RN  Outcome: Progressing  Flowsheets (Taken 9/12/2023 0000)  Free From Fall Injury:   Instruct family/caregiver on patient safety   Based on caregiver fall risk screen, instruct family/caregiver to ask for assistance with transferring infant if caregiver noted to have fall risk factors     Problem: ABCDS Injury Assessment  Goal: Absence of physical injury  9/12/2023 1254 by Ronald Degroot RN  Outcome: Progressing  9/12/2023 0141 by Evelio Tripathi RN  Outcome: Progressing  Flowsheets (Taken 9/12/2023 0000)  Absence of Physical Injury: Implement safety measures based on patient assessment

## 2023-09-12 NOTE — PROGRESS NOTES
Postop Note     S/p TURP - afeb , VSS    Maradiaga removed - mild GH - low PVR's - voiding well     D/c home

## 2023-09-12 NOTE — OP NOTE
[address], [facility] [address]                                OPERATIVE REPORT    PATIENT NAME: Leonela Rivera                    :        1950  MED REC NO:   [id number]                          ROOM:       4129  ACCOUNT NO:   [de-identified]                           ADMIT DATE: 2023  PROVIDER:     Imer Dumont. Glen Dubon MD    DATE OF PROCEDURE:  2023    PREOPERATIVE DIAGNOSIS:  BPH with lower urinary tract symptoms. POSTOPERATIVE DIAGNOSIS:  BPH with lower urinary tract symptoms. PROCEDURES:  Cystoscopy, transurethral resection of the prostate (TURP). ANESTHESIA:  General.    SURGEON:  Bethany Worthington MD    FINDINGS:  50 gm prostate. Approximately 15 gm removed. DRAINS:  24-[place] 3-way Maradiaga catheter. SPECIMEN:  Prostate chips. EBL:  Minimal.    COMPLICATIONS:  None immediate. DISPOSITION:  Stable to recovery. Admit to floor for routine  postoperative care. INDICATIONS FOR PROCEDURE:  The patient is a 74-year-old gentleman with  multiple medical problems including severe arthritis in his back for  which pain control was a severe problem for him. He has significant  lower urinary tract symptoms and underwent a workup with cystoscopy,  transrectal ultrasound of the prostate for volume. He is found to have  a very high bladder neck and a 50 gm prostate with lateral lobar  hyperplasia as well. He presents now for cystoscopy with TURP after  risks, benefits, and alternatives of the procedure were discussed in the  office in detail and he has elected to proceed. PROCEDURE:  The patient was properly identified in the preoperative area  and taken to the operating room, placed on the table in supine position. General anesthesia was induced and the patient was placed in dorsal  lithotomy position. He was prepped and draped in a standard fashion.    Antibiotics were given and time-out was

## 2023-09-12 NOTE — PROGRESS NOTES
Pharmacy Medication Reconciliation Note     List of medications Sandie Paniagua is currently taking is complete. Source of information:   1. Conversation with patient at bedside  2. Outpatient dispense report     Notes regarding home medications:   1. Removed cholecalciferol, methylfolate, rimegepant, and Spiriva from medication list as patient no longer takes them  2. Patient is taking gabapentin 300 mg TID   3. Patient is taking topiramate 25 mg as 3 tablets by mouth in the morning and 3 tablets by mouth in the evening   4.  Added duloxetine 30 mg QD    Rebeca Du, Pharmacy Student  9/12/2023  1:11 PM

## 2023-09-12 NOTE — PLAN OF CARE
Problem: Discharge Planning  Goal: Discharge to home or other facility with appropriate resources  Outcome: Progressing  Flowsheets  Taken 9/11/2023 1734 by Christal Gottlieb RN  Discharge to home or other facility with appropriate resources: Arrange for needed discharge resources and transportation as appropriate  Taken 9/11/2023 1607 by Christal Gottlieb RN  Discharge to home or other facility with appropriate resources: Arrange for needed discharge resources and transportation as appropriate     Problem: Pain  Goal: Verbalizes/displays adequate comfort level or baseline comfort level  Outcome: Progressing     Problem: Safety - Adult  Goal: Free from fall injury  Outcome: Progressing     Problem: ABCDS Injury Assessment  Goal: Absence of physical injury  Outcome: Progressing

## 2023-09-12 NOTE — PROGRESS NOTES
Pt discharged home. Transportation provided  by his wife. Instructions explained and all questions answered at this time. IV removed without complications.

## 2023-09-12 NOTE — PROGRESS NOTES
CBI removed without complications.      PVR:    Out 100 ml      bladder scan 124 ml  Out  250 ml    bladder Scan    0  ml  Out    200 ml   Bladder scan 200 ml

## 2023-11-16 RX ORDER — TOPIRAMATE 25 MG/1
75 TABLET ORAL 3 TIMES DAILY
Qty: 180 TABLET | Refills: 5 | Status: SHIPPED | OUTPATIENT
Start: 2023-11-16

## 2023-11-28 ENCOUNTER — TELEPHONE (OUTPATIENT)
Dept: FAMILY MEDICINE CLINIC | Age: 73
End: 2023-11-28

## 2023-11-28 NOTE — TELEPHONE ENCOUNTER
Plan of Treatment - Upcoming Encounters  Upcoming Encounters  Date Type Department Care Team Description   09/29/2023 1:15 PM EDT Office Visit [facility]   [address] [facility] [address], [address] [facility]  Redd Welch MD   [address] [facility] [address], [address] [address]   [phone] (Work)   [phone] (Fax)      NO LONGER OUR PT REMOVING [address] AS PCP. CALLED PHARMACY AND ADVISED TO NOT SEND US RX REQUESTS ANY LONGER. Shelbie Lakhani

## 2023-11-28 NOTE — TELEPHONE ENCOUNTER
Martin is calling to double check the directions on the   Topiramate  25 mg there are two sets of directions.       Please call Phoenix Chirinos  [phone]

## 2024-06-10 ENCOUNTER — HOSPITAL ENCOUNTER (OUTPATIENT)
Dept: MRI IMAGING | Age: 74
Discharge: HOME | End: 2024-06-10
Payer: COMMERCIAL

## 2024-06-10 DIAGNOSIS — G44.52 NDPH (NEW DAILY PERSISTENT HEADACHE): ICD-10-CM

## 2024-06-10 DIAGNOSIS — G43.709 CHRONIC MIGRAINE W/O AURA W/O STATUS MIGRAINOSUS, NOT INTRACTABLE: ICD-10-CM

## 2024-06-10 PROCEDURE — 6360000004 HC RX CONTRAST MEDICATION: Performed by: PSYCHIATRY & NEUROLOGY

## 2024-06-10 PROCEDURE — 70553 MRI BRAIN STEM W/O & W/DYE: CPT

## 2024-06-10 PROCEDURE — A9577 INJ MULTIHANCE: HCPCS | Performed by: PSYCHIATRY & NEUROLOGY

## 2024-06-10 RX ADMIN — GADOBENATE DIMEGLUMINE 16 ML: 529 INJECTION, SOLUTION INTRAVENOUS at 17:17

## 2024-07-31 ENCOUNTER — HOSPITAL ENCOUNTER (OUTPATIENT)
Dept: MRI IMAGING | Age: 74
Discharge: HOME | End: 2024-07-31
Payer: COMMERCIAL

## 2024-07-31 DIAGNOSIS — M48.062 SPINAL STENOSIS, LUMBAR REGION WITH NEUROGENIC CLAUDICATION: ICD-10-CM

## 2024-07-31 DIAGNOSIS — M54.50 LOW BACK PAIN, UNSPECIFIED BACK PAIN LATERALITY, UNSPECIFIED CHRONICITY, UNSPECIFIED WHETHER SCIATICA PRESENT: ICD-10-CM

## 2024-07-31 DIAGNOSIS — M54.6 PAIN IN THORACIC SPINE: ICD-10-CM

## 2024-07-31 PROCEDURE — 72146 MRI CHEST SPINE W/O DYE: CPT

## 2024-07-31 PROCEDURE — 72148 MRI LUMBAR SPINE W/O DYE: CPT

## 2024-10-11 DIAGNOSIS — M48.50XA WEDGING OF VERTEBRA, INITIAL ENCOUNTER (HCC): ICD-10-CM

## 2024-10-11 DIAGNOSIS — M81.0 SENILE OSTEOPOROSIS: ICD-10-CM

## 2024-10-11 DIAGNOSIS — S62.101S WRIST FRACTURE, RIGHT, SEQUELA: ICD-10-CM

## 2024-10-11 PROCEDURE — 77080 DXA BONE DENSITY AXIAL: CPT

## (undated) DEVICE — UNDERGLOVE SURG SZ 8 FNGR THK0.21MIL GRN LTX BEAD CUF

## (undated) DEVICE — SOLUTION IRRIG 1000ML STRL H2O USP PLAS POUR BTL

## (undated) DEVICE — SOLUTION SCRB 4OZ 10% POVIDONE IOD ANTIMIC BTL

## (undated) DEVICE — DRAPE,MINOR PROC,6X6 FEN, STER: Brand: MEDLINE

## (undated) DEVICE — GOWN SIRUS NONREIN LG W/TWL: Brand: MEDLINE INDUSTRIES, INC.

## (undated) DEVICE — BONE TAMP KIT KEX152EB FF E2 15/2 OI: Brand: KYPHON EXPRESS II KYPHOPAK TRAY

## (undated) DEVICE — SOLUTION IRRIG 1000ML 0.9% SOD CHL USP POUR PLAS BTL

## (undated) DEVICE — SYRINGE MED 10ML LUERLOCK TIP W/O SFTY DISP

## (undated) DEVICE — ELECTRODE PT RET AD L9FT HI MOIST COND ADH HYDRGEL CORDED

## (undated) DEVICE — ELECTRODE ELECSURG LOOP LG 12 DEG BPLR QUICK-FIRE

## (undated) DEVICE — LIQUIBAND RAPID ADHESIVE 36/CS 0.8ML: Brand: MEDLINE

## (undated) DEVICE — MINOR SET UP: Brand: MEDLINE INDUSTRIES, INC.

## (undated) DEVICE — GLOVE SURG SZ 8 CRM LTX FREE POLYISOPRENE POLYMER BEAD ANTI

## (undated) DEVICE — MERCY HEALTH WEST TURNOVER: Brand: MEDLINE INDUSTRIES, INC.

## (undated) DEVICE — INTENDED FOR TISSUE SEPARATION, AND OTHER PROCEDURES THAT REQUIRE A SHARP SURGICAL BLADE TO PUNCTURE OR CUT.: Brand: BARD-PARKER ® STAINLESS STEEL BLADES

## (undated) DEVICE — DRAPE THER FLUID WARMING 66X44 IN FLAT SLUSH DBL DISC ORS

## (undated) DEVICE — GLOVE ORANGE PI 7 1/2   MSG9075

## (undated) DEVICE — 3M™ TEGADERM™ TRANSPARENT FILM DRESSING FRAME STYLE, 1624W, 2-3/8 IN X 2-3/4 IN (6 CM X 7 CM), 100/CT 4CT/CASE: Brand: 3M™ TEGADERM™

## (undated) DEVICE — GLOVE SURG SZ 75 CRM LTX FREE POLYISOPRENE POLYMER BEAD ANTI

## (undated) DEVICE — STRL PENROSE DRAIN 18" X 1/2": Brand: CARDINAL HEALTH

## (undated) DEVICE — KIT KPE1003 KYPAK EXP TRY 15/2 FF W/EOIS: Brand: KYPHOPAK™ EXPRESS™ TRAY

## (undated) DEVICE — MAT FLR W32XL58IN

## (undated) DEVICE — SOLUTION PREP POVIDONE IOD FOR SKIN MUCOUS MEM PRIOR TO

## (undated) DEVICE — INTRODUCER T34A KYPHON EX OI DIA AND BEV: Brand: KYPHON® EXPRESS™ OSTEO INTRODUCER® SYSTEM

## (undated) DEVICE — SYRINGE,TOOMEY,IRRIGATION,70CC,STERILE: Brand: MEDLINE

## (undated) DEVICE — SHEET,DRAPE,53X77,STERILE: Brand: MEDLINE

## (undated) DEVICE — KIT JACK TBL PT CARE

## (undated) DEVICE — SUTURE PERMA-HAND SZ 2-0 L30IN NONABSORBABLE BLK L26MM SH K833H

## (undated) DEVICE — C-ARM: Brand: UNBRANDED

## (undated) DEVICE — GAUZE,SPONGE,2"X2",8PLY,STERILE,LF,2'S: Brand: MEDLINE

## (undated) DEVICE — CATHETER URETH 24FR BLLN 30CC RED LTX 3 W F SPEC M RND TIP

## (undated) DEVICE — STRIP,CLOSURE,WOUND,MEDI-STRIP,1/2X4: Brand: MEDLINE

## (undated) DEVICE — GLOVE ORANGE PI 7   MSG9070

## (undated) DEVICE — GOWN,AURORA,NONREINF,RAGLAN,XXL,STERILE: Brand: MEDLINE

## (undated) DEVICE — SUTURE VCRL + SZ 4-0 L18IN ABSRB UD L19MM PS-2 3/8 CIR PRIM VCP496H

## (undated) DEVICE — DALE FOLEY CATHETER HOLDER, LEGBAND, FITS UP TO 30": Brand: DALE FOLEY CATHETER HOLDER

## (undated) DEVICE — SUTURE PDS + SZ 2 0 L27IN ABSRB VLT L26MM CT 2 1 2 CIR PDP333H

## (undated) DEVICE — SYRINGE MED 30ML STD CLR PLAS LUERLOCK TIP N CTRL DISP

## (undated) DEVICE — CYSTOSCOPY: Brand: MEDLINE INDUSTRIES, INC.

## (undated) DEVICE — CLEANER,CAUTERY TIP,2X2",STERILE: Brand: MEDLINE

## (undated) DEVICE — SUTURE VCRL + SZ 0 L27IN ABSRB UD CT-1 L36MM 1/2 CIR TAPR VCP260H

## (undated) DEVICE — PREMIUM DRY TRAY LF: Brand: MEDLINE INDUSTRIES, INC.

## (undated) DEVICE — SOLUTION IV IRRIG WATER 1000ML POUR BRL 2F7114

## (undated) DEVICE — SUTURE VCRL + SZ 3-0 L27IN ABSRB UD L26MM SH 1/2 CIR VCP416H

## (undated) DEVICE — SHEET, T, LAPAROTOMY, STERILE: Brand: MEDLINE

## (undated) DEVICE — GOWN SIRUS NONREIN XL W/TWL: Brand: MEDLINE INDUSTRIES, INC.

## (undated) DEVICE — DRAINBAG,ANTI-REFLUX TOWER,L/F,2000ML,LL: Brand: MEDLINE

## (undated) DEVICE — 3M™ STERI-STRIP™ COMPOUND BENZOIN TINCTURE 40 BAGS/CARTON 4 CARTONS/CASE C1544: Brand: 3M™ STERI-STRIP™